# Patient Record
Sex: FEMALE | Race: WHITE | Employment: OTHER | URBAN - METROPOLITAN AREA
[De-identification: names, ages, dates, MRNs, and addresses within clinical notes are randomized per-mention and may not be internally consistent; named-entity substitution may affect disease eponyms.]

---

## 2022-04-22 ENCOUNTER — HOSPITAL ENCOUNTER (INPATIENT)
Age: 77
LOS: 19 days | Discharge: NURSING, REHAB OR LONG-TERM CARE | DRG: 064 | End: 2022-05-11
Attending: FAMILY MEDICINE | Admitting: FAMILY MEDICINE
Payer: MEDICARE

## 2022-04-22 DIAGNOSIS — G45.9 TIA (TRANSIENT ISCHEMIC ATTACK): Primary | ICD-10-CM

## 2022-04-22 PROBLEM — R29.90 STROKE-LIKE SYMPTOMS: Status: ACTIVE | Noted: 2022-04-22

## 2022-04-22 PROCEDURE — 2060000000 HC ICU INTERMEDIATE R&B

## 2022-04-22 RX ORDER — ASPIRIN 300 MG/1
300 SUPPOSITORY RECTAL DAILY
Status: DISCONTINUED | OUTPATIENT
Start: 2022-04-23 | End: 2022-05-04

## 2022-04-22 RX ORDER — RAMIPRIL 5 MG/1
5 CAPSULE ORAL DAILY
Status: ON HOLD | COMMUNITY
End: 2022-05-10 | Stop reason: HOSPADM

## 2022-04-22 RX ORDER — DILTIAZEM HYDROCHLORIDE 120 MG/1
120 CAPSULE, COATED, EXTENDED RELEASE ORAL DAILY
COMMUNITY

## 2022-04-22 RX ORDER — ATORVASTATIN CALCIUM 80 MG/1
80 TABLET, FILM COATED ORAL DAILY
COMMUNITY

## 2022-04-22 RX ORDER — ONDANSETRON 4 MG/1
4 TABLET, ORALLY DISINTEGRATING ORAL EVERY 8 HOURS PRN
Status: DISCONTINUED | OUTPATIENT
Start: 2022-04-22 | End: 2022-05-11 | Stop reason: HOSPADM

## 2022-04-22 RX ORDER — ASPIRIN 81 MG/1
81 TABLET ORAL DAILY
Status: DISCONTINUED | OUTPATIENT
Start: 2022-04-23 | End: 2022-05-11 | Stop reason: HOSPADM

## 2022-04-22 RX ORDER — ENOXAPARIN SODIUM 100 MG/ML
40 INJECTION SUBCUTANEOUS DAILY
Status: DISCONTINUED | OUTPATIENT
Start: 2022-04-23 | End: 2022-04-24

## 2022-04-22 RX ORDER — POLYETHYLENE GLYCOL 3350 17 G/17G
17 POWDER, FOR SOLUTION ORAL DAILY PRN
Status: DISCONTINUED | OUTPATIENT
Start: 2022-04-22 | End: 2022-05-11 | Stop reason: HOSPADM

## 2022-04-22 RX ORDER — ONDANSETRON 2 MG/ML
4 INJECTION INTRAMUSCULAR; INTRAVENOUS EVERY 6 HOURS PRN
Status: DISCONTINUED | OUTPATIENT
Start: 2022-04-22 | End: 2022-05-11 | Stop reason: HOSPADM

## 2022-04-22 RX ORDER — OMEPRAZOLE 20 MG/1
20 CAPSULE, DELAYED RELEASE ORAL DAILY
COMMUNITY
End: 2022-05-26

## 2022-04-22 RX ORDER — ATORVASTATIN CALCIUM 40 MG/1
80 TABLET, FILM COATED ORAL NIGHTLY
Status: DISCONTINUED | OUTPATIENT
Start: 2022-04-22 | End: 2022-05-11 | Stop reason: HOSPADM

## 2022-04-22 NOTE — LETTER
[address] Medicaid  CERTIFICATION OF NECESSITY  FOR TRANSPORTATION   BY WHEELCHAIR VAN     Individual Information   1. Name: Shaji Smith 2. [place] Medicaid Billing Number:    3. Address: [address]  [address] [facility]      Transportation Provider Information   4. Provider Name: MARCIA   5. [place] Medicaid Provider Number:  National Provider Identifier (NPI):      Certification  7. Criteria:  By signing this document, the practitioner certifies that two statements are true:  A. This individual must be accompanied by a mobility-related assistive device from the point of pick-up to the point of drop-off. B. Transport of this individual by standard passenger vehicle or common carrier is precluded or contraindicated. 8. Period Beginning Date: 5/3/2022   9. Length  [x] Not more than 10 day(s)  [] One Year     Additional Information Relevant to Certification   10. Comments or Explanations, If Necessary or Appropriate     Stroke like symptoms, bilateral lower ext dvts HLD & PE     Certifying Practitioner Information   11. Name of Practitioner: Jade Bartholomew   12. [place] Medicaid Provider Number, If Applicable:  Hao 62 Provider Identifier (NPI):      Signature Information   14. Date of Signature: 5/3/2022 15. Name of Person Signing: Electronically signed by Emelina Bryant RN on 5/3/2022 at 11:18 AM     16. Signature and Professional Designation: Electronically signed by Emelina Bryant RN/ & discharge planner on 5/3/2022 at 11:18 AM       ODM 87906  Rev. 7/2015       [address] Encounter Date/Time: 4/22/2022 2138    Hospital Account: [de-identified]    MRN: [id number]    Patient: Patience Apo Serial #: [id number]      ENCOUNTER          Patient Class: I Private Enc?   No Unit RM BD: [address] [facility]  Service: MED   Encounter DX: Stroke-like symptoms [R2*   ADM Provider: Rosalba Agosto MD   Procedure:     ATT Provider: Jennifer Dos Santos MD   REF Provider: Cally Buenrostro Shirley Perkins      Admission DX: Stroke-like symptoms and DX codes: R29.90      PATIENT                 Name: Jeanie Almendarez : 1945 (76 yrs)   Address: [address] [place] [address] Sex: Female   [place]: [address] [place] [address]         Marital Status:    Employer: [employer]         Presybeterian: Mandaeism   Primary Care Provider: Jacob Agosto MD         Primary Phone: [phone]   EMERGENCY CONTACT   Contact Name Legal Guardian? Relationship to Patient Home Phone Work Phone   1. jason mackay  2. *No Contact Specified*      Child    [phone]                 GUARANTOR            GuarantorRbryant Molina     : 1945   Address: [address] Sex: Female     [place] [id number]     Relation to Patient: Self       Home Phone: [phone]   Guarantor ID: [id number]       Work Phone:     Guarantor Employer: [employer]         Status: UNKNOWN      COVERAGE        PRIMARY INSURANCE   Payor: [facility] MEDICARE Plan: Latrice Gal MEDICARE ADVANTAGE*   Payor Address: [facility] [id number] [id number]       Group Number: [id number] Insurance Type: [address] Name: Jcarlos Watts : 1945   Subscriber ID: [id number] Dianna Goldman. Rel. to Sub: Self   SECONDARY INSURANCE   Payor:   Plan:     Payor Address:  ,           Group Number:   Insurance Type:     Subscriber Name:   Subscriber :     Subscriber ID:   Pat.  Rel. to Sub:             CSN: [id number]

## 2022-04-22 NOTE — LETTER
[address] [facility] Medicaid Billing Number: [facility] Medicaid  CERTIFICATION OF NECESSITY  FOR NON-EMERGENCY TRANSPORTATION   BY GROUND AMBULANCE      Individual Information   1. Name: Sheridan Goltz [id number]     3. Address: [address]  [address] [facility]      Transportation Provider Information   4. Provider Name: jose l    5. [place] Provider Number:  National Provider Identifier (NPI):      Certification  7. Criteria:  During transport, this individual requires:  [x] Medical treatment or continuous     supervision by an EMT. [x] The administration or regulation of oxygen by another person. [] Supervised protective restraint. 8. Period Beginning Date: 2022   9. Length  [x] Not more than 10 day(s)  [] One Year     Additional Information Relevant to Certification   10. Comments or Explanations, If Necessary or Appropriate     02 l nc unable to maintain. Sever muscular weakness and deconditioned state that precludes any significant physical activity      Certifying Practitioner Information   11. Name of Practitioner: MADIHA Gray 62 Provider Identifier (NPI):      Signature Information   14. Date of Signature: 2022 15. Name of Person Signing: Lida Robin    Electronically signed by DERECK Sheriff on 2022 at 12:41 PM       OD 41964  Rev. 2015          Samiagallito Bazan : 1945 (76 yrs)    Address: , [facility] Sex: Female   [place] city: [address] [place] [address]         Marital Status:    Employer: [employer]         Confucianist: Cheondoism   Primary Care Provider: Brian Gutierrez MD         Primary Phone: [phone]   EMERGENCY CONTACT   Contact Name Legal Guardian?  Relationship to Patient Home Phone Work Phone   1. jason mackay  2. *No Contact Specified*      Child    [phone]                 GUARANTOR            GuarantorLuceli Ohm     : 1945   Address: [address] Sex: Female     [place] [address]     Relation to Patient: Self       Home Phone: [phone]   Guarantor ID: [id number]       Work Phone:     Guarantor Employer: [employer]         Status: UNKNOWN      COVERAGE        PRIMARY INSURANCE   Payor: [place] MEDICARE Plan: [facility] MEDICARE ADVANTAGE*   Payor Address: [facility] [id number],  [place] [phone]       Group Number: [id number] Insurance Type: [address] Name: Beaverc[place]us Krishnan : 1945   Subscriber ID: [id number] Juan Jose Cullen.  Rel. to Sub: Self       Ss#

## 2022-04-23 ENCOUNTER — APPOINTMENT (OUTPATIENT)
Dept: MRI IMAGING | Age: 77
DRG: 064 | End: 2022-04-23
Attending: FAMILY MEDICINE
Payer: MEDICARE

## 2022-04-23 LAB
ANION GAP SERPL CALCULATED.3IONS-SCNC: 12 MMOL/L (ref 7–16)
BUN BLDV-MCNC: 21 MG/DL (ref 6–23)
CALCIUM SERPL-MCNC: 8.5 MG/DL (ref 8.6–10.2)
CHLORIDE BLD-SCNC: 111 MMOL/L (ref 98–107)
CHOLESTEROL, TOTAL: 140 MG/DL (ref 0–199)
CO2: 18 MMOL/L (ref 22–29)
CREAT SERPL-MCNC: 1.2 MG/DL (ref 0.5–1)
CREATININE URINE: 82 MG/DL (ref 29–226)
CREATININE URINE: 85 MG/DL (ref 29–226)
GFR AFRICAN AMERICAN: 53
GFR NON-AFRICAN AMERICAN: 44 ML/MIN/1.73
GLUCOSE BLD-MCNC: 109 MG/DL (ref 74–99)
HBA1C MFR BLD: 5.6 % (ref 4–5.6)
HCT VFR BLD CALC: 23.4 % (ref 34–48)
HDLC SERPL-MCNC: 48 MG/DL
HEMOGLOBIN: 7.1 G/DL (ref 11.5–15.5)
LDL CHOLESTEROL CALCULATED: 75 MG/DL (ref 0–99)
MCH RBC QN AUTO: 27.8 PG (ref 26–35)
MCHC RBC AUTO-ENTMCNC: 30.3 % (ref 32–34.5)
MCV RBC AUTO: 91.8 FL (ref 80–99.9)
MICROALBUMIN UR-MCNC: 99 MG/L
MICROALBUMIN/CREAT UR-RTO: 116.5 (ref 0–30)
OSMOLALITY URINE: 511 MOSM/KG (ref 300–900)
PDW BLD-RTO: 13.7 FL (ref 11.5–15)
PLATELET # BLD: 280 E9/L (ref 130–450)
PMV BLD AUTO: 10 FL (ref 7–12)
POTASSIUM REFLEX MAGNESIUM: 3.7 MMOL/L (ref 3.5–5)
RBC # BLD: 2.55 E12/L (ref 3.5–5.5)
SODIUM BLD-SCNC: 141 MMOL/L (ref 132–146)
SODIUM URINE: 108 MMOL/L
TRIGL SERPL-MCNC: 87 MG/DL (ref 0–149)
UREA NITROGEN, UR: 498 MG/DL (ref 800–1666)
VITAMIN D 25-HYDROXY: 31 NG/ML (ref 30–100)
VLDLC SERPL CALC-MCNC: 17 MG/DL
WBC # BLD: 9.9 E9/L (ref 4.5–11.5)

## 2022-04-23 PROCEDURE — 97161 PT EVAL LOW COMPLEX 20 MIN: CPT

## 2022-04-23 PROCEDURE — 92523 SPEECH SOUND LANG COMPREHEN: CPT | Performed by: SPEECH-LANGUAGE PATHOLOGIST

## 2022-04-23 PROCEDURE — 80061 LIPID PANEL: CPT

## 2022-04-23 PROCEDURE — 6360000002 HC RX W HCPCS: Performed by: FAMILY MEDICINE

## 2022-04-23 PROCEDURE — 97165 OT EVAL LOW COMPLEX 30 MIN: CPT

## 2022-04-23 PROCEDURE — 82570 ASSAY OF URINE CREATININE: CPT

## 2022-04-23 PROCEDURE — 70551 MRI BRAIN STEM W/O DYE: CPT

## 2022-04-23 PROCEDURE — 92610 EVALUATE SWALLOWING FUNCTION: CPT | Performed by: SPEECH-LANGUAGE PATHOLOGIST

## 2022-04-23 PROCEDURE — 85027 COMPLETE CBC AUTOMATED: CPT

## 2022-04-23 PROCEDURE — 84300 ASSAY OF URINE SODIUM: CPT

## 2022-04-23 PROCEDURE — 2060000000 HC ICU INTERMEDIATE R&B

## 2022-04-23 PROCEDURE — 6370000000 HC RX 637 (ALT 250 FOR IP): Performed by: NURSE PRACTITIONER

## 2022-04-23 PROCEDURE — 84540 ASSAY OF URINE/UREA-N: CPT

## 2022-04-23 PROCEDURE — 83036 HEMOGLOBIN GLYCOSYLATED A1C: CPT

## 2022-04-23 PROCEDURE — 36415 COLL VENOUS BLD VENIPUNCTURE: CPT

## 2022-04-23 PROCEDURE — 83935 ASSAY OF URINE OSMOLALITY: CPT

## 2022-04-23 PROCEDURE — 6370000000 HC RX 637 (ALT 250 FOR IP): Performed by: FAMILY MEDICINE

## 2022-04-23 PROCEDURE — 99222 1ST HOSP IP/OBS MODERATE 55: CPT | Performed by: PSYCHIATRY & NEUROLOGY

## 2022-04-23 PROCEDURE — 97530 THERAPEUTIC ACTIVITIES: CPT

## 2022-04-23 PROCEDURE — 92526 ORAL FUNCTION THERAPY: CPT | Performed by: SPEECH-LANGUAGE PATHOLOGIST

## 2022-04-23 PROCEDURE — 82306 VITAMIN D 25 HYDROXY: CPT

## 2022-04-23 PROCEDURE — 80048 BASIC METABOLIC PNL TOTAL CA: CPT

## 2022-04-23 PROCEDURE — 97535 SELF CARE MNGMENT TRAINING: CPT

## 2022-04-23 PROCEDURE — 82044 UR ALBUMIN SEMIQUANTITATIVE: CPT

## 2022-04-23 RX ORDER — ACETAMINOPHEN 325 MG/1
650 TABLET ORAL EVERY 6 HOURS PRN
Status: DISCONTINUED | OUTPATIENT
Start: 2022-04-23 | End: 2022-04-24

## 2022-04-23 RX ORDER — PANTOPRAZOLE SODIUM 40 MG/1
40 TABLET, DELAYED RELEASE ORAL
Status: DISCONTINUED | OUTPATIENT
Start: 2022-04-24 | End: 2022-04-26

## 2022-04-23 RX ORDER — DILTIAZEM HYDROCHLORIDE 120 MG/1
120 CAPSULE, COATED, EXTENDED RELEASE ORAL DAILY
Status: DISCONTINUED | OUTPATIENT
Start: 2022-04-23 | End: 2022-05-11 | Stop reason: HOSPADM

## 2022-04-23 RX ORDER — ATORVASTATIN CALCIUM 40 MG/1
80 TABLET, FILM COATED ORAL DAILY
Status: DISCONTINUED | OUTPATIENT
Start: 2022-04-23 | End: 2022-04-23

## 2022-04-23 RX ORDER — RAMIPRIL 5 MG/1
5 CAPSULE ORAL DAILY
Status: DISCONTINUED | OUTPATIENT
Start: 2022-04-23 | End: 2022-05-11 | Stop reason: HOSPADM

## 2022-04-23 RX ORDER — CHOLECALCIFEROL (VITAMIN D3) 50 MCG
5000 TABLET ORAL DAILY
Status: DISCONTINUED | OUTPATIENT
Start: 2022-04-23 | End: 2022-05-11 | Stop reason: HOSPADM

## 2022-04-23 RX ADMIN — Medication 5000 UNITS: at 09:41

## 2022-04-23 RX ADMIN — ACETAMINOPHEN 650 MG: 325 TABLET ORAL at 14:10

## 2022-04-23 RX ADMIN — ENOXAPARIN SODIUM 40 MG: 100 INJECTION SUBCUTANEOUS at 09:47

## 2022-04-23 RX ADMIN — ACETAMINOPHEN 650 MG: 325 TABLET ORAL at 03:16

## 2022-04-23 RX ADMIN — ATORVASTATIN CALCIUM 80 MG: 40 TABLET, FILM COATED ORAL at 21:30

## 2022-04-23 RX ADMIN — DILTIAZEM HYDROCHLORIDE 120 MG: 120 CAPSULE, COATED, EXTENDED RELEASE ORAL at 10:33

## 2022-04-23 RX ADMIN — ASPIRIN 81 MG: 81 TABLET, COATED ORAL at 09:41

## 2022-04-23 ASSESSMENT — PAIN DESCRIPTION - LOCATION: LOCATION: HEAD

## 2022-04-23 ASSESSMENT — PAIN SCALES - GENERAL
PAINLEVEL_OUTOF10: 4
PAINLEVEL_OUTOF10: 3

## 2022-04-23 ASSESSMENT — PAIN DESCRIPTION - DESCRIPTORS: DESCRIPTORS: TIGHTNESS;ACHING

## 2022-04-23 ASSESSMENT — PAIN DESCRIPTION - ORIENTATION: ORIENTATION: RIGHT

## 2022-04-23 NOTE — PROGRESS NOTES
Hospitalist Progress Note      Synopsis: Patient admitted as a transfer from [facility] for strokelike symptoms. Patient presented to [facility] with sudden onset of left-sided weakness and aphasia that occurred while in the middle of a conversation with her daughter. In ED she was noted to have severe aphasia, severe dysarthria, and left arm and leg drift, and rightward gaze, and left-sided hemianopia. She is for MRI of the brain and echo. Neurology has been consulted. Hospital day 1     Subjective:  Stable overnight. No issues reported. Patient seen and examined. Daughter at bedside reports her symptoms have improved significantly since arrival to [address] [place] [address] ED. Patient herself reports that her left leg and arm are numb though she does have full movement of the left leg and shoulder movement of the left arm. She also complaints of a right frontal headache that isn't severe but more of a nuisance. Daughter states patient recently established with a kidney doctor who resumed her ramipril   Records reviewed. Temp (24hrs), Av.5 °F (36.9 °C), Min:97.8 °F (36.6 °C), Max:99.1 °F (37.3 °C)    DIET: Diet NPO  CODE: Full Code    Intake/Output Summary (Last 24 hours) at 2022 1353  Last data filed at 2022 0929  Gross per 24 hour   Intake 0 ml   Output 700 ml   Net -700 ml       Review of Systems: All bolded are positive; please see HPI  General:  Fever, chills, diaphoresis, fatigue, malaise, night sweats, weight loss  Psychological:  Anxiety, disorientation, hallucinations. ENT:  Epistaxis, headaches, vertigo, visual changes. Cardiovascular:  Chest pain, irregular heartbeats, palpitations, paroxysmal nocturnal dyspnea. Respiratory:  Shortness of breath, coughing, sputum production, hemoptysis, wheezing, orthopnea.   Gastrointestinal:  Nausea, vomiting, diarrhea, heartburn, constipation, abdominal pain, hematemesis, hematochezia, melena, acholic stools  Genito-Urinary: BILITOT, AMYLASE, LIPASE in the last 72 hours. No results for input(s): INR in the last 72 hours. No results for input(s): Naima Comment in the last 72 hours. Chronic labs:    Lab Results   Component Value Date    CHOL 140 04/23/2022    TRIG 87 04/23/2022    HDL 48 04/23/2022    LDLCALC 75 04/23/2022    LABA1C 5.6 04/23/2022       Radiology: REVIEWED DAILY    Assessment:  Suspected CVA - severe aphasia, severe dysarthria, and left arm and leg drift, and rightward gaze, and left-sided hemianopia  CKD, unknown baseline  CAD s/p PCI in 2017  HTN  HLD  Hx of BLE DVT + PE in 11/2021 anticoagulated on Eliquis    Plan:  Resume home meds  Awaiting neurology consultation  Continue neurochecks  Awaiting MRI brain + echo  ASA + statin   Will defer resumption of DOAC to neurology  PT/OT, SLP  Permissive hypertension unless BP >220/120 or pt symptomatic  Check urine studies  Monitor renal function     DVT Prophylaxis [x] Lovenox  []  Heparin [] DOAC [] PCDs [] Ambulation    GI Prophylaxis [x] PPI  [] H2 Blocker   [] Carafate  [] Diet/Tube Feeds   Level of care [] Med/Surg  [x] Intermediate  []  ICU   Diet Diet NPO    Family contact [x]  N/A - pt A&O   [x] At bedside - DTR  [] Phone call     Discharge Plan: Home when stable    +++++++++++++++++++++++++++++++++++++++++++++++++  LUIS Smith/ Jose [address]  +++++++++++++++++++++++++++++++++++++++++++++++++  NOTE: This report was transcribed using voice recognition software. Every effort was made to ensure accuracy; however, inadvertent computerized transcription errors may be present.

## 2022-04-23 NOTE — PROGRESS NOTES
SPEECH/LANGUAGE PATHOLOGY  SPEECH/LANGUAGE/COGNITIVE EVALUATION   and PLAN OF CARE      PATIENT NAME:  Shaista Stephens  (female)     MRN:  [id number]    :  1945  (68 y.o.)  STATUS:  Inpatient: Room 8516/8516-A    TODAY'S DATE:  2022  REFERRING PROVIDER:     Speech Language Pathology (SLP) eval and treat Start: 22, End: 22, ONE TIME, Standing Count: 1 Occurrences, R    Hamilton Newman DO  REASON FOR REFERRAL:  New CVA  EVALUATING THERAPIST: DAYLIN Gonzales    ADMITTING DIAGNOSIS: Stroke-like symptoms [R29.90]    VISIT DIAGNOSIS:        SPEECH THERAPY  PLAN OF CARE   The speech therapy  POC is established based on physician order, speech pathology diagnosis and results of clinical assessment     SPEECH PATHOLOGY DIAGNOSIS:    Moderate dysarthria, mild-mod  cognitive deficit    Speech Pathology intervention is recommended 3-6 times per week for LOS or when goals are met with emphasis on the following:      Conditions Requiring Skilled Therapeutic Intervention for speech, language and/or cognition    Dysarthria   Decreased safety awareness  Decreased short term memory  Decreased problem solving skills     Specific Speech Therapy Interventions to Include: Therapeutic tasks for Cognition  Therapeutic exercises for dysarthria    Specific instructions for next treatment:      To initiate POC    SHORT/LONG TERM GOALS  Pt will improve immediate, short term, recent memory during structured and unstructured tasks with 80% accuracy   Pt will improve problem solving/thought organization during structured and unstructured tasks with 80% accuracy   Pt will improve speech intelligibility and increased articulatory precision via compensatory strategies and oral motor exercises     Patient goals: Patient/family involved in developing goals and treatment plan:   Treatment goals discussed with Patient    The Patient understand(s) the diagnosis, prognosis and plan of care   The patient/family Agreed with above,     This plan may be re-evaluated and revised as warranted. Rehabilitation Potential/Prognosis: good                CLINICAL ASSESSMENT:  MOTOR SPEECH       Oral Peripheral Examination   Left labiobuccal weakness and Left lingual deviation     Parameters of Speech Production  Respiration:  Adequate for speech production  Articulation:  Distortion  Resonance:  Within functional limits  Quality:   Breathy  Pitch: Within functional limits  Intensity: Quiet  Fluency:  Intact  Prosody Intact    RECEPTIVE LANGUAGE    Comprehension of Yes/No Questions: Within functional limits    Process  Simple Verbal Commands:   Within functional limits  Process Intermediate Verbal Commands:   Within functional limits  Process Complex Verbal Commands:     Inconsistent    Comprehension of Conversation:      Within functional limits      EXPRESSIVE LANGUAGE     Serials: Functional    Imitation:  Words   Functional   Sentences Functional    Naming:  (Modality used:  Verbal)  Confrontation Naming  Functional  Functional Description  Functional  Response Naming: Functional    Conversation:      Conversation was within functional limits    COGNITION     Attention/Orientation  Attention: Sustained attention   Orientation:  Oriented to Person, Place, Date, Reason for hospitalization    Memory   Immediate Recall: Repeated 3/3     Delayed Recall:   Recalled 1/3     Long Term Recall:   Recalled Address, Birthdate and Age    Organization/Problem Solving/Reasoning   Verbal Sequencing:   Functional        Verbal Problem solving:   Impaired          CLINICAL OBSERVATIONS NOTED DURING THE EVALUATION  Cueing was required                  EDUCATION:   The Speech Language Pathologist (SLP) completed education regarding results of evaluation and that intervention is warranted at this time.   Learner: Patient  Education: Reviewed results and recommendations of this evaluation  Evaluation of Education:  Verbalizes understanding    Evaluation Time includes thorough review of current medical information, gathering information on past medical history/social history and prior level of function, completion of standardized testing/informal observation of tasks, assessment of data and education on plan of care and goals. CPT code:    56771  eval speech sound lang comprehension      The admitting diagnosis and active problem list, as listed below have been reviewed prior to initiation of this evaluation. ACTIVE PROBLEM LIST:   Patient Active Problem List   Diagnosis    Stroke-like symptoms       Anup GRANADOS CCC/SLP [id number]  Speech-Language Pathologist

## 2022-04-23 NOTE — PROGRESS NOTES
Occupational Therapy  OCCUPATIONAL THERAPY INITIAL EVALUATION    [address] [address] [address]      Date:2022                                                Patient Name: Marilyn Santiago  MRN: [id number]  : 1945  Room: [address] [place] [address]    Evaluating OT: Fly Ta OTR/L #[id number]     Referring Provider: Joe Adams DO  Specific Provider Orders/Date: OT eval and treat 22    Diagnosis: Stroke-like symptoms [R29.90]   Pt admitted to hospital with L sided weakness, aphasia, dysarthria        Pertinent Medical History:  has no past medical history on file.        Precautions:  Fall Risk, L hemiparesis, R gaze, L inattention, dysarthria, , bed alarm, L lateral lean    Assessment of current deficits    [x] Functional mobility  [x]ADLs  [x] Strength               [x]Cognition    [x] Functional transfers   [x] IADLs         [x] Safety Awareness   [x]Endurance    [x] Fine Coordination              [x] Balance      [x] Vision/perception   [x]Sensation     [x]Gross Motor Coordination  [x] ROM  [] Delirium                   [x] Motor Control     OT PLAN OF CARE   OT POC based on physician orders, patient diagnosis and results of clinical assessment    Frequency/Duration 1-5 days/wk for 2 weeks PRN   Specific OT Treatment Interventions to include:   * Instruction/training on adapted ADL techniques and AE recommendations to increase functional independence within precautions       * Training on energy conservation strategies, correct breathing pattern and techniques to improve independence/tolerance for self-care routine  * Functional transfer/mobility training/DME recommendations for increased independence, safety, and fall prevention  * Patient/Family education to increase follow through with safety techniques and functional independence  * Recommendation of environmental modifications for increased safety with functional transfers/mobility and ADLs  * Cognitive retraining/development of therapeutic activities to improve problem solving, judgement, memory, and attention for increased safety/participation in ADL/IADL tasks  * Sensory re-education to improve body/limb awareness, maintain/improve skin integrity, and improve hand/UE motor function  * Visual-perceptual training to improve environmental scanning, visual attention/focus, and oculomotor skills for increased safety/independence with functional transfers/mobility and ADLs  * Splinting/positioning for increased function, prevention of contractures, and improve skin integrity  * Therapeutic exercise to improve motor endurance, ROM, and functional strength for ADLs/functional transfers  * Therapeutic activities to facilitate/challenge dynamic balance, stand tolerance for increased safety and independence with ADLs  * Therapeutic activities to facilitate gross/fine motor skills for increased independence with ADLs  * Neuro-muscular re-education: facilitation of righting/equilibrium reactions, midline orientation, scapular stability/mobility, normalization of muscle tone, and facilitation of volitional active controled movement  * Positioning to improve skin integrity, interaction with environment and functional independence  * Manual techniques for edema management      Modified [place] Scale (MRS)  Score     Description  0             No symptoms  1             No significant disability despite symptoms  2             Slight disability; able to look after own affairs  3             Moderate disability; able to ambulate without assist/ requires assist with ADLs  4             Moderate/Severe disability;requires assist to ambulate/assist with ADLs  5             Severe disability;bedridden/incontinent   6               Score:  5      Recommended Adaptive Equipment:  TBD     Home Living: Pt lives alone in a 1 story home with 1 ESTRELLA and 1 hand rail.      Bathroom setup: walk-in shower; shower chair    Equipment owned: shower chair, Janina Fang    Prior Level of Function: mod I with ADLs , mod I with IADLs; ambulated with SPC   Driving: no   Occupation: na    Pain Level: Pt denies pain this session    Cognition: A&O: grossly x3 (self, \"hospital\", month and year); Follows 1 step directions   Memory:  P+   Sequencing:  P+   Problem solving:  P+   Judgement/safety:  P   Impaired insight into situation / deficits      Functional Assessment:  AM-PAC Daily Activity Raw Score: 9/24   Initial Eval Status  Date: 4/23/22 Treatment Status  Date: STGs = LTGs  Time frame: 10-14 days   Feeding NPO      Grooming Moderate Assist   Stand by Assist    UB Dressing Maximal Assist   Minimal Assist    LB Dressing Dependent   Moderate Assist    Bathing Maximal Assist  Moderate Assist    Toileting Dependent   Moderate Assist    Bed Mobility  Supine to sit: Maximal Assist x2  Sit to supine: Maximal Assist x2  Supine to sit: Moderate Assist   Sit to supine: Moderate Assist    Functional Transfers Maximal Assist x2    Sit to stands  Moderate Assist    Functional Mobility Maximal Assist x2    1 Side step to [facility] with HHA  Moderate Assist    Balance Sitting: max - min A  (L lateral lean; mod/max cuing; able to correct with min A at times)    Standing: max A x2     Activity Tolerance F-  F+   Visual/  Perceptual R gaze  L inattention    Continue to assess                  Hand Dominance right   Strength ROM Additional Info:    RUE   4-/5 wfl good  and wfl FMC/dexterity noted during ADL tasks     LUE  shoulder 2-/5  Elbow 2/5   Shoulder / elbow 1/2 AROM noted    AAROM / PROM wfl     absent  and absent  FMC/dexterity noted during ADL tasks         Hearing: wfl  Sensation: absent L side (light touch / deep pressure)  Tone: impaired L UE / LE  Edema:none noted     Comments: Upon arrival patient semi-supine in bed and agreeable to OT Session. Therapist educated pt on role of OT.  At end of session, patient seated with [facility] elevated (pillows placed for joint and skin integrity) with call light and phone within reach, all lines intact. Overall patient demonstrated decreased independence and safety during completion of ADL/functional transfer/mobility tasks. Pt would benefit from continued skilled OT to increase safety and independence with completion of ADL/IADL tasks for functional independence and quality of life. Treatment: OT treatment provided this date includes: Facilitation of bed mobility (rolling, supine<>sit), sitting balance at EOB (L lateral lean with max - min A to correct; cuing for midline positioning; addressing posture, weight shifting, dynamic reaching), functional sit to stand transfers (L knee block), standing tolerance tasks (L knee block; addressing posture, balance and activity tolerance; impacting ADLs and functional activity) and side step to [facility] with HHA (L knee block; cuing on sequencing, posture and safety) - skilled cuing on sequencing, task initiation, hand placement, posture, body mechanics, energy conservation techniques and safety. Therapist facilitated self-care retraining: UB/LB self-care tasks (gown, socks), toileting hygiene task and grooming tasks (able to recognize comb; assist with thoroughness and addressing L side) while cuing on sequencing, task initiation, modified techniques, posture, safety and energy conservation techniques. Skilled monitoring of HR, O2 sats and pts response to treatment. Rehab Potential: Good for established goals     Patient / Family Goal: return home      Patient and/or family were instructed on functional diagnosis, prognosis/goals and OT plan of care. Demonstrated fair understanding.      Eval Complexity: Low    Time In: 815  Time Out: 840  Total Treatment Time: 10 minutes    Min Units   OT Eval Low 97165  x  1   OT Eval Medium 34183      OT Eval High 87940      OT Re-Eval [id number]       Therapeutic Ex 56507       Therapeutic Activities 35784  2     ADL/Self Care 36270  8  1   Orthotic Management 31542       Manual 87844     Neuro Re-Ed 56977       Non-Billable Time          Evaluation Time additionally includes thorough review of current medical information, gathering information on past medical history/social history and prior level of function, interpretation of standardized testing/informal observation of tasks, assessment of data and development of plan of care and goals.           Mathew Merritt OTR/L #[id number]

## 2022-04-23 NOTE — PROGRESS NOTES
Physical Therapy  Physical Therapy Initial Assessment     Name: Shaji Smith  : 1945  MRN: [id number]      Date of Service: 2022    Evaluating PT:  Leonard Wills PT, DPT [id number]     Room #:  9500/1715-E  Diagnosis:  Stroke-like symptoms [R29.90]  Reason for admission: L weakness  Precautions:  Falls, L hemiparesis, R gaze/L inattention, sitter  Procedure/Surgery:  None   Equipment Recommendations:  TBD     SUBJECTIVE:  Pt lives alone in a 1 story home with 1 ESTRELLA 1 rail. Pt ambulated with single point cane PTA. OBJECTIVE:   Initial Evaluation  Date:  Treatment   Short Term/ Long Term   Goals   AM-PAC 6 Clicks      Was pt agreeable to Eval/treatment? Yes      Does pt have pain?  Denies      Bed Mobility  Rolling: NT  Supine to sit: MaxA x2  Sit to supine: MaxA x2  Scooting: MaxA x2  ModA   Transfers Sit to stand: MaxA x2  Stand to sit: MaxA x2  Stand pivot: NT  ModA   Ambulation    1-2 side steps with no AD MaxA x2    >10 feet with AAD ModA   Stair negotiation: ascended and descended  NT  TBD      LE ROM: WFL    LE Strength:   knee ext: 4-/5  ankle DF: 4-/5    Balance:   Sitting static: Min<>MaxA  Sitting dynamic: Min<>MaxA  Standing static: MaxA x2  Standing dynamic: NT    -Pt is A & O x 3  -Sensation:  Pt has no light touch or pressure perception to LLE  -Edema:  unremarkable     Therapeutic Exercises:  Functional activity     Patient education  Pt educated on safety, sequencing of transfers, and role of PT    Patient response to education:   Pt verbalized understanding Pt demonstrated skill Pt requires further education in this area   Yes  No  yes     ASSESSMENT:  Conditions Requiring Skilled Therapeutic Intervention:  [x]Decreased strength     []Decreased ROM  [x]Decreased functional mobility  [x]Decreased balance   [x]Decreased endurance   []Decreased posture  [x]Decreased sensation  []Decreased coordination   [x]Decreased vision  [x]Decreased safety awareness   [x]Increased pain Comments:  Pt received supine in bed and agreeable to PT session   Pt presents with L sided weakness (worse in arm than leg), R sided gaze, L sided inattention, and impaired sensation to L side. She requires heavy assist and max verbal cueing for all transfers. She is awake, alert, and oriented cognitively. She has impaired sitting balance with L sided leaning which she does correct with cues but quickly resorts back to leaning. Assisted to stand and able to take a step or two with substantial aid for balance. Pt with all needs met and call light in reach. Pt would benefit from continued PT POC to address functional deficits described above. Treatment:  Patient practiced and was instructed in the following treatment:     Therapeutic activity  o Patient education provided continuously throughout session for sequencing, safety maintenance, and improving any deficits found during the evaluation. o Bed mobility training - pt given verbal and tactile cues to facilitate proper sequencing and safety during rolling and supine>sit as well as provided with physical assistance to complete task    o Sitting EOB for >10 minutes for upright tolerance, postural awareness and BLE ROM   o STS and pivot transfer training - pt educated on proper hand and foot placement, safety and sequencing, and use of proper technique to safely complete sit<>stand and pivot transfers with hands on assistance to complete task safely. Side steps while standing. Up for 1 minute standing. Pt's/ family goals   1. None stated    Patient and or family understand(s) diagnosis, prognosis, and plan of care. yes    Prognosis is fair for reaching above PT goals.     PHYSICAL THERAPY PLAN OF CARE:    PT POC is established based on physician order and patient diagnosis     Referring provider/PT Order:  04/22/22 2215   PT evaluation and treat Start: 04/22/22 2215, End: 04/22/22 2215, ONE TIME, Standing Count: 1 Occurrences, R    Maple Lat, DO    Diagnosis:  Stroke-like symptoms [R29.90]  Specific instructions for next treatment:  Progress transfers as able. Current Treatment Recommendations:   [x] Strengthening to improve independence with functional mobility   [] ROM to improve independence with functional mobility   [x] Balance Training to improve static/dynamic balance and to reduce fall risk  [x] Endurance Training to improve activity tolerance during functional mobility   [x] Transfer Training to improve safety and independence with all functional transfers   [x] Gait Training to improve gait mechanics, endurance and asses need for appropriate assistive device  [] Stair Training in preparation for safe discharge home and/or into the community   [x] Positioning to prevent skin breakdown and contractures  [x] Safety and Education Training   [] Patient/Caregiver Education   [] HEP  [] Other     PT long term treatment goals are located in above grid    Frequency of treatments: 2-5x/week x 1-2 weeks. Time in  0810  Time out  0835    Total Treatment Time  15 minutes     Evaluation Time includes thorough review of current medical information, gathering information on past medical history/social history and prior level of function, completion of standardized testing/informal observation of tasks, assessment of data and education on plan of care and goals.     CPT codes:  [x] Low Complexity PT evaluation 81395  [] Moderate Complexity PT evaluation 68960  [] High Complexity PT evaluation 70182  [] PT Re-evaluation 09235  [] Gait training 70233 - minutes  [] Manual therapy 02733 - minutes  [x] Therapeutic activities 56924 15 minutes  [] Therapeutic exercises 92935 - minutes  [] Neuromuscular reeducation 92719 - minutes     Marilin Coates, PT, DPT  [id number]

## 2022-04-23 NOTE — PROGRESS NOTES
SPEECH/LANGUAGE PATHOLOGY  CLINICAL ASSESSMENT OF SWALLOWING FUNCTION   and PLAN OF CARE    PATIENT NAME:  Nelsy Olivas  (female)     MRN:  [id number]    :  1945  (68 y.o.)  STATUS:  Inpatient: Room 8516/8516-A    TODAY'S DATE:  2215   Speech Language Pathology (SLP) eval and treat Start: 22, End: 22, ONE TIME, Standing Count: 1 Occurrences, R    Radha Arellano, DO  REASON FOR REFERRAL: new CVA  EVALUATING THERAPIST: Symone Rodriguez, SLP                 RESULTS:    DYSPHAGIA DIAGNOSIS:   Clinical indicators of  oropharyngeal phase dysphagia       DIET RECOMMENDATIONS:  Pureed consistency solids (IDDSI level 4) with pudding consistency (extremely thick - IDDSI level 4)  Liquids as tolerated    MEDICATION ADMINISTRATION: Administer medication crushed, as able, with pudding/applesauce     FEEDING RECOMMENDATIONS:     Assistance level:  Full assistance is needed during all oral intake      Compensatory strategies recommended: Double swallow, Small bites/sips, Alternate solids and liquids, Check for oral pocketing and No straw      Discussed recommendations with nursing and/or faxed report to referring provider: Yes    SPEECH THERAPY  PLAN OF CARE   The dysphagia POC is established based on physician order, dysphagia diagnosis and results of clinical assessment     Skilled SLP intervention for dysphagia management on acute care 3-5 x per week until goals met, pt plateaus in function and/or discharged from hospital    Conditions Requiring Skilled Therapeutic Intervention for dysphagia:    Reduced oral control of bolus   Anterior bolus loss  Reduced bolus formation and/or manipulation  Decreased buccal strength and sensation resulting in oral cavity retention  Throat clearing during PO intake   Coughing during PO intake      Specific dysphagia interventions to include:     Compensatory strategy training   Therapeutic exercises  Trials of upgraded diet/liquid     Specific instructions for next treatment:  ongoing PO analysis to upgrade diet and evaluate tolerance of current PO recommendation, initiate instruction of therapeutic exercises  and initiate instruction of compensatory strategies  Patient Treatment Goals:    Short Term Goals:  Pt will implement identified compensatory swallowing strategies on 90% of opportunities or greater to improve airway protection and swallow function. Pt will complete PO trials of upgraded diet textures with SLP only to determine the least restrictive PO diet to maintain adequate nutrition/hydration with no more than 1 overt s/s of pen/asp. Pt will complete oral motor strength/ coordination exercises to improve bolus prep/ control and mastication with  moderate verbal prompts . Pt will complete BOTR strength/ ROM exercises to reduce pharyngeal residuals and improve epiglottic inversion moderate verbal prompts  Pt will complete laryngeal strength/ ROM therapeutic exercises to improve airway protection for the least restrictive PO diet moderate verbal prompts    Long Term Goals:   Pt will maintain adequate nutrition/hydration via PO intake of the least restrictive oral diet with implementation of safe swallow/ compensatory strategies and decrease signs/symptoms of aspiration to less than 1 x/day. Pt will improve oropharyngeal swallow function to ensure airway protection during PO intake to maintain adequate nutrition/hydration and decrease signs/symptoms of aspiration to less than 1 x/day.       Patient/family Goal:    To be able to [facility] discussed with Patient   The Patient understand(s) the diagnosis, prognosis and plan of care     Rehabilitation Potential/Prognosis: good                    ADMITTING DIAGNOSIS: Stroke-like symptoms [R29.90]    VISIT DIAGNOSIS:      PATIENT REPORT/COMPLAINT: patient currently NPO pending results of this evaluation  RN cleared patient for participation in assessment     yes     PRIOR LEVEL OF re-evaluated and revised as warranted. Evaluation Time includes thorough review of current medical information, gathering information on past medical history/social history and prior level of function, completion of standardized testing/informal observation of tasks, assessment of data and education on plan of care and goals. INTERVENTION    Pt/family educated on above results and plan of care. Pt/family trained on compensatory strategies for safe swallow and signs and symptoms of aspiration (throat clearing, coughing/choking, wet vocal quality. , etc.) and encouraged to notify staff if observed. Handouts provided as warranted. Pt/family encouraged to engage in question/answer session. All questions answered and pt/family verbalized understanding of above. [x]The admitting diagnosis and active problem list, have been reviewed prior to initiation of this evaluation. ACTIVE PROBLEM LIST:   Patient Active Problem List   Diagnosis    Stroke-like symptoms         CPT code:  14210  bedside swallow eval, 19452 dysphagia therapy    Alejandro GRANADOS CCC/SLP [id number]  Speech-Language Pathologist

## 2022-04-23 NOTE — CONSULTS
[facility]  Neurology Consult    Date:  4/23/2022  Patient Name:  Gadiel Reyna  YOB: 1945  MRN: [id number]     PCP:  Anabel Perez MD   Referring:  Keenan Orona DO      Chief Complaint: left arm weakness    History obtained from: patient, chart    Estiven Thomason is a 68 y.o. 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 She has a history of afib on Eliquis, which may represent a failure of this agent for her. She also, however, has some borderline intracranial stenosis as well as some plaque in the right ICA. Unfortunately, she has multiple potential etiologies, I would favor the ICA more than cardioembolic given I only note infarcts in the right MCA territory at this time. Plan  · On Eliquis  · If right carotid not symptomatic may need to change to other [address] [place] [address] Road  · Continue ASA 81 daily  · Continue statin therapy, goal LDL<70  · ECHO pending  · Check CUS - consider vascular surgery evaluation depending on results        History of Present Illness:  Gadiel Reyna is a 68 y.o. right handed female presenting for evaluation of stroke. She was reported to have left sided weakness and aphasia with rightward gaze drift, and left visual field deficits. She states she is feeling somewhat better, but still has some left arm weakness. She is on Eliquis for afib, and reports no missed doses recently. Review of Systems:  Constitutional  · Weight loss: No  · Fever: No    Eyes  · Double Vision: No  · Visions loss: No    Ears, Nose, Mouth, and Throat  · Difficulty swallowing: No    Cardiovascular  · Chest Pain: No    Respiratory  · Shortness of Breath: Yes    Gastrointestinal  · Abdominal Pain: No    Genitourinary  · Difficulty with Urination: No    Integumentary  · Rash: No    Musculoskeletal  · Knee Pain: Yes    Neurological  · Weakness:  Yes  · Further symptoms noted in HPI    Psychiatric  · Anxiety: No  · Depression: No    Complete 10-point review of systems is negative except as noted above in my HPI      Medical History:   As per HPI     Surgical History:   No past surgical history reported     Family History:   No history of stroke reported    Social History:  Social History     Tobacco Use    Smoking status: Not on file    Smokeless tobacco: Not on file   Substance Use Topics    Alcohol use: Not on file    Drug use: Not on file        Current Medications:      Current Facility-Administered Medications   Medication Dose Route Frequency Provider Last Rate Last Admin    acetaminophen (TYLENOL) tablet 650 mg  650 mg Oral Q6H PRN Arjun Oka, DO   650 mg at 04/23/22 0316    [START ON 4/24/2022] pantoprazole (PROTONIX) tablet 40 mg  40 mg Oral QAM AC Hiral D [place] Prima, APRN - NP        [Held by provider] apixaban (ELIQUIS) tablet 5 mg  5 mg Oral BID Hiral Jerrald Amel, APRN - NP        vitamin D (CHOLECALCIFEROL) tablet 5,000 Units  5,000 Units Oral Daily Chacon Bolds, APRN - NP   5,000 Units at 04/23/22 0941    dilTIAZem (CARDIZEM CD) extended release capsule 120 mg  120 mg Oral Daily Hiral D Marly Prima, APRN - NP        [Held by provider] ramipril (ALTACE) capsule 5 mg  5 mg Oral Daily Chacon Bolds, APRN - NP        ondansetron (ZOFRAN-ODT) disintegrating tablet 4 mg  4 mg Oral Q8H PRN Arjun Oka, DO        Or    ondansetron [facility]) injection 4 mg  4 mg IntraVENous Q6H PRN Arjun Oka, DO        polyethylene glycol (GLYCOLAX) packet 17 g  17 g Oral Daily PRN Arjun Oka, DO        enoxaparin (LOVENOX) injection 40 mg  40 mg SubCUTAneous Daily Arjun Oka, DO   40 mg at 04/23/22 0947    aspirin EC tablet 81 mg  81 mg Oral Daily Arjun Oka, DO   81 mg at 04/23/22 2704    Or    aspirin suppository 300 mg  300 mg Rectal Daily Arjun Oka, DO        perflutren lipid microspheres (DEFINITY) injection 1.65 mg  1.5 mL IntraVENous ONCE PRN Arjun Oka, DO        atorvastatin (LIPITOR) tablet 80 mg  80 mg Oral Nightly Daniel Bishop DO            Allergies:      Not on File     Physical Examination  Vitals   Vitals:    04/22/22 2230 04/23/22 0200 04/23/22 0929   BP: (!) 152/67 (!) 129/45 (!) 152/74   Pulse: 93 78 87   Resp:  18 20   Temp: 98.8 °F (37.1 °C) 97.8 °F (36.6 °C) 98.1 °F (36.7 °C)   TempSrc: Temporal Axillary Oral   SpO2: 96%  94%        General: Patient appears in no acute distress. Awake and oriented x 3. HEENT: Normocephalic, atraumatic  Chest: Clear to auscultation bilaterally  Heart: No murmurs appreciated  Extremities/Peripheral vascular: Trace BL LE edema noted. No cold limbs noted. Neurologic Examination    Mental Status  Alert, and oriented to person, place and time. Speech is fluent with intact comprehension. No evidence of memory impairment. Attention and concentration appeared normal.     Cranial Nerves  II. Visual fields full to confrontation bilaterally. Fundoscopic exam: discs not well visualized  III, IV, VI: Pupils equally round and reactive to light, 3 to 2 mm bilaterally. EOMs: full, no nystagmus. V. Facial sensation intact to light touch bilaterally  VII: Facial movements symmetric and strong  VIII: Hearing intact to voice  IX,X: Palate elevates symmetrically. No dysarthria  XI: Sternocleidomastoid and trapezius 5/5 bilaterally   XII: Tongue is midline    Motor     Right Left   Right Left   Deltoid 5 4  Hip Flexion 4+ 4+   Biceps      5  4+  Knee Extension 5 5   Triceps 5 4+  Knee Flexion 5 5   Handgrip 5 4  Ankle Dorsiflexion 5 5       Ankle Plantarflexion 5 5       Sensation  · Light Touch: Intact distally in all four limbs    Reflexes     Right Left   Biceps 2 2   Brachioradialis 2 2   Patellar 0 0   Achilles 0 0   ankle clonus none none     Toes down going bilaterally.     Coordination  Rapid alternating movements normal in bilateral upper extremities  Finger to nose testing normal bilaterally    Gait  Deferred for safety/fall consideration      Labs  Recent Labs     04/23/22  0543      K 3.7   *   CO2 18*   BUN 21   CREATININE 1.2*   GLUCOSE 109*   CALCIUM 8.5*   WBC 9.9   RBC 2.55*   HGB 7.1*   HCT 23.4*   MCV 91.8   MCH 27.8   MCHC 30.3*   RDW 13.7      MPV 10.0   HDL 48   LDLCALC 75   LABA1C 5.6       Imaging  MRI brain without contrast    (Results Pending)             Possible right hypodensity in the right posterior frontal region. Diffuse atrophy noted on outside head CT. CTA head/neck        A mild to moderate amount of plaque noted in JOJO.  If R MCA stroke noted on MRI, there is potentially symptomatic R-MCA atherosclerosis noted intracranially as well        Electronically signed by Jodie Justin DO on 4/23/2022 at 10:31 AM

## 2022-04-23 NOTE — PROGRESS NOTES
Paged Dr. Ly Benoit to clarify if a physician requested any extra scans from [facility], due to the nursing supervisor from [facility] calling the unit is stating that the pt's daughter would  the images and reports from [facility] and bring them to us once the HIPPA form is signed and faxed to them.  Talked to Sylwia Anderson, NP she stated that we don't have to worry about getting those images and reports unless something comes about that we need them

## 2022-04-23 NOTE — H&P
Hospitalist History & Physical      PCP: No primary care provider on file. Date of Service: Pt seen/examined on 4/22/2022     Chief Complaint:  had no chief complaint listed for this encounter. History Of Present Illness:    Ms. Elvi Kline, a 68y.o. year old female  who  has no past medical history on file. Patient transferred from [facility] with concern for strokelike symptoms. Patient had sudden onset of left-sided weakness and aphasia. Patient was sitting at a table talking to family on her when the symptom suddenly developed. At the time of examination in the emergency department she had severe aphasia, severe dysarthria. Left arm and left leg exhibited drift. Subsequently these symptoms did not improve. But patient continued to display rightward gaze and left-sided hemianopsia and numbness to left arm and left leg. On arrival to the floor here at [facility] patient appears disheveled and somewhat confused. She is answering questions but she is somewhat disoriented. She states that she has no pain, no concerns or complaints. She has a sitter with her to keep her from getting out of bed. She has some mild facial droop. Vital signs within normal limits and stable. No past medical history on file. No past surgical history on file. Prior to Admission medications    Not on File         Allergies:  Patient has no allergy information on record. Social History:    TOBACCO:   has no history on file for tobacco use. ETOH:   has no history on file for alcohol use. Family History:    Reviewed in detail and negative for DM, CAD, Cancer, CVA. Positive as follows\"  No family history on file. REVIEW OF SYSTEMS:   Pertinent positives as noted in the HPI. All other systems reviewed and negative. PHYSICAL EXAM:  There were no vitals taken for this visit.   General appearance: No apparent distress, mildly confused and restless  HEENT: Normal Order  Surrogate decision maker confirmed with patient:   Extended Emergency Contact Information  Primary Emergency Contact: jason mackay  Home Phone: [phone]  Relation: Child    DVT Prophylaxis: []Lovenox []Heparin []PCD [] [address] []Encouraged ambulation  Disposition: []Med/Surg [] Intermediate [] ICU/CCU  Admit status: [] Observation [] Inpatient     +++++++++++++++++++++++++++++++++++++++++++++++++  Sharona Quivers, DO  +++++++++++++++++++++++++++++++++++++++++++++++++  NOTE: This report was transcribed using voice recognition software. Every effort was made to ensure accuracy; however, inadvertent computerized transcription errors may be present.

## 2022-04-24 ENCOUNTER — APPOINTMENT (OUTPATIENT)
Dept: ULTRASOUND IMAGING | Age: 77
DRG: 064 | End: 2022-04-24
Attending: FAMILY MEDICINE
Payer: MEDICARE

## 2022-04-24 LAB
ANION GAP SERPL CALCULATED.3IONS-SCNC: 10 MMOL/L (ref 7–16)
BUN BLDV-MCNC: 14 MG/DL (ref 6–23)
CALCIUM SERPL-MCNC: 8.8 MG/DL (ref 8.6–10.2)
CHLORIDE BLD-SCNC: 109 MMOL/L (ref 98–107)
CO2: 21 MMOL/L (ref 22–29)
CREAT SERPL-MCNC: 1 MG/DL (ref 0.5–1)
GFR AFRICAN AMERICAN: >60
GFR NON-AFRICAN AMERICAN: 54 ML/MIN/1.73
GLUCOSE BLD-MCNC: 107 MG/DL (ref 74–99)
HCT VFR BLD CALC: 26 % (ref 34–48)
HEMOGLOBIN: 7.9 G/DL (ref 11.5–15.5)
MCH RBC QN AUTO: 28 PG (ref 26–35)
MCHC RBC AUTO-ENTMCNC: 30.4 % (ref 32–34.5)
MCV RBC AUTO: 92.2 FL (ref 80–99.9)
METER GLUCOSE: 111 MG/DL (ref 74–99)
METER GLUCOSE: 114 MG/DL (ref 74–99)
METER GLUCOSE: 121 MG/DL (ref 74–99)
PDW BLD-RTO: 13.7 FL (ref 11.5–15)
PLATELET # BLD: 328 E9/L (ref 130–450)
PMV BLD AUTO: 9.1 FL (ref 7–12)
POTASSIUM SERPL-SCNC: 3.8 MMOL/L (ref 3.5–5)
RBC # BLD: 2.82 E12/L (ref 3.5–5.5)
SODIUM BLD-SCNC: 140 MMOL/L (ref 132–146)
WBC # BLD: 11.5 E9/L (ref 4.5–11.5)

## 2022-04-24 PROCEDURE — 6360000002 HC RX W HCPCS: Performed by: NURSE PRACTITIONER

## 2022-04-24 PROCEDURE — 6370000000 HC RX 637 (ALT 250 FOR IP): Performed by: FAMILY MEDICINE

## 2022-04-24 PROCEDURE — 93880 EXTRACRANIAL BILAT STUDY: CPT

## 2022-04-24 PROCEDURE — 93880 EXTRACRANIAL BILAT STUDY: CPT | Performed by: RADIOLOGY

## 2022-04-24 PROCEDURE — 2060000000 HC ICU INTERMEDIATE R&B

## 2022-04-24 PROCEDURE — 80048 BASIC METABOLIC PNL TOTAL CA: CPT

## 2022-04-24 PROCEDURE — 85027 COMPLETE CBC AUTOMATED: CPT

## 2022-04-24 PROCEDURE — 99233 SBSQ HOSP IP/OBS HIGH 50: CPT | Performed by: NURSE PRACTITIONER

## 2022-04-24 PROCEDURE — 97535 SELF CARE MNGMENT TRAINING: CPT

## 2022-04-24 PROCEDURE — 36415 COLL VENOUS BLD VENIPUNCTURE: CPT

## 2022-04-24 PROCEDURE — 6360000002 HC RX W HCPCS: Performed by: FAMILY MEDICINE

## 2022-04-24 PROCEDURE — 82962 GLUCOSE BLOOD TEST: CPT

## 2022-04-24 RX ORDER — POTASSIUM CHLORIDE 20 MEQ/1
40 TABLET, EXTENDED RELEASE ORAL PRN
Status: DISCONTINUED | OUTPATIENT
Start: 2022-04-24 | End: 2022-04-26

## 2022-04-24 RX ORDER — MORPHINE SULFATE 2 MG/ML
1 INJECTION, SOLUTION INTRAMUSCULAR; INTRAVENOUS EVERY 4 HOURS PRN
Status: DISCONTINUED | OUTPATIENT
Start: 2022-04-24 | End: 2022-04-27

## 2022-04-24 RX ORDER — ALBUTEROL SULFATE 2.5 MG/3ML
2.5 SOLUTION RESPIRATORY (INHALATION) EVERY 6 HOURS PRN
Status: DISCONTINUED | OUTPATIENT
Start: 2022-04-24 | End: 2022-05-11 | Stop reason: HOSPADM

## 2022-04-24 RX ORDER — ENOXAPARIN SODIUM 100 MG/ML
1 INJECTION SUBCUTANEOUS DAILY
Status: DISCONTINUED | OUTPATIENT
Start: 2022-04-25 | End: 2022-04-24 | Stop reason: DRUGHIGH

## 2022-04-24 RX ORDER — LORAZEPAM 0.5 MG/1
0.5 TABLET ORAL ONCE
Status: DISCONTINUED | OUTPATIENT
Start: 2022-04-24 | End: 2022-04-24

## 2022-04-24 RX ORDER — MECOBALAMIN 5000 MCG
5 TABLET,DISINTEGRATING ORAL EVERY EVENING
Status: DISCONTINUED | OUTPATIENT
Start: 2022-04-24 | End: 2022-05-04

## 2022-04-24 RX ORDER — HYDRALAZINE HYDROCHLORIDE 20 MG/ML
10 INJECTION INTRAMUSCULAR; INTRAVENOUS EVERY 6 HOURS PRN
Status: DISCONTINUED | OUTPATIENT
Start: 2022-04-24 | End: 2022-04-28

## 2022-04-24 RX ORDER — LORAZEPAM 2 MG/ML
0.5 INJECTION INTRAMUSCULAR ONCE
Status: COMPLETED | OUTPATIENT
Start: 2022-04-24 | End: 2022-04-25

## 2022-04-24 RX ORDER — POTASSIUM CHLORIDE 7.45 MG/ML
10 INJECTION INTRAVENOUS PRN
Status: DISCONTINUED | OUTPATIENT
Start: 2022-04-24 | End: 2022-04-26

## 2022-04-24 RX ORDER — MECOBALAMIN 5000 MCG
5 TABLET,DISINTEGRATING ORAL NIGHTLY PRN
Status: DISCONTINUED | OUTPATIENT
Start: 2022-04-24 | End: 2022-05-04

## 2022-04-24 RX ORDER — MAGNESIUM SULFATE 1 G/100ML
1000 INJECTION INTRAVENOUS PRN
Status: DISCONTINUED | OUTPATIENT
Start: 2022-04-24 | End: 2022-04-26

## 2022-04-24 RX ORDER — ACETAMINOPHEN 500 MG
1000 TABLET ORAL EVERY 8 HOURS PRN
Status: DISCONTINUED | OUTPATIENT
Start: 2022-04-24 | End: 2022-05-11 | Stop reason: HOSPADM

## 2022-04-24 RX ORDER — ENOXAPARIN SODIUM 100 MG/ML
1 INJECTION SUBCUTANEOUS 2 TIMES DAILY
Status: DISCONTINUED | OUTPATIENT
Start: 2022-04-24 | End: 2022-05-08

## 2022-04-24 RX ORDER — LORAZEPAM 2 MG/ML
1 INJECTION INTRAMUSCULAR ONCE
Status: COMPLETED | OUTPATIENT
Start: 2022-04-24 | End: 2022-04-24

## 2022-04-24 RX ADMIN — ENOXAPARIN SODIUM 80 MG: 100 INJECTION SUBCUTANEOUS at 20:49

## 2022-04-24 RX ADMIN — MORPHINE SULFATE 1 MG: 2 INJECTION, SOLUTION INTRAMUSCULAR; INTRAVENOUS at 17:16

## 2022-04-24 RX ADMIN — ASPIRIN 300 MG: 300 SUPPOSITORY RECTAL at 17:22

## 2022-04-24 RX ADMIN — LORAZEPAM 1 MG: 2 INJECTION INTRAMUSCULAR; INTRAVENOUS at 00:39

## 2022-04-24 RX ADMIN — ENOXAPARIN SODIUM 40 MG: 100 INJECTION SUBCUTANEOUS at 10:29

## 2022-04-24 ASSESSMENT — PAIN DESCRIPTION - ORIENTATION: ORIENTATION: UPPER

## 2022-04-24 ASSESSMENT — PAIN SCALES - GENERAL: PAINLEVEL_OUTOF10: 8

## 2022-04-24 ASSESSMENT — PAIN DESCRIPTION - DESCRIPTORS: DESCRIPTORS: STABBING;ACHING

## 2022-04-24 ASSESSMENT — PAIN DESCRIPTION - LOCATION: LOCATION: LEG

## 2022-04-24 NOTE — PROGRESS NOTES
neurological deficits, weakness, numbness, paresthesia  Derm:  Rashes, ulcers, excoriations, bruising  Extremities:  Decreased ROM - LUE d/t bone spurs, chronic peripheral edema, mottling    Objective:    /63   Pulse 93   Temp 98.6 °F (37 °C) (Temporal)   Resp 20   SpO2 92%     General appearance: Obese elderly female in no apparent distress, appears stated age and cooperative. HEENT: Conjunctivae/corneas clear. Mucous membranes moist.  Neck: Supple. No JVD. Respiratory:  Clear to auscultation bilaterally. Normal respiratory effort. Cardiovascular:  RRR. S1, S2 without MRG. PV: Pulses palpable. + 1 pitting edema of BLE  Abdomen: Soft, non-tender, non-distended. +BS  Musculoskeletal: No obvious deformities. Skin: Normal skin color. No rashes or lesions. Good turgor. Neurologic:  L facial droop, slurred speech, flaccid LUE. Numbness of LUE and LLE. Right sided gaze. Psychiatric: Alert and oriented, thought content appropriate, normal insight and judgement    Medications:  REVIEWED DAILY    Infusion Medications   Scheduled Medications    pantoprazole  40 mg Oral QAM AC    [Held by provider] apixaban  5 mg Oral BID    vitamin D  5,000 Units Oral Daily    dilTIAZem  120 mg Oral Daily    ramipril  5 mg Oral Daily    enoxaparin  40 mg SubCUTAneous Daily    aspirin  81 mg Oral Daily    Or    aspirin  300 mg Rectal Daily    atorvastatin  80 mg Oral Nightly     PRN Meds: acetaminophen, ondansetron **OR** ondansetron, polyethylene glycol, perflutren lipid microspheres    Labs:     Recent Labs     04/23/22  0543   WBC 9.9   HGB 7.1*   HCT 23.4*          Recent Labs     04/23/22  0543      K 3.7   *   CO2 18*   BUN 21   CREATININE 1.2*   CALCIUM 8.5*       No results for input(s): PROT, ALB, ALKPHOS, ALT, AST, BILITOT, AMYLASE, LIPASE in the last 72 hours. No results for input(s): INR in the last 72 hours.     No results for input(s): Terence Seed in the last 72 hours.    Chronic labs:    Lab Results   Component Value Date    CHOL 140 04/23/2022    TRIG 87 04/23/2022    HDL 48 04/23/2022    [address] 75 04/23/2022    LABA1C 5.6 04/23/2022       Radiology: REVIEWED DAILY    Assessment:  Suspected CVA - severe aphasia, severe dysarthria, and left arm and leg drift, and rightward gaze, and left-sided hemianopia  Dysphagia  R ICA borderline stenosis    CKD, unknown baseline  CAD s/p PCI in 2017  HTN  HLD  Hx of BLE DVT + PE in 11/2021 anticoagulated on Eliquis    Plan:  Neurology following  Continue neurochecks  Awaiting echo + carotid US  ASA + statin   Spoke with neurology who is OK with resuming therapeutic anticoagulation  Therapeutic lovenox until able to take PO  Will obtain MBSS given wheezing s/p eating with previously recommended modified diet. PT/OT, SLP  Permissive hypertension unless BP >220/120 or pt symptomatic  Melatonin at HS    DVT Prophylaxis [x] Lovenox  []  Heparin [] DOAC [] PCDs [] Ambulation    GI Prophylaxis [x] PPI  [] H2 Blocker   [] Carafate  [] Diet/Tube Feeds   Level of care [] Med/Surg  [x] Intermediate  []  ICU   Diet ADULT DIET; Dysphagia - Pureed; Extremely Thick (Pudding); No Drinking Straws    Family contact [x]  N/A - pt A&O   [] At bedside   [x] Phone call - daughter     Discharge Plan: Home when stable    +++++++++++++++++++++++++++++++++++++++++++++++++  [place]pe [place]asm[place] [address], [place]  +++++++++++++++++++++++++++++++++++++++++++++++++  NOTE: This report was transcribed using voice recognition software. Every effort was made to ensure accuracy; however, inadvertent computerized transcription errors may be present.

## 2022-04-24 NOTE — PROGRESS NOTES
Occupational Therapy  OCCUPATIONAL THERAPY treatment note   [facility] [address]  [address] [place] [address]      Date:2022                                                Patient Name: Kalia Epperson  MRN: [id number]  : 1945  Room: [address] [place] [address]    Per eval:  Evaluating OT: Wanda Rene OTR/CON #[id number]     Referring Provider: [place] DO  Specific Provider Orders/Date: OT eval and treat 22  Recommended Adaptive Equipment:  24 hr physical assist     Diagnosis: Stroke-like symptoms [R29.90]   Pt admitted to hospital with L sided weakness, aphasia, dysarthria     Pertinent Medical History:  has no past medical history on file.      Precautions:  Fall Risk, L hemiparesis, R gaze, L inattention, dysarthria, , bed alarm, L lateral lean, TSM, impulsive    Assessment of current deficits    [x] Functional mobility  [x]ADLs  [x] Strength               [x]Cognition    [x] Functional transfers   [x] IADLs         [x] Safety Awareness   [x]Endurance    [x] Fine Coordination              [x] Balance      [x] Vision/perception   [x]Sensation     [x]Gross Motor Coordination  [x] ROM  [] Delirium                   [x] Motor Control     OT PLAN OF CARE   OT POC based on physician orders, patient diagnosis and results of clinical assessment    Frequency/Duration 1-5 days/wk for 2 weeks PRN   Specific OT Treatment Interventions to include:   * Instruction/training on adapted ADL techniques and AE recommendations to increase functional independence within precautions       * Training on energy conservation strategies, correct breathing pattern and techniques to improve independence/tolerance for self-care routine  * Functional transfer/mobility training/DME recommendations for increased independence, safety, and fall prevention  * Patient/Family education to increase follow through with safety techniques and functional independence  * Recommendation of environmental modifications for increased safety with functional transfers/mobility and ADLs  * Cognitive retraining/development of therapeutic activities to improve problem solving, judgement, memory, and attention for increased safety/participation in ADL/IADL tasks  * Sensory re-education to improve body/limb awareness, maintain/improve skin integrity, and improve hand/UE motor function  * Visual-perceptual training to improve environmental scanning, visual attention/focus, and oculomotor skills for increased safety/independence with functional transfers/mobility and ADLs  * Splinting/positioning for increased function, prevention of contractures, and improve skin integrity  * Therapeutic exercise to improve motor endurance, ROM, and functional strength for ADLs/functional transfers  * Therapeutic activities to facilitate/challenge dynamic balance, stand tolerance for increased safety and independence with ADLs  * Therapeutic activities to facilitate gross/fine motor skills for increased independence with ADLs  * Neuro-muscular re-education: facilitation of righting/equilibrium reactions, midline orientation, scapular stability/mobility, normalization of muscle tone, and facilitation of volitional active controled movement  * Positioning to improve skin integrity, interaction with environment and functional independence  * Manual techniques for edema management      Modified No Scale (MRS)  Score     Description  0             No symptoms  1             No significant disability despite symptoms  2             Slight disability; able to look after own affairs  3             Moderate disability; able to ambulate without assist/ requires assist with ADLs  4             Moderate/Severe disability;requires assist to ambulate/assist with ADLs  5             Severe disability;bedridden/incontinent   6               Score:  5    Home Living: Pt lives alone in a 1 story home with 1 ESTRELLA and 1 hand rail. Bathroom setup: walk-in shower; shower chair    Equipment owned: shower chair, [facility]    Prior Level of Function: mod I with ADLs , mod I with IADLs; ambulated with SPC   Driving: no   Occupation: na    Pain Level: 0/10    Cognition: A&O: grossly x3 (self, \"hospital\", month and year); Follows 1 step directions   Memory:  P+   Sequencing:  P+   Problem solving:  P+   Judgement/safety:  P   Impaired insight into situation / deficits      Functional Assessment:  AM-PAC Daily Activity Raw Score: 7/24   Initial Eval Status  Date: 4/23/22 Treatment Status  Date: 4/24/22 STGs = LTGs  Time frame: 10-14 days   Feeding NPO  Dep (full feed, decreased alertness and ability to safely  Sequence task)    Grooming Moderate Assist  Max A (hand over hand assist for facial washing) Stand by Assist    UB Dressing Maximal Assist  dep Minimal Assist    LB Dressing Dependent  Dep (assist with socks) Moderate Assist    Bathing Maximal Assist Dep (simulated) Moderate Assist    Toileting Dependent  Dep (incontinent, assist with hygiene) Moderate Assist    Bed Mobility  Supine to sit: Maximal Assist x2  Sit to supine: Maximal Assist x2 Supine-sit: Mod A    Sit-supine: Mod x 2 Supine to sit: Moderate Assist   Sit to supine: Moderate Assist    Functional Transfers Maximal Assist x2    Sit to stands Sit-stand: Max A     Moderate Assist    Functional Mobility Maximal Assist x2    1 Side step to [facility] with HHA NT Moderate Assist    Balance Sitting: max - min A  (L lateral lean; mod/max cuing; able to correct with min A at times)    Standing: max A x2 Sitting: Mod A (progressed to CGA, Lateral lean)    Standing:  Max A    Activity Tolerance F- Poor+ F+   Visual/  Perceptual R gaze  L inattention    Continue to assess                  Hand Dominance right   Strength ROM Additional Info:    RUE   4-/5 wfl good  and wfl FMC/dexterity noted during ADL tasks     LUE  shoulder 2-/5  Elbow 2/5   Shoulder / elbow 1/2 AROM noted    AAROM / PROM wfl     absent  and absent  FMC/dexterity noted during ADL tasks         Hearing: wfl  Sensation: absent L side (light touch / deep pressure)  Tone: impaired L UE / LE  Edema:none noted     Comments: Upon arrival patient semi-supine in bed and agreeable to OT Session. At end of session, patient upright in bed (pillows placed for joint and skin integrity) with call light and phone within reach, all lines intact. Treatment: Cleared by RN to see pt. Pt required vc's and physical assist for proper technique/safety with hand placement/body mechanics/posture for bed mobility/ADLs/functional tranfers. Pt required vc's for sequencing/initiation of ADLs/functional transfers. Pt appears very impulsive with transfers. Pt able to  sit EOB ~5 mins to increase core strength/balance/activity tolerance for ease with ADLs. Pt required increased time to complete ADLs/functional transfers due to re-directing to task and vc's to stay alert. Pt alert with eyes open ~50% of session.  Continue to educate.     -pt has made slow progress toward goals  -continue current POC    Time In: 0955  Time Out: 1005  Total Treatment Time: 10 minutes    Min Units   OT Eval Low [id number]     OT Eval Medium [id number]     OT Eval High [id number]     OT Re-Eval [id number]     Therapeutic Ex [facility] [id number]     ADL/Self Care [address] [facility] 10 1   Orthotic Management [facility]       Manual [id number]     Neuro Re-Ed [address] [place]       Non-Billable Time          Paulo Maza OTR/L [id number]

## 2022-04-24 NOTE — PROGRESS NOTES
room   V: mastication Normal   V: facial light touch sensation     V,VII: corneal reflex  Present   VII: facial muscle function - upper  Normal   VII: facial muscle function - lower  left facial droop   VIII: hearing Normal   IX: soft palate elevation  Normal   IX,X: gag reflex    XI: trapezius strength  5/5   XI: sternocleidomastoid strength 5/5   XI: neck extension strength  5/5   XII: tongue strength  Normal     Motor:  LUE 1/5, RUE +4/5  BUE atleast 3/5  Normal bulk and tone  No abnormal movements    Sensory:  Appreciates LT throughout     Coordination:   FN intact, attempts to FFM on R but none to L    Gait:  Not tested     DTR:   +1 throughout     No Babinskis  No Monte's    No pathological reflexes    Laboratory/Radiology:     CBC:   Lab Results   Component Value Date    WBC 11.5 04/24/2022    RBC 2.82 04/24/2022    HGB 7.9 04/24/2022    HCT 26.0 04/24/2022    MCV 92.2 04/24/2022    MCH 28.0 04/24/2022    MCHC 30.4 04/24/2022    RDW 13.7 04/24/2022     04/24/2022    MPV 9.1 04/24/2022     CMP:    Lab Results   Component Value Date     04/23/2022    K 3.7 04/23/2022     04/23/2022    CO2 18 04/23/2022    BUN 21 04/23/2022    CREATININE 1.2 04/23/2022    GFRAA 53 04/23/2022    LABGLOM 44 04/23/2022    GLUCOSE 109 04/23/2022    CALCIUM 8.5 04/23/2022     MRI brain without contrast   Final Result   Multiple small recent infarcts throughout the right MCA territory. There is   evidence of slow vascular flow in the right MCA territory. Consider CTA of   the head/neck for further evaluation. Advanced chronic microvascular ischemic changes. No mass effect. No hemorrhage. No hydrocephalus.          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 fib on Eliquis--may be Eliquis failure    Patient also has borderline intracranial stenosis as well as some plaque in the right ICA   She has multiple potential etiologies but would favor ICA more than cardioembolic given infarcts only in the right MCA territory at this time   Patient is awake, alert and oriented x3, confused to situation and impulsive.   Patient continues to have left-sided deficits on exam.      Plan:     Continue supportive care  Await complete echo  Await carotid ultrasound   Vascular surgery may be needed pending results  Continue Eliquis   If right carotid is not symptomatic may need to change [address]  Continue aspirin  Continue high intensity statin  Stroke education  Continue telemetry  Continue PT/OT/PATRICIA Carrasquillo   8:33 AM  4/24/2022

## 2022-04-24 NOTE — PROGRESS NOTES
Patient's daughter at the bedside requesting ultrasound of the patient's legs.  Hiral Kirkpatrick notified via perfect serve

## 2022-04-25 ENCOUNTER — APPOINTMENT (OUTPATIENT)
Dept: GENERAL RADIOLOGY | Age: 77
DRG: 064 | End: 2022-04-25
Attending: FAMILY MEDICINE
Payer: MEDICARE

## 2022-04-25 ENCOUNTER — APPOINTMENT (OUTPATIENT)
Dept: ULTRASOUND IMAGING | Age: 77
DRG: 064 | End: 2022-04-25
Attending: FAMILY MEDICINE
Payer: MEDICARE

## 2022-04-25 LAB
ANION GAP SERPL CALCULATED.3IONS-SCNC: 13 MMOL/L (ref 7–16)
BUN BLDV-MCNC: 14 MG/DL (ref 6–23)
CALCIUM SERPL-MCNC: 8.8 MG/DL (ref 8.6–10.2)
CHLORIDE BLD-SCNC: 109 MMOL/L (ref 98–107)
CO2: 20 MMOL/L (ref 22–29)
CREAT SERPL-MCNC: 1 MG/DL (ref 0.5–1)
EKG ATRIAL RATE: 88 BPM
EKG P AXIS: 13 DEGREES
EKG P-R INTERVAL: 160 MS
EKG Q-T INTERVAL: 394 MS
EKG QRS DURATION: 76 MS
EKG QTC CALCULATION (BAZETT): 476 MS
EKG R AXIS: -6 DEGREES
EKG T AXIS: 18 DEGREES
EKG VENTRICULAR RATE: 88 BPM
GFR AFRICAN AMERICAN: >60
GFR NON-AFRICAN AMERICAN: 54 ML/MIN/1.73
GLUCOSE BLD-MCNC: 105 MG/DL (ref 74–99)
HCT VFR BLD CALC: 27.3 % (ref 34–48)
HEMOGLOBIN: 8.4 G/DL (ref 11.5–15.5)
MCH RBC QN AUTO: 28.5 PG (ref 26–35)
MCHC RBC AUTO-ENTMCNC: 30.8 % (ref 32–34.5)
MCV RBC AUTO: 92.5 FL (ref 80–99.9)
METER GLUCOSE: 101 MG/DL (ref 74–99)
PDW BLD-RTO: 13.9 FL (ref 11.5–15)
PLATELET # BLD: 350 E9/L (ref 130–450)
PMV BLD AUTO: 9.6 FL (ref 7–12)
POTASSIUM SERPL-SCNC: 3.7 MMOL/L (ref 3.5–5)
RBC # BLD: 2.95 E12/L (ref 3.5–5.5)
SODIUM BLD-SCNC: 142 MMOL/L (ref 132–146)
WBC # BLD: 12 E9/L (ref 4.5–11.5)

## 2022-04-25 PROCEDURE — 6370000000 HC RX 637 (ALT 250 FOR IP): Performed by: NURSE PRACTITIONER

## 2022-04-25 PROCEDURE — 6360000002 HC RX W HCPCS: Performed by: FAMILY MEDICINE

## 2022-04-25 PROCEDURE — 93005 ELECTROCARDIOGRAM TRACING: CPT | Performed by: NURSE PRACTITIONER

## 2022-04-25 PROCEDURE — 71045 X-RAY EXAM CHEST 1 VIEW: CPT

## 2022-04-25 PROCEDURE — 36415 COLL VENOUS BLD VENIPUNCTURE: CPT

## 2022-04-25 PROCEDURE — 82962 GLUCOSE BLOOD TEST: CPT

## 2022-04-25 PROCEDURE — 93970 EXTREMITY STUDY: CPT

## 2022-04-25 PROCEDURE — 93971 EXTREMITY STUDY: CPT | Performed by: RADIOLOGY

## 2022-04-25 PROCEDURE — 6370000000 HC RX 637 (ALT 250 FOR IP): Performed by: FAMILY MEDICINE

## 2022-04-25 PROCEDURE — 85027 COMPLETE CBC AUTOMATED: CPT

## 2022-04-25 PROCEDURE — 99232 SBSQ HOSP IP/OBS MODERATE 35: CPT | Performed by: PHYSICIAN ASSISTANT

## 2022-04-25 PROCEDURE — 2060000000 HC ICU INTERMEDIATE R&B

## 2022-04-25 PROCEDURE — 80048 BASIC METABOLIC PNL TOTAL CA: CPT

## 2022-04-25 PROCEDURE — 2580000003 HC RX 258: Performed by: NURSE PRACTITIONER

## 2022-04-25 PROCEDURE — 6360000002 HC RX W HCPCS: Performed by: NURSE PRACTITIONER

## 2022-04-25 RX ORDER — QUETIAPINE FUMARATE 25 MG/1
12.5 TABLET, FILM COATED ORAL EVERY 12 HOURS SCHEDULED
Status: DISCONTINUED | OUTPATIENT
Start: 2022-04-25 | End: 2022-05-09

## 2022-04-25 RX ORDER — ACETAMINOPHEN 650 MG/1
650 SUPPOSITORY RECTAL EVERY 4 HOURS PRN
Status: DISCONTINUED | OUTPATIENT
Start: 2022-04-25 | End: 2022-05-11 | Stop reason: HOSPADM

## 2022-04-25 RX ORDER — SODIUM CHLORIDE 9 MG/ML
INJECTION, SOLUTION INTRAVENOUS CONTINUOUS
Status: DISCONTINUED | OUTPATIENT
Start: 2022-04-25 | End: 2022-04-26

## 2022-04-25 RX ORDER — QUETIAPINE FUMARATE 25 MG/1
12.5 TABLET, FILM COATED ORAL EVERY 6 HOURS PRN
Status: DISCONTINUED | OUTPATIENT
Start: 2022-04-25 | End: 2022-04-27

## 2022-04-25 RX ADMIN — ASPIRIN 300 MG: 300 SUPPOSITORY RECTAL at 16:21

## 2022-04-25 RX ADMIN — LORAZEPAM 0.5 MG: 2 INJECTION INTRAMUSCULAR; INTRAVENOUS at 00:31

## 2022-04-25 RX ADMIN — ENOXAPARIN SODIUM 80 MG: 100 INJECTION SUBCUTANEOUS at 12:07

## 2022-04-25 RX ADMIN — ACETAMINOPHEN 650 MG: 650 SUPPOSITORY RECTAL at 16:21

## 2022-04-25 RX ADMIN — SODIUM CHLORIDE: 9 INJECTION, SOLUTION INTRAVENOUS at 18:09

## 2022-04-25 RX ADMIN — ENOXAPARIN SODIUM 80 MG: 100 INJECTION SUBCUTANEOUS at 23:08

## 2022-04-25 ASSESSMENT — PAIN SCALES - GENERAL
PAINLEVEL_OUTOF10: 1
PAINLEVEL_OUTOF10: 1
PAINLEVEL_OUTOF10: 3

## 2022-04-25 NOTE — CARE COORDINATION
Therapy notes reviewed and patient will need rehab prior returning home. Met with the patient and her daughter Bruce Quinn at the bed and the patient's niece Nate Morales [phone] on the speaker phone. Reviewed input from therapy and gave patient's daughter a list of [place] rehab facilities. Patient has an [employer] Patient is currently in 4 pt restraints, released at the daughter's request because she is at the bedside. Patient is agitated and moving about in the bed. Reviewed that the patient is ordered an US BLE, Echocardiogram, and video swallow. Mirna Lay stated that they would review the list and determine some choices for where they may like the patient to transition to. Contact information for this CM provided to the patient's daughter. Patient will need to be restraint free and sitter free for at least 24 hours prior to submitting for authorization with the patient's insurance. Will continue to follow.      Sandeep Canseco RN.  Sandra Holman  [phone]

## 2022-04-25 NOTE — PLAN OF CARE
Problem: Discharge Planning  Goal: Discharge to home or other facility with appropriate resources  Outcome: Progressing     Problem: Skin/Tissue Integrity  Goal: Absence of new skin breakdown  Description: 1. Monitor for areas of redness and/or skin breakdown  2. Assess vascular access sites hourly  3. Every 4-6 hours minimum:  Change oxygen saturation probe site  4. Every 4-6 hours:  If on nasal continuous positive airway pressure, respiratory therapy assess nares and determine need for appliance change or resting period.   Outcome: Progressing     Problem: Safety - Adult  Goal: Free from fall injury  Outcome: Progressing     Problem: Pain  Goal: Verbalizes/displays adequate comfort level or baseline comfort level  Outcome: Progressing

## 2022-04-25 NOTE — PROGRESS NOTES
Patient continues to be confused and unsuccessfully redirected by the tele sitter.  Restraints continued for patient safety

## 2022-04-25 NOTE — PROGRESS NOTES
SPEECH LANGUAGE PATHOLOGY  DAILY PROGRESS NOTE        PATIENT NAME:  Sunil Ernst      :  1945          TODAY'S DATE:  2022 ROOM:  [address] [place], [place] [address]-    Order received. Chart reviewed. Pt unavailable at this time due to:  [] HOLD per RN  [] Off unit for testing/ procedure    [] With medical staff   [] Declined intervention  [x] Sleeping/ Lethargic   [x] Other: Pt not appropriate for MBSS at this time. Pt unable to attend/stay awake or follow simple commands to participate in MBSS. Will follow as per previously established POC. Please cancel MBSS. SLP will request when appropriate. Violet Andrade.  Caio CAI, [place]. [phone]

## 2022-04-25 NOTE — PROGRESS NOTES
Hospitalist Progress Note      Synopsis: Patient admitted as a transfer from [facility] for strokelike symptoms. Patient presented to [facility] with sudden onset of left-sided weakness and aphasia that occurred while in the middle of a conversation with her daughter. In ED she was noted to have severe aphasia, severe dysarthria, and left arm and leg drift, and rightward gaze, and left-sided hemianopia. MRI of the brain right MCA stroke likely embolic in nature. Neurology is following. Carotid ultrasound with no significant stenosis. Awaiting echo. Hospital day 3     Subjective:  Stable overnight. No issues reported. Patient seen and examined. Confused. 4pt restraints. Records reviewed. Temp (24hrs), Av.9 °F (36.6 °C), Min:97 °F (36.1 °C), Max:98.9 °F (37.2 °C)    DIET: Diet NPO  CODE: Full Code    Intake/Output Summary (Last 24 hours) at 2022 0730  Last data filed at 2022 1446  Gross per 24 hour   Intake --   Output 700 ml   Net -700 ml       Review of Systems:    KURTIS given mental status    Objective:    /85   Pulse 97   Temp 97 °F (36.1 °C) (Temporal)   Resp 18   SpO2 95%     General appearance: Obese elderly female restless and agitated. HEENT: Conjunctivae/corneas clear. Mucous membranes dry. Neck: Supple. No JVD. Respiratory:  Clear to auscultation bilaterally. Normal respiratory effort. Cardiovascular:  RRR. S1, S2 without MRG. PV: Pulses palpable. + 1 pitting edema of BLE  Abdomen: Soft, non-tender, non-distended. +BS  Musculoskeletal: No obvious deformities. Skin: Normal skin color. No rashes or lesions. Good turgor. Neurologic:  L facial droop, slurred speech, flaccid LUE.  Nonsensical speech    Medications:  REVIEWED DAILY    Infusion Medications   Scheduled Medications    melatonin  5 mg Oral QPM    enoxaparin  1 mg/kg SubCUTAneous BID    pantoprazole  40 mg Oral QAM AC    [Held by provider] apixaban  5 mg Oral BID    vitamin D  5,000 Units Oral Daily    dilTIAZem  120 mg Oral Daily    ramipril  5 mg Oral Daily    aspirin  81 mg Oral Daily    Or    aspirin  300 mg Rectal Daily    atorvastatin  80 mg Oral Nightly     PRN Meds: melatonin, hydrALAZINE, potassium chloride **OR** potassium alternative oral replacement **OR** potassium chloride, magnesium sulfate, albuterol, acetaminophen, morphine, ondansetron **OR** ondansetron, polyethylene glycol, perflutren lipid microspheres    Labs:     Recent Labs     04/23/22  0543 04/24/22  1003 04/25/22  0621   WBC 9.9 11.5 12.0*   HGB 7.1* 7.9* 8.4*   HCT 23.4* 26.0* 27.3*    328 350       Recent Labs     04/23/22  0543 04/24/22  1003 04/25/22  0621    140 142   K 3.7 3.8 3.7   * 109* 109*   CO2 18* 21* 20*   BUN 21 14 14   CREATININE 1.2* 1.0 1.0   CALCIUM 8.5* 8.8 8.8       No results for input(s): PROT, ALB, ALKPHOS, ALT, AST, BILITOT, AMYLASE, LIPASE in the last 72 hours. No results for input(s): INR in the last 72 hours. No results for input(s): Les Anna in the last 72 hours. Chronic labs:    Lab Results   Component Value Date    CHOL 140 04/23/2022    TRIG 87 04/23/2022    HDL 48 04/23/2022    LDLCALC 75 04/23/2022    LABA1C 5.6 04/23/2022       Radiology: REVIEWED DAILY    Assessment:  Suspected CVA - severe aphasia, severe dysarthria, and left arm and leg drift, and rightward gaze, and left-sided hemianopia  Dysphagia  Acute delirium  R ICA borderline stenosis     Leukocytosis   CKD, unknown baseline  CAD s/p PCI in 2017  HTN  HLD  Hx of BLE DVT + PE in 11/2021 anticoagulated on Eliquis    Plan:  Neurology following  Continue neurochecks  Awaiting echo   Carotid ultrasound unremarkable, therefore suspect Eliquis failed. [address] only changed  Cont therapeutic lovenox for now  ASA + statin   Will obtain MBSS given wheezing s/p eating with previously recommended modified diet.     PT/OT, SLP  Melatonin at HS  Check CXR given suspicion of aspiration + new onset leukocytosis   Measures to reduce delirium   Seroquel added + melatonin at HS    DVT Prophylaxis [x] Lovenox  []  Heparin [] DOAC [] PCDs [] Ambulation    GI Prophylaxis [x] PPI  [] H2 Blocker   [] Carafate  [] Diet/Tube Feeds   Level of care [] Med/Surg  [x] Intermediate  []  ICU   Diet Diet NPO    Family contact [x]  N/A - pt A&O   [] At bedside   [] Phone call      Discharge Plan: Home when stable    +++++++++++++++++++++++++++++++++++++++++++++++++  LUIS Washington/ Jose [address]  +++++++++++++++++++++++++++++++++++++++++++++++++  NOTE: This report was transcribed using voice recognition software. Every effort was made to ensure accuracy; however, inadvertent computerized transcription errors may be present.

## 2022-04-25 NOTE — PROGRESS NOTES
[facility]  Neurology follow up     Date:  4/25/2022  Patient Name:  Jeny Moran  YOB: 1945  MRN: [id number]     Chief Complaint: stroke     Assessment  RMCA stroke    Evidence of some intracranial stenosis on vessel imaging, however no significant stenosis in the JOJO on CUS    Concerning for cardio-embolic source. Per daughter, the patient does not have a known history of Afib and was on Eliquis for a DVT/PE. No missed doses of anticoagulation. Echo is pending. No afib on telemetry thus far     Plan  · On Eliquis  · Consider changing to different [address] [place] [address] Road- defer choice of agent to primary   · Continue ASA 81 daily  · Continue statin therapy, goal LDL<70  · ECHO pending- if unrevealing, will need extended cardiac monitoring on discharge   · PT/OT/speech , video swallow pending   · Patient currently in 4 point restraints due to confusion, recommend avoiding narcotics/sedatives as able. Recommend frequent reorientation. Can consider use of Depakote 125 mg if needed. History of Present Illness:  Jeny Moran is a 68 y.o. right handed female presenting for evaluation of stroke. Patient initially presented to an outside hospital for eval ration of stroke. She was transferred here for further evaluation. MRI of the brain revealed right MCA stroke. She does have some evidence of borderline intracranial atherosclerosis. Carotid ultrasound did not show any significant right ICA stenosis. An echo is pending. Discussed with patient's daughter today and reviewed MRI images. Per her daughter the patient does not have a known history of A. fib and was on Eliquis for DVT/PE. Denies any compliance issues. Patient was very active leading up to this and independent. She continues to have left-sided weakness and dysarthria. She is lethargic but did receive morphine yesterday and Ativan today.     ROS limited due to lethargy      Allergies:      Not on File     Physical Examination  Vitals   Vitals:    04/24/22 1830 04/24/22 2030 04/25/22 0357 04/25/22 0815   BP: 138/72 (!) 160/79 134/85 (!) 164/75   Pulse: 96 61 97 98   Resp:  18 18 28   Temp:  97.2 °F (36.2 °C) 97 °F (36.1 °C) 96.4 °F (35.8 °C)   TempSrc:  Temporal Temporal Temporal   SpO2:  92% 95% 96%        General: Patient appears in no acute distress. Lethargic. Oriented to self and knows she is in the hospital (says Lu Evans). HEENT: Normocephalic, atraumatic  Chest: effort normal on room air   Heart: NSR on tele  Extremities/Peripheral vascular: Trace BL LE edema noted. No cold limbs noted. Neurologic Examination    Mental Status  Lethargic, and oriented to person, hospital (says Lu Evans)  Attention and concentration appeared impaired. Moderate to severe dysarthria. No aphasia. Repetition of single words intact, trouble with attention for phrases. Cranial Nerves  II. Visual fields - no clear blink to threat on L.   III, IV, VI: Pupils equally round and reactive to light, 3 to 2 mm bilaterally. EOMs: eyes midline with forced eye opening    ? Eyelid apraxia   V. Facial sensation intact to light touch bilaterally  VII: Facial movements - L facial droop   VIII: Hearing intact to voice  IX,X: Palate elevates symmetrically.  Moderate to severe dysarthria   XI: Sternocleidomastoid and trapezius 5/5 bilaterally   XII: Tongue is midline    Motor    L hemiparesis arm > leg  BLE 3/5  Normal tone and bulk   No abnormal movements     Sensation  · Light Touch: decreased on L  · Responds to pain in all limbs     Reflexes    L Babinski     Coordination  Not alert enough to follow commands for testing     Gait  Deferred for safety/fall consideration      Labs  Recent Labs     04/23/22  0543 04/24/22  1003 04/25/22  0621      < > 142   K 3.7   < > 3.7   *   < > 109*   CO2 18*   < > 20*   BUN 21   < > 14   CREATININE 1.2*   < > 1.0   GLUCOSE 109*   < > 105*   CALCIUM 8.5*   < > 8.8   WBC 9.9   < > 12.0*   RBC 2.55*   < > 2.95*   HGB 7.1*   < > 8.4*   HCT 23.4*   < > 27.3*   MCV 91.8   < > 92.5   MCH 27.8   < > 28.5   MCHC 30.3*   < > 30.8*   RDW 13.7   < > 13.9      < > 350   MPV 10.0   < > 9.6   HDL 48  --   --    LDLCALC 75  --   --    LABA1C 5.6  --   --     < > = values in this interval not displayed. Imaging  XR CHEST PORTABLE   Final Result   Findings suggest interstitial scarring and chronic bronchitis. No   significant acute infiltrates are noted         VL DUP CAROTID BILATERAL   Final Result   Atherosclerotic disease. No hemodynamically significant stenosis is   identified   Estimated stenosis by NASCET criteria in the proximal right carotid   artery is between 0% and 49%. Estimated stenosis by NASCET criteria in the proximal left carotid   artery is between 0% and 49%. MRI brain without contrast   Final Result   Multiple small recent infarcts throughout the right MCA territory. There is   evidence of slow vascular flow in the right MCA territory. Consider CTA of   the head/neck for further evaluation. Advanced chronic microvascular ischemic changes. No mass effect. No hemorrhage. No hydrocephalus. FL MODIFIED BARIUM SWALLOW W VIDEO    (Results Pending)   US DUP LOWER EXTREMITIES BILATERAL VENOUS    (Results Pending)         CTA head/neck        A mild to moderate amount of plaque noted in JOJO. If R MCA stroke noted on MRI, there is potentially symptomatic R-MCA atherosclerosis noted intracranially as well    CUS  Atherosclerotic disease. No hemodynamically significant stenosis is  identified  Estimated stenosis by NASCET criteria in the proximal right carotid  artery is between 0% and 49%. Estimated stenosis by NASCET criteria in the proximal left carotid  artery is between 0% and 49%.     Electronically signed by TEMI Beckham on 4/25/2022 at 11:01 AM

## 2022-04-26 ENCOUNTER — APPOINTMENT (OUTPATIENT)
Dept: GENERAL RADIOLOGY | Age: 77
DRG: 064 | End: 2022-04-26
Attending: FAMILY MEDICINE
Payer: MEDICARE

## 2022-04-26 ENCOUNTER — APPOINTMENT (OUTPATIENT)
Dept: CT IMAGING | Age: 77
DRG: 064 | End: 2022-04-26
Attending: FAMILY MEDICINE
Payer: MEDICARE

## 2022-04-26 LAB
AADO2: 102.8 MMHG
AADO2: 233 MMHG
ANION GAP SERPL CALCULATED.3IONS-SCNC: 15 MMOL/L (ref 7–16)
B.E.: -3.7 MMOL/L (ref -3–3)
B.E.: -5.7 MMOL/L (ref -3–3)
BACTERIA: ABNORMAL /HPF
BILIRUBIN URINE: NEGATIVE
BLOOD, URINE: ABNORMAL
BUN BLDV-MCNC: 17 MG/DL (ref 6–23)
CALCIUM SERPL-MCNC: 8.7 MG/DL (ref 8.6–10.2)
CHLORIDE BLD-SCNC: 108 MMOL/L (ref 98–107)
CLARITY: ABNORMAL
CO2: 20 MMOL/L (ref 22–29)
COHB: 0.4 % (ref 0–1.5)
COHB: 0.7 % (ref 0–1.5)
COLOR: YELLOW
CREAT SERPL-MCNC: 1 MG/DL (ref 0.5–1)
CRITICAL: ABNORMAL
CRITICAL: ABNORMAL
DATE ANALYZED: ABNORMAL
DATE ANALYZED: ABNORMAL
DATE OF COLLECTION: ABNORMAL
DATE OF COLLECTION: ABNORMAL
FIO2: 40 %
FIO2: 60 %
GFR AFRICAN AMERICAN: >60
GFR NON-AFRICAN AMERICAN: 54 ML/MIN/1.73
GLUCOSE BLD-MCNC: 90 MG/DL (ref 74–99)
GLUCOSE URINE: NEGATIVE MG/DL
HCO3: 18.7 MMOL/L (ref 22–26)
HCO3: 21 MMOL/L (ref 22–26)
HCT VFR BLD CALC: 28.5 % (ref 34–48)
HEMOGLOBIN: 8.6 G/DL (ref 11.5–15.5)
HHB: 1.2 % (ref 0–5)
HHB: 1.4 % (ref 0–5)
KETONES, URINE: 40 MG/DL
LAB: ABNORMAL
LAB: ABNORMAL
LEUKOCYTE ESTERASE, URINE: ABNORMAL
Lab: ABNORMAL
Lab: ABNORMAL
MCH RBC QN AUTO: 27.8 PG (ref 26–35)
MCHC RBC AUTO-ENTMCNC: 30.2 % (ref 32–34.5)
MCV RBC AUTO: 92.2 FL (ref 80–99.9)
METHB: 0.3 % (ref 0–1.5)
METHB: 0.5 % (ref 0–1.5)
MODE: AC
MODE: AC
NITRITE, URINE: POSITIVE
O2 CONTENT: 11.3 ML/DL
O2 CONTENT: 11.5 ML/DL
O2 SATURATION: 98.6 % (ref 92–98.5)
O2 SATURATION: 98.8 % (ref 92–98.5)
O2HB: 97.7 % (ref 94–97)
O2HB: 97.8 % (ref 94–97)
OPERATOR ID: 1210
OPERATOR ID: ABNORMAL
PATIENT TEMP: 37 C
PATIENT TEMP: 37 C
PCO2: 32.2 MMHG (ref 35–45)
PCO2: 36 MMHG (ref 35–45)
PDW BLD-RTO: 14 FL (ref 11.5–15)
PEEP/CPAP: 5 CMH2O
PEEP/CPAP: 8 CMH2O
PFO2: 2.34 MMHG/%
PFO2: 3.38 MMHG/%
PH BLOOD GAS: 7.38 (ref 7.35–7.45)
PH BLOOD GAS: 7.38 (ref 7.35–7.45)
PH UA: 5.5 (ref 5–9)
PLATELET # BLD: 420 E9/L (ref 130–450)
PMV BLD AUTO: 9.7 FL (ref 7–12)
PO2: 135.4 MMHG (ref 75–100)
PO2: 140.2 MMHG (ref 75–100)
POTASSIUM SERPL-SCNC: 3.6 MMOL/L (ref 3.5–5)
PROCALCITONIN: 0.21 NG/ML (ref 0–0.08)
PROTEIN UA: 100 MG/DL
RBC # BLD: 3.09 E12/L (ref 3.5–5.5)
RBC UA: ABNORMAL /HPF (ref 0–2)
RI(T): 0.76
RI(T): 1.66
RR MECHANICAL: 12 B/MIN
RR MECHANICAL: 16 B/MIN
SODIUM BLD-SCNC: 143 MMOL/L (ref 132–146)
SOURCE, BLOOD GAS: ABNORMAL
SOURCE, BLOOD GAS: ABNORMAL
SPECIFIC GRAVITY UA: >=1.03 (ref 1–1.03)
THB: 8 G/DL (ref 11.5–16.5)
THB: 8.1 G/DL (ref 11.5–16.5)
TIME ANALYZED: 1634
TIME ANALYZED: 1951
UROBILINOGEN, URINE: 0.2 E.U./DL
VT MECHANICAL: 350 ML
VT MECHANICAL: 350 ML
WBC # BLD: 13.2 E9/L (ref 4.5–11.5)
WBC UA: ABNORMAL /HPF (ref 0–5)

## 2022-04-26 PROCEDURE — 94640 AIRWAY INHALATION TREATMENT: CPT

## 2022-04-26 PROCEDURE — 87186 SC STD MICRODIL/AGAR DIL: CPT

## 2022-04-26 PROCEDURE — 6360000002 HC RX W HCPCS: Performed by: NURSE PRACTITIONER

## 2022-04-26 PROCEDURE — 2580000003 HC RX 258: Performed by: INTERNAL MEDICINE

## 2022-04-26 PROCEDURE — 6370000000 HC RX 637 (ALT 250 FOR IP): Performed by: INTERNAL MEDICINE

## 2022-04-26 PROCEDURE — 6370000000 HC RX 637 (ALT 250 FOR IP): Performed by: NURSE PRACTITIONER

## 2022-04-26 PROCEDURE — 0BH17EZ INSERTION OF ENDOTRACHEAL AIRWAY INTO TRACHEA, VIA NATURAL OR ARTIFICIAL OPENING: ICD-10-PCS | Performed by: INTERNAL MEDICINE

## 2022-04-26 PROCEDURE — 99232 SBSQ HOSP IP/OBS MODERATE 35: CPT | Performed by: PHYSICIAN ASSISTANT

## 2022-04-26 PROCEDURE — 6360000002 HC RX W HCPCS: Performed by: STUDENT IN AN ORGANIZED HEALTH CARE EDUCATION/TRAINING PROGRAM

## 2022-04-26 PROCEDURE — 6360000002 HC RX W HCPCS: Performed by: INTERNAL MEDICINE

## 2022-04-26 PROCEDURE — 82805 BLOOD GASES W/O2 SATURATION: CPT

## 2022-04-26 PROCEDURE — 71045 X-RAY EXAM CHEST 1 VIEW: CPT

## 2022-04-26 PROCEDURE — C9113 INJ PANTOPRAZOLE SODIUM, VIA: HCPCS | Performed by: STUDENT IN AN ORGANIZED HEALTH CARE EDUCATION/TRAINING PROGRAM

## 2022-04-26 PROCEDURE — 6360000002 HC RX W HCPCS

## 2022-04-26 PROCEDURE — 31500 INSERT EMERGENCY AIRWAY: CPT | Performed by: INTERNAL MEDICINE

## 2022-04-26 PROCEDURE — 74018 RADEX ABDOMEN 1 VIEW: CPT

## 2022-04-26 PROCEDURE — 84145 PROCALCITONIN (PCT): CPT

## 2022-04-26 PROCEDURE — 81001 URINALYSIS AUTO W/SCOPE: CPT

## 2022-04-26 PROCEDURE — 85027 COMPLETE CBC AUTOMATED: CPT

## 2022-04-26 PROCEDURE — 2580000003 HC RX 258: Performed by: NURSE PRACTITIONER

## 2022-04-26 PROCEDURE — 2000000000 HC ICU R&B

## 2022-04-26 PROCEDURE — 87088 URINE BACTERIA CULTURE: CPT

## 2022-04-26 PROCEDURE — 36415 COLL VENOUS BLD VENIPUNCTURE: CPT

## 2022-04-26 PROCEDURE — A4216 STERILE WATER/SALINE, 10 ML: HCPCS | Performed by: INTERNAL MEDICINE

## 2022-04-26 PROCEDURE — 80048 BASIC METABOLIC PNL TOTAL CA: CPT

## 2022-04-26 PROCEDURE — 94664 DEMO&/EVAL PT USE INHALER: CPT

## 2022-04-26 PROCEDURE — 70450 CT HEAD/BRAIN W/O DYE: CPT

## 2022-04-26 PROCEDURE — 2580000003 HC RX 258: Performed by: STUDENT IN AN ORGANIZED HEALTH CARE EDUCATION/TRAINING PROGRAM

## 2022-04-26 PROCEDURE — 94002 VENT MGMT INPAT INIT DAY: CPT

## 2022-04-26 PROCEDURE — 87040 BLOOD CULTURE FOR BACTERIA: CPT

## 2022-04-26 PROCEDURE — 2500000003 HC RX 250 WO HCPCS: Performed by: INTERNAL MEDICINE

## 2022-04-26 PROCEDURE — 31500 INSERT EMERGENCY AIRWAY: CPT

## 2022-04-26 PROCEDURE — 5A1955Z RESPIRATORY VENTILATION, GREATER THAN 96 CONSECUTIVE HOURS: ICD-10-PCS | Performed by: INTERNAL MEDICINE

## 2022-04-26 PROCEDURE — 2500000003 HC RX 250 WO HCPCS

## 2022-04-26 RX ORDER — ETOMIDATE 2 MG/ML
INJECTION INTRAVENOUS
Status: COMPLETED
Start: 2022-04-26 | End: 2022-04-26

## 2022-04-26 RX ORDER — ETOMIDATE 2 MG/ML
0.3 INJECTION INTRAVENOUS ONCE
Status: COMPLETED | OUTPATIENT
Start: 2022-04-26 | End: 2022-04-26

## 2022-04-26 RX ORDER — SUCCINYLCHOLINE CHLORIDE 20 MG/ML
INJECTION INTRAMUSCULAR; INTRAVENOUS
Status: COMPLETED
Start: 2022-04-26 | End: 2022-04-26

## 2022-04-26 RX ORDER — PANTOPRAZOLE SODIUM 40 MG/10ML
40 INJECTION, POWDER, LYOPHILIZED, FOR SOLUTION INTRAVENOUS DAILY
Status: DISCONTINUED | OUTPATIENT
Start: 2022-04-26 | End: 2022-04-28

## 2022-04-26 RX ORDER — DIPHENHYDRAMINE HYDROCHLORIDE 50 MG/ML
INJECTION INTRAMUSCULAR; INTRAVENOUS
Status: DISPENSED
Start: 2022-04-26 | End: 2022-04-27

## 2022-04-26 RX ORDER — FENTANYL CITRATE 50 UG/ML
50 INJECTION, SOLUTION INTRAMUSCULAR; INTRAVENOUS ONCE
Status: COMPLETED | OUTPATIENT
Start: 2022-04-26 | End: 2022-04-26

## 2022-04-26 RX ORDER — FENTANYL CITRATE 50 UG/ML
INJECTION, SOLUTION INTRAMUSCULAR; INTRAVENOUS
Status: COMPLETED
Start: 2022-04-26 | End: 2022-04-26

## 2022-04-26 RX ORDER — METHYLPREDNISOLONE SODIUM SUCCINATE 125 MG/2ML
INJECTION, POWDER, LYOPHILIZED, FOR SOLUTION INTRAMUSCULAR; INTRAVENOUS
Status: DISPENSED
Start: 2022-04-26 | End: 2022-04-27

## 2022-04-26 RX ORDER — CHLORHEXIDINE GLUCONATE 0.12 MG/ML
15 RINSE ORAL 2 TIMES DAILY
Status: DISCONTINUED | OUTPATIENT
Start: 2022-04-26 | End: 2022-05-02

## 2022-04-26 RX ORDER — METHYLPREDNISOLONE SODIUM SUCCINATE 125 MG/2ML
125 INJECTION, POWDER, LYOPHILIZED, FOR SOLUTION INTRAMUSCULAR; INTRAVENOUS ONCE
Status: DISCONTINUED | OUTPATIENT
Start: 2022-04-26 | End: 2022-04-26

## 2022-04-26 RX ORDER — DIPHENHYDRAMINE HYDROCHLORIDE 50 MG/ML
12.5 INJECTION INTRAMUSCULAR; INTRAVENOUS ONCE
Status: DISCONTINUED | OUTPATIENT
Start: 2022-04-26 | End: 2022-04-26

## 2022-04-26 RX ORDER — SUCCINYLCHOLINE CHLORIDE 20 MG/ML
0.6 INJECTION INTRAMUSCULAR; INTRAVENOUS ONCE
Status: COMPLETED | OUTPATIENT
Start: 2022-04-26 | End: 2022-04-26

## 2022-04-26 RX ORDER — SODIUM CHLORIDE FOR INHALATION 0.9 %
3 VIAL, NEBULIZER (ML) INHALATION ONCE
Status: COMPLETED | OUTPATIENT
Start: 2022-04-26 | End: 2022-04-26

## 2022-04-26 RX ORDER — PROPOFOL 10 MG/ML
5-50 INJECTION, EMULSION INTRAVENOUS CONTINUOUS
Status: DISCONTINUED | OUTPATIENT
Start: 2022-04-26 | End: 2022-04-27

## 2022-04-26 RX ADMIN — ISODIUM CHLORIDE 3 ML: 0.03 SOLUTION RESPIRATORY (INHALATION) at 12:22

## 2022-04-26 RX ADMIN — PROPOFOL 5 MCG/KG/MIN: 10 INJECTION, EMULSION INTRAVENOUS at 13:04

## 2022-04-26 RX ADMIN — ENOXAPARIN SODIUM 80 MG: 100 INJECTION SUBCUTANEOUS at 19:58

## 2022-04-26 RX ADMIN — FENTANYL CITRATE 100 MCG: 50 INJECTION, SOLUTION INTRAMUSCULAR; INTRAVENOUS at 12:55

## 2022-04-26 RX ADMIN — RACEPINEPHRINE HYDROCHLORIDE 11.25 MG: 11.25 SOLUTION RESPIRATORY (INHALATION) at 12:16

## 2022-04-26 RX ADMIN — PANTOPRAZOLE SODIUM 40 MG: 40 INJECTION, POWDER, FOR SOLUTION INTRAVENOUS at 17:51

## 2022-04-26 RX ADMIN — ENOXAPARIN SODIUM 80 MG: 100 INJECTION SUBCUTANEOUS at 09:36

## 2022-04-26 RX ADMIN — ETOMIDATE 20 MG: 20 INJECTION, SOLUTION INTRAVENOUS at 12:56

## 2022-04-26 RX ADMIN — SODIUM CHLORIDE: 9 INJECTION, SOLUTION INTRAVENOUS at 13:16

## 2022-04-26 RX ADMIN — 0.12% CHLORHEXIDINE GLUCONATE 15 ML: 1.2 RINSE ORAL at 13:43

## 2022-04-26 RX ADMIN — FAMOTIDINE 20 MG: 10 INJECTION INTRAVENOUS at 13:43

## 2022-04-26 RX ADMIN — 0.12% CHLORHEXIDINE GLUCONATE 15 ML: 1.2 RINSE ORAL at 19:53

## 2022-04-26 RX ADMIN — WATER 1000 MG: 1 INJECTION INTRAMUSCULAR; INTRAVENOUS; SUBCUTANEOUS at 19:58

## 2022-04-26 RX ADMIN — PROPOFOL 30 MCG/KG/MIN: 10 INJECTION, EMULSION INTRAVENOUS at 17:53

## 2022-04-26 RX ADMIN — ETOMIDATE 20 MG: 2 INJECTION INTRAVENOUS at 12:56

## 2022-04-26 RX ADMIN — SUCCINYLCHOLINE CHLORIDE 48 MG: 20 INJECTION, SOLUTION INTRAMUSCULAR; INTRAVENOUS at 12:59

## 2022-04-26 RX ADMIN — SUCCINYLCHOLINE CHLORIDE 48 MG: 20 INJECTION INTRAMUSCULAR; INTRAVENOUS at 12:59

## 2022-04-26 ASSESSMENT — PAIN SCALES - GENERAL
PAINLEVEL_OUTOF10: 0
PAINLEVEL_OUTOF10: 2
PAINLEVEL_OUTOF10: 0

## 2022-04-26 ASSESSMENT — PULMONARY FUNCTION TESTS
PIF_VALUE: 22
PIF_VALUE: 22
PIF_VALUE: 16
PIF_VALUE: 22
PIF_VALUE: 18
PIF_VALUE: 32
PIF_VALUE: 25
PIF_VALUE: 25
PIF_VALUE: 23
PIF_VALUE: 21
PIF_VALUE: 24
PIF_VALUE: 26
PIF_VALUE: 18
PIF_VALUE: 24
PIF_VALUE: 18
PIF_VALUE: 17
PIF_VALUE: 22
PIF_VALUE: 17
PIF_VALUE: 23
PIF_VALUE: 19
PIF_VALUE: 21
PIF_VALUE: 25
PIF_VALUE: 23
PIF_VALUE: 16
PIF_VALUE: 23
PIF_VALUE: 21
PIF_VALUE: 20
PIF_VALUE: 25

## 2022-04-26 NOTE — PROGRESS NOTES
[address] [facility]  Neurology follow up     Date:  4/26/2022  Patient Name:  Latrice Rdz  YOB: 1945  MRN: [id number]     Chief Complaint: stroke     *Addendum: Repeat CT head reviewed- stable. No evidence of new infarcts or hemorrhagic conversion. Okay to resume anticoagulation from neurology POV    Assessment  RMCA stroke    Evidence of some intracranial stenosis on vessel imaging, however no significant stenosis in the JOJO on CUS    Concerning for cardio-embolic source. Per daughter, the patient does not have a known history of Afib and was on Eliquis for a DVT/PE. Need to assess for PFO- r/o paradoxical embolus. Echo is pending- if unrevealing, extended cardiac monitoring will be needed to eval for Afib. No afib on telemetry thus far. Will repeat CT head today given worsening exam- if stable and no signs of hemorrhage, okay to resume anticoagulation from neurology POV. Would recommend switching from eliquis to different [address] given presence of DVT still as well as embolic appearing stroke. Plan  · Repeat CT head due to worsening exam- if stable and no sign of bleed, okay to resume anticoagulation from neuro POV- defer choice of agent to primary  · Eliquis has been on hold  · Consider changing to different [address]- defer choice of agent to primary   · Continue ASA 81 daily  · Continue statin therapy, goal LDL<70  · ECHO pending- if unrevealing, will need extended cardiac monitoring on discharge   · PT/OT/speech , video swallow pending   · Patient currently in 4 point restraints due to confusion, recommend avoiding narcotics/sedatives as able. Recommend frequent reorientation. Can consider use of Depakote 125 mg if needed. History of Present Illness:  Latrice Rdz is a 68 y.o. right handed female presenting for evaluation of stroke. Patient initially presented to an outside hospital for eval ration of stroke. She was transferred here for further evaluation.   MRI of the brain revealed right MCA stroke. She does have some evidence of borderline intracranial atherosclerosis. Carotid ultrasound did not show any significant right ICA stenosis. An echo is pending. Discussed with patient's daughter today and reviewed MRI images. Per her daughter the patient does not have a known history of A. fib and was on Eliquis for DVT/PE. Denies any compliance issues. Patient was very active leading up to this and independent. She continues to have left-sided weakness and dysarthria. She remains lethargic. She has not received any Ativan today. Severely dysarthric and worse than yesterday. She was not able to participate in MBSS yesterday. She was off floor when echo was attempted to be done. US LE did show evidence of DVT. No family at bedside. ROS limited due to lethargy      Allergies:      Not on File     Physical Examination  Vitals   Vitals:    04/25/22 1615 04/25/22 2015 04/26/22 0400 04/26/22 0800   BP: (!) 141/85 (!) 155/95 137/82 (!) 143/93   Pulse: 101 104 94 101   Resp: 24 18 20 24   Temp: 97.9 °F (36.6 °C) 97.4 °F (36.3 °C) 96.7 °F (35.9 °C) 97.7 °F (36.5 °C)   TempSrc: Temporal Temporal Temporal Axillary   SpO2: 96% 94% 95% 96%        General: Patient appears in no acute distress. Lethargic. Oriented to self and knows she is in the hospital (says Lakeshia Osullivan). HEENT: Normocephalic, atraumatic  Chest: Appears short of breath with increased breathing effort on room air   Heart: NSR on tele  Extremities/Peripheral vascular: Trace BL LE edema noted. No cold limbs noted. Neurologic Examination    Mental Status  Lethargic, and oriented to person, hospital (does not know which). Attention and concentration appeared impaired. Severe dysarthria. - trouble making out any words on exam today. Cranial Nerves  II. Visual fields - no clear blink to threat on L.   III, IV, VI: Pupils equally round and reactive to light, 3 to 2 mm bilaterally.    EOMs: eyes midline with forced eye opening    ? Eyelid apraxia   V. Facial sensation intact to light touch bilaterally  VII: Facial movements - L facial droop   VIII: Hearing intact to voice  IX,X: Palate elevates symmetrically. Severe dysarthria   XI: Sternocleidomastoid and trapezius 5/5 bilaterally   XII: Tongue is midline    Motor    L hemiparesis arm > leg  BLE 3/5  Normal tone and bulk   No abnormal movements     Sensation  · Light Touch: decreased on L  · Responds to pain in all limbs     Reflexes    L Babinski     Coordination  Not alert enough to follow commands for testing     Gait  Deferred for safety/fall consideration      Labs  Recent Labs     04/26/22  0657      K 3.6   *   CO2 20*   BUN 17   CREATININE 1.0   GLUCOSE 90   CALCIUM 8.7   WBC 13.2*   RBC 3.09*   HGB 8.6*   HCT 28.5*   MCV 92.2   MCH 27.8   MCHC 30.2*   RDW 14.0      MPV 9.7       Imaging  US DUP LOWER EXTREMITIES BILATERAL VENOUS   Final Result   There is evidence for deep venous thrombosis      ALERT:  THIS IS AN ABNORMAL REPORT               XR CHEST PORTABLE   Final Result   Findings suggest interstitial scarring and chronic bronchitis. No   significant acute infiltrates are noted         VL DUP CAROTID BILATERAL   Final Result   Atherosclerotic disease. No hemodynamically significant stenosis is   identified   Estimated stenosis by NASCET criteria in the proximal right carotid   artery is between 0% and 49%. Estimated stenosis by NASCET criteria in the proximal left carotid   artery is between 0% and 49%. MRI brain without contrast   Final Result   Multiple small recent infarcts throughout the right MCA territory. There is   evidence of slow vascular flow in the right MCA territory. Consider CTA of   the head/neck for further evaluation. Advanced chronic microvascular ischemic changes. No mass effect. No hemorrhage. No hydrocephalus.          CT HEAD WO CONTRAST    (Results Pending)         CTA head/neck        A mild to moderate amount of plaque noted in JOJO. If R MCA stroke noted on MRI, there is potentially symptomatic R-MCA atherosclerosis noted intracranially as well    CUS  Atherosclerotic disease. No hemodynamically significant stenosis is  identified  Estimated stenosis by NASCET criteria in the proximal right carotid  artery is between 0% and 49%. Estimated stenosis by NASCET criteria in the proximal left carotid  artery is between 0% and 49%.     Electronically signed by TEMI Garcia on 4/26/2022 at 10:01 AM

## 2022-04-26 NOTE — PROGRESS NOTES
Speech Language Pathology      NAME:  Cynthia Gonzalez  :  1945  DATE: 2022  ROOM:  9053/[id number]    Chart review indicating that Pt is now intubated. Will DC from SLP caseload. Please re-order Speech Pathology as warranted/ appropriate. Thank you.        Stroke-like symptoms [R29.90]

## 2022-04-26 NOTE — PROGRESS NOTES
Occupational Therapy     Date:2022  Patient Name: Shaji Smith  MRN: [id number]  : 1945  Room: [address] [place] [address]-A     Completed chart review & attempted to see pt with pt off the unit for testing with nsg reporting decline & may be transferred off unit. Will attempt to see pt at another time. Catia Palomares.  [address] [place] [address], [address] [place] [address]

## 2022-04-26 NOTE — H&P
n  Medical Intensive Care Unit     [facility]  MICU Consult Note    Date and time: 4/26/2022 1:07 PM  Patient's name:  Susana Gonzalez  Medical Record Number: [id number]  Patient's account/billing number: [de-identified]  Patient's YOB: 1945  Age: 68 y.o. Date of Admission: 4/22/2022  9:38 PM  Length of stay during current admission: 4    Primary Care Physician: Monty Solorzano MD  ICU Attending Physician: Dr. Unice Galeazzi Status: Full Code    Reason for ICU admission: worsening mentation, respiratory status     History of Present Illness:   Susana Gonzalez is a 68 y.o. female with PMH of hypertension, hyperlipidemia, CAD s/p PCI in 2017, BLE DVT/PE 11/2021 on Eliquis, GERD who initially presented 4/22 to [address] [place] [address] from [facility] for strokelike sx. She was having sudden onset left-sided weakness and aphasia. In [facility] ED, she had severe aphasia, dysarthria, left arm/leg drift and rightward gaze with left-sided hemianopsia. She was transferred to [facility]. Upon arrival, she was confused and had mild facial droop. MRI showed small recent infarcts in the right MCA territory. Neurology was consulted. Carotid ultrasound showed no significant stenosis. Eliquis was held therapeutic Lovenox was started until patient was able to take p.o. Repeat CT head ordered due to worsening mental status, which showed no new infarcts, progression or midline shift. LE US positive for DVT and popliteal vein and tibioperoneal trunk on the right. Echo ordered to evaluate for PFO to rule out paradoxical embolus. Today, patient became more obtunded and was in respiratory distress. Transferred to the ICU for closer management. Upon arrival to the ICU, she was intubated. Large mucous plug was removed.       CURRENT VENTILATION STATUS:   [x] Ventilator  [] BIPAP  [] Nasal Cannula [] Room Air      IF INTUBATED, ET TUBE MARKING AT LOWER LIP:   24    cms    SECRETIONS   Amount:  [] Small [] Moderate  [] Large [x] None    Color:     [] White [] Colored  [] Bloody    SEDATION:  RAAS Score:  [x] Propofol gtt  [] Versed gtt  [] Ativan gtt   [] No Sedation    PARALYZED:  [x] No    [] Yes    VASOPRESSORS:  [x] No    [] Yes    If yes -   [] Levophed       [] Dopamine     [] Vasopressin       [] Dobutamine  [] Phenylephrine         [] Epinephrine    CENTRAL LINES:     [x] No   [] Yes   (Date of Insertion:   )           If yes -     [] Right IJ     [] Left IJ [] Right Femoral [] Left Femoral                   [] Right Subclavian [] Left Subclavian    REVIEW OF SYSTEMS:  Review of Systems   Unable to perform ROS: Intubated       OBJECTIVE:     VITAL SIGNS:  BP (!) 167/92   Pulse 104   Temp 97.7 °F (36.5 °C) (Axillary)   Resp 20   SpO2 (!) 83%   Tmax over 24 hours:  Temp (24hrs), Av.4 °F (36.3 °C), Min:96.7 °F (35.9 °C), Max:97.9 °F (36.6 °C)      Patient Vitals for the past 6 hrs:   BP Temp Temp src Pulse Resp SpO2   22 1245 (!) 167/92 -- -- 104 20 (!) 83 %   22 1240 (!) 179/100 -- -- 103 20 100 %   22 1230 -- -- -- -- -- 99 %   22 1216 -- -- -- -- -- 99 %   22 0800 (!) 143/93 97.7 °F (36.5 °C) Axillary 101 24 96 %       No intake or output data in the 24 hours ending 22 1307  Wt Readings from Last 2 Encounters:   22 176 lb (79.8 kg)     There is no height or weight on file to calculate BMI. PHYSICAL EXAMINATION:  Physical Exam:  · Vitals: BP (!) 167/92   Pulse 104   Temp 97.7 °F (36.5 °C) (Axillary)   Resp 20   SpO2 (!) 83%     · I & O - 24hr: No intake/output data recorded. Physical Exam  Constitutional:       Appearance: She is ill-appearing. Comments: Intubated, sedated   HENT:      Head: Normocephalic and atraumatic. Eyes:      General: No scleral icterus. Conjunctiva/sclera: Conjunctivae normal.      Pupils: Pupils are equal, round, and reactive to light. Cardiovascular:      Rate and Rhythm: Normal rate and regular rhythm. Pulses: Normal pulses. Heart sounds: No murmur heard. Pulmonary:      Breath sounds: Normal breath sounds. No wheezing, rhonchi or rales. Abdominal:      General: Bowel sounds are normal.      Palpations: Abdomen is soft. Tenderness: There is no guarding or rebound. Musculoskeletal:         General: No swelling. Cervical back: Neck supple. Skin:     General: Skin is warm and dry. Findings: Bruising (ecchymosis present on BUE, ecchymosis and petichiae on BLE ) present. Neurological:      Comments:  Withdraws from noxious stimuli, unresponsive to voice, sedated          MEDICATIONS:  Scheduled Meds:   methylPREDNISolone  125 mg IntraVENous Once    diphenhydrAMINE  12.5 mg IntraVENous Once    diphenhydrAMINE        methylPREDNISolone sodium        QUEtiapine  12.5 mg Oral 2 times per day    melatonin  5 mg Oral QPM    enoxaparin  1 mg/kg SubCUTAneous BID    pantoprazole  40 mg Oral QAM AC    [Held by provider] apixaban  5 mg Oral BID    vitamin D  5,000 Units Oral Daily    dilTIAZem  120 mg Oral Daily    ramipril  5 mg Oral Daily    aspirin  81 mg Oral Daily    Or    aspirin  300 mg Rectal Daily    atorvastatin  80 mg Oral Nightly     Continuous Infusions:   propofol 5 mcg/kg/min (04/26/22 1304)    sodium chloride 50 mL/hr at 04/25/22 1809     PRN Meds:   racepinephrine HCl, 11.25 mg, Q4H PRN  QUEtiapine, 12.5 mg, Q6H PRN  acetaminophen, 650 mg, Q4H PRN  melatonin, 5 mg, Nightly PRN  hydrALAZINE, 10 mg, Q6H PRN  potassium chloride, 40 mEq, PRN   Or  potassium alternative oral replacement, 40 mEq, PRN   Or  potassium chloride, 10 mEq, PRN  magnesium sulfate, 1,000 mg, PRN  albuterol, 2.5 mg, Q6H PRN  acetaminophen, 1,000 mg, Q8H PRN  morphine, 1 mg, Q4H PRN  ondansetron, 4 mg, Q8H PRN   Or  ondansetron, 4 mg, Q6H PRN  polyethylene glycol, 17 g, Daily PRN  perflutren lipid microspheres, 1.5 mL, ONCE PRN        VENT SETTINGS (Comprehensive) (if applicable): Additional Respiratory Assessments  Pulse: 104  Resp: 20  SpO2: (!) 83 %  Swallow: Chokes on liquids    ABGs:   No results for input(s): PH, PCO2, PO2, HCO3, BE, O2SAT in the last 72 hours. Laboratory findings:  Complete Blood Count:   Recent Labs     04/24/22  1003 04/25/22  0621 04/26/22  0657   WBC 11.5 12.0* 13.2*   HGB 7.9* 8.4* 8.6*   HCT 26.0* 27.3* 28.5*    350 420        Last 3 Blood Glucose:   Recent Labs     04/24/22  1003 04/25/22  0621 04/26/22  0657   GLUCOSE 107* 105* 90        PT/INR:  No results found for: PROTIME, INR  PTT:  No results found for: APTT, PTT    Comprehensive Metabolic Profile:   Recent Labs     04/24/22  1003 04/25/22  0621 04/26/22  0657    142 143   K 3.8 3.7 3.6   * 109* 108*   CO2 21* 20* 20*   BUN 14 14 17   CREATININE 1.0 1.0 1.0   GLUCOSE 107* 105* 90   CALCIUM 8.8 8.8 8.7      Magnesium: No results found for: MG  Phosphorus: No results found for: PHOS  Ionized Calcium: No results found for: CAION   Troponin: No results for input(s): TROPONINI in the last 72 hours. Urinalysis: Urine dipstick shows not done. Micro exam: not done. Microbiology:  Notable Cultures:      Blood cultures No results found for: BC  Respiratory cultures No results found for: RESPCULTURE No results found for: LABGRAM  Urine No results found for: LABURIN  Legionella No results found for: LABLEGI  C Diff PCR No results found for: CDIFPCR  Wound culture/abscess: No results for input(s): WNDABS in the last 72 hours. Tip culture:No results for input(s): CXCATHTIP in the last 72 hours. Radiology/Imaging:   Chest Xray (4/26/2022):    ASSESSMENT  And PLAN:     Assessment:    1. Acute respiratory failure 2/2 mucus plugging  2. Acute RMCA Stroke   3. Acute RLE DVT, US showed nonocclusive in popliteal vein and tibioperneal trunk  4. Chronic Anemia, Hb 7.1 on admission, improving   5. History of BLE DVT/PE, on Eliquis  6. History of hypertension  7.  History of hyperlipidemia        Plan:  · Intubated and sedated on propofol  · ABG, CXR   · Neuro on board, appreciate recommendations   · Echo pending   · Eliquis held, currently on therapeutic lovenox   · Leukocytosis and elevated Pro-Des, panculture ordered  · CXR showed more likely atelectasis on the left, cannot rule out infiltrate. For now will defer antibiotics  · Monitor CBC, transfuse Hb <7  · Monitor BMP, creatinine at baseline 1-1.2  · Thrombophilia workup started at [facility], lupus anticoag, APA, factor V, factor VII, factor VIII negative. May need heme onc f/u OP on DC   · Continue aspirin and atorvastatin  · Continue home BP med, Cardizem  · Continue Seroquel for agitation, monitor QTC  · Continue vitamin D          ICU PROPHYLAXIS:  Stress ulcer:  [x] PPI Agent  [] [id number] [] Sucralfate  [] Other:  VTE:   [x] Enoxaparin  [] Unfract. Heparin Subcut  [] EPC Cuffs    NUTRITION:  [x] NPO [] Tube Feeding (Specify: ) [] TPN  [] PO (Diet: Diet NPO)    Chico Qureshi MD, MD PGY-1  Attending Physician: Dr. Olena Grigsby  4/26/2022, 1:07 PM       [facility]  Department of Pulmonary, Critical Care and Sleep Medicine  [address] [facility]  Department of Internal Medicine      During multidisciplinary team rounds Jaky Gallagher is a 68 y.o. female was seen, examined and discussed. This is confirmation that I have personally seen and examined the patient and that the key elements of the encounter were performed by me (> 85 % time). The medications & laboratory data was discussed and adjusted where necessary. The radiographic images were reviewed or with radiologist or consultant if felt dis-concordant with the exam or history. The above findings were corroborated, plans confirmed and changes made if needed. Family is updated at the bedside as available. Key issues of the case were discussed among consultants. Critical Care time is documented if appropriate.       Critical Care time: 44 minutes  Kami Kincaid DO

## 2022-04-26 NOTE — PROGRESS NOTES
Pt noted to be stridorous and increasingly obtunded. Transferred emergently to ICU for RSI.  Meds given as follows:    1243 50 mg fentanyl  1243 20 mg etomidate  1244 48 mg succinylcholine      SpO2 100% /98 R 28    1245 HR 70 SpO2 40    1246  SpO2 23    1247  SpO2 100 167/92    7.5 ETT 24 @ lip BL breath sounds

## 2022-04-26 NOTE — PROGRESS NOTES
Hospitalist Progress Note      Synopsis: Patient admitted as a transfer from [facility] for strokelike symptoms. Patient presented to [facility] with sudden onset of left-sided weakness and aphasia that occurred while in the middle of a conversation with her daughter. In ED she was noted to have severe aphasia, severe dysarthria, and left arm and leg drift, and rightward gaze, and left-sided hemianopia. MRI of the brain right MCA stroke likely embolic in nature. Neurology is following. Carotid ultrasound with no significant stenosis. Awaiting echo to assess for paradoxical embolus. If unrevealing plan is for Zio patch at discharge. She is currently being treated with therapeutic Lovenox with plans to switch to Coumadin when she is able to take p.o. Hospital day 4     Subjective:  Stable overnight. Nursing concerned with breathing this morning  Patient seen and examined. Obtunded, stridorous. Daughter at bedside who reports she believes her mother would agree to intubation. Records reviewed. Temp (24hrs), Av.3 °F (36.3 °C), Min:96.7 °F (35.9 °C), Max:97.9 °F (36.6 °C)    DIET: Diet NPO  CODE: Full Code  No intake or output data in the 24 hours ending 22 1045    Review of Systems:    KURTIS given mental status    Objective:    BP (!) 143/93   Pulse 101   Temp 97.7 °F (36.5 °C) (Axillary)   Resp 24   SpO2 96%     General appearance: Obese elderly female, obtunded though does respond to voice. HEENT: Conjunctivae/corneas clear. Mucous membranes dry. Poor airway protection. Neck: Supple. No JVD. Respiratory:  Stridorous. Prolonged expiration. Mild accessory muscle use. Cardiovascular:  RRR. S1, S2 without MRG. PV: Pulses palpable. + 1 pitting edema of BLE  Abdomen: Soft, non-tender, non-distended. +BS  Musculoskeletal: No obvious deformities. Skin: Normal skin color. No rashes or lesions. Good turgor. Neurologic:  L facial droop, slurred speech, flaccid LUE. Nonsensical speech. Medications:  REVIEWED DAILY    Infusion Medications    sodium chloride 50 mL/hr at 04/25/22 1809     Scheduled Medications    QUEtiapine  12.5 mg Oral 2 times per day    melatonin  5 mg Oral QPM    enoxaparin  1 mg/kg SubCUTAneous BID    pantoprazole  40 mg Oral QAM AC    [Held by provider] apixaban  5 mg Oral BID    vitamin D  5,000 Units Oral Daily    dilTIAZem  120 mg Oral Daily    ramipril  5 mg Oral Daily    aspirin  81 mg Oral Daily    Or    aspirin  300 mg Rectal Daily    atorvastatin  80 mg Oral Nightly     PRN Meds: QUEtiapine, acetaminophen, melatonin, hydrALAZINE, potassium chloride **OR** potassium alternative oral replacement **OR** potassium chloride, magnesium sulfate, albuterol, acetaminophen, morphine, ondansetron **OR** ondansetron, polyethylene glycol, perflutren lipid microspheres    Labs:     Recent Labs     04/24/22  1003 04/25/22  0621 04/26/22  0657   WBC 11.5 12.0* 13.2*   HGB 7.9* 8.4* 8.6*   HCT 26.0* 27.3* 28.5*    350 420       Recent Labs     04/24/22  1003 04/25/22  0621 04/26/22  0657    142 143   K 3.8 3.7 3.6   * 109* 108*   CO2 21* 20* 20*   BUN 14 14 17   CREATININE 1.0 1.0 1.0   CALCIUM 8.8 8.8 8.7       No results for input(s): PROT, ALB, ALKPHOS, ALT, AST, BILITOT, AMYLASE, LIPASE in the last 72 hours. No results for input(s): INR in the last 72 hours. No results for input(s): Oscar Candace in the last 72 hours.     Chronic labs:    Lab Results   Component Value Date    CHOL 140 04/23/2022    TRIG 87 04/23/2022    HDL 48 04/23/2022    [address] 75 04/23/2022    LABA1C 5.6 04/23/2022       Radiology: REVIEWED DAILY    Assessment:  Suspected CVA - severe aphasia, severe dysarthria, and left arm and leg drift, and rightward gaze, and left-sided hemianopia  Dysphagia  Poor airway protection  Acute delirium  R ICA borderline stenosis     Leukocytosis   CKD, unknown baseline  CAD s/p PCI in 2017  HTN  HLD  Hx of BLE DVT + PE in 11/2021 anticoagulated on Eliquis    Plan:  Received Mg epinephrine, Solu-Medrol, and Benadryl ordered  ICU team consulted given concern for airway protection, she was subsequently transferred to the ICU for intubation  Neurology following  Continue neurochecks  Awaiting echo to r/o paradoxical embolus, if negative will need Zio patch at discharge  Cont therapeutic lovenox for now, plans to bridge to Coumadin once able to take p.o. ASA + statin   PT/OT, SLP  Measures to reduce delirium   Check Pro-Des, UA  Seroquel added + melatonin at HS  Will need MBSS when appropriate   Will make outpatient referral to hematology/oncology for hypercoagulable work-up given history of DVT/PE and failure of Eliquis     DVT Prophylaxis [x] Lovenox  []  Heparin [] DOAC [] PCDs [] Ambulation    GI Prophylaxis [x] PPI  [] H2 Blocker   [] Carafate  [] Diet/Tube Feeds   Level of care [] Med/Surg  [] Intermediate  [x]  ICU   Diet Diet NPO    Family contact []  N/A  [x] At bedside - daughter, Madina Howard   [] Phone call      Discharge Plan: TBD pending further clinical course    +++++++++++++++++++++++++++++++++++++++++++++++++  Karan Griffin [address]  +++++++++++++++++++++++++++++++++++++++++++++++++  NOTE: This report was transcribed using voice recognition software. Every effort was made to ensure accuracy; however, inadvertent computerized transcription errors may be present.

## 2022-04-26 NOTE — PLAN OF CARE
Problem: Discharge Planning  Goal: Discharge to home or other facility with appropriate resources  Outcome: Progressing     Problem: Skin/Tissue Integrity  Goal: Absence of new skin breakdown  Description: 1. Monitor for areas of redness and/or skin breakdown  2. Assess vascular access sites hourly  3. Every 4-6 hours minimum:  Change oxygen saturation probe site  4. Every 4-6 hours:  If on nasal continuous positive airway pressure, respiratory therapy assess nares and determine need for appliance change or resting period. Outcome: Progressing     Problem: Safety - Adult  Goal: Free from fall injury  Outcome: Progressing     Problem: Pain  Goal: Verbalizes/displays adequate comfort level or baseline comfort level  Outcome: Progressing     Problem: Safety - Medical Restraint  Goal: Remains free of injury from restraints (Restraint for Interference with Medical Device)  Description: INTERVENTIONS:  1. Determine that other, less restrictive measures have been tried or would not be effective before applying the restraint  2. Evaluate the patient's condition at the time of restraint application  3. Inform patient/family regarding the reason for restraint  4.  Q2H: Monitor safety, psychosocial status, comfort, nutrition and hydration  Outcome: Progressing

## 2022-04-27 ENCOUNTER — APPOINTMENT (OUTPATIENT)
Dept: GENERAL RADIOLOGY | Age: 77
DRG: 064 | End: 2022-04-27
Attending: FAMILY MEDICINE
Payer: MEDICARE

## 2022-04-27 LAB
AADO2: 121.2 MMHG
ANION GAP SERPL CALCULATED.3IONS-SCNC: 12 MMOL/L (ref 7–16)
ANION GAP SERPL CALCULATED.3IONS-SCNC: 14 MMOL/L (ref 7–16)
ANION GAP SERPL CALCULATED.3IONS-SCNC: 15 MMOL/L (ref 7–16)
ANION GAP SERPL CALCULATED.3IONS-SCNC: 17 MMOL/L (ref 7–16)
APTT: 48 SEC (ref 24.5–35.1)
B.E.: -4.9 MMOL/L (ref -3–3)
BUN BLDV-MCNC: 25 MG/DL (ref 6–23)
BUN BLDV-MCNC: 25 MG/DL (ref 6–23)
BUN BLDV-MCNC: 26 MG/DL (ref 6–23)
BUN BLDV-MCNC: 26 MG/DL (ref 6–23)
CALCIUM IONIZED: 1.19 MMOL/L (ref 1.15–1.33)
CALCIUM SERPL-MCNC: 7.7 MG/DL (ref 8.6–10.2)
CALCIUM SERPL-MCNC: 7.9 MG/DL (ref 8.6–10.2)
CALCIUM SERPL-MCNC: 7.9 MG/DL (ref 8.6–10.2)
CALCIUM SERPL-MCNC: 8.2 MG/DL (ref 8.6–10.2)
CHLORIDE BLD-SCNC: 112 MMOL/L (ref 98–107)
CHLORIDE BLD-SCNC: 112 MMOL/L (ref 98–107)
CHLORIDE BLD-SCNC: 114 MMOL/L (ref 98–107)
CHLORIDE BLD-SCNC: 115 MMOL/L (ref 98–107)
CHLORIDE URINE RANDOM: 29 MMOL/L
CO2: 15 MMOL/L (ref 22–29)
CO2: 18 MMOL/L (ref 22–29)
CO2: 18 MMOL/L (ref 22–29)
CO2: 19 MMOL/L (ref 22–29)
COHB: 1 % (ref 0–1.5)
CREAT SERPL-MCNC: 1.3 MG/DL (ref 0.5–1)
CREATININE URINE: 205 MG/DL (ref 29–226)
CRITICAL: ABNORMAL
DATE ANALYZED: ABNORMAL
DATE OF COLLECTION: ABNORMAL
FIO2: 40 %
GFR AFRICAN AMERICAN: 48
GFR NON-AFRICAN AMERICAN: 40 ML/MIN/1.73
GLUCOSE BLD-MCNC: 107 MG/DL (ref 74–99)
GLUCOSE BLD-MCNC: 139 MG/DL (ref 74–99)
GLUCOSE BLD-MCNC: 86 MG/DL (ref 74–99)
GLUCOSE BLD-MCNC: 86 MG/DL (ref 74–99)
HCO3: 19.5 MMOL/L (ref 22–26)
HCT VFR BLD CALC: 22.9 % (ref 34–48)
HCT VFR BLD CALC: 23.2 % (ref 34–48)
HEMOGLOBIN: 7 G/DL (ref 11.5–15.5)
HEMOGLOBIN: 7.1 G/DL (ref 11.5–15.5)
HHB: 1.6 % (ref 0–5)
INR BLD: 1.3
LAB: ABNORMAL
LV EF: 60 %
LVEF MODALITY: NORMAL
Lab: ABNORMAL
MAGNESIUM: 1.8 MG/DL (ref 1.6–2.6)
MCH RBC QN AUTO: 28.6 PG (ref 26–35)
MCHC RBC AUTO-ENTMCNC: 30.6 % (ref 32–34.5)
MCV RBC AUTO: 93.5 FL (ref 80–99.9)
METHB: 0.4 % (ref 0–1.5)
MODE: AC
O2 CONTENT: 10.5 ML/DL
O2 SATURATION: 98.4 % (ref 92–98.5)
O2HB: 97 % (ref 94–97)
OPERATOR ID: 2962
PATIENT TEMP: 37 C
PCO2: 32.5 MMHG (ref 35–45)
PDW BLD-RTO: 14.4 FL (ref 11.5–15)
PEEP/CPAP: 8 CMH2O
PFO2: 2.91 MMHG/%
PH BLOOD GAS: 7.39 (ref 7.35–7.45)
PLATELET # BLD: 287 E9/L (ref 130–450)
PMV BLD AUTO: 9.8 FL (ref 7–12)
PO2: 116.6 MMHG (ref 75–100)
POTASSIUM REFLEX MAGNESIUM: 3.2 MMOL/L (ref 3.5–5)
POTASSIUM REFLEX MAGNESIUM: 3.6 MMOL/L (ref 3.5–5)
POTASSIUM SERPL-SCNC: 3.2 MMOL/L (ref 3.5–5)
POTASSIUM SERPL-SCNC: 5.3 MMOL/L (ref 3.5–5)
POTASSIUM, UR: >100 MMOL/L
PROTHROMBIN TIME: 14.9 SEC (ref 9.3–12.4)
RBC # BLD: 2.48 E12/L (ref 3.5–5.5)
RI(T): 1.04
RR MECHANICAL: 12 B/MIN
SODIUM BLD-SCNC: 144 MMOL/L (ref 132–146)
SODIUM BLD-SCNC: 145 MMOL/L (ref 132–146)
SODIUM BLD-SCNC: 146 MMOL/L (ref 132–146)
SODIUM BLD-SCNC: 146 MMOL/L (ref 132–146)
SODIUM URINE: 37 MMOL/L
SOURCE, BLOOD GAS: ABNORMAL
THB: 7.5 G/DL (ref 11.5–16.5)
TIME ANALYZED: 535
UREA NITROGEN, UR: 601 MG/DL (ref 800–1666)
VT MECHANICAL: 350 ML
WBC # BLD: 11.9 E9/L (ref 4.5–11.5)

## 2022-04-27 PROCEDURE — 2500000003 HC RX 250 WO HCPCS

## 2022-04-27 PROCEDURE — 6370000000 HC RX 637 (ALT 250 FOR IP): Performed by: INTERNAL MEDICINE

## 2022-04-27 PROCEDURE — 99232 SBSQ HOSP IP/OBS MODERATE 35: CPT | Performed by: PHYSICIAN ASSISTANT

## 2022-04-27 PROCEDURE — 84540 ASSAY OF URINE/UREA-N: CPT

## 2022-04-27 PROCEDURE — 6360000002 HC RX W HCPCS: Performed by: STUDENT IN AN ORGANIZED HEALTH CARE EDUCATION/TRAINING PROGRAM

## 2022-04-27 PROCEDURE — 85018 HEMOGLOBIN: CPT

## 2022-04-27 PROCEDURE — 71045 X-RAY EXAM CHEST 1 VIEW: CPT

## 2022-04-27 PROCEDURE — 94003 VENT MGMT INPAT SUBQ DAY: CPT

## 2022-04-27 PROCEDURE — 80048 BASIC METABOLIC PNL TOTAL CA: CPT

## 2022-04-27 PROCEDURE — 6370000000 HC RX 637 (ALT 250 FOR IP)

## 2022-04-27 PROCEDURE — 85730 THROMBOPLASTIN TIME PARTIAL: CPT

## 2022-04-27 PROCEDURE — 2580000003 HC RX 258: Performed by: STUDENT IN AN ORGANIZED HEALTH CARE EDUCATION/TRAINING PROGRAM

## 2022-04-27 PROCEDURE — 83735 ASSAY OF MAGNESIUM: CPT

## 2022-04-27 PROCEDURE — 85014 HEMATOCRIT: CPT

## 2022-04-27 PROCEDURE — 6370000000 HC RX 637 (ALT 250 FOR IP): Performed by: FAMILY MEDICINE

## 2022-04-27 PROCEDURE — 6360000002 HC RX W HCPCS: Performed by: INTERNAL MEDICINE

## 2022-04-27 PROCEDURE — 6360000002 HC RX W HCPCS

## 2022-04-27 PROCEDURE — 82436 ASSAY OF URINE CHLORIDE: CPT

## 2022-04-27 PROCEDURE — 82805 BLOOD GASES W/O2 SATURATION: CPT

## 2022-04-27 PROCEDURE — C9113 INJ PANTOPRAZOLE SODIUM, VIA: HCPCS | Performed by: STUDENT IN AN ORGANIZED HEALTH CARE EDUCATION/TRAINING PROGRAM

## 2022-04-27 PROCEDURE — 93306 TTE W/DOPPLER COMPLETE: CPT

## 2022-04-27 PROCEDURE — 2580000003 HC RX 258: Performed by: INTERNAL MEDICINE

## 2022-04-27 PROCEDURE — 6370000000 HC RX 637 (ALT 250 FOR IP): Performed by: NURSE PRACTITIONER

## 2022-04-27 PROCEDURE — 82330 ASSAY OF CALCIUM: CPT

## 2022-04-27 PROCEDURE — 2000000000 HC ICU R&B

## 2022-04-27 PROCEDURE — 84300 ASSAY OF URINE SODIUM: CPT

## 2022-04-27 PROCEDURE — 84133 ASSAY OF URINE POTASSIUM: CPT

## 2022-04-27 PROCEDURE — 6360000002 HC RX W HCPCS: Performed by: NURSE PRACTITIONER

## 2022-04-27 PROCEDURE — 99221 1ST HOSP IP/OBS SF/LOW 40: CPT | Performed by: INTERNAL MEDICINE

## 2022-04-27 PROCEDURE — 82570 ASSAY OF URINE CREATININE: CPT

## 2022-04-27 PROCEDURE — 85610 PROTHROMBIN TIME: CPT

## 2022-04-27 PROCEDURE — 85027 COMPLETE CBC AUTOMATED: CPT

## 2022-04-27 PROCEDURE — 99291 CRITICAL CARE FIRST HOUR: CPT | Performed by: INTERNAL MEDICINE

## 2022-04-27 PROCEDURE — 36415 COLL VENOUS BLD VENIPUNCTURE: CPT

## 2022-04-27 PROCEDURE — 2580000003 HC RX 258: Performed by: CHIROPRACTOR

## 2022-04-27 PROCEDURE — 2580000003 HC RX 258

## 2022-04-27 RX ORDER — DEXAMETHASONE SODIUM PHOSPHATE 10 MG/ML
10 INJECTION INTRAMUSCULAR; INTRAVENOUS ONCE
Status: DISCONTINUED | OUTPATIENT
Start: 2022-04-27 | End: 2022-04-27

## 2022-04-27 RX ORDER — POLYETHYLENE GLYCOL 3350 17 G/17G
17 POWDER, FOR SOLUTION ORAL DAILY
Status: DISCONTINUED | OUTPATIENT
Start: 2022-04-27 | End: 2022-05-02

## 2022-04-27 RX ORDER — SENNA PLUS 8.6 MG/1
1 TABLET ORAL NIGHTLY
Status: DISCONTINUED | OUTPATIENT
Start: 2022-04-27 | End: 2022-05-02

## 2022-04-27 RX ORDER — DEXAMETHASONE SODIUM PHOSPHATE 10 MG/ML
10 INJECTION INTRAMUSCULAR; INTRAVENOUS EVERY 6 HOURS
Status: DISCONTINUED | OUTPATIENT
Start: 2022-04-28 | End: 2022-04-27

## 2022-04-27 RX ORDER — DEXAMETHASONE SODIUM PHOSPHATE 4 MG/ML
4 INJECTION, SOLUTION INTRA-ARTICULAR; INTRALESIONAL; INTRAMUSCULAR; INTRAVENOUS; SOFT TISSUE EVERY 6 HOURS
Status: DISCONTINUED | OUTPATIENT
Start: 2022-04-27 | End: 2022-04-28

## 2022-04-27 RX ORDER — DEXTROSE MONOHYDRATE 50 MG/ML
INJECTION, SOLUTION INTRAVENOUS CONTINUOUS
Status: DISCONTINUED | OUTPATIENT
Start: 2022-04-27 | End: 2022-04-28

## 2022-04-27 RX ORDER — SODIUM CHLORIDE, SODIUM LACTATE, POTASSIUM CHLORIDE, AND CALCIUM CHLORIDE .6; .31; .03; .02 G/100ML; G/100ML; G/100ML; G/100ML
500 INJECTION, SOLUTION INTRAVENOUS ONCE
Status: COMPLETED | OUTPATIENT
Start: 2022-04-27 | End: 2022-04-27

## 2022-04-27 RX ORDER — DEXTROSE MONOHYDRATE 50 MG/ML
100 INJECTION, SOLUTION INTRAVENOUS PRN
Status: DISCONTINUED | OUTPATIENT
Start: 2022-04-27 | End: 2022-05-11 | Stop reason: HOSPADM

## 2022-04-27 RX ORDER — NICOTINE POLACRILEX 4 MG
15 LOZENGE BUCCAL PRN
Status: DISCONTINUED | OUTPATIENT
Start: 2022-04-27 | End: 2022-04-27 | Stop reason: CLARIF

## 2022-04-27 RX ORDER — POTASSIUM CHLORIDE 7.45 MG/ML
10 INJECTION INTRAVENOUS
Status: DISCONTINUED | OUTPATIENT
Start: 2022-04-27 | End: 2022-04-27

## 2022-04-27 RX ORDER — OXYMETAZOLINE HYDROCHLORIDE 0.05 G/100ML
2 SPRAY NASAL 2 TIMES DAILY
Status: DISPENSED | OUTPATIENT
Start: 2022-04-27 | End: 2022-04-29

## 2022-04-27 RX ORDER — POTASSIUM CHLORIDE 7.45 MG/ML
10 INJECTION INTRAVENOUS
Status: COMPLETED | OUTPATIENT
Start: 2022-04-27 | End: 2022-04-28

## 2022-04-27 RX ORDER — DEXTROSE MONOHYDRATE 25 G/50ML
12.5 INJECTION, SOLUTION INTRAVENOUS PRN
Status: DISCONTINUED | OUTPATIENT
Start: 2022-04-27 | End: 2022-05-11 | Stop reason: HOSPADM

## 2022-04-27 RX ADMIN — PROPOFOL 30 MCG/KG/MIN: 10 INJECTION, EMULSION INTRAVENOUS at 01:30

## 2022-04-27 RX ADMIN — PANTOPRAZOLE SODIUM 40 MG: 40 INJECTION, POWDER, FOR SOLUTION INTRAVENOUS at 08:02

## 2022-04-27 RX ADMIN — ENOXAPARIN SODIUM 80 MG: 100 INJECTION SUBCUTANEOUS at 08:30

## 2022-04-27 RX ADMIN — 0.12% CHLORHEXIDINE GLUCONATE 15 ML: 1.2 RINSE ORAL at 08:06

## 2022-04-27 RX ADMIN — DEXTROSE MONOHYDRATE: 50 INJECTION, SOLUTION INTRAVENOUS at 08:57

## 2022-04-27 RX ADMIN — SODIUM CHLORIDE, POTASSIUM CHLORIDE, SODIUM LACTATE AND CALCIUM CHLORIDE 500 ML: 600; 310; 30; 20 INJECTION, SOLUTION INTRAVENOUS at 23:00

## 2022-04-27 RX ADMIN — QUETIAPINE FUMARATE 12.5 MG: 25 TABLET ORAL at 08:02

## 2022-04-27 RX ADMIN — ACETAMINOPHEN 1000 MG: 500 TABLET ORAL at 21:12

## 2022-04-27 RX ADMIN — DEXAMETHASONE SODIUM PHOSPHATE 4 MG: 4 INJECTION, SOLUTION INTRAMUSCULAR; INTRAVENOUS at 15:46

## 2022-04-27 RX ADMIN — 0.12% CHLORHEXIDINE GLUCONATE 15 ML: 1.2 RINSE ORAL at 21:12

## 2022-04-27 RX ADMIN — POLYETHYLENE GLYCOL 3350 17 G: 17 POWDER, FOR SOLUTION ORAL at 09:38

## 2022-04-27 RX ADMIN — Medication 5000 UNITS: at 08:03

## 2022-04-27 RX ADMIN — SODIUM CHLORIDE, POTASSIUM CHLORIDE, SODIUM LACTATE AND CALCIUM CHLORIDE 500 ML: 600; 310; 30; 20 INJECTION, SOLUTION INTRAVENOUS at 07:52

## 2022-04-27 RX ADMIN — SODIUM CHLORIDE, POTASSIUM CHLORIDE, SODIUM LACTATE AND CALCIUM CHLORIDE 500 ML: 600; 310; 30; 20 INJECTION, SOLUTION INTRAVENOUS at 08:43

## 2022-04-27 RX ADMIN — DEXAMETHASONE SODIUM PHOSPHATE 4 MG: 4 INJECTION, SOLUTION INTRAMUSCULAR; INTRAVENOUS at 20:07

## 2022-04-27 RX ADMIN — SENNOSIDES 8.6 MG: 8.6 TABLET, COATED ORAL at 21:12

## 2022-04-27 RX ADMIN — POTASSIUM BICARBONATE 50 MEQ: 782 TABLET, EFFERVESCENT ORAL at 14:07

## 2022-04-27 RX ADMIN — WATER 1000 MG: 1 INJECTION INTRAMUSCULAR; INTRAVENOUS; SUBCUTANEOUS at 20:07

## 2022-04-27 RX ADMIN — ASPIRIN 81 MG: 81 TABLET, COATED ORAL at 08:06

## 2022-04-27 RX ADMIN — Medication 10 MEQ: at 23:54

## 2022-04-27 RX ADMIN — Medication 10 MEQ: at 22:52

## 2022-04-27 RX ADMIN — QUETIAPINE FUMARATE 12.5 MG: 25 TABLET ORAL at 21:12

## 2022-04-27 RX ADMIN — ATORVASTATIN CALCIUM 80 MG: 40 TABLET, FILM COATED ORAL at 21:12

## 2022-04-27 RX ADMIN — SODIUM CHLORIDE 0.2 MCG/KG/HR: 9 INJECTION, SOLUTION INTRAVENOUS at 14:44

## 2022-04-27 ASSESSMENT — PULMONARY FUNCTION TESTS
PIF_VALUE: 19
PIF_VALUE: 18
PIF_VALUE: 18
PIF_VALUE: 20
PIF_VALUE: 17
PIF_VALUE: 19
PIF_VALUE: 19
PIF_VALUE: 21
PIF_VALUE: 17
PIF_VALUE: 5
PIF_VALUE: 19
PIF_VALUE: 18
PIF_VALUE: 26
PIF_VALUE: 18
PIF_VALUE: 17
PIF_VALUE: 20
PIF_VALUE: 16
PIF_VALUE: 20
PIF_VALUE: 19
PIF_VALUE: 19
PIF_VALUE: 18
PIF_VALUE: 19
PIF_VALUE: 16
PIF_VALUE: 19
PIF_VALUE: 23

## 2022-04-27 ASSESSMENT — PAIN SCALES - GENERAL: PAINLEVEL_OUTOF10: 3

## 2022-04-27 NOTE — PLAN OF CARE
Problem: Respiratory - Adult  Goal: Respiratory status will improve  Description: Respiratory status will improve  4/27/2022 1104 by Darylene Chroman, RCP  Outcome: Progressing  4/27/2022 0123 by Jesus Ivy RCP  Outcome: Progressing

## 2022-04-27 NOTE — CONSULTS
DEPARTMENT OF OTOLARYNGOLOGY -- HEAD & NECK SURGERY   CONSULT NOTE    PATIENT: Heri Jimenez : 1945 (68 y.o.)   DATE: 2022  ROOM/BED: [id number]      REQUESTING PHYSICIAN: Gonzales Chaudhary DO    REASON FOR CONSULT: nasal/oral bleeding     HISTORY OBTAINED FROM:  chart, nurse    HISTORY OF PRESENT ILLNESS:                                                                                        HPI  68 y.o. female presents as a consult for nasal and oral bleeding started earlier today. Patient has history of PE and DVT. Patient was admitted on 2022 due to having a stroke. Was transferred from [facility] ED. Patient was obtunded with breathing difficulty. She was subsequently intubated and found a large mucous plug. OGT placed and has had intermittent bleeding from mouth and nose. Daughter at bedside and states patient does have history of epistaxis. Past Medical History:   Diagnosis Date    CAD (coronary artery disease)     s/p PCI in 2017    DVT, bilateral lower limbs ([facility] [address] 2021    on eliquis    HTN (hypertension)     Hyperlipidemia     Pulmonary embolism ([facility] [address] 2021    on eliquis     Past Surgical History:   Procedure Laterality Date    CHOLECYSTECTOMY      HYSTERECTOMY  2011     Prior to Admission medications    Medication Sig Start Date End Date Taking?  Authorizing Provider   ramipril (ALTACE) 5 MG capsule Take 5 mg by mouth daily   Yes Historical Provider, MD   dilTIAZem (CARDIZEM CD) 120 MG extended release capsule Take 120 mg by mouth daily   Yes Historical Provider, MD   atorvastatin (LIPITOR) 80 MG tablet Take 80 mg by mouth daily Please send 2 40mg tablets easier for patient to swallow   Yes Historical Provider, MD   apixaban (ELIQUIS) 5 MG TABS tablet Take 5 mg by mouth 2 times daily   Yes Historical Provider, MD   Cholecalciferol (DIALYVITE VITAMIN D 5000 PO) Take 5,000 Units/day by mouth   Yes Historical Provider, MD   omeprazole (PRILOSEC) 20 MG delayed release capsule Take 20 mg by mouth daily   Yes Historical Provider, MD     Scheduled Medications:   senna  1 tablet Oral Nightly    polyethylene glycol  17 g Oral Daily    dexamethasone  4 mg IntraVENous Q6H    oxymetazoline  2 spray Each Nostril BID    chlorhexidine  15 mL Mouth/Throat BID    pantoprazole  40 mg IntraVENous Daily    cefTRIAXone (ROCEPHIN) IV  1,000 mg IntraVENous Q24H    QUEtiapine  12.5 mg Oral 2 times per day    melatonin  5 mg Oral QPM    [Held by provider] enoxaparin  1 mg/kg SubCUTAneous BID    [Held by provider] apixaban  5 mg Oral BID    vitamin D  5,000 Units Oral Daily    [Held by provider] dilTIAZem  120 mg Oral Daily    [Held by provider] ramipril  5 mg Oral Daily    aspirin  81 mg Oral Daily    Or    aspirin  300 mg Rectal Daily    atorvastatin  80 mg Oral Nightly     Continuous Infusion Medications:   dextrose      dexmedetomidine (PRECEDEX) IV infusion 0.4 mcg/kg/hr (04/27/22 1514)    dextrose 50 mL/hr at 04/27/22 0857     AsNeeded Medications:  dextrose, glucagon (rDNA), dextrose, glucose, racepinephrine HCl, acetaminophen, melatonin, hydrALAZINE, albuterol, acetaminophen, ondansetron **OR** ondansetron, polyethylene glycol, perflutren lipid microspheres  Not on File  Social History     Socioeconomic History    Marital status:      Spouse name: Not on file    Number of children: Not on file    Years of education: Not on file    Highest education level: Not on file   Occupational History    Not on file   Tobacco Use    Smoking status: Not on file    Smokeless tobacco: Not on file   Substance and Sexual Activity    Alcohol use: Not on file    Drug use: Not on file    Sexual activity: Not on file   Other Topics Concern    Not on file   Social History Narrative    Not on file     Social Determinants of Health     Financial Resource Strain:     Difficulty of Paying Living Expenses: Not on file   Food Insecurity:     Worried About Running Out of Food in the Last Year: Not on file    Ran Out of Food in the Last Year: Not on file   Transportation Needs:     Lack of Transportation (Medical): Not on file    Lack of Transportation (Non-Medical): Not on file   Physical Activity:     Days of Exercise per Week: Not on file    Minutes of Exercise per Session: Not on file   Stress:     Feeling of Stress : Not on file   Social Connections:     Frequency of Communication with Friends and Family: Not on file    Frequency of Social Gatherings with Friends and Family: Not on file    Attends Anabaptism Services: Not on file    Active Member of [address] [place] [address] [employer] or Organizations: Not on file    Attends Club or Organization Meetings: Not on file    Marital Status: Not on file   Intimate Partner Violence:     Fear of Current or Ex-Partner: Not on file    Emotionally Abused: Not on file    Physically Abused: Not on file    Sexually Abused: Not on file   Housing Stability:     Unable to Pay for Housing in the Last Year: Not on file    Number of [place] in the Last Year: Not on file    Unstable Housing in the Last Year: Not on file     No family history on file. REVIEW OF SYSTEMS:  Review of Systems  Unable to obtain, sedated and intubated     PHYSICAL EXAM:                                                                                                                Vitals:    04/27/22 1600 04/27/22 1607 04/27/22 1700 04/27/22 1705   BP: (!) 108/52  126/60    Pulse: 82 83 86 92   Resp: 17 25 23 16   Temp: 100.2 °F (37.9 °C)      TempSrc: Bladder      SpO2: 100% 99%  99%   Weight:       Height:         Physical Exam  Constitutional: Appears well-developed and well-nourished.    Eyes: Lids and lashes normal   ENT: Sinuses nontender on palpation, external ears and ear canals without lesions, lips normal, poor dentition, Kerlex in place with minimal bleeding, bilateral nares with blood clots   Neck:  Supple, symmetrical, trachea midline   Respiratory: Intubated Cardiovascular: Regular rate   Skin: Warm and dry   Neurologic:  Sedated     DATA:                                                                                                                                      XR CHEST PORTABLE   Final Result   Improved aeration at the left base associated with a greater degree of   inspiration. XR ABDOMEN FOR NG/OG/NE TUBE PLACEMENT   Final Result   Catheter is in the stomach. XR CHEST PORTABLE   Final Result   ET tube tip 2.4 cm from the pascual. XR CHEST PORTABLE   Final Result   No acute process. ET tube tip within the right mainstem bronchus and should be retracted at   least 4.0 cm. Findings given to the core team to be relayed to the license caregiver on   04/26/2022 at 2:09 p.m. Nick Gomes CT HEAD WO CONTRAST   Final Result   1. Late acute/early subacute multiple scattered infarcts in the vascular   distribution of the right middle cerebral artery as above commented. 2.  No indication for hemorrhagic component. 3.  No indication for new sizable area of acute recent insult in progression   to the brain parenchyma. 4.  There is no midline shift. RECOMMENDATIONS:   Unavailable         US DUP LOWER EXTREMITIES BILATERAL VENOUS   Final Result   There is evidence for deep venous thrombosis      ALERT:  THIS IS AN ABNORMAL REPORT               XR CHEST PORTABLE   Final Result   Findings suggest interstitial scarring and chronic bronchitis. No   significant acute infiltrates are noted         VL DUP CAROTID BILATERAL   Final Result   Atherosclerotic disease. No hemodynamically significant stenosis is   identified   Estimated stenosis by NASCET criteria in the proximal right carotid   artery is between 0% and 49%. Estimated stenosis by NASCET criteria in the proximal left carotid   artery is between 0% and 49%.          MRI brain without contrast   Final Result   Multiple small recent infarcts throughout the right MCA territory. There is   evidence of slow vascular flow in the right MCA territory. Consider CTA of   the head/neck for further evaluation. Advanced chronic microvascular ischemic changes. No mass effect. No hemorrhage. No hydrocephalus.          XR CHEST PORTABLE    (Results Pending)     CBC with Differential:    Lab Results   Component Value Date    WBC 11.9 04/27/2022    RBC 2.48 04/27/2022    HGB 7.0 04/27/2022    HCT 22.9 04/27/2022     04/27/2022    MCV 93.5 04/27/2022    MCH 28.6 04/27/2022    MCHC 30.6 04/27/2022    RDW 14.4 04/27/2022     Platelets:    Lab Results   Component Value Date     04/27/2022     Hemoglobin/Hematocrit:    Lab Results   Component Value Date    HGB 7.0 04/27/2022    HCT 22.9 04/27/2022     CMP:    Lab Results   Component Value Date     04/27/2022    K 3.2 04/27/2022     04/27/2022    CO2 18 04/27/2022    BUN 26 04/27/2022    CREATININE 1.3 04/27/2022    GFRAA 48 04/27/2022    LABGLOM 40 04/27/2022    GLUCOSE 107 04/27/2022    CALCIUM 8.2 04/27/2022     BUN/Creatinine:    Lab Results   Component Value Date    BUN 26 04/27/2022    CREATININE 1.3 04/27/2022     Albumin:  No results found for: LABALBU  Calcium:    Lab Results   Component Value Date    CALCIUM 8.2 04/27/2022     Ionized Calcium:  No results found for: IONCA  PT/INR:    Lab Results   Component Value Date    PROTIME 14.9 04/27/2022    INR 1.3 04/27/2022     Warfarin PT/INR:  No components found for: PTPATWAR, PTINRWAR  PTT:    Lab Results   Component Value Date    APTT 48.0 04/27/2022   [APTT}    ASSESSMENTAND PLAN:                                                                                                   Patient Active Problem List   Diagnosis    Stroke-like symptoms     68 y.o. female with history of PE, DVT, and stroke presents with oral and nasal bleeding after OGT placement     · Bilateral nares packed with Rapid Rhino, to stay 3-5 days   · Oral cavity packed with wet Kerlex - change PRN if saturated   · Call ENT if further bleeding    Case, assessment and plans discussed with attending physician.     Yoel Fowler DO  Resident Physician  Otolaryngology -- [facility]ky Gipson 1943 4/27/2022  5:49 PM

## 2022-04-27 NOTE — PLAN OF CARE
Problem: Safety - Medical Restraint  Goal: Remains free of injury from restraints (Restraint for Interference with Medical Device)  Description: INTERVENTIONS:  1. Determine that other, less restrictive measures have been tried or would not be effective before applying the restraint  2. Evaluate the patient's condition at the time of restraint application  3. Inform patient/family regarding the reason for restraint  4. Q2H: Monitor safety, psychosocial status, comfort, nutrition and hydration  Outcome: Progressing     Problem: Safety - Medical Restraint  Goal: Remains free of injury from restraints (Restraint for Interference with Medical Device)  Description: INTERVENTIONS:  1. Determine that other, less restrictive measures have been tried or would not be effective before applying the restraint  2. Evaluate the patient's condition at the time of restraint application  3. Inform patient/family regarding the reason for restraint  4.  Q2H: Monitor safety, psychosocial status, comfort, nutrition and hydration  Outcome: Progressing

## 2022-04-27 NOTE — PROGRESS NOTES
Spontaneous Parameters performed    VT = 567 ml  f = 12  B/M  Ve = 9.29 L/M  NIF = -22  cmH2O  VC = unable to do   RSBI = 26    No cuff leak present at this time       Performed by Michelle Edward RCP

## 2022-04-27 NOTE — PROGRESS NOTES
Comprehensive Nutrition Assessment    Type and Reason for Visit:  Initial,Consult    Nutrition Recommendations/Plan:   Continue NPO, Start Tube Feeding    Rec Standard with fiber (Jevity 1.5) @ 40 ml/hr + 1 protein modular daily  Will provide: 960 ml tv, 1440 kcals, 61 gm pro (1540 kcals & 87 gm pro w/ mod), 730 ml free water    Will defer water flushes to critical care team     Malnutrition Assessment:  Malnutrition Status: At risk for malnutrition (04/27/22)   Context:  Acute Illness     Findings of the 6 clinical characteristics of malnutrition:  Energy Intake:  50% or less of estimated energy requirements for 5 or more days  Weight Loss:  No significant weight loss (10.5% x 11 mon, does not met sig criteria)     Body Fat Loss:  No significant body fat loss   Muscle Mass Loss:  No significant muscle mass loss  Fluid Accumulation:  No significant fluid accumulation   Strength:  Not Performed    Nutrition Assessment:    Pt admit transferred from [facility] 2/2 R MCA stroke now intubated d/t mucous plugging. PMHx CAD. Noted severe aphasia & previous dysphagia. Plans to start EN support, will provide TF recs & monitor.     Nutrition Related Findings:    Pt intubated/sedated, MAP WNL, -I/O's, trace edema, active BS, OGT Wound Type: None       Current Nutrition Intake & Therapies:    Average Meal Intake: NPO (Minimal nutrition x 5 days)     Current Tube Feeding (TF) Orders:  · Feeding Route: Orogastric  · Formula: Standard with Fiber  · Schedule: Continuous  · Feeding Regimen: 55 ml/hr, not started yet  · Water Flushes: 100 ml q 4 hr = 600 ml water  · Goal TF & Flush Orders Provides: 1320 ml tv, 1980 kcals, 84 gm pro, 1003 ml free water, 1603 ml total water w/ flushes      Anthropometric Measures:  Height: 5' (152.4 cm)  Ideal Body Weight (IBW): 100 lbs (45 kg)    Admission Body Weight: 162 lb (73.5 kg) (4/26 first measured)  Current Body Weight: 162 lb (73.5 kg) (4/26 adm wt as CBW elevated)   Current BMI (kg/m2): 31.6  Usual Body Weight: 181 lb (82.1 kg) (5/10/21 EMR measured wt from [facility] encounter)  % Weight Change (Calculated): -10.5 x 11 mon per EMR                     BMI Categories: Obese Class 1 (BMI 30.0-34. 9)    Estimated Daily Nutrient Needs:  Energy Requirements Based On: Formula  Weight Used for Energy Requirements: Admission  Energy (kcal/day): ; 6638-9044  Weight Used for Protein Requirements: Ideal  Protein (g/day): 1.3-1.5 g/kg IBW; 70-80     Fluid (ml/day): per critical care    Nutrition Diagnosis:   · Inadequate oral intake related to impaired respiratory function as evidenced by NPO or clear liquid status due to medical condition,intubation,nutrition support - enteral nutrition      Nutrition Interventions:   Nutrition Education/Counseling: Education not appropriate  Coordination of Nutrition Care: Continue to monitor while inpatient       Goals:     Goals: Initiate nutrition support,Tolerate nutrition support at goal rate       Nutrition Monitoring and Evaluation:      Food/Nutrient Intake Outcomes: Diet Advancement/Tolerance,Enteral Nutrition Intake/Tolerance  Physical Signs/Symptoms Outcomes: Biochemical Data,Nutrition Focused Physical Findings,Skin,Weight,GI Status,Fluid Status or Edema,Hemodynamic Status    Discharge Planning:     Too soon to determine     Tito Bull RD, LD  Contact: Ext [phone]

## 2022-04-27 NOTE — PROGRESS NOTES
[address] [facility]   Department of Internal Medicine   Internal Medicine Residency  MICU Progress Note    Patient:  Xavier Hook 68 y.o. female   MRN: [id number]       Date of Service: 2022    Allergy: Patient has no allergy information on record. Subjective     Patient was seen and examined this morning at bedside in no acute distress. Overnight, no acute events noted. Seen this morning patient was coming off of the propofol sedation. We had planned to try to extubate patient today however upon weaning parameters she did not have a cuff leak. As such we feel there is most likely some edema present and will start Decadron. There is concern for UTI based on her urinalysis as such cultures were sent and she was started on Rocephin. CURT developing most likely due to poor oral hydration, will start fluids and monitor for recovery. This afternoon patient started to develop bleeding from mouth and nose, possibly due to oral trauma, ENT was consulted and placed packing. At this time we will continue to monitor in ICU and try for possible extubation tomorrow if patient tolerates and bleeding has resolved. Continue ICU level care at this time    Objective     TEMPERATURE:  Current - Temp: 99.5 °F (37.5 °C); Max - Temp  Av.2 °F (37.3 °C)  Min: 98.4 °F (36.9 °C)  Max: 99.7 °F (37.6 °C)  RESPIRATIONS RANGE: Resp  Av.7  Min: 11  Max: 29  PULSE RANGE: Pulse  Av.4  Min: 66  Max: 110  BLOOD PRESSURE RANGE:  Systolic (73XZI), ELS:401 , Min:89 , MRA:769   ; Diastolic (68KEP), EIR:64, Min:41, Max:87    PULSE OXIMETRY RANGE: SpO2  Av.1 %  Min: 87 %  Max: 100 %    I & O - 24hr:    Intake/Output Summary (Last 24 hours) at 2022 1445  Last data filed at 2022 0800  Gross per 24 hour   Intake 639.2 ml   Output 210 ml   Net 429.2 ml     I/O last 3 completed shifts: In: 639.2 [I.V.:639.2]  Out: 210 [Urine:210] No intake/output data recorded.    Weight change:     Physical Exam  Constitutional:       Appearance: Normal appearance. Interventions: She is sedated and intubated. Eyes:      Pupils: Pupils are equal, round, and reactive to light. Cardiovascular:      Rate and Rhythm: Normal rate and regular rhythm. Pulses: Normal pulses. Heart sounds: Normal heart sounds. No murmur heard. Pulmonary:      Effort: Pulmonary effort is normal. She is intubated. Breath sounds: Normal breath sounds. No wheezing or rhonchi. Abdominal:      General: Abdomen is flat. There is no distension. Palpations: Abdomen is soft. Tenderness: There is no abdominal tenderness. Musculoskeletal:         General: Normal range of motion. Cervical back: Normal range of motion. Skin:     General: Skin is warm and dry. Capillary Refill: Capillary refill takes less than 2 seconds. Neurological:      General: No focal deficit present.       Comments: Weaning sedation this morning, intermittently following commands         Medications     Continuous Infusions:   dextrose      dexmedetomidine (PRECEDEX) IV infusion 0.2 mcg/kg/hr (04/27/22 1444)    dextrose 50 mL/hr at 04/27/22 0857     Scheduled Meds:   senna  1 tablet Oral Nightly    polyethylene glycol  17 g Oral Daily    dexamethasone  4 mg IntraVENous Q6H    chlorhexidine  15 mL Mouth/Throat BID    pantoprazole  40 mg IntraVENous Daily    cefTRIAXone (ROCEPHIN) IV  1,000 mg IntraVENous Q24H    QUEtiapine  12.5 mg Oral 2 times per day    melatonin  5 mg Oral QPM    [Held by provider] enoxaparin  1 mg/kg SubCUTAneous BID    [Held by provider] apixaban  5 mg Oral BID    vitamin D  5,000 Units Oral Daily    [Held by provider] dilTIAZem  120 mg Oral Daily    [Held by provider] ramipril  5 mg Oral Daily    aspirin  81 mg Oral Daily    Or    aspirin  300 mg Rectal Daily    atorvastatin  80 mg Oral Nightly     PRN Meds: dextrose, glucagon (rDNA), dextrose, glucose, racepinephrine HCl, acetaminophen, melatonin, hydrALAZINE, albuterol, acetaminophen, ondansetron **OR** ondansetron, polyethylene glycol, perflutren lipid microspheres  Nutrition:   NG/OG tube TF type: Pulmocare/Nephro/Glucerna/Jevity        At rate: ml/h    Labs and Imaging Studies     CBC:   Recent Labs     04/25/22  0621 04/25/22  0621 04/26/22  0657 04/27/22  0533 04/27/22  1400   WBC 12.0*  --  13.2* 11.9*  --    HGB 8.4*   < > 8.6* 7.1* 7.0*   HCT 27.3*   < > 28.5* 23.2* 22.9*   MCV 92.5  --  92.2 93.5  --      --  420 287  --     < > = values in this interval not displayed. BMP:    Recent Labs     04/26/22  0657 04/27/22  0533 04/27/22  0900    146 145   K 3.6 5.3* 3.2*   * 114* 112*   CO2 20* 15* 18*   BUN 17 25* 25*   CREATININE 1.0 1.3* 1.3*   GLUCOSE 90 86 86       LIVER PROFILE:   No results for input(s): AST, ALT, LIPASE, BILIDIR, BILITOT, ALKPHOS in the last 72 hours. Invalid input(s): AMYLASE,  ALB    PT/INR:   No results for input(s): PROTIME, INR in the last 72 hours. APTT:   No results for input(s): APTT in the last 72 hours. Fasting Lipid Panel:    Lab Results   Component Value Date    CHOL 140 04/23/2022    TRIG 87 04/23/2022    HDL 48 04/23/2022       Cardiac Enzymes:    No results found for: CKTOTAL, CKMB, CKMBINDEX, TROPONINI    Notable Cultures:      Blood cultures No results found for: BC  Respiratory cultures No results found for: RESPCULTURE No results found for: LABGRAM  Urine   Urine Culture, Routine   Date Value Ref Range Status   04/26/2022   Preliminary    Growth present, evaluating for:  Escherichia coli       Legionella No results found for: LABLEGI  C Diff PCR No results found for: CDIFPCR  Wound culture/abscess: No results for input(s): WNDABS in the last 72 hours. Tip culture:No results for input(s): CXCATHTIP in the last 72 hours.       Oxygen:     Vent Information  Ventilator ID: [id number]  Vent Mode: AC/VC  Ventilator Initiate: Yes  Additional Respiratory Assessments  Pulse: 110  Resp: 19  SpO2: 100 %  Position: Semi-Tang's  Humidification Source: Heated wire  Humidification Temp: 36.9  Circuit Condensation: Drained  Subglottic Suction Done: Yes  Swallow: Chokes on liquids       Urinary Catheter-Output (mL): 20 mL  [REMOVED] Urinary Catheter Pink-Output (mL): 700 mL    Imaging Studies:  XR CHEST PORTABLE   Final Result   Improved aeration at the left base associated with a greater degree of   inspiration. XR ABDOMEN FOR NG/OG/NE TUBE PLACEMENT   Final Result   Catheter is in the stomach. XR CHEST PORTABLE   Final Result   ET tube tip 2.4 cm from the pascual. XR CHEST PORTABLE   Final Result   No acute process. ET tube tip within the right mainstem bronchus and should be retracted at   least 4.0 cm. Findings given to the core team to be relayed to the license caregiver on   04/26/2022 at 2:09 p.m. Ashlee Salas CT HEAD WO CONTRAST   Final Result   1. Late acute/early subacute multiple scattered infarcts in the vascular   distribution of the right middle cerebral artery as above commented. 2.  No indication for hemorrhagic component. 3.  No indication for new sizable area of acute recent insult in progression   to the brain parenchyma. 4.  There is no midline shift. RECOMMENDATIONS:   Unavailable         US DUP LOWER EXTREMITIES BILATERAL VENOUS   Final Result   There is evidence for deep venous thrombosis      ALERT:  THIS IS AN ABNORMAL REPORT               XR CHEST PORTABLE   Final Result   Findings suggest interstitial scarring and chronic bronchitis. No   significant acute infiltrates are noted         VL DUP CAROTID BILATERAL   Final Result   Atherosclerotic disease. No hemodynamically significant stenosis is   identified   Estimated stenosis by NASCET criteria in the proximal right carotid   artery is between 0% and 49%. Estimated stenosis by NASCET criteria in the proximal left carotid   artery is between 0% and 49%. MRI brain without contrast   Final Result   Multiple small recent infarcts throughout the right MCA territory. There is   evidence of slow vascular flow in the right MCA territory. Consider CTA of   the head/neck for further evaluation. Advanced chronic microvascular ischemic changes. No mass effect. No hemorrhage. No hydrocephalus. XR CHEST PORTABLE    (Results Pending)        Resident's Assessment and Plan     Assessment:    1. Acute hypoxic respiratory failure 2/2 large mucus plug  2. Acute RMCA Stroke no hemorraghic convergence on CT  3. Acute RLE DVT: seen on US in the popliteal vein and Tibioperoneal Trunk  4. Oral and nasal bleeding 2/2 OG tube insertion, ENT consulted placed rhino in nose for 3-5 days and Kerlex packing for mouth  5. Concern for UTI due UA showing bacteria: Cultures sent will follow  6. Chronic Anemia: Has had workup done at [facility]  7. CURT 2/2 poor oral intake/nutrion since admission: Pre-renal, FeNa= 0.2% indicating pre-renal  8. 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 Hx Hypertension on Cardizem and ramipril at home  10.  Hx Hyperlipidemia    Plan:  · Hold Anticoagulation at this time due to oral bleed, may need to consider DVT filter if unable to resume  · Per neuro continue Aspirin and Statin therapy at this time  · Following ENT recs regarding oral and nasal bleeding  · Started Decadron due to no cuff leak  · Will try for extubation tomorrow if cuff leak improving  · Hold Cardizem in setting of normo/hypotension  · Hold Ramipril in setting of CURT and normotensive pressures   · Started on D5 for nutrition as tube feeds can not be started   · Continue following fluid status and Cr   · Started precedex for agitation  · Continue Rocephin for UTI concern  · Try for Extubation possibly tomorrow      # Peptic ulcer prophylaxis: Protonix  # DVT Prophylaxis: Held due to bleed  # Disposition: Cont current care     Rachel Riley MD, PGY-1    Attending Physician:  [address] [facility]  Department of Pulmonary, Critical Care and Sleep Medicine  [address] [facility]  Department of Internal Medicine      During multidisciplinary team rounds Cynthia Gonzalez is a 68 y.o. female was seen, examined and discussed. This is confirmation that I have personally seen and examined the patient and that the key elements of the encounter were performed by me (> 85 % time). The medications & laboratory data was discussed and adjusted where necessary. The radiographic images were reviewed or with radiologist or consultant if felt dis-concordant with the exam or history. The above findings were corroborated, plans confirmed and changes made if needed. Family is updated at the bedside as available. Key issues of the case were discussed among consultants. Critical Care time is documented if appropriate. Hopeful to get patient extubated today however she then developed significant epistaxis and also bleeding from the oral cavity. Currently with packing in place bilaterally and also to the oral cavity. We will plan for possible extubation tomorrow.   Patient's daughter updated at bedside    Critical Care time: 36 minutes    Kami Kincaid DO

## 2022-04-27 NOTE — CARE COORDINATION
4/27:  Update CM Note: Pt was a transfer from [facility] for concerns of stroke like symptoms. MRI showed small recent infarcts. Neurology was consulted. Pt became obtunded, in respiratory distress & was transferred to MICU. PT was intubated & a large mucous plug removed. Pt remains intubated in bilateral wrist restraints. Pt is on IV decadron, Iv Rocephin & Iv Protonix. 2 D echo needed & Swallow Eval. FMLA papers faxed to PCP Dr. Niki Castrejon for daughter & left paperwork in room. Needs are unclear. Pt will need PT/OT/Speech evals. Pt will need to be restraint free/sitter for 24hrs prior to submitting auth. Per Previous CM notes:  Daughter was given a list of [place] rehab facilities. CM spoke with daughter Kelvin Sanchez. She has the list & so far she is interested in [address] [place] [address] in [address] explained that [facility] may not be an option due to being able to work out 3 hrs. She will look over the list & get back to me. Sw/CHELLE will continue to follow for dc planning.  Electronically signed by Kriss Gutierrez RN on 4/27/2022 at 2:07 PM

## 2022-04-27 NOTE — PROGRESS NOTES
[facility]  Neurology follow up     Date:  4/27/2022  Patient Name:  Melvin Stewart  YOB: 1945  MRN: [id number]     Chief Complaint: stroke     Assessment  RMCA stroke    Evidence of some intracranial stenosis on vessel imaging, however no significant stenosis in the JOJO on CUS    Concerning for cardio-embolic source. Per daughter, the patient does not have a known history of Afib and was on Eliquis for a DVT/PE. Need to assess for PFO- r/o paradoxical embolus. Echo is pending- if unrevealing, extended cardiac monitoring will be needed to eval for Afib. No afib on telemetry thus far. UTI- on Rocephin     Plan  · Continue OAC   · Continue ASA 81 daily  · Continue statin therapy, goal LDL<70  · ECHO pending- if unrevealing, will need extended cardiac monitoring on discharge   · PT/OT/speech , video swallow pending       History of Present Illness:  Melvin Stewart is a 68 y.o. right handed female presenting for evaluation of stroke. Patient initially presented to an outside hospital for eval ration of stroke. She was transferred here for further evaluation. MRI of the brain revealed right MCA stroke. She does have some evidence of borderline intracranial atherosclerosis. Carotid ultrasound did not show any significant right ICA stenosis. An echo is pending. Discussed with patient's daughter today and reviewed MRI images. Per her daughter the patient does not have a known history of A. fib and was on Eliquis for DVT/PE. Denies any compliance issues. Patient was very active leading up to this and independent. US LE did show evidence of DVT. STAT CT head yesterday due to worsening dysarthria and lethargy- stable. Had increasing breathing difficulties and was transferred to the ICU and intubated. Found to have large mucous plug. Also found to have UTI and now on Abx     Spoke with RN. Sedation stopped this morning, planning to hopefully extubate soon.      No family at bedside. ROS unable due to intubation. Allergies:      Not on File     Physical Examination  Vitals   Vitals:    04/27/22 0800 04/27/22 0900 04/27/22 1000 04/27/22 1044   BP: (!) 115/56 128/61 (!) 131/56    Pulse: 72 81 80 83   Resp: 13 16 14 18   Temp: 99.3 °F (37.4 °C)      TempSrc: Bladder      SpO2: 100%   100%   Weight:       Height:            General: Patient appears in no acute distress. Intubated. HEENT: Normocephalic, atraumatic  Lungs: On vent   Heart: NSR on tele  Extremities/Peripheral vascular: Trace BL LE edema noted. No cold limbs noted. Neurologic Examination    Mental Status  Intubated. Not sedated. Withdraws to pain in all limbs. Not following commands. Cranial Nerves  II. Visual fields - no clear blink to threat on L.   III, IV, VI: Pupils equally round and reactive to light, 3 to 2 mm bilaterally. EOMs: eyes midline with forced eye opening   V. Facial sensation intact to light touch bilaterally  VII: Facial movements - L facial droop   VIII: Hearing intact to voice  IX,X: Palate elevates symmetrically.   XI: Sternocleidomastoid and trapezius 5/5 bilaterally   XII: Tongue is midline    Motor    Spontaneously moving all limbs R > L  Normal tone and bulk   No abnormal movements     Sensation  · Responds to pain in all limbs     Reflexes    L Babinski     Coordination  Not alert enough to follow commands for testing     Gait  Deferred for safety/fall consideration      Labs  Recent Labs     04/26/22  1951 04/27/22  0533 04/27/22  0533 04/27/22  0535 04/27/22  0900   NA  --  146   < >  --  145   K  --  5.3*   < >  --  3.2*   CL  --  114*   < >  --  112*   CO2  --  15*   < >  --  18*   BUN  --  25*   < >  --  25*   CREATININE  --  1.3*   < >  --  1.3*   GLUCOSE  --  86   < >  --  86   CALCIUM  --  7.9*   < >  --  7.9*   WBC  --  11.9*  --   --   --    RBC  --  2.48*  --   --   --    HGB  --  7.1*  --   --   --    HCT  --  23.2*  --   --   --    MCV  --  93.5  --   --   -- MCH  --  28.6  --   --   --    MCHC  --  30.6*  --   --   --    RDW  --  14.4  --   --   --    PLT  --  287  --   --   --    MPV  --  9.8  --   --   --    PH   < >  --   --  7.395  --    PO2   < >  --   --  116.6*  --    PCO2   < >  --   --  32.5*  --    HCO3   < >  --   --  19.5*  --    BE   < >  --   --  -4.9*  --    O2SAT   < >  --   --  98.4  --     < > = values in this interval not displayed. Imaging  XR CHEST PORTABLE   Final Result   Improved aeration at the left base associated with a greater degree of   inspiration. XR ABDOMEN FOR NG/OG/NE TUBE PLACEMENT   Final Result   Catheter is in the stomach. XR CHEST PORTABLE   Final Result   ET tube tip 2.4 cm from the pascual. XR CHEST PORTABLE   Final Result   No acute process. ET tube tip within the right mainstem bronchus and should be retracted at   least 4.0 cm. Findings given to the core team to be relayed to the license caregiver on   04/26/2022 at 2:09 p.m. [place] CT HEAD WO CONTRAST   Final Result   1. Late acute/early subacute multiple scattered infarcts in the vascular   distribution of the right middle cerebral artery as above commented. 2.  No indication for hemorrhagic component. 3.  No indication for new sizable area of acute recent insult in progression   to the brain parenchyma. 4.  There is no midline shift. RECOMMENDATIONS:   Unavailable         US DUP LOWER EXTREMITIES BILATERAL VENOUS   Final Result   There is evidence for deep venous thrombosis      ALERT:  THIS IS AN ABNORMAL REPORT               XR CHEST PORTABLE   Final Result   Findings suggest interstitial scarring and chronic bronchitis. No   significant acute infiltrates are noted         VL DUP CAROTID BILATERAL   Final Result   Atherosclerotic disease. No hemodynamically significant stenosis is   identified   Estimated stenosis by NASCET criteria in the proximal right carotid   artery is between 0% and 49%.    Estimated stenosis by NASCET criteria in the proximal left carotid   artery is between 0% and 49%. MRI brain without contrast   Final Result   Multiple small recent infarcts throughout the right MCA territory. There is   evidence of slow vascular flow in the right MCA territory. Consider CTA of   the head/neck for further evaluation. Advanced chronic microvascular ischemic changes. No mass effect. No hemorrhage. No hydrocephalus. XR CHEST PORTABLE    (Results Pending)         CTA head/neck        A mild to moderate amount of plaque noted in JOJO. If R MCA stroke noted on MRI, there is potentially symptomatic R-MCA atherosclerosis noted intracranially as well    CUS  Atherosclerotic disease. No hemodynamically significant stenosis is  identified  Estimated stenosis by NASCET criteria in the proximal right carotid  artery is between 0% and 49%. Estimated stenosis by NASCET criteria in the proximal left carotid  artery is between 0% and 49%.     Electronically signed by TEMI Queen on 4/27/2022 at 10:55 AM

## 2022-04-27 NOTE — PROGRESS NOTES
56 - Spoke with Resident Henry; wean propofol and get weaning parameters from RT.  1242 - ICU team rounding. Aware of bleeding from nose and mouth  1352 - Resident Henry notified of very large clots suctioned from nose and throat. Pt continuously bleeding from nose and mouth. See new orders  1435 -  Resident Henry notified of continuous blood from nose, ETT and mouth  1444 - Ok to start precedex and no plans to extubate today. Also hold TF for now d/t bleeding  1505 - Resident Henry informed by Dr Riri Zuniga to call ENT  [phone] - Pt mouth packed with gauze by resident Sunday Hughes per ENT request.  7832 - Daughter at bedside.  Updated on patient status  1645 - ENT at bedside

## 2022-04-27 NOTE — PROGRESS NOTES
Hospitalist Progress Note      Synopsis: Patient admitted as a transfer from [facility] for strokelike symptoms. Patient presented to [facility] with sudden onset of left-sided weakness and aphasia that occurred while in the middle of a conversation with her daughter. In ED she was noted to have severe aphasia, severe dysarthria, and left arm and leg drift, and rightward gaze, and left-sided hemianopia. MRI of the brain right MCA stroke likely embolic in nature. Neurology is following. Carotid ultrasound with no significant stenosis. Awaiting echo to assess for paradoxical embolus. If unrevealing plan is for Zio patch at discharge. She is currently being treated with therapeutic Lovenox with plans to switch to Coumadin when she is able to take p.o. she began obtunded and stridorous requiring transfer to ICU and intubation. A large mucous plug was extracted from her airway. Hospital day 5     Subjective:  Stable overnight. No issues reported  Patient seen and examined. Daughter at bedside reports they just stopped sedation and attempt to extubate today. Patient starting to follow some commands. Records reviewed. Temp (24hrs), Av.9 °F (37.2 °C), Min:97.7 °F (36.5 °C), Max:99.7 °F (37.6 °C)    DIET: Diet NPO  CODE: Full Code    Intake/Output Summary (Last 24 hours) at 2022 0646  Last data filed at 2022 0415  Gross per 24 hour   Intake 606.3 ml   Output 165 ml   Net 441.3 ml       Review of Systems:    KURTIS given mental status    Objective:    BP (!) 89/41   Pulse 75   Temp 99.7 °F (37.6 °C) (Bladder)   Resp 12   Ht 5' (1.524 m)   Wt 162 lb 0.6 oz (73.5 kg)   SpO2 99%   BMI 31.65 kg/m²     General appearance: Obese elderly female, sedated, in no apparent distress. Appears stated age. HEENT: Conjunctivae/corneas clear. Mucous membranes dry. ETT secured. Neck: Supple. No JVD. Respiratory: CTA throughout though diminished at bilateral bases.   Synchronous with ventilator. Cardiovascular:  RRR. S1, S2 without MRG. PV: Pulses palpable. + 1 pitting edema of BLE  Abdomen: Soft, non-tender, non-distended. +BS  Musculoskeletal: No obvious deformities. Skin: Normal skin color. No rashes or lesions. Good turgor. Neurologic: Squeezes right hand to command. Otherwise, unable to follow command. Medications:  REVIEWED DAILY    Infusion Medications    propofol 25 mcg/kg/min (04/27/22 0411)     Scheduled Medications    chlorhexidine  15 mL Mouth/Throat BID    pantoprazole  40 mg IntraVENous Daily    cefTRIAXone (ROCEPHIN) IV  1,000 mg IntraVENous Q24H    QUEtiapine  12.5 mg Oral 2 times per day    melatonin  5 mg Oral QPM    enoxaparin  1 mg/kg SubCUTAneous BID    [Held by provider] apixaban  5 mg Oral BID    vitamin D  5,000 Units Oral Daily    [Held by provider] dilTIAZem  120 mg Oral Daily    [Held by provider] ramipril  5 mg Oral Daily    aspirin  81 mg Oral Daily    Or    aspirin  300 mg Rectal Daily    atorvastatin  80 mg Oral Nightly     PRN Meds: racepinephrine HCl, QUEtiapine, acetaminophen, melatonin, hydrALAZINE, albuterol, acetaminophen, [Held by provider] morphine, ondansetron **OR** ondansetron, polyethylene glycol, perflutren lipid microspheres    Labs:     Recent Labs     04/25/22  0621 04/26/22  0657 04/27/22  0533   WBC 12.0* 13.2* 11.9*   HGB 8.4* 8.6* 7.1*   HCT 27.3* 28.5* 23.2*    420 287       Recent Labs     04/24/22  1003 04/25/22  0621 04/26/22  0657    142 143   K 3.8 3.7 3.6   * 109* 108*   CO2 21* 20* 20*   BUN 14 14 17   CREATININE 1.0 1.0 1.0   CALCIUM 8.8 8.8 8.7       No results for input(s): PROT, ALB, ALKPHOS, ALT, AST, BILITOT, AMYLASE, LIPASE in the last 72 hours. No results for input(s): INR in the last 72 hours. No results for input(s): Lorenso Favre in the last 72 hours.     Chronic labs:    Lab Results   Component Value Date    CHOL 140 04/23/2022    TRIG 87 04/23/2022    HDL 48 04/23/2022    [address] 75 04/23/2022    LABA1C 5.6 04/23/2022       Radiology: REVIEWED DAILY    Assessment:  Acute respiratory failure secondary to mucous plugging  Right MCA stroke- severe aphasia, severe dysarthria, and left arm and leg drift, and rightward gaze, and left-sided hemianopia  Dysphagia  Acute delirium   UTI  Chronic anemia    CKD, unknown baseline  CAD s/p PCI in 2017  HTN  HLD  Hx of BLE DVT + PE in 11/2021 anticoagulated on Eliquis    Plan:  ICU team and neurology following  Remains intubated -MV management per ICU team, attempting to wean   Continue neurochecks  Awaiting echo to r/o paradoxical embolus, if negative will need Zio patch at discharge  Cont therapeutic lovenox for now, plans to bridge to Coumadin once able to take p.o. ASA + statin   PT/OT, SLP  Measures to reduce delirium   Seroquel added + melatonin at HS  Will need MBSS when appropriate   Initiated on ceftriaxone  Will make outpatient referral to hematology/oncology for hypercoagulable work-up given history of DVT/PE and failure of Eliquis     DVT Prophylaxis [x] Lovenox  []  Heparin [] DOAC [] PCDs [] Ambulation    GI Prophylaxis [x] PPI  [] H2 Blocker   [] Carafate  [] Diet/Tube Feeds   Level of care [] Med/Surg  [] Intermediate  [x]  ICU   Diet Diet NPO    Family contact []  N/A  [x] At bedside - daughterJanell   [] Phone call      Discharge Plan: TBD pending further clinical course    +++++++++++++++++++++++++++++++++++++++++++++++++  LUIS Rodrigues/ Jose Hermosillo[address]  +++++++++++++++++++++++++++++++++++++++++++++++++  NOTE: This report was transcribed using voice recognition software. Every effort was made to ensure accuracy; however, inadvertent computerized transcription errors may be present.

## 2022-04-27 NOTE — CARE COORDINATION
4/27:  Update CM Note:  CHELLE faxed LA papers to Dr. Ephriam Ahumada. Returned papers to Kiki/Melva.  Electronically signed by Sisi Wallace RN on 4/27/2022 at 10:14 AM

## 2022-04-27 NOTE — PLAN OF CARE
Spoke with ENT resident Dr. Cally Swenson regarding patients bleeding from mouth and nose. He recommended that we pack the back of patients mouth with Crilex Gauze and that they would be up to see patient momentarily. Patients mouth was packed with gauze.

## 2022-04-28 ENCOUNTER — APPOINTMENT (OUTPATIENT)
Dept: GENERAL RADIOLOGY | Age: 77
DRG: 064 | End: 2022-04-28
Attending: FAMILY MEDICINE
Payer: MEDICARE

## 2022-04-28 LAB
ABO/RH: NORMAL
ANION GAP SERPL CALCULATED.3IONS-SCNC: 10 MMOL/L (ref 7–16)
ANION GAP SERPL CALCULATED.3IONS-SCNC: 12 MMOL/L (ref 7–16)
ANION GAP SERPL CALCULATED.3IONS-SCNC: 9 MMOL/L (ref 7–16)
ANTIBODY SCREEN: NORMAL
BUN BLDV-MCNC: 29 MG/DL (ref 6–23)
BUN BLDV-MCNC: 30 MG/DL (ref 6–23)
BUN BLDV-MCNC: 31 MG/DL (ref 6–23)
CALCIUM SERPL-MCNC: 7.5 MG/DL (ref 8.6–10.2)
CALCIUM SERPL-MCNC: 7.7 MG/DL (ref 8.6–10.2)
CALCIUM SERPL-MCNC: 8.2 MG/DL (ref 8.6–10.2)
CHLORIDE BLD-SCNC: 108 MMOL/L (ref 98–107)
CHLORIDE BLD-SCNC: 109 MMOL/L (ref 98–107)
CHLORIDE BLD-SCNC: 109 MMOL/L (ref 98–107)
CHLORIDE URINE RANDOM: <20 MMOL/L
CO2: 20 MMOL/L (ref 22–29)
CO2: 20 MMOL/L (ref 22–29)
CO2: 21 MMOL/L (ref 22–29)
CREAT SERPL-MCNC: 1.3 MG/DL (ref 0.5–1)
CREAT SERPL-MCNC: 1.3 MG/DL (ref 0.5–1)
CREAT SERPL-MCNC: 1.4 MG/DL (ref 0.5–1)
CREATININE URINE: 70 MG/DL (ref 29–226)
GFR AFRICAN AMERICAN: 44
GFR AFRICAN AMERICAN: 48
GFR AFRICAN AMERICAN: 48
GFR NON-AFRICAN AMERICAN: 36 ML/MIN/1.73
GFR NON-AFRICAN AMERICAN: 40 ML/MIN/1.73
GFR NON-AFRICAN AMERICAN: 40 ML/MIN/1.73
GLUCOSE BLD-MCNC: 138 MG/DL (ref 74–99)
GLUCOSE BLD-MCNC: 164 MG/DL (ref 74–99)
GLUCOSE BLD-MCNC: 178 MG/DL (ref 74–99)
HCT VFR BLD CALC: 18.9 % (ref 34–48)
HCT VFR BLD CALC: 25.4 % (ref 34–48)
HCT VFR BLD CALC: 26 % (ref 34–48)
HEMOGLOBIN: 5.7 G/DL (ref 11.5–15.5)
HEMOGLOBIN: 8.2 G/DL (ref 11.5–15.5)
HEMOGLOBIN: 8.4 G/DL (ref 11.5–15.5)
MCH RBC QN AUTO: 28.5 PG (ref 26–35)
MCHC RBC AUTO-ENTMCNC: 32.3 % (ref 32–34.5)
MCV RBC AUTO: 88.2 FL (ref 80–99.9)
PDW BLD-RTO: 14.1 FL (ref 11.5–15)
PLATELET # BLD: 280 E9/L (ref 130–450)
PMV BLD AUTO: 10 FL (ref 7–12)
POTASSIUM SERPL-SCNC: 4.3 MMOL/L (ref 3.5–5)
POTASSIUM SERPL-SCNC: 4.4 MMOL/L (ref 3.5–5)
POTASSIUM SERPL-SCNC: 4.9 MMOL/L (ref 3.5–5)
POTASSIUM, UR: 26.6 MMOL/L
RBC # BLD: 2.88 E12/L (ref 3.5–5.5)
SODIUM BLD-SCNC: 138 MMOL/L (ref 132–146)
SODIUM BLD-SCNC: 139 MMOL/L (ref 132–146)
SODIUM BLD-SCNC: 141 MMOL/L (ref 132–146)
SODIUM URINE: <20 MMOL/L
UREA NITROGEN, UR: 444 MG/DL (ref 800–1666)
WBC # BLD: 10 E9/L (ref 4.5–11.5)

## 2022-04-28 PROCEDURE — 6370000000 HC RX 637 (ALT 250 FOR IP): Performed by: NURSE PRACTITIONER

## 2022-04-28 PROCEDURE — 6370000000 HC RX 637 (ALT 250 FOR IP)

## 2022-04-28 PROCEDURE — 2580000003 HC RX 258: Performed by: INTERNAL MEDICINE

## 2022-04-28 PROCEDURE — 99291 CRITICAL CARE FIRST HOUR: CPT | Performed by: INTERNAL MEDICINE

## 2022-04-28 PROCEDURE — 99222 1ST HOSP IP/OBS MODERATE 55: CPT | Performed by: OTOLARYNGOLOGY

## 2022-04-28 PROCEDURE — 6370000000 HC RX 637 (ALT 250 FOR IP): Performed by: INTERNAL MEDICINE

## 2022-04-28 PROCEDURE — 99232 SBSQ HOSP IP/OBS MODERATE 35: CPT | Performed by: PHYSICIAN ASSISTANT

## 2022-04-28 PROCEDURE — 2580000003 HC RX 258: Performed by: CHIROPRACTOR

## 2022-04-28 PROCEDURE — 84540 ASSAY OF URINE/UREA-N: CPT

## 2022-04-28 PROCEDURE — 6360000002 HC RX W HCPCS: Performed by: INTERNAL MEDICINE

## 2022-04-28 PROCEDURE — 2000000000 HC ICU R&B

## 2022-04-28 PROCEDURE — 6360000002 HC RX W HCPCS: Performed by: STUDENT IN AN ORGANIZED HEALTH CARE EDUCATION/TRAINING PROGRAM

## 2022-04-28 PROCEDURE — 6370000000 HC RX 637 (ALT 250 FOR IP): Performed by: FAMILY MEDICINE

## 2022-04-28 PROCEDURE — 86900 BLOOD TYPING SEROLOGIC ABO: CPT

## 2022-04-28 PROCEDURE — 86850 RBC ANTIBODY SCREEN: CPT

## 2022-04-28 PROCEDURE — 36430 TRANSFUSION BLD/BLD COMPNT: CPT

## 2022-04-28 PROCEDURE — 6360000002 HC RX W HCPCS

## 2022-04-28 PROCEDURE — P9016 RBC LEUKOCYTES REDUCED: HCPCS

## 2022-04-28 PROCEDURE — 86923 COMPATIBILITY TEST ELECTRIC: CPT

## 2022-04-28 PROCEDURE — 2500000003 HC RX 250 WO HCPCS: Performed by: CHIROPRACTOR

## 2022-04-28 PROCEDURE — 80048 BASIC METABOLIC PNL TOTAL CA: CPT

## 2022-04-28 PROCEDURE — 82570 ASSAY OF URINE CREATININE: CPT

## 2022-04-28 PROCEDURE — 2580000003 HC RX 258: Performed by: STUDENT IN AN ORGANIZED HEALTH CARE EDUCATION/TRAINING PROGRAM

## 2022-04-28 PROCEDURE — 84300 ASSAY OF URINE SODIUM: CPT

## 2022-04-28 PROCEDURE — 82436 ASSAY OF URINE CHLORIDE: CPT

## 2022-04-28 PROCEDURE — 71045 X-RAY EXAM CHEST 1 VIEW: CPT

## 2022-04-28 PROCEDURE — 85018 HEMOGLOBIN: CPT

## 2022-04-28 PROCEDURE — 36415 COLL VENOUS BLD VENIPUNCTURE: CPT

## 2022-04-28 PROCEDURE — 85027 COMPLETE CBC AUTOMATED: CPT

## 2022-04-28 PROCEDURE — 86901 BLOOD TYPING SEROLOGIC RH(D): CPT

## 2022-04-28 PROCEDURE — C9113 INJ PANTOPRAZOLE SODIUM, VIA: HCPCS | Performed by: INTERNAL MEDICINE

## 2022-04-28 PROCEDURE — 94003 VENT MGMT INPAT SUBQ DAY: CPT

## 2022-04-28 PROCEDURE — 85014 HEMATOCRIT: CPT

## 2022-04-28 PROCEDURE — 84133 ASSAY OF URINE POTASSIUM: CPT

## 2022-04-28 RX ORDER — PANTOPRAZOLE SODIUM 40 MG/10ML
40 INJECTION, POWDER, LYOPHILIZED, FOR SOLUTION INTRAVENOUS 2 TIMES DAILY
Status: DISCONTINUED | OUTPATIENT
Start: 2022-04-28 | End: 2022-05-04

## 2022-04-28 RX ORDER — SODIUM CHLORIDE 9 MG/ML
INJECTION, SOLUTION INTRAVENOUS PRN
Status: DISCONTINUED | OUTPATIENT
Start: 2022-04-28 | End: 2022-05-02

## 2022-04-28 RX ORDER — DEXAMETHASONE SODIUM PHOSPHATE 4 MG/ML
4 INJECTION, SOLUTION INTRA-ARTICULAR; INTRALESIONAL; INTRAMUSCULAR; INTRAVENOUS; SOFT TISSUE EVERY 6 HOURS
Status: COMPLETED | OUTPATIENT
Start: 2022-04-28 | End: 2022-04-29

## 2022-04-28 RX ORDER — FENTANYL CITRATE 50 UG/ML
25 INJECTION, SOLUTION INTRAMUSCULAR; INTRAVENOUS
Status: DISCONTINUED | OUTPATIENT
Start: 2022-04-28 | End: 2022-04-29

## 2022-04-28 RX ORDER — SODIUM CHLORIDE, SODIUM LACTATE, POTASSIUM CHLORIDE, AND CALCIUM CHLORIDE .6; .31; .03; .02 G/100ML; G/100ML; G/100ML; G/100ML
500 INJECTION, SOLUTION INTRAVENOUS ONCE
Status: COMPLETED | OUTPATIENT
Start: 2022-04-28 | End: 2022-04-28

## 2022-04-28 RX ORDER — SODIUM CHLORIDE, SODIUM LACTATE, POTASSIUM CHLORIDE, CALCIUM CHLORIDE 600; 310; 30; 20 MG/100ML; MG/100ML; MG/100ML; MG/100ML
INJECTION, SOLUTION INTRAVENOUS CONTINUOUS
Status: ACTIVE | OUTPATIENT
Start: 2022-04-28 | End: 2022-04-28

## 2022-04-28 RX ORDER — HYDRALAZINE HYDROCHLORIDE 20 MG/ML
10 INJECTION INTRAMUSCULAR; INTRAVENOUS EVERY 4 HOURS PRN
Status: DISCONTINUED | OUTPATIENT
Start: 2022-04-28 | End: 2022-05-11 | Stop reason: HOSPADM

## 2022-04-28 RX ADMIN — 0.12% CHLORHEXIDINE GLUCONATE 15 ML: 1.2 RINSE ORAL at 21:08

## 2022-04-28 RX ADMIN — POLYETHYLENE GLYCOL 3350 17 G: 17 POWDER, FOR SOLUTION ORAL at 10:45

## 2022-04-28 RX ADMIN — PANTOPRAZOLE SODIUM 40 MG: 40 INJECTION, POWDER, FOR SOLUTION INTRAVENOUS at 21:08

## 2022-04-28 RX ADMIN — DEXAMETHASONE SODIUM PHOSPHATE 4 MG: 4 INJECTION, SOLUTION INTRAMUSCULAR; INTRAVENOUS at 10:40

## 2022-04-28 RX ADMIN — DEXAMETHASONE SODIUM PHOSPHATE 4 MG: 4 INJECTION, SOLUTION INTRAMUSCULAR; INTRAVENOUS at 04:58

## 2022-04-28 RX ADMIN — DEXTROSE MONOHYDRATE: 50 INJECTION, SOLUTION INTRAVENOUS at 05:45

## 2022-04-28 RX ADMIN — Medication 50 MCG/HR: at 23:45

## 2022-04-28 RX ADMIN — SODIUM CHLORIDE, POTASSIUM CHLORIDE, SODIUM LACTATE AND CALCIUM CHLORIDE 500 ML: 600; 310; 30; 20 INJECTION, SOLUTION INTRAVENOUS at 05:03

## 2022-04-28 RX ADMIN — QUETIAPINE FUMARATE 12.5 MG: 25 TABLET ORAL at 21:09

## 2022-04-28 RX ADMIN — FENTANYL CITRATE 25 MCG: 50 INJECTION, SOLUTION INTRAMUSCULAR; INTRAVENOUS at 21:08

## 2022-04-28 RX ADMIN — WATER 1000 MG: 1 INJECTION INTRAMUSCULAR; INTRAVENOUS; SUBCUTANEOUS at 21:10

## 2022-04-28 RX ADMIN — SODIUM CHLORIDE, POTASSIUM CHLORIDE, SODIUM LACTATE AND CALCIUM CHLORIDE: 600; 310; 30; 20 INJECTION, SOLUTION INTRAVENOUS at 10:49

## 2022-04-28 RX ADMIN — QUETIAPINE FUMARATE 12.5 MG: 25 TABLET ORAL at 10:41

## 2022-04-28 RX ADMIN — SENNOSIDES 8.6 MG: 8.6 TABLET, COATED ORAL at 21:10

## 2022-04-28 RX ADMIN — DEXAMETHASONE SODIUM PHOSPHATE 4 MG: 4 INJECTION INTRA-ARTICULAR; INTRALESIONAL; INTRAMUSCULAR; INTRAVENOUS; SOFT TISSUE at 21:09

## 2022-04-28 RX ADMIN — 0.12% CHLORHEXIDINE GLUCONATE 15 ML: 1.2 RINSE ORAL at 10:41

## 2022-04-28 RX ADMIN — ATORVASTATIN CALCIUM 80 MG: 40 TABLET, FILM COATED ORAL at 21:10

## 2022-04-28 RX ADMIN — DEXAMETHASONE SODIUM PHOSPHATE 4 MG: 4 INJECTION INTRA-ARTICULAR; INTRALESIONAL; INTRAMUSCULAR; INTRAVENOUS; SOFT TISSUE at 14:24

## 2022-04-28 RX ADMIN — Medication 5 MG: at 21:09

## 2022-04-28 RX ADMIN — Medication 10 MEQ: at 01:04

## 2022-04-28 RX ADMIN — SODIUM CHLORIDE, POTASSIUM CHLORIDE, SODIUM LACTATE AND CALCIUM CHLORIDE: 600; 310; 30; 20 INJECTION, SOLUTION INTRAVENOUS at 21:06

## 2022-04-28 RX ADMIN — PANTOPRAZOLE SODIUM 40 MG: 40 INJECTION, POWDER, FOR SOLUTION INTRAVENOUS at 09:35

## 2022-04-28 RX ADMIN — Medication 5000 UNITS: at 10:41

## 2022-04-28 RX ADMIN — FENTANYL CITRATE 25 MCG: 50 INJECTION, SOLUTION INTRAMUSCULAR; INTRAVENOUS at 14:20

## 2022-04-28 ASSESSMENT — PAIN SCALES - GENERAL
PAINLEVEL_OUTOF10: 7
PAINLEVEL_OUTOF10: 0
PAINLEVEL_OUTOF10: 7

## 2022-04-28 ASSESSMENT — PULMONARY FUNCTION TESTS
PIF_VALUE: 20
PIF_VALUE: 21
PIF_VALUE: 19
PIF_VALUE: 17
PIF_VALUE: 20
PIF_VALUE: 20
PIF_VALUE: 18
PIF_VALUE: 24
PIF_VALUE: 21
PIF_VALUE: 17
PIF_VALUE: 24
PIF_VALUE: 11
PIF_VALUE: 18
PIF_VALUE: 21
PIF_VALUE: 18
PIF_VALUE: 17
PIF_VALUE: 17
PIF_VALUE: 22
PIF_VALUE: 18
PIF_VALUE: 18
PIF_VALUE: 16
PIF_VALUE: 20
PIF_VALUE: 20
PIF_VALUE: 17
PIF_VALUE: 18
PIF_VALUE: 20
PIF_VALUE: 18
PIF_VALUE: 18
PIF_VALUE: 22
PIF_VALUE: 28

## 2022-04-28 NOTE — CONSULTS
Department of Internal Medicine  Nephrology Nurse Practitioner Consult Note      Reason for Consult: CURT  Requesting Physician:  Dr. Prerna Huynh:  Stroke like symptoms    History Obtained From:  patient, electronic medical record    HISTORY OF PRESENT ILLNESS:  Briefly, Ms. Shaista Stephens is a 68year old female with a PMH of HTN, CAD with PCI 2017, BLE DVT 11/2021, HLD, GERD who was admitted on April 22, 2022 after she was transferred from [facility] with stroke like symptoms. Patient presented to [facility] with complaint of strokelike symptoms and sudden onset left-sided weakness with aphasia, with MRI indicating right MCA without hemorrhagic conversion. Patient was originally admitted to the medical floor at [facility] and became progressively obtunded with stridorous respiratory failure, was intubated and transferred to MICU 4/26/2022. Patient developed severe epistaxis requiring packing per ENT. Labs on admission were significant for sodium of 141, potassium 3.7, chloride 111, bicarbonate 18, BUN 21, creatinine 1.2, and hemoglobin of 7.1. We are consulted for worsening CURT and decreased urine output. Notably patient's daughter states that patient has been recently evaluated by nephrology in [place] and underwent blood work and renal ultrasound however did not receive the results yet. Ramipril, diltiazem, atorvastatin, apixaban, and omeprazole are medications patient was taking prior to admission.       Past Medical History:        Diagnosis Date    CAD (coronary artery disease)     s/p PCI in 2017    DVT, bilateral lower limbs ([address] 11/2021    on eliquis    HTN (hypertension)     Hyperlipidemia     [address]monary embolism [address] [address] 11/2021    on eliquis     Past Surgical History:        Procedure Laterality Date    CHOLECYSTECTOMY  2011    HYSTERECTOMY  02/09/2011     Current Medications:    Current Facility-Administered Medications: 0.9 % sodium chloride infusion, , IntraVENous, PRN  0.9 % sodium chloride infusion, , IntraVENous, PRN  pantoprazole (PROTONIX) injection 40 mg, 40 mg, IntraVENous, BID  lactated ringers infusion, , IntraVENous, Continuous  dexamethasone (DECADRON) injection 4 mg, 4 mg, IntraVENous, Q6H **FOLLOWED BY** [DISCONTINUED] dexamethasone (DECADRON) injection 10 mg, 10 mg, IntraVENous, Q6H  dextrose 50 % IV solution, 12.5 g, IntraVENous, PRN  glucagon (rDNA) injection 1 mg, 1 mg, IntraMUSCular, PRN  dextrose 5 % solution, 100 mL/hr, IntraVENous, PRN  glucose chewable tablet 16 g, 16 g, Oral, PRN  senna (SENOKOT) tablet 8.6 mg, 1 tablet, Oral, Nightly  polyethylene glycol (GLYCOLAX) packet 17 g, 17 g, Oral, Daily  dexmedetomidine (PRECEDEX) 1,000 mcg in sodium chloride 0.9 % 250 mL infusion, 0.1-1.5 mcg/kg/hr, IntraVENous, Continuous  oxymetazoline (AFRIN) 0.05 % nasal spray 2 spray, 2 spray, Each Nostril, BID  racepinephrine HCl (VAPONEFPRIN) 2.25 % nebulizer solution NEBU 11.25 mg, 11.25 mg, Nebulization, Q4H PRN  chlorhexidine (PERIDEX) 0.12 % solution 15 mL, 15 mL, Mouth/Throat, BID  cefTRIAXone (ROCEPHIN) 1,000 mg in sterile water 10 mL IV syringe, 1,000 mg, IntraVENous, Q24H  QUEtiapine (SEROQUEL) tablet 12.5 mg, 12.5 mg, Oral, 2 times per day  acetaminophen (TYLENOL) suppository 650 mg, 650 mg, Rectal, Q4H PRN  melatonin disintegrating tablet 5 mg, 5 mg, Oral, QPM  melatonin disintegrating tablet 5 mg, 5 mg, Oral, Nightly PRN  hydrALAZINE (APRESOLINE) injection 10 mg, 10 mg, IntraVENous, Q6H PRN  albuterol (PROVENTIL) nebulizer solution 2.5 mg, 2.5 mg, Nebulization, Q6H PRN  [Held by provider] enoxaparin (LOVENOX) injection 80 mg, 1 mg/kg, SubCUTAneous, BID  acetaminophen (TYLENOL) tablet 1,000 mg, 1,000 mg, Oral, Q8H PRN  [Held by provider] apixaban (ELIQUIS) tablet 5 mg, 5 mg, Oral, BID  vitamin D (CHOLECALCIFEROL) tablet 5,000 Units, 5,000 Units, Oral, Daily  [Held by provider] dilTIAZem (CARDIZEM CD) extended release capsule 120 mg, 120 mg, Oral, Daily  [Held by provider] ramipril (ALTACE) capsule 5 mg, 5 mg, Oral, Daily  ondansetron (ZOFRAN-ODT) disintegrating tablet 4 mg, 4 mg, Oral, Q8H PRN **OR** ondansetron (ZOFRAN) injection 4 mg, 4 mg, IntraVENous, Q6H PRN  polyethylene glycol (GLYCOLAX) packet 17 g, 17 g, Oral, Daily PRN  [Held by provider] aspirin EC tablet 81 mg, 81 mg, Oral, Daily **OR** [Held by provider] aspirin suppository 300 mg, 300 mg, Rectal, Daily  perflutren lipid microspheres (DEFINITY) injection 1.65 mg, 1.5 mL, IntraVENous, ONCE PRN  atorvastatin (LIPITOR) tablet 80 mg, 80 mg, Oral, Nightly  Allergies:  Patient has no allergy information on record. Social History:    TOBACCO:   has no history on file for tobacco use. ETOH:   has no history on file for alcohol use. Family History:   No family history on file. REVIEW OF SYSTEMS:    Review of systems not obtained due to patient factors - intubation  PHYSICAL EXAM:      Vitals:    VITALS:  BP (!) 167/91   Pulse 68   Temp 98.1 °F (36.7 °C) (Axillary)   Resp 16   Ht 5' (1.524 m)   Wt 174 lb 2.6 oz (79 kg)   SpO2 100%   BMI 34.01 kg/m²   24HR INTAKE/OUTPUT:    Intake/Output Summary (Last 24 hours) at 4/28/2022 1148  Last data filed at 4/28/2022 1000  Gross per 24 hour   Intake 3169.86 ml   Output 375 ml   Net 2794.86 ml       Constitutional: Intubated on mechanical ventilator  HEENT:  ETT present, nasal packing present  Respiratory:   On mechanical ventilator  Cardiovascular/Edema:  RRR, S1/S2  Gastrointestinal:  abd rounded, non tender, BS hypoactive  Neurologic:  Sedated on mechanical ventilator  Skin:  Warm,dry, ecchymosis to BUE  Other:  Pink catheter draining minimal yellow urine    DATA:    CBC:   Lab Results   Component Value Date    WBC 10.0 04/28/2022    RBC 2.88 04/28/2022    HGB 8.2 04/28/2022    HCT 25.4 04/28/2022    MCV 88.2 04/28/2022    MCH 28.5 04/28/2022    MCHC 32.3 04/28/2022    RDW 14.1 04/28/2022     04/28/2022    MPV 10.0 04/28/2022     CMP:    Lab Results   Component Value Date     04/28/2022    K 4.4 04/28/2022    K 3.6 04/27/2022     04/28/2022    CO2 20 04/28/2022    BUN 29 04/28/2022    CREATININE 1.3 04/28/2022    GFRAA 48 04/28/2022    LABGLOM 40 04/28/2022    GLUCOSE 178 04/28/2022    CALCIUM 7.7 04/28/2022     Magnesium:    Lab Results   Component Value Date    MG 1.8 04/27/2022     Phosphorus:  No results found for: PHOS  Radiology Review:            BRIEF SUMMARY OF INITIAL CONSULT:  Briefly, Ms. Latrice Rdz is a 68year old female with a PMH of HTN, CAD with PCI 2017, BLE DVT 11/2021, HLD, GERD who was admitted on April 22, 2022 after she was transferred from [facility] with stroke like symptoms. [facility] of strokelike symptoms and sudden onset left-sided weakness with aphasia, with MRI indicating right MCA without hemorrhagic conversion. Patient was originally admitted to the medical floor at [facility] and became progressively obtunded with stridorous respiratory failure, was intubated and transferred to MICU 4/26/2022. Patient developed severe epistaxis requiring packing per ENT. Labs on admission were significant for sodium of 141, potassium 3.7, chloride 111, bicarbonate 18, BUN 21, creatinine 1.2, and hemoglobin of 7.1. We are consulted for worsening CURT and decreased urine output. Notably patient's daughter states that patient has been recently evaluated by nephrology in [place] and underwent blood work and renal ultrasound however did not receive the results yet. Ramipril, diltiazem, atorvastatin, apixaban, and omeprazole are medications patient was taking prior to admission. IMPRESSION/RECOMMENDATIONS:      1. CURT stage III based on urine output, likely volume responsive pre-renal CURT from poor oral intake and volume loss with severe anemia in the setting of ACEi administration vs ischemic ATN from acute hypotension. Oliguric.   FENa 0.3% c/w pre-renal injury. Creatinine on admission 1.2 mg/dL, today creatinine 1.3 mg/dL    2. CKD? Patient's daughter states patient was recently seen by nephrologist in [place]. Blood work and renal ultrasound had been conducted however they had not received the results yet. We will try to obtain results from Dr. Zenobia Dillard in [facility]. 3. Acidemia, pH 7.39, PCO2 32.5, HCO3 19.5   4. HTN, on cardizem and ramipril (on hold)  5. Hypocalcemia, 2/2 vitamin D deficiency. On cholecalciferol, obtain ionized calcium in am  6. Vitamin D deficiency, on cholecalciferol  ------------------------------------------------------------------------  7. Acute hypoxic respiratory failure 2/2 mucus plug, intubated on 4/26, remains on mechanical ventilation. S/p bronchoscopy  8. CVA, MRI demonstrates infarcts in right MCA without hemorrhagic conversion. Neurology following, concern for already embolic source. Echo ordered for PFO evaluation to r/o paradoxical embolus. Borderline intracranial atherosclerosis. 9. Historical DVT and current right LE+, patiently chronically anticoagulated on Eliquis at home, was being treated with Lovenox for transition to [address] [place] [address], now on hold secondary to epistaxis  10. Acute epistaxis, nasal packing in place  11. Anemia, acute drop in hemoglobin 5.6, with previous work-up at  [address]. HLD, on statin   13. GERD, on omeprazole at home  14. UTI, cultures pending, started on Rocephin  15. N.p.o., start tube feed? Plan:    · Continue LR at 100 cc/hr  · Monitor renal function, repeat BMP 4 PM  · Obtain ionized calcium in am  · Monitor H&H, transfuse to keep <7. S/p transfusion this morning       Thank you Dr. Alvaro Sahu for allowing us to participate in the care of Ms. Samia Maria  Electronically signed by PATRICIA Finley CNP on 4/28/2022 at 11:49 AM

## 2022-04-28 NOTE — PROGRESS NOTES
[facility]  Neurology follow up     Date:  4/28/2022  Patient Name:  Johnny Mcclellan  YOB: 1945  MRN: [id number]     Chief Complaint: stroke     Assessment  RMCA stroke    Evidence of some intracranial stenosis on vessel imaging, however no significant stenosis in the JOJO on CUS    Concerning for cardio-embolic source. Echo with dilated LA. No shunt. No AF on telemetry. Will need extended cardiac monitoring to eval for, especially given her bleeding issues currently. If AF would be detected and she is unable to remain on anticoagulation, could consider watchman device at that time. Hgb drop to 5.7 and bleeding from the mouth and nose-- ASA and AC on hold. Would recommend resuming when/if able    UTI- on Rocephin     Plan  · Resume OAC/AP when able from bleeding perspective   · Given her blood clotting hx, would recommend [address] over   AP therapy for secondary prevention. She does have intracranial athero and was previously on ASA as well for CAD, however it does not appear that she has been or currently able to tolerate both  · Continue statin therapy, goal LDL<70  · Zio patch on discharge- order placed. · PT/OT/speech   · Neuro will be available as needed, please call with any questions or concerns in the interim       History of Present Illness:  Johnny Mcclellan is a 68 y.o. right handed female presenting for evaluation of stroke. Patient initially presented to an outside hospital for eval ration of stroke. She was transferred here for further evaluation. MRI of the brain revealed right MCA stroke. She does have some evidence of borderline intracranial atherosclerosis. Carotid ultrasound did not show any significant right ICA stenosis. An echo is pending. Discussed with patient's daughter today and reviewed MRI images. Per her daughter the patient does not have a known history of A. fib and was on Eliquis for DVT/PE. Denies any compliance issues.   Patient was very active leading up to this and independent. US LE did show evidence of DVT. STAT CT head 4/26 due to worsening dysarthria and lethargy- stable. Had increasing breathing difficulties and was transferred to the ICU and intubated. Found to have large mucous plug. Also found to have UTI and now on Abx     Developed bleeding from mouth and nose. Hgb 5.7 this am. ASA and Lovenox on hold. Echo completed- dilated LA. No shunt. Followed at [facility] and has had hypercoag workup. Last note from 3/4/22 reports elevated factor 8 and the plan was to keep her on anticoagulation indefinitely. Daughter at bedside. Reports family history of blood clots. Does not know of any specific diagnosis      ROS unable due to intubation. Allergies:      Not on File     Physical Examination  Vitals   Vitals:    04/28/22 0906 04/28/22 0909 04/28/22 0920 04/28/22 1000   BP:   138/62 (!) 167/91   Pulse: (!) 49 (!) 48  68   Resp:    16   Temp:   97.9 °F (36.6 °C) 98.1 °F (36.7 °C)   TempSrc:    Axillary   SpO2:    100%   Weight:       Height:            General: Patient appears in no acute distress. Intubated. HEENT: Normocephalic, atraumatic  Lungs: On vent   Heart: NSR on tele  Extremities/Peripheral vascular: Trace BL LE edema noted. No cold limbs noted. Neurologic Examination    Mental Status  Intubated. Not sedated. Follows simple one step commands. Cranial Nerves  II. Visual fields - no clear blink to threat on L.   III, IV, VI: Pupils equally round and reactive to light, 3 to 2 mm bilaterally. EOMs: eyes midline with forced eye opening   V. Facial sensation intact to light touch bilaterally  VII: Facial movements - L facial droop   VIII: Hearing intact to voice  IX,X: Palate elevates symmetrically.   XI: Sternocleidomastoid and trapezius 5/5 bilaterally   XII: Tongue is midline    Motor    Spontaneously moving all limbs R > L  Normal tone and bulk   No abnormal movements     Sensation  · Responds to pain in all limbs more briskly on R     Reflexes    L Babinski     Coordination  Not alert enough to follow commands for testing     Gait  Deferred for safety/fall consideration      Labs  Recent Labs     04/26/22  1951 04/27/22  0533 04/27/22  0535 04/27/22  0900 04/27/22  1400 04/27/22  1850 04/28/22  0200   NA  --  146  --    < >  --  146  --    K  --  5.3*  --    < >  --  3.6  --    CL  --  114*  --    < >  --  115*  --    CO2  --  15*  --    < >  --  19*  --    BUN  --  25*  --    < >  --  26*  --    CREATININE  --  1.3*  --    < >  --  1.3*  --    GLUCOSE  --  86  --    < >  --  139*  --    CALCIUM  --  7.9*  --    < >  --  7.7*  --    WBC  --  11.9*  --   --   --   --   --    RBC  --  2.48*  --   --   --   --   --    HGB  --  7.1*  --    < >   < >  --  5.7*   HCT  --  23.2*  --    < >   < >  --  18.9*   MCV  --  93.5  --   --   --   --   --    MCH  --  28.6  --   --   --   --   --    MCHC  --  30.6*  --   --   --   --   --    RDW  --  14.4  --   --   --   --   --    PLT  --  287  --   --   --   --   --    MPV  --  9.8  --   --   --   --   --    PH   < >  --  7.395  --   --   --   --    PO2   < >  --  116.6*  --   --   --   --    PCO2   < >  --  32.5*  --   --   --   --    HCO3   < >  --  19.5*  --   --   --   --    BE   < >  --  -4.9*  --   --   --   --    O2SAT   < >  --  98.4  --   --   --   --     < > = values in this interval not displayed. Imaging  XR CHEST PORTABLE         XR CHEST PORTABLE   Final Result   Improved aeration at the left base associated with a greater degree of   inspiration. XR ABDOMEN FOR NG/OG/NE TUBE PLACEMENT   Final Result   Catheter is in the stomach. XR CHEST PORTABLE   Final Result   ET tube tip 2.4 cm from the pascual. XR CHEST PORTABLE   Final Result   No acute process. ET tube tip within the right mainstem bronchus and should be retracted at   least 4.0 cm.       Findings given to the core team to be relayed to the license caregiver on   04/26/2022 at 2:09 p.m. Ian Trevor CT HEAD WO CONTRAST   Final Result   1. Late acute/early subacute multiple scattered infarcts in the vascular   distribution of the right middle cerebral artery as above commented. 2.  No indication for hemorrhagic component. 3.  No indication for new sizable area of acute recent insult in progression   to the brain parenchyma. 4.  There is no midline shift. RECOMMENDATIONS:   Unavailable         US DUP LOWER EXTREMITIES BILATERAL VENOUS   Final Result   There is evidence for deep venous thrombosis      ALERT:  THIS IS AN ABNORMAL REPORT               XR CHEST PORTABLE   Final Result   Findings suggest interstitial scarring and chronic bronchitis. No   significant acute infiltrates are noted         VL DUP CAROTID BILATERAL   Final Result   Atherosclerotic disease. No hemodynamically significant stenosis is   identified   Estimated stenosis by NASCET criteria in the proximal right carotid   artery is between 0% and 49%. Estimated stenosis by NASCET criteria in the proximal left carotid   artery is between 0% and 49%. MRI brain without contrast   Final Result   Multiple small recent infarcts throughout the right MCA territory. There is   evidence of slow vascular flow in the right MCA territory. Consider CTA of   the head/neck for further evaluation. Advanced chronic microvascular ischemic changes. No mass effect. No hemorrhage. No hydrocephalus. XR CHEST PORTABLE    (Results Pending)         CTA head/neck        A mild to moderate amount of plaque noted in JOJO. If R MCA stroke noted on MRI, there is potentially symptomatic R-MCA atherosclerosis noted intracranially as well    CUS  Atherosclerotic disease. No hemodynamically significant stenosis is  identified  Estimated stenosis by NASCET criteria in the proximal right carotid  artery is between 0% and 49%.   Estimated stenosis by NASCET criteria in the proximal left carotid  artery is between 0% and 49%. Echo    Normal left ventricular systolic function. Ejection fraction is visually estimated at > 60%. Normal right ventricular size and function (TAPSE 2.8 cm). There is doppler evidence of stage I diastolic dysfunction. No evidence of interatrial shunting on bubble study. Mild aortic stenosis.     Electronically signed by TEMI Rosales on 4/28/2022 at 10:47 AM

## 2022-04-28 NOTE — PROGRESS NOTES
[address] [facility]   Department of Internal Medicine   Internal Medicine Residency  MICU Progress Note    Patient:  Jeanie Almendarez 68 y.o. female   MRN: [id number]       Date of Service: 2022    Allergy: Patient has no allergy information on record. Subjective     Patient was seen and examined this morning at bedside in no acute distress. Overnight, patient was noted to have drop in hemoglobin to 5.6 and as such was transfused 1 unit of packed red blood cells. Patient still having bloody discharge from mouth, ENT recommends continued packing with Kerlix gauze. Understands at this time due to the oral packing with gauze patient is not a candidate for extubation today, we will continue to monitor and assess for extubation in the coming days. Patient is becoming oliguric and her urine with decreased output and elevated creatinine, patient is most likely volume depleted due to blood loss and poor oral intake, nephrology was consulted and patient was started on LR fluids. We will continue to follow patient's kidney function. At this time we will continue ICU level care monitoring for mentation. Objective     TEMPERATURE:  Current - Temp: 98.1 °F (36.7 °C); Max - Temp  Av.9 °F (36.6 °C)  Min: 96.8 °F (36 °C)  Max: 100.2 °F (37.9 °C)  RESPIRATIONS RANGE: Resp  Av.5  Min: 16  Max: 33  PULSE RANGE: Pulse  Av.7  Min: 47  Max: 115  BLOOD PRESSURE RANGE:  Systolic (85KOX), FJQ:294 , Min:98 , QHO:973   ; Diastolic (11WSB), QEL:11, Min:52, Max:96    PULSE OXIMETRY RANGE: SpO2  Av.8 %  Min: 96 %  Max: 100 %    I & O - 24hr:    Intake/Output Summary (Last 24 hours) at 2022 1435  Last data filed at 2022 1000  Gross per 24 hour   Intake 3169.86 ml   Output 375 ml   Net 2794.86 ml     I/O last 3 completed shifts:   In: 3809.1 [I.V.:1736; NG/GT:100; IV Piggyback:.1]  Out: 410 [Urine:410] I/O this shift:  In: -   Out: 125 [Urine:125]   Weight change:     Physical Exam  Constitutional:       Appearance: Normal appearance. Interventions: She is sedated and intubated. HENT:      Mouth/Throat:      Comments: Gauze in place for oral bleeding  Eyes:      Pupils: Pupils are equal, round, and reactive to light. Cardiovascular:      Rate and Rhythm: Normal rate and regular rhythm. Pulses: Normal pulses. Heart sounds: Normal heart sounds. No murmur heard. Pulmonary:      Effort: Pulmonary effort is normal. She is intubated. Breath sounds: Normal breath sounds. No wheezing or rhonchi. Abdominal:      General: Abdomen is flat. There is no distension. Palpations: Abdomen is soft. Tenderness: There is no abdominal tenderness. Musculoskeletal:         General: Normal range of motion. Cervical back: Normal range of motion. Skin:     General: Skin is warm and dry. Capillary Refill: Capillary refill takes less than 2 seconds. Neurological:      General: No focal deficit present.       Comments: Coming off sedation, able to follow some commands and nod appropriately          Medications     Continuous Infusions:   sodium chloride      sodium chloride      lactated ringers 100 mL/hr at 04/28/22 1049    dextrose      dexmedetomidine (PRECEDEX) IV infusion Stopped (04/28/22 0920)     Scheduled Meds:   pantoprazole  40 mg IntraVENous BID    dexamethasone  4 mg IntraVENous Q6H    senna  1 tablet Oral Nightly    polyethylene glycol  17 g Oral Daily    oxymetazoline  2 spray Each Nostril BID    chlorhexidine  15 mL Mouth/Throat BID    cefTRIAXone (ROCEPHIN) IV  1,000 mg IntraVENous Q24H    QUEtiapine  12.5 mg Oral 2 times per day    melatonin  5 mg Oral QPM    [Held by provider] enoxaparin  1 mg/kg SubCUTAneous BID    [Held by provider] apixaban  5 mg Oral BID    vitamin D  5,000 Units Oral Daily    [Held by provider] dilTIAZem  120 mg Oral Daily    [Held by provider] ramipril  5 mg Oral Daily    [Held by provider] aspirin 81 mg Oral Daily    Or    [Held by provider] aspirin  300 mg Rectal Daily    atorvastatin  80 mg Oral Nightly     PRN Meds: sodium chloride, sodium chloride, fentanNYL, dextrose, glucagon (rDNA), dextrose, glucose, racepinephrine HCl, acetaminophen, melatonin, hydrALAZINE, albuterol, acetaminophen, ondansetron **OR** ondansetron, polyethylene glycol, perflutren lipid microspheres  Nutrition:   NG/OG tube TF type: Pulmocare/Nephro/Glucerna/Jevity        At rate: ml/h    Labs and Imaging Studies     CBC:   Recent Labs     04/26/22  0657 04/26/22  0657 04/27/22  0533 04/27/22  0533 04/27/22  1400 04/28/22  0200 04/28/22  1030   WBC 13.2*  --  11.9*  --   --   --  10.0   HGB 8.6*   < > 7.1*   < > 7.0* 5.7* 8.2*   HCT 28.5*   < > 23.2*   < > 22.9* 18.9* 25.4*   MCV 92.2  --  93.5  --   --   --  88.2     --  287  --   --   --  280    < > = values in this interval not displayed. BMP:    Recent Labs     04/27/22  1850 04/28/22  0545 04/28/22  1030    138 141   K 3.6 4.3 4.4   * 109* 109*   CO2 19* 20* 20*   BUN 26* 30* 29*   CREATININE 1.3* 1.3* 1.3*   GLUCOSE 139* 164* 178*       LIVER PROFILE:   No results for input(s): AST, ALT, LIPASE, BILIDIR, BILITOT, ALKPHOS in the last 72 hours. Invalid input(s):   AMYLASE,  ALB    PT/INR:   Recent Labs     04/27/22  1512   PROTIME 14.9*   INR 1.3       APTT:   Recent Labs     04/27/22  1512   APTT 48.0*       Fasting Lipid Panel:    Lab Results   Component Value Date    CHOL 140 04/23/2022    TRIG 87 04/23/2022    HDL 48 04/23/2022       Cardiac Enzymes:    No results found for: CKTOTAL, CKMB, CKMBINDEX, TROPONINI    Notable Cultures:      Blood cultures   Blood Culture, Routine   Date Value Ref Range Status   04/26/2022 24 Hours no growth  Preliminary     Respiratory cultures No results found for: RESPCULTURE No results found for: LABGRAM  Urine   Urine Culture, Routine   Date Value Ref Range Status   04/26/2022 (A)  Preliminary    Growth present, evaluating for:  Escherichia coli     04/26/2022 >100,000 CFU/ml  Sensitivity to follow    Preliminary     Legionella No results found for: LABLEGI  C Diff PCR No results found for: CDIFPCR  Wound culture/abscess: No results for input(s): WNDABS in the last 72 hours. Tip culture:No results for input(s): CXCATHTIP in the last 72 hours. Oxygen:     Vent Information  Ventilator ID: [id number]  Vent Mode: AC/VC  Ventilator Initiate: Yes  Additional Respiratory Assessments  Pulse: 65  Resp: 27  SpO2: 97 %  Position: Semi-Tang's  Humidification Source: Heated wire  Humidification Temp: 37  Circuit Condensation: Drained  Subglottic Suction Done: Yes  Airway Type: ET  Airway Size: 7.5  Cuff Pressure (cm H2O): 29 cm H2O  Swallow: Chokes on liquids       Urinary Catheter-Output (mL): 125 mL  [REMOVED] Urinary Catheter Pink-Output (mL): 700 mL    Imaging Studies:  XR CHEST PORTABLE         XR CHEST PORTABLE   Final Result   Improved aeration at the left base associated with a greater degree of   inspiration. XR ABDOMEN FOR NG/OG/NE TUBE PLACEMENT   Final Result   Catheter is in the stomach. XR CHEST PORTABLE   Final Result   ET tube tip 2.4 cm from the pascual. XR CHEST PORTABLE   Final Result   No acute process. ET tube tip within the right mainstem bronchus and should be retracted at   least 4.0 cm. Findings given to the core team to be relayed to the license caregiver on   04/26/2022 at 2:09 p.m. Shubham Castillo CT HEAD WO CONTRAST   Final Result   1. Late acute/early subacute multiple scattered infarcts in the vascular   distribution of the right middle cerebral artery as above commented. 2.  No indication for hemorrhagic component. 3.  No indication for new sizable area of acute recent insult in progression   to the brain parenchyma. 4.  There is no midline shift.       RECOMMENDATIONS:   Unavailable         US DUP LOWER EXTREMITIES BILATERAL VENOUS   Final Result   There is evidence for deep venous thrombosis      ALERT:  THIS IS AN ABNORMAL REPORT               XR CHEST PORTABLE   Final Result   Findings suggest interstitial scarring and chronic bronchitis. No   significant acute infiltrates are noted         VL DUP CAROTID BILATERAL   Final Result   Atherosclerotic disease. No hemodynamically significant stenosis is   identified   Estimated stenosis by NASCET criteria in the proximal right carotid   artery is between 0% and 49%. Estimated stenosis by NASCET criteria in the proximal left carotid   artery is between 0% and 49%. MRI brain without contrast   Final Result   Multiple small recent infarcts throughout the right MCA territory. There is   evidence of slow vascular flow in the right MCA territory. Consider CTA of   the head/neck for further evaluation. Advanced chronic microvascular ischemic changes. No mass effect. No hemorrhage. No hydrocephalus. XR CHEST PORTABLE    (Results Pending)        Resident's Assessment and Plan     Assessment:    1. Acute hypoxic respiratory failure 2/2 large mucus plug  2. Acute RMCA Stroke no hemorraghic convergence on CT  3. Acute RLE DVT: seen on US in the popliteal vein and Tibioperoneal Trunk  4. Oral and nasal bleeding 2/2 OG tube insertion ?, ENT consulted placed rhino in nose for 3-5 days and Kerlex packing for mouth  5. Concern for UTI due UA showing bacteria: Cultures concerning for E. Coli  6. Acute on Chronic Anemia 2/2 blood lose: Has had workup done at [facility]  7. CURT 2/2 poor oral intake/nutrion and blood loss: Pre-renal, FeNa= 0.2% indicating pre-renal  8. 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 Hx Hypertension on Cardizem and ramipril at home  10.  Hx Hyperlipidemia    Plan:  · Continue hold Anticoagulation at this time due to oral bleed, may need to consider DVT filter if unable to resume  · Per neuro continue Aspirin and Statin therapy at this time  · Per ENT continue rhino rockets and oral packing with gauze  · Complete Decadron course  · Resume home Cardizem   · Hold Ramipril in setting of CURT   · Started on LR maintenance fluids  · Start Tube feeds today  · Fentynal PRN q2h for pain and agitation  · Continue Rocephin for E. Coli UTI  · Follow HgB transfuse in below 7, has required 1 unit PRBC      # Peptic ulcer prophylaxis: Protonix  # DVT Prophylaxis: Held due to bleed  # Disposition: Cont current care     Corinne Arabia, MD, PGY-1    Attending Physician: Dr. Hellen Henson  Department of Pulmonary, Critical Care and Sleep Medicine  [address] [facility]  Department of Internal Medicine      During multidisciplinary team rounds Keagan Hardy is a 68 y.o. female was seen, examined and discussed. This is confirmation that I have personally seen and examined the patient and that the key elements of the encounter were performed by me (> 85 % time). The medications & laboratory data was discussed and adjusted where necessary. The radiographic images were reviewed or with radiologist or consultant if felt dis-concordant with the exam or history. The above findings were corroborated, plans confirmed and changes made if needed. Family is updated at the bedside as available. Key issues of the case were discussed among consultants. Critical Care time is documented if appropriate.       Critical Care time: 35 minutes     Kami Kincaid DO

## 2022-04-28 NOTE — PLAN OF CARE
Problem: Safety - Adult  Goal: Free from fall injury  4/28/2022 0611 by Krissy Davis RN  Outcome: Progressing     Problem: Skin/Tissue Integrity  Goal: Absence of new skin breakdown  Description: 1. Monitor for areas of redness and/or skin breakdown  2. Assess vascular access sites hourly  3. Every 4-6 hours minimum:  Change oxygen saturation probe site  4. Every 4-6 hours:  If on nasal continuous positive airway pressure, respiratory therapy assess nares and determine need for appliance change or resting period. 4/28/2022 5519 by Krissy Davis RN  Outcome: Progressing     Problem: Pain  Goal: Verbalizes/displays adequate comfort level or baseline comfort level  Outcome: Progressing     Problem: Safety - Medical Restraint  Goal: Remains free of injury from restraints (Restraint for Interference with Medical Device)  Description: INTERVENTIONS:  1. Determine that other, less restrictive measures have been tried or would not be effective before applying the restraint  2. Evaluate the patient's condition at the time of restraint application  3. Inform patient/family regarding the reason for restraint  4.  Q2H: Monitor safety, psychosocial status, comfort, nutrition and hydration  4/28/2022 0611 by Krissy Davis RN  Outcome: Progressing     Problem: Nutrition Deficit:  Goal: Optimize nutritional status  4/27/2022 2217 by Krissy Davis RN  Outcome: Not Progressing

## 2022-04-28 NOTE — PROGRESS NOTES
Hospitalist Progress Note      Synopsis: Patient admitted as a transfer from [facility] for strokelike symptoms. Patient presented to [facility] with sudden onset of left-sided weakness and aphasia that occurred while in the middle of a conversation with her daughter. In ED she was noted to have severe aphasia, severe dysarthria, and left arm and leg drift, and rightward gaze, and left-sided hemianopia. MRI of the brain right MCA stroke likely embolic in nature. Neurology is following. Carotid ultrasound with no significant stenosis. Awaiting echo to assess for paradoxical embolus. If unrevealing plan is for Zio patch at discharge. She is currently being treated with therapeutic Lovenox with plans to switch to Coumadin when she is able to take p.o. she began obtunded and stridorous requiring transfer to ICU and intubation. A large mucous plug was extracted from her airway. Patient began having bleeding from the nose and mouth. ENT was consulted. Nasal and oral packing remain in place. Anticoagulation on hold. Hospital day 6     Subjective:  Stable overnight. No issues reported  Patient seen and examined. Remains intubated. Able to follow some simple commands. Records reviewed. Temp (24hrs), Av.1 °F (36.7 °C), Min:96.8 °F (36 °C), Max:100.2 °F (37.9 °C)    DIET: Diet NPO  ADULT TUBE FEEDING; Orogastric; Standard with Fiber; Continuous; 10; Yes; 15; Q 4 hours; 40; 100; Q 4 hours; Protein; 1 Proteinex Daily  CODE: Full Code    Intake/Output Summary (Last 24 hours) at 2022 0910  Last data filed at 2022 3518  Gross per 24 hour   Intake 3169.86 ml   Output 250 ml   Net 2919.86 ml       Review of Systems:    KURTIS given mental status    Objective:    /67   Pulse 50   Temp 97.5 °F (36.4 °C)   Resp 24   Ht 5' (1.524 m)   Wt 174 lb 2.6 oz (79 kg)   SpO2 99%   BMI 34.01 kg/m²     General appearance: Obese elderly female, intubated and in no apparent distress. Appears stated age. HEENT: Conjunctivae/corneas clear. Bilateral nasal packing, oral packing. Still with some sanguineous oozing. ETT secured. Neck: Supple. No JVD. Respiratory: CTA throughout though diminished at bilateral bases. Synchronous with ventilator. Cardiovascular:  RRR. S1, S2 without MRG. PV: Pulses palpable. + 1 pitting edema of BLE  Abdomen: Soft, non-tender, non-distended. +BS  Musculoskeletal: No obvious deformities. Skin: Normal skin color. No rashes or lesions. Good turgor. Neurologic: Squeezes right hand to command. Otherwise, unable to follow command.     Medications:  REVIEWED DAILY    Infusion Medications    sodium chloride      sodium chloride      dextrose      dexmedetomidine (PRECEDEX) IV infusion 0.2 mcg/kg/hr (04/28/22 6022)    dextrose 50 mL/hr at 04/28/22 4391     Scheduled Medications    senna  1 tablet Oral Nightly    polyethylene glycol  17 g Oral Daily    dexamethasone  4 mg IntraVENous Q6H    oxymetazoline  2 spray Each Nostril BID    chlorhexidine  15 mL Mouth/Throat BID    pantoprazole  40 mg IntraVENous Daily    cefTRIAXone (ROCEPHIN) IV  1,000 mg IntraVENous Q24H    QUEtiapine  12.5 mg Oral 2 times per day    melatonin  5 mg Oral QPM    [Held by provider] enoxaparin  1 mg/kg SubCUTAneous BID    [Held by provider] apixaban  5 mg Oral BID    vitamin D  5,000 Units Oral Daily    [Held by provider] dilTIAZem  120 mg Oral Daily    [Held by provider] ramipril  5 mg Oral Daily    [Held by provider] aspirin  81 mg Oral Daily    Or    [Held by provider] aspirin  300 mg Rectal Daily    atorvastatin  80 mg Oral Nightly     PRN Meds: sodium chloride, sodium chloride, dextrose, glucagon (rDNA), dextrose, glucose, racepinephrine HCl, acetaminophen, melatonin, hydrALAZINE, albuterol, acetaminophen, ondansetron **OR** ondansetron, polyethylene glycol, perflutren lipid microspheres    Labs:     Recent Labs     04/26/22  0657 04/26/22  0657 04/27/22  3669 04/27/22  1400 04/28/22  0200   WBC 13.2*  --  11.9*  --   --    HGB 8.6*   < > 7.1* 7.0* 5.7*   HCT 28.5*   < > 23.2* 22.9* 18.9*     --  287  --   --     < > = values in this interval not displayed. Recent Labs     04/27/22  0900 04/27/22  1405 04/27/22  1850    144 146   K 3.2* 3.2* 3.6   * 112* 115*   CO2 18* 18* 19*   BUN 25* 26* 26*   CREATININE 1.3* 1.3* 1.3*   CALCIUM 7.9* 8.2* 7.7*       No results for input(s): PROT, ALB, ALKPHOS, ALT, AST, BILITOT, AMYLASE, LIPASE in the last 72 hours. Recent Labs     04/27/22  1512   INR 1.3       No results for input(s): Les Anna in the last 72 hours.     Chronic labs:    Lab Results   Component Value Date    CHOL 140 04/23/2022    TRIG 87 04/23/2022    HDL 48 04/23/2022    LDLCALC 75 04/23/2022    INR 1.3 04/27/2022    LABA1C 5.6 04/23/2022       Radiology: REVIEWED DAILY    Assessment:  Acute respiratory failure secondary to mucous plugging  Right MCA stroke- severe aphasia, severe dysarthria, and left arm and leg drift, and rightward gaze, and left-sided hemianopia  CURT  Nasal/oral bleeding  Acute on chronic anemia   Dysphagia  Acute delirium   UTI  CKD, unknown baseline  CAD s/p PCI in 2017  HTN  HLD  Hx of BLE DVT + PE in 11/2021 anticoagulated on Eliquis    Plan:  ICU team,  Neurology, and ENT following  Remains intubated -MV management per ICU team, attempting to wean   Decadron was initiated given lack of cuff leak   ACE inhibitor on hold given CURT  Monitor renal function, INR  Continue neurochecks  Awaiting echo to r/o paradoxical embolus, if negative will need Zio patch at discharge  Anticoagulation on hold  Monitor H&H, transfuse for Hb < 7, for one unit this AM  ASA + statin   PT/OT  Ceftriaxone for UTI  Will make outpatient referral to hematology/oncology for hypercoagulable work-up given history of DVT/PE and failure of Eliquis     DVT Prophylaxis [x] Lovenox  []  Heparin [] DOAC [] PCDs [] Ambulation    GI Prophylaxis [x] PPI  [] H2 Blocker   [] Carafate  [] Diet/Tube Feeds   Level of care [] Med/Surg  [] Intermediate  [x]  ICU   Diet Diet NPO  ADULT TUBE FEEDING; Orogastric; Standard with Fiber; Continuous; 10; Yes; 15; Q 4 hours; 40; 100; Q 4 hours; Protein; 1 Proteinex Daily    Family contact [x]  N/A - per ICU team  [] At bedside  [] Phone call      Discharge Plan: TBD pending further clinical course    +++++++++++++++++++++++++++++++++++++++++++++++++  Hari Greene, [address], [place]  +++++++++++++++++++++++++++++++++++++++++++++++++  NOTE: This report was transcribed using voice recognition software. Every effort was made to ensure accuracy; however, inadvertent computerized transcription errors may be present.

## 2022-04-28 NOTE — PLAN OF CARE
Problem: Skin/Tissue Integrity  Goal: Absence of new skin breakdown  Description: 1. Monitor for areas of redness and/or skin breakdown  2. Assess vascular access sites hourly  3. Every 4-6 hours minimum:  Change oxygen saturation probe site  4. Every 4-6 hours:  If on nasal continuous positive airway pressure, respiratory therapy assess nares and determine need for appliance change or resting period. Outcome: Progressing     Problem: Safety - Adult  Goal: Free from fall injury  Outcome: Progressing     Problem: Safety - Medical Restraint  Goal: Remains free of injury from restraints (Restraint for Interference with Medical Device)  Description: INTERVENTIONS:  1. Determine that other, less restrictive measures have been tried or would not be effective before applying the restraint  2. Evaluate the patient's condition at the time of restraint application  3. Inform patient/family regarding the reason for restraint  4.  Q2H: Monitor safety, psychosocial status, comfort, nutrition and hydration  Outcome: Progressing     Problem: Nutrition Deficit:  Goal: Optimize nutritional status  Outcome: Not Progressing

## 2022-04-28 NOTE — PLAN OF CARE
Problem: Respiratory - Adult  Goal: Achieves optimal ventilation and oxygenation  Flowsheets (Taken 4/27/2022 2153)  Achieves optimal ventilation and oxygenation:   Assess for changes in respiratory status   Position to facilitate oxygenation and minimize respiratory effort   Assess the need for suctioning and aspirate as needed   Respiratory therapy support as indicated   Oxygen supplementation based on oxygen saturation or arterial blood gases

## 2022-04-28 NOTE — PROGRESS NOTES
OTOLARYNGOLOGY  DAILY PROGRESS NOTE  2022    Subjective:     Remains intubated and sedated. No active bleeding noted. Objective:     BP (!) 167/91   Pulse 68   Temp 98.1 °F (36.7 °C) (Axillary)   Resp 16   Ht 5' (1.524 m)   Wt 174 lb 2.6 oz (79 kg)   SpO2 100%   BMI 34.01 kg/m²   PULSE OXIMETRY RANGE: SpO2  Av.9 %  Min: 96 %  Max: 100 %  I/O last 3 completed shifts: In: 3809.1 [I.V.:1736; NG/GT:100; IV Piggyback:1973.1]  Out: 410 [Urine:410]    Physical Exam  Constitutional: Appears well-developed and well-nourished. Eyes: Lids and lashes normal   ENT: [place] [facility] in place with minimal bleeding, bilateral nares with nasal balloon packing in place   Neck:  Supple, symmetrical, trachea midline   Respiratory: Intubated   Cardiovascular: Regular rate   Skin: Warm and dry   Neurologic:  Sedated         Assessment/Plan:     68 y.o. female with history of PE, DVT, and stroke presents with oral and nasal bleeding after OGT placement     - Continue Kerlix packing in mouth as needed for hemostasis. - Packing to be removed 3-5 days after placement.      Patient seen by resident and attending surgeon Dr. Bridget Fallon     Electronically signed by Danae Markham DO on 2022 at 12:01 PM

## 2022-04-29 ENCOUNTER — APPOINTMENT (OUTPATIENT)
Dept: GENERAL RADIOLOGY | Age: 77
DRG: 064 | End: 2022-04-29
Attending: FAMILY MEDICINE
Payer: MEDICARE

## 2022-04-29 LAB
AADO2: 148.2 MMHG
ANION GAP SERPL CALCULATED.3IONS-SCNC: 11 MMOL/L (ref 7–16)
B.E.: -3.7 MMOL/L (ref -3–3)
BUN BLDV-MCNC: 34 MG/DL (ref 6–23)
CALCIUM SERPL-MCNC: 8 MG/DL (ref 8.6–10.2)
CHLORIDE BLD-SCNC: 107 MMOL/L (ref 98–107)
CO2: 20 MMOL/L (ref 22–29)
COHB: 0.4 % (ref 0–1.5)
CREAT SERPL-MCNC: 1.4 MG/DL (ref 0.5–1)
CREATININE URINE: 116 MG/DL (ref 29–226)
CRITICAL: ABNORMAL
DATE ANALYZED: ABNORMAL
DATE OF COLLECTION: ABNORMAL
FIO2: 40 %
GFR AFRICAN AMERICAN: 44
GFR NON-AFRICAN AMERICAN: 36 ML/MIN/1.73
GLUCOSE BLD-MCNC: 168 MG/DL (ref 74–99)
HCO3: 19.9 MMOL/L (ref 22–26)
HCT VFR BLD CALC: 23.3 % (ref 34–48)
HEMOGLOBIN: 7.4 G/DL (ref 11.5–15.5)
HHB: 3.4 % (ref 0–5)
LAB: ABNORMAL
Lab: ABNORMAL
MCH RBC QN AUTO: 28.5 PG (ref 26–35)
MCHC RBC AUTO-ENTMCNC: 31.8 % (ref 32–34.5)
MCV RBC AUTO: 89.6 FL (ref 80–99.9)
METHB: 0.3 % (ref 0–1.5)
MODE: AC
O2 CONTENT: 11.1 ML/DL
O2 SATURATION: 96.6 % (ref 92–98.5)
O2HB: 95.9 % (ref 94–97)
OPERATOR ID: 2863
ORGANISM: ABNORMAL
PATIENT TEMP: 37 C
PCO2: 30 MMHG (ref 35–45)
PDW BLD-RTO: 14.6 FL (ref 11.5–15)
PEEP/CPAP: 8 CMH2O
PFO2: 2.31 MMHG/%
PH BLOOD GAS: 7.44 (ref 7.35–7.45)
PLATELET # BLD: 298 E9/L (ref 130–450)
PMV BLD AUTO: 10.1 FL (ref 7–12)
PO2: 92.5 MMHG (ref 75–100)
POTASSIUM SERPL-SCNC: 4.5 MMOL/L (ref 3.5–5)
PROTEIN PROTEIN: 12 MG/DL (ref 0–12)
PROTEIN/CREAT RATIO: 0.1
PROTEIN/CREAT RATIO: 0.1 (ref 0–0.2)
RBC # BLD: 2.6 E12/L (ref 3.5–5.5)
RI(T): 1.6
RR MECHANICAL: 12 B/MIN
SODIUM BLD-SCNC: 138 MMOL/L (ref 132–146)
SOURCE, BLOOD GAS: ABNORMAL
THB: 8.1 G/DL (ref 11.5–16.5)
TIME ANALYZED: 448
URINE CULTURE, ROUTINE: ABNORMAL
VT MECHANICAL: 350 ML
WBC # BLD: 12.3 E9/L (ref 4.5–11.5)

## 2022-04-29 PROCEDURE — 6370000000 HC RX 637 (ALT 250 FOR IP): Performed by: INTERNAL MEDICINE

## 2022-04-29 PROCEDURE — 2500000003 HC RX 250 WO HCPCS: Performed by: CHIROPRACTOR

## 2022-04-29 PROCEDURE — 82570 ASSAY OF URINE CREATININE: CPT

## 2022-04-29 PROCEDURE — 6360000002 HC RX W HCPCS: Performed by: STUDENT IN AN ORGANIZED HEALTH CARE EDUCATION/TRAINING PROGRAM

## 2022-04-29 PROCEDURE — 82805 BLOOD GASES W/O2 SATURATION: CPT

## 2022-04-29 PROCEDURE — 94003 VENT MGMT INPAT SUBQ DAY: CPT

## 2022-04-29 PROCEDURE — 99291 CRITICAL CARE FIRST HOUR: CPT | Performed by: INTERNAL MEDICINE

## 2022-04-29 PROCEDURE — 2580000003 HC RX 258: Performed by: STUDENT IN AN ORGANIZED HEALTH CARE EDUCATION/TRAINING PROGRAM

## 2022-04-29 PROCEDURE — 6370000000 HC RX 637 (ALT 250 FOR IP): Performed by: NURSE PRACTITIONER

## 2022-04-29 PROCEDURE — 6370000000 HC RX 637 (ALT 250 FOR IP): Performed by: FAMILY MEDICINE

## 2022-04-29 PROCEDURE — C9113 INJ PANTOPRAZOLE SODIUM, VIA: HCPCS | Performed by: INTERNAL MEDICINE

## 2022-04-29 PROCEDURE — 2000000000 HC ICU R&B

## 2022-04-29 PROCEDURE — 71045 X-RAY EXAM CHEST 1 VIEW: CPT

## 2022-04-29 PROCEDURE — 6370000000 HC RX 637 (ALT 250 FOR IP)

## 2022-04-29 PROCEDURE — 80048 BASIC METABOLIC PNL TOTAL CA: CPT

## 2022-04-29 PROCEDURE — 2580000003 HC RX 258: Performed by: NURSE PRACTITIONER

## 2022-04-29 PROCEDURE — 85027 COMPLETE CBC AUTOMATED: CPT

## 2022-04-29 PROCEDURE — 84156 ASSAY OF PROTEIN URINE: CPT

## 2022-04-29 PROCEDURE — 6360000002 HC RX W HCPCS: Performed by: INTERNAL MEDICINE

## 2022-04-29 RX ORDER — SODIUM CHLORIDE, SODIUM LACTATE, POTASSIUM CHLORIDE, CALCIUM CHLORIDE 600; 310; 30; 20 MG/100ML; MG/100ML; MG/100ML; MG/100ML
INJECTION, SOLUTION INTRAVENOUS CONTINUOUS
Status: DISCONTINUED | OUTPATIENT
Start: 2022-04-29 | End: 2022-04-30

## 2022-04-29 RX ORDER — PROPOFOL 10 MG/ML
5-50 INJECTION, EMULSION INTRAVENOUS CONTINUOUS
Status: DISCONTINUED | OUTPATIENT
Start: 2022-04-29 | End: 2022-05-02

## 2022-04-29 RX ADMIN — SENNOSIDES 8.6 MG: 8.6 TABLET, COATED ORAL at 20:28

## 2022-04-29 RX ADMIN — 0.12% CHLORHEXIDINE GLUCONATE 15 ML: 1.2 RINSE ORAL at 20:29

## 2022-04-29 RX ADMIN — PANTOPRAZOLE SODIUM 40 MG: 40 INJECTION, POWDER, FOR SOLUTION INTRAVENOUS at 07:49

## 2022-04-29 RX ADMIN — WATER 1000 MG: 1 INJECTION INTRAMUSCULAR; INTRAVENOUS; SUBCUTANEOUS at 20:28

## 2022-04-29 RX ADMIN — POLYETHYLENE GLYCOL 3350 17 G: 17 POWDER, FOR SOLUTION ORAL at 07:51

## 2022-04-29 RX ADMIN — SODIUM CHLORIDE, POTASSIUM CHLORIDE, SODIUM LACTATE AND CALCIUM CHLORIDE: 600; 310; 30; 20 INJECTION, SOLUTION INTRAVENOUS at 14:54

## 2022-04-29 RX ADMIN — QUETIAPINE FUMARATE 12.5 MG: 25 TABLET ORAL at 20:28

## 2022-04-29 RX ADMIN — DEXAMETHASONE SODIUM PHOSPHATE 4 MG: 4 INJECTION INTRA-ARTICULAR; INTRALESIONAL; INTRAMUSCULAR; INTRAVENOUS; SOFT TISSUE at 20:29

## 2022-04-29 RX ADMIN — Medication 5 MG: at 17:58

## 2022-04-29 RX ADMIN — DEXAMETHASONE SODIUM PHOSPHATE 4 MG: 4 INJECTION INTRA-ARTICULAR; INTRALESIONAL; INTRAMUSCULAR; INTRAVENOUS; SOFT TISSUE at 02:31

## 2022-04-29 RX ADMIN — QUETIAPINE FUMARATE 12.5 MG: 25 TABLET ORAL at 07:49

## 2022-04-29 RX ADMIN — 0.12% CHLORHEXIDINE GLUCONATE 15 ML: 1.2 RINSE ORAL at 07:48

## 2022-04-29 RX ADMIN — Medication 5000 UNITS: at 07:49

## 2022-04-29 RX ADMIN — Medication 75 MCG/HR: at 14:10

## 2022-04-29 RX ADMIN — DILTIAZEM HYDROCHLORIDE 120 MG: 120 CAPSULE, COATED, EXTENDED RELEASE ORAL at 07:49

## 2022-04-29 RX ADMIN — PANTOPRAZOLE SODIUM 40 MG: 40 INJECTION, POWDER, FOR SOLUTION INTRAVENOUS at 20:28

## 2022-04-29 RX ADMIN — PROPOFOL 10 MCG/KG/MIN: 10 INJECTION, EMULSION INTRAVENOUS at 15:05

## 2022-04-29 RX ADMIN — ATORVASTATIN CALCIUM 80 MG: 40 TABLET, FILM COATED ORAL at 20:29

## 2022-04-29 RX ADMIN — DEXAMETHASONE SODIUM PHOSPHATE 4 MG: 4 INJECTION INTRA-ARTICULAR; INTRALESIONAL; INTRAMUSCULAR; INTRAVENOUS; SOFT TISSUE at 14:11

## 2022-04-29 RX ADMIN — DEXAMETHASONE SODIUM PHOSPHATE 4 MG: 4 INJECTION INTRA-ARTICULAR; INTRALESIONAL; INTRAMUSCULAR; INTRAVENOUS; SOFT TISSUE at 07:49

## 2022-04-29 ASSESSMENT — PULMONARY FUNCTION TESTS
PIF_VALUE: 24
PIF_VALUE: 25
PIF_VALUE: 31
PIF_VALUE: 25
PIF_VALUE: 17
PIF_VALUE: 21
PIF_VALUE: 17
PIF_VALUE: 15
PIF_VALUE: 21
PIF_VALUE: 18
PIF_VALUE: 15
PIF_VALUE: 29
PIF_VALUE: 21
PIF_VALUE: 26
PIF_VALUE: 23
PIF_VALUE: 20
PIF_VALUE: 20
PIF_VALUE: 23
PIF_VALUE: 18
PIF_VALUE: 27
PIF_VALUE: 19
PIF_VALUE: 27
PIF_VALUE: 16
PIF_VALUE: 18
PIF_VALUE: 13
PIF_VALUE: 27
PIF_VALUE: 34

## 2022-04-29 ASSESSMENT — PAIN SCALES - GENERAL: PAINLEVEL_OUTOF10: 0

## 2022-04-29 NOTE — PROGRESS NOTES
Hospitalist Progress Note      Synopsis: Patient admitted as a transfer from [facility] for strokelike symptoms. Patient presented to [facility] with sudden onset of left-sided weakness and aphasia that occurred while in the middle of a conversation with her daughter. In ED she was noted to have severe aphasia, severe dysarthria, and left arm and leg drift, and rightward gaze, and left-sided hemianopia. MRI of the brain right MCA stroke likely embolic in nature. Neurology is following. Carotid ultrasound with no significant stenosis. Awaiting echo to assess for paradoxical embolus. If unrevealing plan is for Zio patch at discharge. She is currently being treated with therapeutic Lovenox with plans to switch to Coumadin when she is able to take p.o. she began obtunded and stridorous requiring transfer to ICU and intubation. A large mucous plug was extracted from her airway. Patient began having bleeding from the nose and mouth. ENT was consulted. Nasal and oral packing remain in place. Anticoagulation on hold. Nephrology following for CURT with oliguria. Hospital day 7     Subjective:  Stable overnight. No issues reported  Patient seen and examined. Remains intubated. Now sedated with fentanyl. Opens eyes, otherwise, unable to follow commands. Daughter at bedside. Records reviewed.        Temp (24hrs), Av.9 °F (36.6 °C), Min:97.5 °F (36.4 °C), Max:98.6 °F (37 °C)    DIET: Diet NPO  ADULT TUBE FEEDING; Orogastric; Standard with Fiber; Continuous; 10; Yes; 15; Q 4 hours; 40; 100; Q 4 hours; Protein; 1 Proteinex Daily  CODE: Full Code    Intake/Output Summary (Last 24 hours) at 2022 0715  Last data filed at 2022 0600  Gross per 24 hour   Intake 2350.52 ml   Output 940 ml   Net 1410.52 ml       Review of Systems:    KURTIS given mental status    Objective:    /73   Pulse 89   Temp 98.1 °F (36.7 °C) (Bladder)   Resp 14   Ht 5' (1.524 m)   Wt 174 lb 2.6 oz (79 kg) SpO2 100%   BMI 34.01 kg/m²     General appearance: Obese elderly female, intubated and sedated. In no apparent distress. Appears stated age. HEENT: Conjunctivae/corneas clear. Bilateral nasal packing, oral packing. Sanguinous drainage from nose appears old and dried. ETT secured. Neck: Supple. No JVD. Respiratory: CTA throughout though diminished at bilateral bases. Synchronous with ventilator. Cardiovascular:  RRR. S1, S2 without MRG. PV: Pulses palpable. No edema. Abdomen: Soft, non-tender, non-distended. +BS  Musculoskeletal: No obvious deformities. Skin: Normal skin color. No rashes or lesions. Good turgor. Neurologic: Sedated, opens eyes, unable to follow command.     Medications:  REVIEWED DAILY    Infusion Medications    sodium chloride      sodium chloride      fentaNYL 100 mcg/hr (04/29/22 0600)    dextrose      dexmedetomidine (PRECEDEX) IV infusion Stopped (04/28/22 0909)     Scheduled Medications    pantoprazole  40 mg IntraVENous BID    dexamethasone  4 mg IntraVENous Q6H    senna  1 tablet Oral Nightly    polyethylene glycol  17 g Oral Daily    oxymetazoline  2 spray Each Nostril BID    chlorhexidine  15 mL Mouth/Throat BID    cefTRIAXone (ROCEPHIN) IV  1,000 mg IntraVENous Q24H    QUEtiapine  12.5 mg Oral 2 times per day    melatonin  5 mg Oral QPM    [Held by provider] enoxaparin  1 mg/kg SubCUTAneous BID    [Held by provider] apixaban  5 mg Oral BID    vitamin D  5,000 Units Oral Daily    dilTIAZem  120 mg Oral Daily    [Held by provider] ramipril  5 mg Oral Daily    [Held by provider] aspirin  81 mg Oral Daily    Or    [Held by provider] aspirin  300 mg Rectal Daily    atorvastatin  80 mg Oral Nightly     PRN Meds: sodium chloride, sodium chloride, fentanNYL, hydrALAZINE, dextrose, glucagon (rDNA), dextrose, glucose, racepinephrine HCl, acetaminophen, melatonin, albuterol, acetaminophen, ondansetron **OR** ondansetron, polyethylene glycol, perflutren lipid microspheres    Labs:     Recent Labs     04/27/22  0533 04/27/22  1400 04/28/22  1030 04/28/22  1655 04/29/22  0446   WBC 11.9*  --  10.0  --  12.3*   HGB 7.1*   < > 8.2* 8.4* 7.4*   HCT 23.2*   < > 25.4* 26.0* 23.3*     --  280  --  298    < > = values in this interval not displayed. Recent Labs     04/28/22  1030 04/28/22  1655 04/29/22  0446    139 138   K 4.4 4.9 4.5   * 108* 107   CO2 20* 21* 20*   BUN 29* 31* 34*   CREATININE 1.3* 1.4* 1.4*   CALCIUM 7.7* 8.2* 8.0*       No results for input(s): PROT, ALB, ALKPHOS, ALT, AST, BILITOT, AMYLASE, LIPASE in the last 72 hours. Recent Labs     04/27/22  1512   INR 1.3       No results for input(s): Oscar Candace in the last 72 hours.     Chronic labs:    Lab Results   Component Value Date    CHOL 140 04/23/2022    TRIG 87 04/23/2022    HDL 48 04/23/2022    LDLCALC 75 04/23/2022    INR 1.3 04/27/2022    LABA1C 5.6 04/23/2022       Radiology: REVIEWED DAILY    Assessment:  Acute respiratory failure secondary to mucous plugging  Right MCA stroke- severe aphasia, severe dysarthria, and left arm and leg drift, and rightward gaze, and left-sided hemianopia  CURT  Nasal/oral bleeding  Acute on chronic anemia   Dysphagia  Acute delirium   UTI  CKD, unknown baseline  CAD s/p PCI in 2017  HTN  HLD  Hx of BLE DVT + PE in 11/2021 anticoagulated on Eliquis    Plan:  ICU team, nephrology, and ENT following  Remains intubated -MV management per ICU team, attempting to wean   Decadron was initiated given lack of cuff leak   ACE inhibitor on hold   LR @ 100 per nephro  Monitor renal function, I&O  Neurology signed off with recs to resume anticoagulation if and when possible as well as Zio patch at discharge  Anticoagulation on hold  Monitor H&H, transfuse for Hb < 7, for one unit this AM  ENT with recs to remove and oral packing in 3 to 5 days, will remain intubated while packing in place  Ceftriaxone for UTI  Will make outpatient referral to hematology/oncology for hypercoagulable work-up given history of DVT/PE and failure of Eliquis     DVT Prophylaxis [x] Lovenox  []  Heparin [] DOAC [] PCDs [] Ambulation    GI Prophylaxis [x] PPI  [] H2 Blocker   [] Carafate  [] Diet/Tube Feeds   Level of care [] Med/Surg  [] Intermediate  [x]  ICU   Diet Diet NPO  ADULT TUBE FEEDING; Orogastric; Standard with Fiber; Continuous; 10; Yes; 15; Q 4 hours; 40; 100; Q 4 hours; Protein; 1 Proteinex Daily    Family contact [x]  N/A - per ICU team  [x] At bedside  [] Phone call      Discharge Plan: TBD pending further clinical course    +++++++++++++++++++++++++++++++++++++++++++++++++  Nina Driver, [address]  +++++++++++++++++++++++++++++++++++++++++++++++++  NOTE: This report was transcribed using voice recognition software. Every effort was made to ensure accuracy; however, inadvertent computerized transcription errors may be present.

## 2022-04-29 NOTE — PROGRESS NOTES
Department of Internal Medicine  Nephrology Progress Note    Events reviewed. SUBJECTIVE:  We are following MsNeo Fong for CURT. She remains intubated and sedated. Daughter at the bedside. PHYSICAL EXAM:      Vitals:    VITALS:  /66   Pulse 66   Temp 98.1 °F (36.7 °C) (Bladder)   Resp 15   Ht 5' (1.524 m)   Wt 174 lb 2.6 oz (79 kg)   SpO2 98%   BMI 34.01 kg/m²   24HR INTAKE/OUTPUT:      Intake/Output Summary (Last 24 hours) at 4/29/2022 1120  Last data filed at 4/29/2022 0800  Gross per 24 hour   Intake 2430.52 ml   Output 845 ml   Net 1585.52 ml       Constitutional: Intubated on mechanical ventilator  HEENT:  ETT present, nasal packing present  Respiratory: On mechanical ventilator  Cardiovascular/Edema:  RRR, S1/S2  Gastrointestinal:  abd rounded, non tender, BS hypoactive  Neurologic:  Sedated on mechanical ventilator  Skin:  Warm,dry, ecchymosis to BUE  Other:  Pink catheter draining minimal yellow urine    DATA:    CBC:   Lab Results   Component Value Date    WBC 12.3 04/29/2022    RBC 2.60 04/29/2022    HGB 7.4 04/29/2022    HCT 23.3 04/29/2022    MCV 89.6 04/29/2022    MCH 28.5 04/29/2022    MCHC 31.8 04/29/2022    RDW 14.6 04/29/2022     04/29/2022    MPV 10.1 04/29/2022     CMP:    Lab Results   Component Value Date     04/29/2022    K 4.5 04/29/2022    K 3.6 04/27/2022     04/29/2022    CO2 20 04/29/2022    BUN 34 04/29/2022    CREATININE 1.4 04/29/2022    GFRAA 44 04/29/2022    LABGLOM 36 04/29/2022    GLUCOSE 168 04/29/2022    CALCIUM 8.0 04/29/2022     Magnesium:    Lab Results   Component Value Date    MG 1.8 04/27/2022     Phosphorus:  No results found for: PHOS  Radiology Review:      Chest X-ray April 29, 2022   1. Patchy bilateral infrahilar and lower lobe airspace disease   2. The lung volumes are diminished. 3. There is no pneumothorax.               BRIEF SUMMARY OF INITIAL CONSULT:  Briefly, Ms. Madelyn Fong is a 68year old female with a PMH of HTN, CAD with PCI 2017, BLE DVT 11/2021, HLD, GERD who was admitted on April 22, 2022 after she was transferred from [facility] with stroke like symptoms. [facility] of strokelike symptoms and sudden onset left-sided weakness with aphasia, with MRI indicating right MCA without hemorrhagic conversion. Patient was originally admitted to the medical floor at [facility] and became progressively obtunded with stridorous respiratory failure, was intubated and transferred to MICU 4/26/2022. Patient developed severe epistaxis requiring packing per ENT. Labs on admission were significant for sodium of 141, potassium 3.7, chloride 111, bicarbonate 18, BUN 21, creatinine 1.2, and hemoglobin of 7.1. We are consulted for worsening CURT and decreased urine output. Notably patient's daughter states that patient has been recently evaluated by nephrology in [place] and underwent blood work and renal ultrasound however did not receive the results yet. Ramipril, diltiazem, atorvastatin, apixaban, and omeprazole are medications patient was taking prior to admission. IMPRESSION/RECOMMENDATIONS:      1. CURT stage III based on urine output, likely volume responsive pre-renal CURT from poor oral intake and volume loss with severe anemia in the setting of ACEi administration vs ischemic ATN from acute hypotension. Oliguric. FENa 0.3% c/w pre-renal injury. Creatinine on admission 1.2 mg/dL, today creatinine 1.4 mg/dL. Will restart LR at 100 cc/hr for anther 24 hrs. 2. CKD Stage 3, Renal US done at Dr. Lara Shah office in [place], [facility] shows increased cortical echogenicity consistent with medical renal disease. 3. Alkalemia, pH 7.439, PCO2 30.0, HCO3 19.9, respiratory alkalosis  4. HTN, on cardizem and ramipril (on hold)  5. Hypocalcemia, 2/2 vitamin D deficiency. On cholecalciferol, obtain ionized calcium in am  6.  Vitamin D deficiency, on cholecalciferol  ------------------------------------------------------------------------  7. Acute hypoxic respiratory failure 2/2 mucus plug, intubated on 4/26, remains on mechanical ventilation. S/p bronchoscopy  8. CVA, MRI demonstrates infarcts in right MCA without hemorrhagic conversion. Neurology following, concern for already embolic source. Echo ordered for PFO evaluation to r/o paradoxical embolus. Borderline intracranial atherosclerosis. 9. Historical DVT and current right LE+, patiently chronically anticoagulated on Eliquis at home, was being treated with Lovenox for [facility] to [facility], now on hold secondary to epistaxis  10. Acute epistaxis, nasal packing in place  11. Anemia, acute drop in hemoglobin 5.6, with previous work-up at [address]. HLD, on statin   13. GERD, on omeprazole at home  14. UTI, cultures pending, started on Rocephin  15. N.p.o., TF at 40 cc/hr with free water flush 100 cc Q4hrs      Plan:    · Continue LR at 100 cc/hr for another 24 hrs. · Continue to monitor renal function  · Continue to monitor H&H, transfuse to keep <7. S/p transfusion this morning   · Obtain UPCR      Thank you Dr. Willy Hernandez for allowing us to participate in the care of Ms. Samia Maria  Electronically signed by PATRICIA Dale CNP on 4/29/2022 at 11:20 AM

## 2022-04-29 NOTE — PROGRESS NOTES
[facility]   Department of Internal Medicine   Internal Medicine Residency  MICU Progress Note    Patient:  Macho Whyte 68 y.o. female   MRN: [id number]       Date of Service: 2022    Allergy: Patient has no allergy information on record. Subjective     Patient was seen and examined this morning at bedside. Overnight, she was sedated with fentanyl due to agitation. Continues to be intubated. Responding to commands and withdrawing from noxious stimuli. No active bleeding noted from nares. Objective     TEMPERATURE:  Current - Temp: 98.1 °F (36.7 °C); Max - Temp  Av °F (36.7 °C)  Min: 97.5 °F (36.4 °C)  Max: 98.6 °F (37 °C)  RESPIRATIONS RANGE: Resp  Av.2  Min: 12  Max: 52  PULSE RANGE: Pulse  Av.2  Min: 48  Max: 101  BLOOD PRESSURE RANGE:  Systolic (62IRL), YCI:622 , Min:125 , BTI:695   ; Diastolic (76OGJ), FII:87, Min:62, Max:127    PULSE OXIMETRY RANGE: SpO2  Av.2 %  Min: 95 %  Max: 100 %    I & O - 24hr:    Intake/Output Summary (Last 24 hours) at 2022 0903  Last data filed at 2022 0800  Gross per 24 hour   Intake 2430.52 ml   Output 970 ml   Net 1460.52 ml     I/O last 3 completed shifts: In: 5520.4 [I.V.:3037.3; NG/GT:510; IV Piggyback:1973.1]  Out: 5008 [ORNTT:1787] I/O this shift: In: 80 [NG/GT:80]  Out: 30 [Urine:30]   Weight change:     Physical Exam  Constitutional:       Appearance: Normal appearance. Interventions: She is sedated and intubated. HENT:      Nose:      Comments: Rhino rocket in place with surrounding dried blood in nares      Mouth/Throat:      Comments: Gauze in place with dried blood, no active bleeding noted   Eyes:      Pupils: Pupils are equal, round, and reactive to light. Cardiovascular:      Rate and Rhythm: Normal rate and regular rhythm. Pulses: Normal pulses. Heart sounds: Normal heart sounds. No murmur heard. Pulmonary:      Effort: Pulmonary effort is normal. She is intubated. Breath sounds: Normal breath sounds. No wheezing or rhonchi. Abdominal:      General: Abdomen is flat. Bowel sounds are normal. There is no distension. Palpations: Abdomen is soft. Tenderness: There is no abdominal tenderness. Musculoskeletal:      Cervical back: Normal range of motion. Skin:     General: Skin is warm and dry.    Neurological:      Comments: Sedated, able to follow some commands, withdraws from pain          Medications     Continuous Infusions:   sodium chloride      sodium chloride      fentaNYL 100 mcg/hr (04/29/22 0600)    dextrose      dexmedetomidine (PRECEDEX) IV infusion Stopped (04/28/22 0909)     Scheduled Meds:   pantoprazole  40 mg IntraVENous BID    dexamethasone  4 mg IntraVENous Q6H    senna  1 tablet Oral Nightly    polyethylene glycol  17 g Oral Daily    oxymetazoline  2 spray Each Nostril BID    chlorhexidine  15 mL Mouth/Throat BID    cefTRIAXone (ROCEPHIN) IV  1,000 mg IntraVENous Q24H    QUEtiapine  12.5 mg Oral 2 times per day    melatonin  5 mg Oral QPM    [Held by provider] enoxaparin  1 mg/kg SubCUTAneous BID    [Held by provider] apixaban  5 mg Oral BID    vitamin D  5,000 Units Oral Daily    dilTIAZem  120 mg Oral Daily    [Held by provider] ramipril  5 mg Oral Daily    [Held by provider] aspirin  81 mg Oral Daily    Or    [Held by provider] aspirin  300 mg Rectal Daily    atorvastatin  80 mg Oral Nightly     PRN Meds: sodium chloride, sodium chloride, fentanNYL, hydrALAZINE, dextrose, glucagon (rDNA), dextrose, glucose, racepinephrine HCl, acetaminophen, melatonin, albuterol, acetaminophen, ondansetron **OR** ondansetron, polyethylene glycol, perflutren lipid microspheres  Nutrition:   NG/OG tube TF type: Pulmocare/Nephro/Glucerna/Jevity        At rate: ml/h    Labs and Imaging Studies     CBC:   Recent Labs     04/27/22  0533 04/27/22  1400 04/28/22  1030 04/28/22  1655 04/29/22  0446   WBC 11.9*  --  10.0  --  12.3*   HGB 7.1*   < > 8.2* 8.4* 7.4*   HCT 23.2*   < > 25.4* 26.0* 23.3*   MCV 93.5  --  88.2  --  89.6     --  280  --  298    < > = values in this interval not displayed. BMP:    Recent Labs     04/28/22  1030 04/28/22  1655 04/29/22  0446    139 138   K 4.4 4.9 4.5   * 108* 107   CO2 20* 21* 20*   BUN 29* 31* 34*   CREATININE 1.3* 1.4* 1.4*   GLUCOSE 178* 138* 168*       LIVER PROFILE:   No results for input(s): AST, ALT, LIPASE, BILIDIR, BILITOT, ALKPHOS in the last 72 hours. Invalid input(s): AMYLASE,  ALB    PT/INR:   Recent Labs     04/27/22  1512   PROTIME 14.9*   INR 1.3       APTT:   Recent Labs     04/27/22 1512   APTT 48.0*       Fasting Lipid Panel:    Lab Results   Component Value Date    CHOL 140 04/23/2022    TRIG 87 04/23/2022    HDL 48 04/23/2022       Cardiac Enzymes:    No results found for: CKTOTAL, CKMB, CKMBINDEX, TROPONINI    Notable Cultures:      Blood cultures   Blood Culture, Routine   Date Value Ref Range Status   04/26/2022 24 Hours no growth  Preliminary     Respiratory cultures No results found for: RESPCULTURE No results found for: LABGRAM  Urine   Urine Culture, Routine   Date Value Ref Range Status   04/26/2022 >100,000 CFU/ml  Final     Legionella No results found for: LABLEGI  C Diff PCR No results found for: CDIFPCR  Wound culture/abscess: No results for input(s): WNDABS in the last 72 hours. Tip culture:No results for input(s): CXCATHTIP in the last 72 hours.       Oxygen:     Vent Information  Ventilator ID: [id number]  Vent Mode: AC/VC  Ventilator Initiate: Yes  Additional Respiratory Assessments  Pulse: 74  Resp: 18  SpO2: 99 %  Position: Semi-Tang's  Humidification Source: Heated wire  Humidification Temp: 37  Circuit Condensation: Drained  Subglottic Suction Done: Yes  Airway Type: ET  Airway Size: 7.5  Cuff Pressure (cm H2O): 29 cm H2O  Swallow: Chokes on liquids       Urinary Catheter-Output (mL): 30 mL  [REMOVED] Urinary Catheter Pink-Output (mL): 700 mL    Imaging Studies:  XR CHEST PORTABLE   Final Result   1. Patchy bilateral infrahilar and lower lobe airspace disease   2. The lung volumes are diminished. 3. There is no pneumothorax. XR CHEST PORTABLE   Final Result   *Right perihilar airspace disease. Please note the chest x-ray is rotated and   the hilar vessels are superimposed. The airspace disease within the right   hilum may be exaggerated. *Trace right pleural effusion. *Left pleural effusion. RECOMMENDATION:   1.      XR CHEST PORTABLE   Final Result   Improved aeration at the left base associated with a greater degree of   inspiration. XR ABDOMEN FOR NG/OG/NE TUBE PLACEMENT   Final Result   Catheter is in the stomach. XR CHEST PORTABLE   Final Result   ET tube tip 2.4 cm from the pascual. XR CHEST PORTABLE   Final Result   No acute process. ET tube tip within the right mainstem bronchus and should be retracted at   least 4.0 cm. Findings given to the core team to be relayed to the license caregiver on   04/26/2022 at 2:09 p.m. Silas Garcia CT HEAD WO CONTRAST   Final Result   1. Late acute/early subacute multiple scattered infarcts in the vascular   distribution of the right middle cerebral artery as above commented. 2.  No indication for hemorrhagic component. 3.  No indication for new sizable area of acute recent insult in progression   to the brain parenchyma. 4.  There is no midline shift. RECOMMENDATIONS:   Unavailable         US DUP LOWER EXTREMITIES BILATERAL VENOUS   Final Result   There is evidence for deep venous thrombosis      ALERT:  THIS IS AN ABNORMAL REPORT               XR CHEST PORTABLE   Final Result   Findings suggest interstitial scarring and chronic bronchitis. No   significant acute infiltrates are noted         VL DUP CAROTID BILATERAL   Final Result   Atherosclerotic disease.  No hemodynamically significant stenosis is   identified   Estimated stenosis by NASCET criteria due to bleed  # Disposition: Cont current care     Jeff Sutherland MD, PGY-1    Attending Physician: Dr. Aleks Valente  Department of Pulmonary, Critical Care and [facility] [place]  Department of Internal Medicine      During multidisciplinary team rounds Nelsy Olivas is a 68 y.o. female was seen, examined and discussed. This is confirmation that I have personally seen and examined the patient and that the key elements of the encounter were performed by me (> 85 % time). The medications & laboratory data was discussed and adjusted where necessary. The radiographic images were reviewed or with radiologist or consultant if felt dis-concordant with the exam or history. The above findings were corroborated, plans confirmed and changes made if needed. Family is updated at the bedside as available. Key issues of the case were discussed among consultants. Critical Care time is documented if appropriate.       Critical Care time: 35 minutes    Kami Kincaid DO

## 2022-04-29 NOTE — PLAN OF CARE
Problem: Respiratory - Adult  Goal: Achieves optimal ventilation and oxygenation  4/29/2022 1130 by Kvng Cheung RCP  Outcome:Progressing  4/28/2022 2135 by Sebastian Pfeiffer RCP  Flowsheets (Taken 4/28/2022 2135)  Achieves optimal ventilation and oxygenation:   Assess for changes in respiratory status   Position to facilitate oxygenation and minimize respiratory effort   Assess the need for suctioning and aspirate as needed   Respiratory therapy support as indicated

## 2022-04-29 NOTE — CARE COORDINATION
4/29:  Update CM Note:  Pt was a transfer from [facility] for concerns of stroke like symptoms. MRI showed small recent infarcts. Pt became obtunded, in respiratory distress & was transferred to MICU. PT was intubated & a large mucous plug removed. Pt remains intubated in bilateral wrist restraints. Pt is on IV Fluids, Iv Fentanyl, IV decadron, Iv Rocephin, PO Eliquis   & Iv Protonix. 2 D echo done. FMLA papers faxed to PCP Dr. Petra Garibay for daughter & left paperwork in room. Needs are unclear. Pt will need PT/OT/Speech evals. Pt will need to be restraint free/sitter for 24hrs prior to submitting auth. Per Previous CM notes:  Daughter was given a list of [place] rehab facilities. CM spoke with daughter Janell Rush. She has the list & so far she is interested in [facility] in [address] explained that [facility] may not be an option due to being able to work out 3 hrs. She will look over the list & get back to me. Neurology has signed off. Per Neurology not resume anticoagulation if & when possible as well as Zio patch at dc. Sw/CM will continue to follow for dc planning.  Electronically signed by Luda Frazier RN on 4/29/2022 at 2:53 PM

## 2022-04-30 ENCOUNTER — APPOINTMENT (OUTPATIENT)
Dept: GENERAL RADIOLOGY | Age: 77
DRG: 064 | End: 2022-04-30
Attending: FAMILY MEDICINE
Payer: MEDICARE

## 2022-04-30 LAB
AADO2: 169.9 MMHG
ANION GAP SERPL CALCULATED.3IONS-SCNC: 14 MMOL/L (ref 7–16)
B.E.: -2.3 MMOL/L (ref -3–3)
BUN BLDV-MCNC: 45 MG/DL (ref 6–23)
CALCIUM IONIZED: 1.16 MMOL/L (ref 1.15–1.33)
CALCIUM SERPL-MCNC: 8 MG/DL (ref 8.6–10.2)
CHLORIDE BLD-SCNC: 108 MMOL/L (ref 98–107)
CO2: 18 MMOL/L (ref 22–29)
COHB: 0.5 % (ref 0–1.5)
CREAT SERPL-MCNC: 1.3 MG/DL (ref 0.5–1)
CRITICAL: ABNORMAL
DATE ANALYZED: ABNORMAL
DATE OF COLLECTION: ABNORMAL
FIO2: 40 %
GFR AFRICAN AMERICAN: 48
GFR NON-AFRICAN AMERICAN: 40 ML/MIN/1.73
GLUCOSE BLD-MCNC: 217 MG/DL (ref 74–99)
HCO3: 20.7 MMOL/L (ref 22–26)
HCT VFR BLD CALC: 23.6 % (ref 34–48)
HEMOGLOBIN: 7.3 G/DL (ref 11.5–15.5)
HHB: 4.8 % (ref 0–5)
LAB: ABNORMAL
Lab: ABNORMAL
MCH RBC QN AUTO: 28.2 PG (ref 26–35)
MCHC RBC AUTO-ENTMCNC: 30.9 % (ref 32–34.5)
MCV RBC AUTO: 91.1 FL (ref 80–99.9)
METER GLUCOSE: 138 MG/DL (ref 74–99)
METER GLUCOSE: 145 MG/DL (ref 74–99)
METER GLUCOSE: 149 MG/DL (ref 74–99)
METER GLUCOSE: 231 MG/DL (ref 74–99)
METHB: 0.1 % (ref 0–1.5)
MODE: AC
O2 CONTENT: 9.8 ML/DL
O2 SATURATION: 95.2 % (ref 92–98.5)
O2HB: 94.6 % (ref 94–97)
OPERATOR ID: 2962
PATIENT TEMP: 37 C
PCO2: 28.3 MMHG (ref 35–45)
PDW BLD-RTO: 14.2 FL (ref 11.5–15)
PEEP/CPAP: 6 CMH2O
PFO2: 1.82 MMHG/%
PH BLOOD GAS: 7.48 (ref 7.35–7.45)
PLATELET # BLD: 335 E9/L (ref 130–450)
PMV BLD AUTO: 10 FL (ref 7–12)
PO2: 72.8 MMHG (ref 75–100)
POTASSIUM SERPL-SCNC: 4.7 MMOL/L (ref 3.5–5)
RBC # BLD: 2.59 E12/L (ref 3.5–5.5)
RI(T): 2.33
RR MECHANICAL: 12 B/MIN
SODIUM BLD-SCNC: 140 MMOL/L (ref 132–146)
SOURCE, BLOOD GAS: ABNORMAL
THB: 7.3 G/DL (ref 11.5–16.5)
TIME ANALYZED: 513
VT MECHANICAL: 350 ML
WBC # BLD: 13.5 E9/L (ref 4.5–11.5)

## 2022-04-30 PROCEDURE — 2580000003 HC RX 258: Performed by: NURSE PRACTITIONER

## 2022-04-30 PROCEDURE — 85027 COMPLETE CBC AUTOMATED: CPT

## 2022-04-30 PROCEDURE — 94003 VENT MGMT INPAT SUBQ DAY: CPT

## 2022-04-30 PROCEDURE — 6370000000 HC RX 637 (ALT 250 FOR IP): Performed by: INTERNAL MEDICINE

## 2022-04-30 PROCEDURE — 6370000000 HC RX 637 (ALT 250 FOR IP): Performed by: FAMILY MEDICINE

## 2022-04-30 PROCEDURE — 80048 BASIC METABOLIC PNL TOTAL CA: CPT

## 2022-04-30 PROCEDURE — 71045 X-RAY EXAM CHEST 1 VIEW: CPT

## 2022-04-30 PROCEDURE — 2580000003 HC RX 258

## 2022-04-30 PROCEDURE — 82330 ASSAY OF CALCIUM: CPT

## 2022-04-30 PROCEDURE — 6360000002 HC RX W HCPCS: Performed by: INTERNAL MEDICINE

## 2022-04-30 PROCEDURE — 6370000000 HC RX 637 (ALT 250 FOR IP)

## 2022-04-30 PROCEDURE — 82805 BLOOD GASES W/O2 SATURATION: CPT

## 2022-04-30 PROCEDURE — C9113 INJ PANTOPRAZOLE SODIUM, VIA: HCPCS | Performed by: INTERNAL MEDICINE

## 2022-04-30 PROCEDURE — 2000000000 HC ICU R&B

## 2022-04-30 PROCEDURE — 82962 GLUCOSE BLOOD TEST: CPT

## 2022-04-30 PROCEDURE — 6370000000 HC RX 637 (ALT 250 FOR IP): Performed by: NURSE PRACTITIONER

## 2022-04-30 PROCEDURE — 6360000002 HC RX W HCPCS

## 2022-04-30 PROCEDURE — 99291 CRITICAL CARE FIRST HOUR: CPT | Performed by: INTERNAL MEDICINE

## 2022-04-30 RX ORDER — INSULIN LISPRO 100 [IU]/ML
0-6 INJECTION, SOLUTION INTRAVENOUS; SUBCUTANEOUS
Status: DISCONTINUED | OUTPATIENT
Start: 2022-04-30 | End: 2022-05-02

## 2022-04-30 RX ORDER — INSULIN LISPRO 100 [IU]/ML
0-3 INJECTION, SOLUTION INTRAVENOUS; SUBCUTANEOUS NIGHTLY
Status: DISCONTINUED | OUTPATIENT
Start: 2022-04-30 | End: 2022-05-02

## 2022-04-30 RX ADMIN — Medication 5 MG: at 17:15

## 2022-04-30 RX ADMIN — ATORVASTATIN CALCIUM 80 MG: 40 TABLET, FILM COATED ORAL at 20:40

## 2022-04-30 RX ADMIN — QUETIAPINE FUMARATE 12.5 MG: 25 TABLET ORAL at 20:39

## 2022-04-30 RX ADMIN — SODIUM CHLORIDE, POTASSIUM CHLORIDE, SODIUM LACTATE AND CALCIUM CHLORIDE: 600; 310; 30; 20 INJECTION, SOLUTION INTRAVENOUS at 03:24

## 2022-04-30 RX ADMIN — 0.12% CHLORHEXIDINE GLUCONATE 15 ML: 1.2 RINSE ORAL at 07:43

## 2022-04-30 RX ADMIN — PANTOPRAZOLE SODIUM 40 MG: 40 INJECTION, POWDER, FOR SOLUTION INTRAVENOUS at 20:39

## 2022-04-30 RX ADMIN — INSULIN LISPRO 2 UNITS: 100 INJECTION, SOLUTION INTRAVENOUS; SUBCUTANEOUS at 08:50

## 2022-04-30 RX ADMIN — Medication 5000 UNITS: at 07:42

## 2022-04-30 RX ADMIN — PANTOPRAZOLE SODIUM 40 MG: 40 INJECTION, POWDER, FOR SOLUTION INTRAVENOUS at 07:43

## 2022-04-30 RX ADMIN — POLYETHYLENE GLYCOL 3350 17 G: 17 POWDER, FOR SOLUTION ORAL at 07:42

## 2022-04-30 RX ADMIN — SODIUM CHLORIDE, POTASSIUM CHLORIDE, SODIUM LACTATE AND CALCIUM CHLORIDE: 600; 310; 30; 20 INJECTION, SOLUTION INTRAVENOUS at 13:44

## 2022-04-30 RX ADMIN — 0.12% CHLORHEXIDINE GLUCONATE 15 ML: 1.2 RINSE ORAL at 20:40

## 2022-04-30 RX ADMIN — PROPOFOL 20.04 MCG/KG/MIN: 10 INJECTION, EMULSION INTRAVENOUS at 02:21

## 2022-04-30 RX ADMIN — INSULIN LISPRO 1 UNITS: 100 INJECTION, SOLUTION INTRAVENOUS; SUBCUTANEOUS at 17:14

## 2022-04-30 RX ADMIN — QUETIAPINE FUMARATE 12.5 MG: 25 TABLET ORAL at 07:42

## 2022-04-30 RX ADMIN — Medication 5 MG: at 20:39

## 2022-04-30 RX ADMIN — WATER 1000 MG: 1 INJECTION INTRAMUSCULAR; INTRAVENOUS; SUBCUTANEOUS at 20:38

## 2022-04-30 RX ADMIN — SENNOSIDES 8.6 MG: 8.6 TABLET, COATED ORAL at 20:39

## 2022-04-30 ASSESSMENT — PULMONARY FUNCTION TESTS
PIF_VALUE: 12
PIF_VALUE: 12
PIF_VALUE: 13
PIF_VALUE: 12
PIF_VALUE: 10
PIF_VALUE: 12
PIF_VALUE: 11
PIF_VALUE: 12
PIF_VALUE: 35
PIF_VALUE: 11
PIF_VALUE: 12
PIF_VALUE: 12
PIF_VALUE: 13
PIF_VALUE: 12
PIF_VALUE: 18
PIF_VALUE: 12
PIF_VALUE: 20

## 2022-04-30 ASSESSMENT — PAIN SCALES - GENERAL
PAINLEVEL_OUTOF10: 0
PAINLEVEL_OUTOF10: 2

## 2022-04-30 NOTE — PLAN OF CARE
Problem: Skin/Tissue Integrity  Goal: Absence of new skin breakdown  Description: 1. Monitor for areas of redness and/or skin breakdown  2. Assess vascular access sites hourly  3. Every 4-6 hours minimum:  Change oxygen saturation probe site  4. Every 4-6 hours:  If on nasal continuous positive airway pressure, respiratory therapy assess nares and determine need for appliance change or resting period. 4/30/2022 0944 by Cristal Infante RN  Outcome: Progressing  4/30/2022 0124 by Julius Jose RN  Outcome: Progressing     Problem: Safety - Adult  Goal: Free from fall injury  4/30/2022 0944 by Cristal Infante RN  Outcome: Progressing  4/30/2022 0124 by Julius Jose RN  Outcome: Progressing     Problem: Safety - Medical Restraint  Goal: Remains free of injury from restraints (Restraint for Interference with Medical Device)  Description: INTERVENTIONS:  1. Determine that other, less restrictive measures have been tried or would not be effective before applying the restraint  2. Evaluate the patient's condition at the time of restraint application  3. Inform patient/family regarding the reason for restraint  4.  Q2H: Monitor safety, psychosocial status, comfort, nutrition and hydration  4/30/2022 0944 by Cristal Infante RN  Outcome: Progressing  4/30/2022 0125 by Julius Jose RN  Outcome: Progressing  4/29/2022 2114 by Beny Vasquez RN  Outcome: Progressing     Problem: Respiratory - Adult  Goal: Respiratory status will improve  Description: Respiratory status will improve  4/30/2022 0508 by Orquidea Bryant RCP  Outcome: Progressing  Goal: Achieves optimal ventilation and oxygenation  4/29/2022 2114 by Beny Vasquez RN  Outcome: Progressing  4/29/2022 2054 by Xavier Orozco RCP  Outcome: Progressing     Problem: ABCDS Injury Assessment  Goal: Absence of physical injury  Outcome: Progressing

## 2022-04-30 NOTE — PROGRESS NOTES
Hospitalist Progress Note      Synopsis: Patient admitted as a transfer from [facility] for strokelike symptoms. Patient presented to [facility] with sudden onset of left-sided weakness and aphasia that occurred while in the middle of a conversation with her daughter. In ED she was noted to have severe aphasia, severe dysarthria, and left arm and leg drift, and rightward gaze, and left-sided hemianopia. MRI of the brain right MCA stroke likely embolic in nature. Neurology is following. Carotid ultrasound with no significant stenosis. Awaiting echo to assess for paradoxical embolus. If unrevealing plan is for Zio patch at discharge. She is currently being treated with therapeutic Lovenox with plans to switch to Coumadin when she is able to take p.o. she began obtunded and stridorous requiring transfer to ICU and intubation. A large mucous plug was extracted from her airway. Patient began having bleeding from the nose and mouth. ENT was consulted. Nasal and oral packing remain in place. Anticoagulation on hold. Nephrology following for CURT with oliguria. Hospital day 8     Subjective:  Stable overnight. No issues reported  Patient seen and examined. Sedation weaned off this morning per nursing. Remains intubated. Coughing. Daughter at bedside. Records reviewed.        Temp (24hrs), Av.5 °F (36.9 °C), Min:98.1 °F (36.7 °C), Max:98.8 °F (37.1 °C)    DIET: Diet NPO  ADULT TUBE FEEDING; Orogastric; Standard with Fiber; Continuous; 10; Yes; 15; Q 4 hours; 50; 100; Q 4 hours; Protein; 1 Proteinex Daily  CODE: Full Code    Intake/Output Summary (Last 24 hours) at 2022 0700  Last data filed at 2022 0650  Gross per 24 hour   Intake 3399.71 ml   Output 645 ml   Net 2754.71 ml       Review of Systems:    KURTIS given mental status    Objective:    BP (!) 141/73   Pulse 95   Temp 98.1 °F (36.7 °C) (Bladder)   Resp 18   Ht 5' (1.524 m)   Wt 186 lb (84.4 kg)   SpO2 93%   BMI 36.33 kg/m²     General appearance: Obese elderly female, intubated and in no apparent distress. Appears stated age. HEENT: Conjunctivae/corneas clear. Bilateral nasal packing deflated with no new bleeding. ETT secured. Neck: Supple. No JVD. Respiratory: CTA throughout though diminished at bilateral bases. Synchronous with ventilator. Cardiovascular:  RRR. S1, S2 without MRG. PV: Pulses palpable. No edema. Abdomen: Soft, non-tender, non-distended. +BS  Musculoskeletal: No obvious deformities. Skin: Normal skin color. No rashes or lesions. Good turgor. Neurologic: Opens eyes, unable to follow commands. Spontaneous movement of RUE.     Medications:  REVIEWED DAILY    Infusion Medications    lactated ringers 100 mL/hr at 04/30/22 0650    propofol 10 mcg/kg/min (04/30/22 0650)    sodium chloride      sodium chloride      fentaNYL Stopped (04/29/22 1505)    dextrose      dexmedetomidine (PRECEDEX) IV infusion Stopped (04/28/22 0909)     Scheduled Medications    pantoprazole  40 mg IntraVENous BID    senna  1 tablet Oral Nightly    polyethylene glycol  17 g Oral Daily    chlorhexidine  15 mL Mouth/Throat BID    cefTRIAXone (ROCEPHIN) IV  1,000 mg IntraVENous Q24H    QUEtiapine  12.5 mg Oral 2 times per day    melatonin  5 mg Oral QPM    [Held by provider] enoxaparin  1 mg/kg SubCUTAneous BID    [Held by provider] apixaban  5 mg Oral BID    vitamin D  5,000 Units Oral Daily    dilTIAZem  120 mg Oral Daily    [Held by provider] ramipril  5 mg Oral Daily    [Held by provider] aspirin  81 mg Oral Daily    Or    [Held by provider] aspirin  300 mg Rectal Daily    atorvastatin  80 mg Oral Nightly     PRN Meds: sodium chloride, sodium chloride, hydrALAZINE, dextrose, glucagon (rDNA), dextrose, glucose, racepinephrine HCl, acetaminophen, melatonin, albuterol, acetaminophen, ondansetron **OR** ondansetron, polyethylene glycol, perflutren lipid microspheres    Labs:     Recent Labs 04/28/22  1030 04/28/22  1655 04/29/22  0446   WBC 10.0  --  12.3*   HGB 8.2* 8.4* 7.4*   HCT 25.4* 26.0* 23.3*     --  298       Recent Labs     04/28/22  1030 04/28/22  1655 04/29/22  0446    139 138   K 4.4 4.9 4.5   * 108* 107   CO2 20* 21* 20*   BUN 29* 31* 34*   CREATININE 1.3* 1.4* 1.4*   CALCIUM 7.7* 8.2* 8.0*       No results for input(s): PROT, ALB, ALKPHOS, ALT, AST, BILITOT, AMYLASE, LIPASE in the last 72 hours. Recent Labs     04/27/22  1512   INR 1.3       No results for input(s): Oscar Candace in the last 72 hours. Chronic labs:    Lab Results   Component Value Date    CHOL 140 04/23/2022    TRIG 87 04/23/2022    HDL 48 04/23/2022    LDLCALC 75 04/23/2022    INR 1.3 04/27/2022    LABA1C 5.6 04/23/2022       Radiology: REVIEWED DAILY    Assessment:  Acute respiratory failure secondary to mucous plugging  Right MCA stroke- severe aphasia, severe dysarthria, and left arm and leg drift, and rightward gaze, and left-sided hemianopia  CURT on CKD III  Nasal/oral bleeding  Acute on chronic anemia   Dysphagia  Acute delirium   UTI  Leukocytosis  CKD, unknown baseline  CAD s/p PCI in 2017  HTN  HLD  Hx of BLE DVT + PE in 11/2021 anticoagulated on Eliquis    Plan:  ICU team, nephrology, and ENT following  Remains intubated -MV management per ICU team, attempting to wean   Discussed with Dr. Carlo Perez - nasal packing deflated this morning and oral packing removed. If no bleeding will attempt extubation this afternoon.    ACE inhibitor on hold   LR @ 100 per nephro  Monitor renal function, I&O  Neurology signed off with recs to resume anticoagulation if and when possible as well as Zio patch at discharge  Anticoagulation on hold  Monitor H&H, transfuse for Hb < 7  ENT with recs to remove and oral and nasal packing in 3 to 5 days, will remain intubated while packing in place  Ceftriaxone for UTI  Will make outpatient referral to hematology/oncology for hypercoagulable work-up given history of DVT/PE and failure of Eliquis     DVT Prophylaxis [] Lovenox  []  Heparin [] DOAC [] PCDs [] Ambulation    GI Prophylaxis [x] PPI  [] H2 Blocker   [] Carafate  [] Diet/Tube Feeds   Level of care [] Med/Surg  [] Intermediate  [x]  ICU   Diet Diet NPO  ADULT TUBE FEEDING; Orogastric; Standard with Fiber; Continuous; 10; Yes; 15; Q 4 hours; 50; 100; Q 4 hours; Protein; 1 Proteinex Daily    Family contact []  N/A   [x] At bedside - daughter  [] Phone call      Discharge Plan: TBD pending further clinical course    +++++++++++++++++++++++++++++++++++++++++++++++++  Dennis Zee, [address], [address]  +++++++++++++++++++++++++++++++++++++++++++++++++  NOTE: This report was transcribed using voice recognition software. Every effort was made to ensure accuracy; however, inadvertent computerized transcription errors may be present.

## 2022-04-30 NOTE — PROGRESS NOTES
Department of Internal Medicine  Nephrology Progress Note    Events reviewed. SUBJECTIVE:  We are following Ms. Susana Gonzalez for CURT. She remains intubated and sedated. PHYSICAL EXAM:      Vitals:    VITALS:  BP (!) 142/75   Pulse 92   Temp 99.1 °F (37.3 °C) (Bladder)   Resp 18   Ht 5' (1.524 m)   Wt 186 lb (84.4 kg)   SpO2 95%   BMI 36.33 kg/m²   24HR INTAKE/OUTPUT:      Intake/Output Summary (Last 24 hours) at 4/30/2022 0936  Last data filed at 4/30/2022 0650  Gross per 24 hour   Intake 3319.71 ml   Output 575 ml   Net 2744.71 ml       Constitutional: Intubated on mechanical ventilator  HEENT:  ETT present, nasal packing present  Respiratory:   On mechanical ventilator  Cardiovascular/Edema:  RRR, S1/S2  Gastrointestinal:  abd rounded, non tender, BS hypoactive  Neurologic:  Sedated on mechanical ventilator  Skin:  Warm,dry, ecchymosis to BUE  Other:  Pink catheter draining minimal yellow urine    Scheduled Meds:   insulin lispro  0-6 Units SubCUTAneous TID WC    insulin lispro  0-3 Units SubCUTAneous Nightly    pantoprazole  40 mg IntraVENous BID    senna  1 tablet Oral Nightly    polyethylene glycol  17 g Oral Daily    chlorhexidine  15 mL Mouth/Throat BID    cefTRIAXone (ROCEPHIN) IV  1,000 mg IntraVENous Q24H    QUEtiapine  12.5 mg Oral 2 times per day    melatonin  5 mg Oral QPM    [Held by provider] enoxaparin  1 mg/kg SubCUTAneous BID    [Held by provider] apixaban  5 mg Oral BID    vitamin D  5,000 Units Oral Daily    dilTIAZem  120 mg Oral Daily    [Held by provider] ramipril  5 mg Oral Daily    [Held by provider] aspirin  81 mg Oral Daily    Or    [Held by provider] aspirin  300 mg Rectal Daily    atorvastatin  80 mg Oral Nightly     Continuous Infusions:   lactated ringers 100 mL/hr at 04/30/22 0650    propofol 5 mcg/kg/min (04/30/22 0723)    sodium chloride      sodium chloride      fentaNYL Stopped (04/29/22 1505)    dextrose      dexmedetomidine (PRECEDEX) IV sodium of 141, potassium 3.7, chloride 111, bicarbonate 18, BUN 21, creatinine 1.2, and hemoglobin of 7.1. We are consulted for worsening CURT and decreased urine output. Notably patient's daughter states that patient has been recently evaluated by nephrology in [place] and underwent blood work and renal ultrasound however did not receive the results yet. Ramipril, diltiazem, atorvastatin, apixaban, and omeprazole are medications patient was taking prior to admission. Problems resolved:  · Hypocalcemia, 2/2 vitamin D deficiency. On cholecalciferol, ionized calcium 1.16  · Vitamin D deficiency, on cholecalciferol      IMPRESSION/RECOMMENDATIONS:      1. CURT stage III based on urine output, likely volume responsive pre-renal CURT from poor oral intake and volume loss with severe anemia in the setting of ACEi administration vs ischemic ATN from acute hypotension. Oliguric. FENa 0.3%. Renal function slowly improving, creatinine 1.3 which seems to be her baseline. 2. CKD Stage 3, [facility] [place], [place] shows increased cortical echogenicity consistent with medical renal disease. Baseline creatinine 1.3 mg deciliter. 3. Alkalemia, pH 7.482, PCO2 28.3, HCO3 20.7, respiratory alkalosis  4. HTN, on cardizem, ramipril (on hold)    ------------------------------------------------------------------------  5. Acute hypoxic respiratory failure 2/2 mucus plug, intubated on 4/26, remains on mechanical ventilation. S/p bronchoscopy  6. CVA, MRI demonstrates infarcts in right MCA without hemorrhagic conversion. Neurology following, concern for already embolic source. Echo ordered for PFO evaluation to r/o paradoxical embolus. Borderline intracranial atherosclerosis. 7. Historical DVT and current right LE+, patiently chronically anticoagulated on Eliquis at home, was being treated with Lovenox for transition to [address] [place] [address], now on hold secondary to epistaxis  8.  Acute epistaxis, nasal packing in place  9. Anemia, acute drop in hemoglobin 5.6, [facility]  10. HLD, on statin   11. GERD, on omeprazole at home  12. UTI, cultures pending, started on Rocephin  13. N.p.o., TF at 40 cc/hr with free water flush 100 cc Q4hrs      Plan:    · Discontinue IV fluids  · Continue to monitor renal function  · Continue to monitor H&H, transfuse to keep <7.  S/p transfusion this morning     Electronically signed by Dejuan Rodriguez MD on 4/30/2022 at 9:36 AM

## 2022-04-30 NOTE — PLAN OF CARE
Problem: Respiratory - Adult  Goal: Respiratory status will improve  Description: Respiratory status will improve  Outcome: Progressing     Problem: Respiratory - Adult  Goal: Achieves optimal ventilation and oxygenation  4/29/2022 2114 by Aristides Nuno RN  Outcome: Progressing  4/29/2022 2054 by Evelyn Johnson RCP  Outcome: Progressing

## 2022-04-30 NOTE — PROGRESS NOTES
[address] [facility]   Department of Internal Medicine   Internal Medicine Residency  MICU Progress Note    Patient:  Susana Gonzalez 68 y.o. female   MRN: [id number]       Date of Service: 2022    Allergy: Patient has no allergy information on record. Subjective     Patient was seen and examined this morning at bedside in no acute distress. Overnight, patient was weaned off of sedation. When seen this morning patient was able to wake up and follow commands appropriately. We tried for weaning parameters today however patient does not produce a cuff leak, as such we will await extubation at this time may possibly try tomorrow. Patient continues to have multiple clots in mouth however it does not appear as if she has active bleeding these appear to be old we will continue mouth care and remove clot as appropriate. At this time no other acute issues, will continue to maintain ICU level care and try for extubation in the coming days. Objective     TEMPERATURE:  Current - Temp: 99.1 °F (37.3 °C); Max - Temp  Av.7 °F (37.1 °C)  Min: 98.1 °F (36.7 °C)  Max: 99.1 °F (37.3 °C)  RESPIRATIONS RANGE: Resp  Av.8  Min: 8  Max: 40  PULSE RANGE: Pulse  Av.4  Min: 62  Max: 105  BLOOD PRESSURE RANGE:  Systolic (80FIY), HMT:134 , Min:103 , CRISTEL:897   ; Diastolic (22TTN), PFC:52, Min:52, Max:83    PULSE OXIMETRY RANGE: SpO2  Av.4 %  Min: 93 %  Max: 99 %    I & O - 24hr:    Intake/Output Summary (Last 24 hours) at 2022 1336  Last data filed at 2022 0650  Gross per 24 hour   Intake 3319.71 ml   Output 530 ml   Net 2789.71 ml     I/O last 3 completed shifts: In: 5750.2 [I.V.:3798.2; JZ/TO:2930]  Out: 5706 [Urine:1260] No intake/output data recorded. Weight change:     Physical Exam  Constitutional:       Appearance: Normal appearance. Interventions: She is sedated and intubated.    HENT:      Mouth/Throat:      Comments: Dried blood on ET tube and clots in mouth appear to be old  Eyes: Pupils: Pupils are equal, round, and reactive to light. Cardiovascular:      Rate and Rhythm: Normal rate and regular rhythm. Pulses: Normal pulses. Heart sounds: Normal heart sounds. No murmur heard. Pulmonary:      Effort: Pulmonary effort is normal. She is intubated. Breath sounds: Normal breath sounds. No wheezing or rhonchi. Abdominal:      General: Abdomen is flat. There is no distension. Palpations: Abdomen is soft. Tenderness: There is no abdominal tenderness. Musculoskeletal:         General: Normal range of motion. Cervical back: Normal range of motion. Skin:     General: Skin is warm and dry. Capillary Refill: Capillary refill takes less than 2 seconds. Neurological:      General: No focal deficit present.       Comments: Coming off sedation, able to follow some commands and nod appropriately          Medications     Continuous Infusions:   lactated ringers 100 mL/hr at 04/30/22 0650    propofol 5 mcg/kg/min (04/30/22 0723)    sodium chloride      sodium chloride      fentaNYL Stopped (04/29/22 1505)    dextrose      dexmedetomidine (PRECEDEX) IV infusion Stopped (04/28/22 0909)     Scheduled Meds:   insulin lispro  0-6 Units SubCUTAneous TID WC    insulin lispro  0-3 Units SubCUTAneous Nightly    pantoprazole  40 mg IntraVENous BID    senna  1 tablet Oral Nightly    polyethylene glycol  17 g Oral Daily    chlorhexidine  15 mL Mouth/Throat BID    cefTRIAXone (ROCEPHIN) IV  1,000 mg IntraVENous Q24H    QUEtiapine  12.5 mg Oral 2 times per day    melatonin  5 mg Oral QPM    [Held by provider] enoxaparin  1 mg/kg SubCUTAneous BID    [Held by provider] apixaban  5 mg Oral BID    vitamin D  5,000 Units Oral Daily    dilTIAZem  120 mg Oral Daily    [Held by provider] ramipril  5 mg Oral Daily    [Held by provider] aspirin  81 mg Oral Daily    Or    [Held by provider] aspirin  300 mg Rectal Daily    atorvastatin  80 mg Oral Nightly PRN Meds: sodium chloride, sodium chloride, hydrALAZINE, dextrose, glucagon (rDNA), dextrose, glucose, racepinephrine HCl, acetaminophen, melatonin, albuterol, acetaminophen, ondansetron **OR** ondansetron, polyethylene glycol, perflutren lipid microspheres  Nutrition:   NG/OG tube TF type: Pulmocare/Nephro/Glucerna/Jevity        At rate: ml/h    Labs and Imaging Studies     CBC:   Recent Labs     04/28/22  1030 04/28/22  1030 04/28/22  1655 04/29/22  0446 04/30/22  0549   WBC 10.0  --   --  12.3* 13.5*   HGB 8.2*   < > 8.4* 7.4* 7.3*   HCT 25.4*   < > 26.0* 23.3* 23.6*   MCV 88.2  --   --  89.6 91.1     --   --  298 335    < > = values in this interval not displayed. BMP:    Recent Labs     04/28/22  1655 04/29/22  0446 04/30/22  0549    138 140   K 4.9 4.5 4.7   * 107 108*   CO2 21* 20* 18*   BUN 31* 34* 45*   CREATININE 1.4* 1.4* 1.3*   GLUCOSE 138* 168* 217*       LIVER PROFILE:   No results for input(s): AST, ALT, LIPASE, BILIDIR, BILITOT, ALKPHOS in the last 72 hours. Invalid input(s): AMYLASE,  ALB    PT/INR:   Recent Labs     04/27/22 1512   PROTIME 14.9*   INR 1.3       APTT:   Recent Labs     04/27/22 1512   APTT 48.0*       Fasting Lipid Panel:    Lab Results   Component Value Date    CHOL 140 04/23/2022    TRIG 87 04/23/2022    HDL 48 04/23/2022       Cardiac Enzymes:    No results found for: CKTOTAL, CKMB, CKMBINDEX, TROPONINI    Notable Cultures:      Blood cultures   Blood Culture, Routine   Date Value Ref Range Status   04/26/2022 24 Hours no growth  Preliminary     Respiratory cultures No results found for: RESPCULTURE No results found for: LABGRAM  Urine   Urine Culture, Routine   Date Value Ref Range Status   04/26/2022 >100,000 CFU/ml  Final     Legionella No results found for: LABLEGI  C Diff PCR No results found for: CDIFPCR  Wound culture/abscess: No results for input(s): WNDABS in the last 72 hours.   Tip culture:No results for input(s): CXCATHTIP in the last 72 hours. Oxygen:     Vent Information  Ventilator ID: [id number]  Vent Mode: AC/VC  Ventilator Initiate: Yes  Additional Respiratory Assessments  Pulse: 105  Resp: 29  SpO2: 96 %  Position: Semi-Tang's  Humidification Source: Heated wire  Humidification Temp: 37  Circuit Condensation: Drained  Subglottic Suction Done: Yes  Airway Type: ET  Airway Size: 7.5  Cuff Pressure (cm H2O): 29 cm H2O  Swallow: Chokes on liquids       Urinary Catheter-Output (mL): 125 mL  [REMOVED] Urinary Catheter Pink-Output (mL): 700 mL    Imaging Studies:  XR CHEST PORTABLE   Final Result   1. Partial interval clearing of the bibasilar airspace disease   2. There is mild residual bibasilar airspace disease. XR CHEST PORTABLE   Final Result   1. Patchy bilateral infrahilar and lower lobe airspace disease   2. The lung volumes are diminished. 3. There is no pneumothorax. XR CHEST PORTABLE   Final Result   *Right perihilar airspace disease. Please note the chest x-ray is rotated and   the hilar vessels are superimposed. The airspace disease within the right   hilum may be exaggerated. *Trace right pleural effusion. *Left pleural effusion. RECOMMENDATION:   1.      XR CHEST PORTABLE   Final Result   Improved aeration at the left base associated with a greater degree of   inspiration. XR ABDOMEN FOR NG/OG/NE TUBE PLACEMENT   Final Result   Catheter is in the stomach. XR CHEST PORTABLE   Final Result   ET tube tip 2.4 cm from the pascual. XR CHEST PORTABLE   Final Result   No acute process. ET tube tip within the right mainstem bronchus and should be retracted at   least 4.0 cm. Findings given to the core team to be relayed to the license caregiver on   04/26/2022 at 2:09 p.m. Zunilda Tinajero CT HEAD WO CONTRAST   Final Result   1. Late acute/early subacute multiple scattered infarcts in the vascular   distribution of the right middle cerebral artery as above commented.       2.  No indication for hemorrhagic component. 3.  No indication for new sizable area of acute recent insult in progression   to the brain parenchyma. 4.  There is no midline shift. RECOMMENDATIONS:   Unavailable         US DUP LOWER EXTREMITIES BILATERAL VENOUS   Final Result   There is evidence for deep venous thrombosis      ALERT:  THIS IS AN ABNORMAL REPORT               XR CHEST PORTABLE   Final Result   Findings suggest interstitial scarring and chronic bronchitis. No   significant acute infiltrates are noted         VL DUP CAROTID BILATERAL   Final Result   Atherosclerotic disease. No hemodynamically significant stenosis is   identified   Estimated stenosis by NASCET criteria in the proximal right carotid   artery is between 0% and 49%. Estimated stenosis by NASCET criteria in the proximal left carotid   artery is between 0% and 49%. MRI brain without contrast   Final Result   Multiple small recent infarcts throughout the right MCA territory. There is   evidence of slow vascular flow in the right MCA territory. Consider CTA of   the head/neck for further evaluation. Advanced chronic microvascular ischemic changes. No mass effect. No hemorrhage. No hydrocephalus. XR CHEST PORTABLE    (Results Pending)        Resident's Assessment and Plan     Assessment:    1. Acute hypoxic respiratory failure 2/2 large mucus plug  2. Acute RMCA Stroke no hemorraghic convergence on CT  3. Acute RLE DVT: seen on US in the popliteal vein and Tibioperoneal Trunk  4. Oral and nasal bleeding 2/2 OG tube insertion ?, ENT consulted placed rhino in nose for 3-5 days and Kerlex packing for mouth  5. Concern for UTI due UA showing bacteria: Cultures concerning for E. Coli  6. Acute on Chronic Anemia 2/2 blood lose: Has had workup done at [facility]  7.  CURT 2/2 poor oral intake/nutrion and blood loss: Pre-renal, FeNa= 0.2% indicating pre-renal, may have component of CKD, based upon Care Everywhere chart records patient has creatinine of 1-1.3 on baseline, recently started seeing a nephrologist in [place]  8. 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 Hx Hypertension on Cardizem and ramipril at home  10. Hx Hyperlipidemia    Plan:  · Continue holding anticoagulation, bleeding appears to have resolved mainly clots and dried blood at this point  · Per neuro continue Aspirin and Statin therapy at this time  · ENT has seen patient, no longer requiring oral packing, Rhino Rocket's were deflated May possibly removed tomorrow  · Tried for SBT trials however patient does not have a cuff leak, will remain intubated today and will try for extubation possibly tomorrow  · Continue home Cardizem  · Hold Ramipril in setting of CURT   · Continue LR maintenance fluids per nephrology  · Continue tube feeds  · Wean sedation may use as needed's for pain and agitation  · Antibiotics stopped after 5 days  · Follow HgB transfuse in below 7  · We will try for extubation tomorrow      # Peptic ulcer prophylaxis: Protonix  # DVT Prophylaxis: Held due to bleed  # Disposition: Cont current care     Rachel Riley MD, PGY-1    Attending Physician: Dr. Nikolay Ellis  Department of Pulmonary, Critical Care and Sleep Medicine  [address] [facility]  Department of Internal Medicine      During multidisciplinary team rounds Sheridan Goltz is a 68 y.o. female was seen, examined and discussed. This is confirmation that I have personally seen and examined the patient and that the key elements of the encounter were performed by me (> 85 % time). The medications & laboratory data was discussed and adjusted where necessary. The radiographic images were reviewed or with radiologist or consultant if felt dis-concordant with the exam or history. The above findings were corroborated, plans confirmed and changes made if needed. Family is updated at the bedside as available.  Key issues of the case were discussed among consultants. Critical Care time is documented if appropriate.       Critical Care time: 35 minutes    Kami Kincaid DO

## 2022-04-30 NOTE — PROGRESS NOTES
Wean parameter done    VT= 539 mls  F= 16 B/M  V= 8.62 l/m  NIF=-31 cmH2O  VC= na L      Rsbi=31    No leak detected with a vt of 500mls.

## 2022-04-30 NOTE — PLAN OF CARE
Problem: Skin/Tissue Integrity  Goal: Absence of new skin breakdown  Description: 1. Monitor for areas of redness and/or skin breakdown  2. Assess vascular access sites hourly  3. Every 4-6 hours minimum:  Change oxygen saturation probe site  4. Every 4-6 hours:  If on nasal continuous positive airway pressure, respiratory therapy assess nares and determine need for appliance change or resting period.   Outcome: Progressing     Problem: Safety - Adult  Goal: Free from fall injury  Outcome: Progressing

## 2022-04-30 NOTE — PLAN OF CARE
Problem: Safety - Medical Restraint  Goal: Remains free of injury from restraints (Restraint for Interference with Medical Device)  Description: INTERVENTIONS:  1. Determine that other, less restrictive measures have been tried or would not be effective before applying the restraint  2. Evaluate the patient's condition at the time of restraint application  3. Inform patient/family regarding the reason for restraint  4.  Q2H: Monitor safety, psychosocial status, comfort, nutrition and hydration  4/30/2022 0125 by Hema Mack RN  Outcome: Progressing

## 2022-04-30 NOTE — PLAN OF CARE
Problem: Respiratory - Adult  Goal: Respiratory status will improve  Description: Respiratory status will improve  4/30/2022 0944 by Brigitte Gonzalez RCP  Outcome: Progressing     Problem: Respiratory - Adult  Goal: Achieves optimal ventilation and oxygenation  4/30/2022 0944 by Brigitte Gonzalez RCP  Outcome: Progressing

## 2022-04-30 NOTE — PLAN OF CARE
Problem: Respiratory - Adult  Goal: Respiratory status will improve  Description: Respiratory status will improve  4/30/2022 1816 by Melissa Conteh RCP  Outcome: Progressing

## 2022-04-30 NOTE — PROGRESS NOTES
OTOLARYNGOLOGY  DAILY PROGRESS NOTE  2022    Subjective:     Remains intubated and sedated. Kerlix packing removed from mouth by nursing staff as no bleeding noted. Old clots suctioned from back of mouth. Objective:     /83   Pulse 86   Temp 98.1 °F (36.7 °C) (Bladder)   Resp 19   Ht 5' (1.524 m)   Wt 186 lb (84.4 kg)   SpO2 94%   BMI 36.33 kg/m²   PULSE OXIMETRY RANGE: SpO2  Av %  Min: 93 %  Max: 99 %  I/O last 3 completed shifts: In: 5.4 [I.V.:3798.2; NG/GT:]  Out: 1260 [Urine:1260]    Physical Exam  Constitutional: Appears well-developed and well-nourished. Eyes: Lids and lashes normal   ENT: bilateral nares with nasal balloon packing in place   Neck:  Supple, symmetrical, trachea midline   Respiratory: Intubated   Cardiovascular: Regular rate   Skin: Warm and dry   Neurologic:  Sedated         Assessment/Plan:     68 y.o. female with history of PE, DVT, and stroke presents with oral and nasal bleeding after OGT placement     - Continue Kerlix packing in mouth as needed for hemostasis. - Nasal packing to be removed early next week if no bleeding returns. - balloon deflated. Will pull nasal packing  if remains without bleeding.     Patient seen by resident and attending surgeon Dr. Israel Durán    Electronically signed by Danae Markham DO on 2022 at 8:47 AM

## 2022-04-30 NOTE — PLAN OF CARE
Problem: Respiratory - Adult  Goal: Respiratory status will improve  Description: Respiratory status will improve  4/30/2022 1534 by Lee Kern RCP  Outcome: Progressing     Problem: Respiratory - Adult  Goal: Achieves optimal ventilation and oxygenation  4/30/2022 1534 by Lee Kern RCP  Outcome: Progressing

## 2022-05-01 ENCOUNTER — APPOINTMENT (OUTPATIENT)
Dept: GENERAL RADIOLOGY | Age: 77
DRG: 064 | End: 2022-05-01
Attending: FAMILY MEDICINE
Payer: MEDICARE

## 2022-05-01 LAB
AADO2: 140.3 MMHG
ANION GAP SERPL CALCULATED.3IONS-SCNC: 11 MMOL/L (ref 7–16)
B.E.: -0.8 MMOL/L (ref -3–3)
BLOOD CULTURE, ROUTINE: NORMAL
BUN BLDV-MCNC: 41 MG/DL (ref 6–23)
CALCIUM SERPL-MCNC: 7.9 MG/DL (ref 8.6–10.2)
CHLORIDE BLD-SCNC: 109 MMOL/L (ref 98–107)
CO2: 23 MMOL/L (ref 22–29)
COHB: 0.2 % (ref 0–1.5)
CREAT SERPL-MCNC: 1.1 MG/DL (ref 0.5–1)
CRITICAL: ABNORMAL
CULTURE, BLOOD 2: NORMAL
DATE ANALYZED: ABNORMAL
DATE OF COLLECTION: ABNORMAL
FIO2: 40 %
GFR AFRICAN AMERICAN: 58
GFR NON-AFRICAN AMERICAN: 48 ML/MIN/1.73
GLUCOSE BLD-MCNC: 150 MG/DL (ref 74–99)
HCO3: 21.8 MMOL/L (ref 22–26)
HCT VFR BLD CALC: 22.3 % (ref 34–48)
HEMOGLOBIN: 7.1 G/DL (ref 11.5–15.5)
HHB: 2.1 % (ref 0–5)
LAB: ABNORMAL
Lab: ABNORMAL
MCH RBC QN AUTO: 28 PG (ref 26–35)
MCHC RBC AUTO-ENTMCNC: 31.8 % (ref 32–34.5)
MCV RBC AUTO: 87.8 FL (ref 80–99.9)
METER GLUCOSE: 150 MG/DL (ref 74–99)
METER GLUCOSE: 166 MG/DL (ref 74–99)
METER GLUCOSE: 184 MG/DL (ref 74–99)
METER GLUCOSE: 202 MG/DL (ref 74–99)
METHB: 0.2 % (ref 0–1.5)
MODE: ABNORMAL
O2 CONTENT: 11.2 ML/DL
O2 SATURATION: 97.9 % (ref 92–98.5)
O2HB: 97.5 % (ref 94–97)
OPERATOR ID: ABNORMAL
PATIENT TEMP: 37 C
PCO2: 28 MMHG (ref 35–45)
PDW BLD-RTO: 14.4 FL (ref 11.5–15)
PEEP/CPAP: 6 CMH2O
PFO2: 2.57 MMHG/%
PH BLOOD GAS: 7.51 (ref 7.35–7.45)
PLATELET # BLD: 341 E9/L (ref 130–450)
PMV BLD AUTO: 9.8 FL (ref 7–12)
PO2: 102.7 MMHG (ref 75–100)
POTASSIUM SERPL-SCNC: 4.8 MMOL/L (ref 3.5–5)
PS: 5 CMH20
RBC # BLD: 2.54 E12/L (ref 3.5–5.5)
RI(T): 1.37
SODIUM BLD-SCNC: 143 MMOL/L (ref 132–146)
SOURCE, BLOOD GAS: ABNORMAL
THB: 8 G/DL (ref 11.5–16.5)
TIME ANALYZED: 508
WBC # BLD: 14.7 E9/L (ref 4.5–11.5)

## 2022-05-01 PROCEDURE — 6360000002 HC RX W HCPCS: Performed by: INTERNAL MEDICINE

## 2022-05-01 PROCEDURE — 94003 VENT MGMT INPAT SUBQ DAY: CPT

## 2022-05-01 PROCEDURE — 6370000000 HC RX 637 (ALT 250 FOR IP)

## 2022-05-01 PROCEDURE — 6370000000 HC RX 637 (ALT 250 FOR IP): Performed by: NURSE PRACTITIONER

## 2022-05-01 PROCEDURE — 99232 SBSQ HOSP IP/OBS MODERATE 35: CPT | Performed by: OTOLARYNGOLOGY

## 2022-05-01 PROCEDURE — 80048 BASIC METABOLIC PNL TOTAL CA: CPT

## 2022-05-01 PROCEDURE — 82962 GLUCOSE BLOOD TEST: CPT

## 2022-05-01 PROCEDURE — 94640 AIRWAY INHALATION TREATMENT: CPT

## 2022-05-01 PROCEDURE — 6360000002 HC RX W HCPCS: Performed by: NURSE PRACTITIONER

## 2022-05-01 PROCEDURE — 71045 X-RAY EXAM CHEST 1 VIEW: CPT

## 2022-05-01 PROCEDURE — 82805 BLOOD GASES W/O2 SATURATION: CPT

## 2022-05-01 PROCEDURE — C9113 INJ PANTOPRAZOLE SODIUM, VIA: HCPCS | Performed by: INTERNAL MEDICINE

## 2022-05-01 PROCEDURE — 6370000000 HC RX 637 (ALT 250 FOR IP): Performed by: INTERNAL MEDICINE

## 2022-05-01 PROCEDURE — 2580000003 HC RX 258

## 2022-05-01 PROCEDURE — 2000000000 HC ICU R&B

## 2022-05-01 PROCEDURE — 94660 CPAP INITIATION&MGMT: CPT

## 2022-05-01 PROCEDURE — 85027 COMPLETE CBC AUTOMATED: CPT

## 2022-05-01 RX ORDER — DEXAMETHASONE SODIUM PHOSPHATE 4 MG/ML
4 INJECTION, SOLUTION INTRA-ARTICULAR; INTRALESIONAL; INTRAMUSCULAR; INTRAVENOUS; SOFT TISSUE EVERY 4 HOURS
Status: COMPLETED | OUTPATIENT
Start: 2022-05-01 | End: 2022-05-02

## 2022-05-01 RX ADMIN — DILTIAZEM HYDROCHLORIDE 120 MG: 120 CAPSULE, COATED, EXTENDED RELEASE ORAL at 08:00

## 2022-05-01 RX ADMIN — Medication 5000 UNITS: at 07:59

## 2022-05-01 RX ADMIN — 0.12% CHLORHEXIDINE GLUCONATE 15 ML: 1.2 RINSE ORAL at 08:00

## 2022-05-01 RX ADMIN — DEXAMETHASONE SODIUM PHOSPHATE 4 MG: 4 INJECTION, SOLUTION INTRA-ARTICULAR; INTRALESIONAL; INTRAMUSCULAR; INTRAVENOUS; SOFT TISSUE at 12:22

## 2022-05-01 RX ADMIN — WATER: 1 INJECTION, SOLUTION INTRAMUSCULAR; INTRAVENOUS; SUBCUTANEOUS at 09:48

## 2022-05-01 RX ADMIN — PANTOPRAZOLE SODIUM 40 MG: 40 INJECTION, POWDER, FOR SOLUTION INTRAVENOUS at 08:00

## 2022-05-01 RX ADMIN — INSULIN LISPRO 1 UNITS: 100 INJECTION, SOLUTION INTRAVENOUS; SUBCUTANEOUS at 12:22

## 2022-05-01 RX ADMIN — DEXAMETHASONE SODIUM PHOSPHATE 4 MG: 4 INJECTION, SOLUTION INTRA-ARTICULAR; INTRALESIONAL; INTRAMUSCULAR; INTRAVENOUS; SOFT TISSUE at 16:33

## 2022-05-01 RX ADMIN — INSULIN LISPRO 1 UNITS: 100 INJECTION, SOLUTION INTRAVENOUS; SUBCUTANEOUS at 08:00

## 2022-05-01 RX ADMIN — 0.12% CHLORHEXIDINE GLUCONATE 15 ML: 1.2 RINSE ORAL at 20:40

## 2022-05-01 RX ADMIN — RACEPINEPHRINE HYDROCHLORIDE 11.25 MG: 11.25 SOLUTION RESPIRATORY (INHALATION) at 20:24

## 2022-05-01 RX ADMIN — QUETIAPINE FUMARATE 12.5 MG: 25 TABLET ORAL at 08:00

## 2022-05-01 RX ADMIN — SALINE NASAL SPRAY 2 SPRAY: 1.5 SOLUTION NASAL at 20:40

## 2022-05-01 RX ADMIN — ALBUTEROL SULFATE 2.5 MG: 2.5 SOLUTION RESPIRATORY (INHALATION) at 20:24

## 2022-05-01 RX ADMIN — RACEPINEPHRINE HYDROCHLORIDE 11.25 MG: 11.25 SOLUTION RESPIRATORY (INHALATION) at 23:55

## 2022-05-01 RX ADMIN — ALBUTEROL SULFATE 2.5 MG: 2.5 SOLUTION RESPIRATORY (INHALATION) at 16:07

## 2022-05-01 RX ADMIN — SALINE NASAL SPRAY 2 SPRAY: 1.5 SOLUTION NASAL at 14:25

## 2022-05-01 RX ADMIN — INSULIN LISPRO 2 UNITS: 100 INJECTION, SOLUTION INTRAVENOUS; SUBCUTANEOUS at 18:12

## 2022-05-01 RX ADMIN — INSULIN LISPRO 1 UNITS: 100 INJECTION, SOLUTION INTRAVENOUS; SUBCUTANEOUS at 20:59

## 2022-05-01 RX ADMIN — PANTOPRAZOLE SODIUM 40 MG: 40 INJECTION, POWDER, FOR SOLUTION INTRAVENOUS at 20:40

## 2022-05-01 RX ADMIN — DEXAMETHASONE SODIUM PHOSPHATE 4 MG: 4 INJECTION, SOLUTION INTRA-ARTICULAR; INTRALESIONAL; INTRAMUSCULAR; INTRAVENOUS; SOFT TISSUE at 08:02

## 2022-05-01 RX ADMIN — RACEPINEPHRINE HYDROCHLORIDE 11.25 MG: 11.25 SOLUTION RESPIRATORY (INHALATION) at 16:07

## 2022-05-01 RX ADMIN — DEXAMETHASONE SODIUM PHOSPHATE 4 MG: 4 INJECTION, SOLUTION INTRA-ARTICULAR; INTRALESIONAL; INTRAMUSCULAR; INTRAVENOUS; SOFT TISSUE at 20:40

## 2022-05-01 RX ADMIN — POLYETHYLENE GLYCOL 3350 17 G: 17 POWDER, FOR SOLUTION ORAL at 07:59

## 2022-05-01 ASSESSMENT — PULMONARY FUNCTION TESTS
PIF_VALUE: 13
PIF_VALUE: 13
PIF_VALUE: 12
PIF_VALUE: 12
PIF_VALUE: 11
PIF_VALUE: 11
PIF_VALUE: 12
PIF_VALUE: 13

## 2022-05-01 ASSESSMENT — PAIN SCALES - GENERAL
PAINLEVEL_OUTOF10: 0

## 2022-05-01 NOTE — PLAN OF CARE
Problem: Respiratory - Adult  Goal: Achieves optimal ventilation and oxygenation  5/1/2022 1511 by Corrinne Shu, RN  Outcome: Progressing     Problem: Safety - Medical Restraint  Goal: Remains free of injury from restraints (Restraint for Interference with Medical Device)  Description: INTERVENTIONS:  1. Determine that other, less restrictive measures have been tried or would not be effective before applying the restraint  2. Evaluate the patient's condition at the time of restraint application  3. Inform patient/family regarding the reason for restraint  4.  Q2H: Monitor safety, psychosocial status, comfort, nutrition and hydration  5/1/2022 1511 by Corrinne Shu, RN  Outcome: Completed

## 2022-05-01 NOTE — PROGRESS NOTES
Attempted leak test, no leak detected. Has been on ps of 5 since yesterday. Alert and following commands.      05/01/22 0815   NICU Vent Information   Vent Type 980   Vent Mode PS   Vt (Set, mL) 500 mL   Vt Exhaled 408 mL   Resp Rate (Set) 12 bmp   Rate Measured 19 br/min   Minute Volume 8.64 Liters   Peak Flow 40 L/min   Pressure Support 5 cmH20   FiO2  40 %   PaO2/FiO2 ratio 257   Peak Inspiratory Pressure 12 cmH2O   I:E Ratio 1:1.80   Sensitivity 3   PEEP/CPAP (cmH2O) 6   Mean Airway Pressure 8 cmH20   Plateau Pressure 19 FBS81   Static Compliance 23 mL/cmH2O   Additional Respiratory Assessments   Pulse 102   Resp 22   SpO2 98 %   Humidification Source Heated wire   Humidification Temp 37   Cuff Pressure (cm H2O) 29 cm H2O   Vent Alarm Settings   High Pressure  40 cmH2O

## 2022-05-01 NOTE — PROGRESS NOTES
Hospitalist Progress Note      Synopsis: Patient admitted as a transfer from [facility] for strokelike symptoms. Patient presented to [facility] with sudden onset of left-sided weakness and aphasia that occurred while in the middle of a conversation with her daughter. In ED she was noted to have severe aphasia, severe dysarthria, and left arm and leg drift, and rightward gaze, and left-sided hemianopia. MRI of the brain right MCA stroke likely embolic in nature. Neurology is following. Carotid ultrasound with no significant stenosis. Awaiting echo to assess for paradoxical embolus. If unrevealing plan is for Zio patch at discharge. She is currently being treated with therapeutic Lovenox with plans to switch to Coumadin when she is able to take p.o. she began obtunded and stridorous requiring transfer to ICU and intubation. A large mucous plug was extracted from her airway. Patient began having bleeding from the nose and mouth. ENT was consulted. Nasal and oral packing remain in place. Anticoagulation on hold. Nephrology following for CURT with oliguria. Nasal packing was removed and replaced with absorbable sinofoam. She was successfully extubated. Hospital day 9     Subjective:  Stable overnight. No issues reported  Patient seen and examined. On Venturi mask. Nonverbal on my exam.   Records reviewed.        Temp (24hrs), Av.7 °F (37.6 °C), Min:99.3 °F (37.4 °C), Max:99.9 °F (37.7 °C)    DIET: Diet NPO  ADULT TUBE FEEDING; Orogastric; Standard with Fiber; Continuous; 10; Yes; 15; Q 4 hours; 50; 100; Q 4 hours; Protein; 1 Proteinex Daily  CODE: Full Code    Intake/Output Summary (Last 24 hours) at 2022 0843  Last data filed at 2022 0641  Gross per 24 hour   Intake 2598.75 ml   Output 2475 ml   Net 123.75 ml       Review of Systems:    KURTIS given mental status    Objective:    BP (!) 149/83   Pulse 102   Temp 99.9 °F (37.7 °C) (Bladder)   Resp 22   Ht 5' (1.524 m) Wt 193 lb 6.4 oz (87.7 kg)   SpO2 98%   BMI 37.77 kg/m²     General appearance: Obese elderly female in no apparent distress though is nonverbal on exam.   HEENT: Conjunctivae/corneas clear. Mucous membranes dry. Neck: Supple. No JVD. Respiratory: CTA throughout though diminished at bilateral bases. Cardiovascular:  RRR. S1, S2 without MRG. PV: Pulses palpable. No edema. Abdomen: Soft, non-tender, non-distended. +BS  Musculoskeletal: No obvious deformities. Skin: Normal skin color. No rashes or lesions. Good turgor. Neurologic: Withdraws from painful stimuli. Does not follow commands. Spontaneous movement of RUE.      Medications:  REVIEWED DAILY    Infusion Medications    propofol Stopped (04/30/22 1560)    sodium chloride      sodium chloride      fentaNYL Stopped (04/29/22 3705)    dextrose      dexmedetomidine (PRECEDEX) IV infusion Stopped (04/28/22 0905)     Scheduled Medications    dexamethasone  4 mg IntraVENous Q4H    insulin lispro  0-6 Units SubCUTAneous TID WC    insulin lispro  0-3 Units SubCUTAneous Nightly    pantoprazole  40 mg IntraVENous BID    senna  1 tablet Oral Nightly    polyethylene glycol  17 g Oral Daily    chlorhexidine  15 mL Mouth/Throat BID    QUEtiapine  12.5 mg Oral 2 times per day    melatonin  5 mg Oral QPM    [Held by provider] enoxaparin  1 mg/kg SubCUTAneous BID    [Held by provider] apixaban  5 mg Oral BID    vitamin D  5,000 Units Oral Daily    dilTIAZem  120 mg Oral Daily    [Held by provider] ramipril  5 mg Oral Daily    [Held by provider] aspirin  81 mg Oral Daily    Or    [Held by provider] aspirin  300 mg Rectal Daily    atorvastatin  80 mg Oral Nightly     PRN Meds: sodium chloride, sodium chloride, hydrALAZINE, dextrose, glucagon (rDNA), dextrose, glucose, racepinephrine HCl, acetaminophen, melatonin, albuterol, acetaminophen, ondansetron **OR** ondansetron, polyethylene glycol, perflutren lipid microspheres    Labs:     Recent Labs 04/29/22  0446 04/30/22  0549 05/01/22  0430   WBC 12.3* 13.5* 14.7*   HGB 7.4* 7.3* 7.1*   HCT 23.3* 23.6* 22.3*    335 341       Recent Labs     04/29/22  0446 04/30/22  0549 05/01/22  0430    140 143   K 4.5 4.7 4.8    108* 109*   CO2 20* 18* 23   BUN 34* 45* 41*   CREATININE 1.4* 1.3* 1.1*   CALCIUM 8.0* 8.0* 7.9*       No results for input(s): PROT, ALB, ALKPHOS, ALT, AST, BILITOT, AMYLASE, LIPASE in the last 72 hours. No results for input(s): INR in the last 72 hours. No results for input(s): Vaishnavi Belts in the last 72 hours.     Chronic labs:    Lab Results   Component Value Date    CHOL 140 04/23/2022    TRIG 87 04/23/2022    HDL 48 04/23/2022    LDLCALC 75 04/23/2022    INR 1.3 04/27/2022    LABA1C 5.6 04/23/2022       Radiology: REVIEWED DAILY    Assessment:  Acute respiratory failure secondary to mucous plugging  Right MCA stroke- severe aphasia, severe dysarthria, and left arm and leg drift, and rightward gaze, and left-sided hemianopia  Encephalopathy  CKD III  Nasal/oral bleeding  Acute on chronic anemia   Dysphagia  Acute delirium   UTI s/p 5 days of Ceftri  Leukocytosis  CKD, unknown baseline  CAD s/p PCI in 2017  HTN  HLD  Hx of BLE DVT + PE in 11/2021 anticoagulated on Eliquis    Plan:  ICU team and nephrology following  S/p extubation this morning  ACE inhibitor on hold   IVF stopped  Monitor renal function, I&O  Neurology signed off with recs to resume anticoagulation if and when possible as well as Zio patch at discharge  Anticoagulation on hold  Monitor H&H, transfuse for Hb < 7  Will make outpatient referral to hematology/oncology for hypercoagulable work-up given history of DVT/PE and failure of Eliquis     DVT Prophylaxis [] Lovenox  []  Heparin [] DOAC [] PCDs [] Ambulation    GI Prophylaxis [x] PPI  [] H2 Blocker   [] Carafate  [] Diet/Tube Feeds   Level of care [] Med/Surg  [] Intermediate  [x]  ICU   Diet Diet NPO  ADULT TUBE FEEDING; Orogastric; Standard with Fiber; Continuous; 10; Yes; 15; Q 4 hours; 50; 100; Q 4 hours; Protein; 1 Proteinex Daily    Family contact [x]  N/A - per ICU team  [] At bedside   [] Phone call      Discharge Plan: TBD pending further clinical course    +++++++++++++++++++++++++++++++++++++++++++++++++  [address]  +++++++++++++++++++++++++++++++++++++++++++++++++  NOTE: This report was transcribed using voice recognition software. Every effort was made to ensure accuracy; however, inadvertent computerized transcription errors may be present.

## 2022-05-01 NOTE — PLAN OF CARE
Problem: Respiratory - Adult  Goal: Respiratory status will improve  Description: Respiratory status will improve  5/1/2022 0054 by Giulia Saxena RCP  Outcome: Progressing

## 2022-05-01 NOTE — PLAN OF CARE
Problem: Safety - Medical Restraint  Goal: Remains free of injury from restraints (Restraint for Interference with Medical Device)  Description: INTERVENTIONS:  1. Determine that other, less restrictive measures have been tried or would not be effective before applying the restraint  2. Evaluate the patient's condition at the time of restraint application  3. Inform patient/family regarding the reason for restraint  4.  Q2H: Monitor safety, psychosocial status, comfort, nutrition and hydration  Outcome: Progressing

## 2022-05-01 NOTE — PROGRESS NOTES
Patient extubated by Dr. Chey Grover, no complicatons noted at this time.      05/01/22 1041   Oxygen Therapy/Pulse Ox   O2 Therapy Oxygen   O2 Device Nasal cannula   O2 Flow Rate (L/min) 3 L/min   Resp 19   SpO2 94 %

## 2022-05-01 NOTE — PLAN OF CARE
Problem: Respiratory - Adult  Goal: Achieves optimal ventilation and oxygenation  Outcome: Progressing

## 2022-05-01 NOTE — PROGRESS NOTES
OTOLARYNGOLOGY  DAILY PROGRESS NOTE  2022    Subjective:     Remains intubated but alert. No new bleeding. Objective:     BP (!) 144/93   Pulse 97   Temp 99.9 °F (37.7 °C) (Bladder)   Resp 19   Ht 5' (1.524 m)   Wt 193 lb 6.4 oz (87.7 kg)   SpO2 94%   BMI 37.77 kg/m²   PULSE OXIMETRY RANGE: SpO2  Av.8 %  Min: 90 %  Max: 98 %  I/O last 3 completed shifts: In: 4968.7 [I.V.:2821.7; NG/GT:2147]  Out: 3465 [Urine:3140]    Physical Exam  Constitutional: Appears well-developed and well-nourished. Eyes: Lids and lashes normal   ENT: bilateral nares with nasal balloon packing in place   Neck:  Supple, symmetrical, trachea midline   Respiratory: Intubated   Cardiovascular: Regular rate   Skin: Warm and dry   Neurologic:  Sedated         Assessment/Plan:     68 y.o. female with history of PE, DVT, and stroke presents with oral and nasal bleeding after OGT placement     - packing removed from bilateral nasal passages. - Bilateral nose packed with absorbable fibrillar and sinu-foam which will dissolve on its own. Nasal saline spray at least TID- ordered. - avoid nose blowing, keep head of bed elevated. - ENT will sign off.     Patient seen by resident and attending surgeon Dr. Milton Murphy    Electronically signed by Rosalind Benton DO on 2022 at 11:09 AM

## 2022-05-01 NOTE — PROGRESS NOTES
Patient reaches for ETT/OGT. Patient is not reliably verbally redirectable. Bilateral soft wrist restraints continued for patient safety.

## 2022-05-01 NOTE — PROGRESS NOTES
Patient continues to reach for ETT/OGT. Patient is not reliably verbally redirectable. Bilateral soft wrist restraints continued for patient safety.

## 2022-05-01 NOTE — PROGRESS NOTES
[address] [facility]   Department of Internal Medicine   Internal Medicine Residency  MICU Progress Note    Patient:  Jeny Moran 68 y.o. female   MRN: [id number]       Date of Service: 2022    Allergy: Patient has no allergy information on record. Subjective     Patient was seen and examined this morning at bedside in no acute distress. She was awake, alert, follows commands. Overnight, patient has been off sedation and on pressure support, tolerated well. No acute events noted. Nasal packing has been removed by ENT today. Blood clots were suctioned. Patient did not have cuff leak, given decadron. She was extubated today with no complications. Objective     TEMPERATURE:  Current - Temp: 99.7 °F (37.6 °C); Max - Temp  Av.7 °F (37.6 °C)  Min: 99.3 °F (37.4 °C)  Max: 99.9 °F (37.7 °C)  RESPIRATIONS RANGE: Resp  Av  Min: 16  Max: 30  PULSE RANGE: Pulse  Av.2  Min: 88  Max: 114  BLOOD PRESSURE RANGE:  Systolic (82TWM), UUC:162 , Min:101 , GEU:574   ; Diastolic (82SGM), PII:07, Min:58, Max:124    PULSE OXIMETRY RANGE: SpO2  Av.6 %  Min: 90 %  Max: 98 %    I & O - 24hr:    Intake/Output Summary (Last 24 hours) at 2022 1403  Last data filed at 2022 1200  Gross per 24 hour   Intake 1732.29 ml   Output 2760 ml   Net -1027.71 ml     I/O last 3 completed shifts: In: 4968.7 [I.V.:2821.7; NG/GT:2147]  Out: 8090 [RCELO:6580] I/O this shift: In: 80 [NG/GT:80]  Out: 750 [Urine:750]   Weight change: 7 lb 1.8 oz (3.226 kg)    Physical Exam  Constitutional:       Appearance: Normal appearance. Interventions: She is sedated and intubated. HENT:      Mouth/Throat:      Comments: Dried blood on ET tube and clots in mouth appear to be old  Eyes:      Pupils: Pupils are equal, round, and reactive to light. Cardiovascular:      Rate and Rhythm: Normal rate and regular rhythm. Pulses: Normal pulses. Heart sounds: Normal heart sounds. No murmur heard.       Pulmonary: Effort: Pulmonary effort is normal. She is intubated. Breath sounds: Normal breath sounds. No wheezing or rhonchi. Abdominal:      General: Abdomen is flat. There is no distension. Palpations: Abdomen is soft. Tenderness: There is no abdominal tenderness. Musculoskeletal:         General: Normal range of motion. Cervical back: Normal range of motion. Skin:     General: Skin is warm and dry. Capillary Refill: Capillary refill takes less than 2 seconds. Neurological:      General: No focal deficit present.       Comments: Off sedation, able to follow some commands and nod appropriately          Medications     Continuous Infusions:   propofol Stopped (04/30/22 0723)    sodium chloride      sodium chloride      fentaNYL Stopped (04/29/22 1505)    dextrose      dexmedetomidine (PRECEDEX) IV infusion Stopped (04/28/22 0945)     Scheduled Meds:   dexamethasone  4 mg IntraVENous Q4H    sodium chloride  2 spray Each Nostril TID    insulin lispro  0-6 Units SubCUTAneous TID WC    insulin lispro  0-3 Units SubCUTAneous Nightly    pantoprazole  40 mg IntraVENous BID    senna  1 tablet Oral Nightly    polyethylene glycol  17 g Oral Daily    chlorhexidine  15 mL Mouth/Throat BID    QUEtiapine  12.5 mg Oral 2 times per day    melatonin  5 mg Oral QPM    [Held by provider] enoxaparin  1 mg/kg SubCUTAneous BID    [Held by provider] apixaban  5 mg Oral BID    vitamin D  5,000 Units Oral Daily    dilTIAZem  120 mg Oral Daily    [Held by provider] ramipril  5 mg Oral Daily    [Held by provider] aspirin  81 mg Oral Daily    Or    [Held by provider] aspirin  300 mg Rectal Daily    atorvastatin  80 mg Oral Nightly     PRN Meds: sodium chloride, sodium chloride, sodium chloride, hydrALAZINE, dextrose, glucagon (rDNA), dextrose, glucose, racepinephrine HCl, acetaminophen, melatonin, albuterol, acetaminophen, ondansetron **OR** ondansetron, polyethylene glycol, perflutren lipid microspheres  Nutrition:   NG/OG tube TF type: Pulmocare/Nephro/Glucerna/Jevity        At rate: ml/h    Labs and Imaging Studies     CBC:   Recent Labs     04/29/22  0446 04/30/22  0549 05/01/22  0430   WBC 12.3* 13.5* 14.7*   HGB 7.4* 7.3* 7.1*   HCT 23.3* 23.6* 22.3*   MCV 89.6 91.1 87.8    335 341       BMP:    Recent Labs     04/29/22  0446 04/30/22  0549 05/01/22  0430    140 143   K 4.5 4.7 4.8    108* 109*   CO2 20* 18* 23   BUN 34* 45* 41*   CREATININE 1.4* 1.3* 1.1*   GLUCOSE 168* 217* 150*       LIVER PROFILE:   No results for input(s): AST, ALT, LIPASE, BILIDIR, BILITOT, ALKPHOS in the last 72 hours. Invalid input(s): AMYLASE,  ALB    PT/INR:   No results for input(s): PROTIME, INR in the last 72 hours. APTT:   No results for input(s): APTT in the last 72 hours. Fasting Lipid Panel:    Lab Results   Component Value Date    CHOL 140 04/23/2022    TRIG 87 04/23/2022    HDL 48 04/23/2022       Cardiac Enzymes:    No results found for: CKTOTAL, CKMB, CKMBINDEX, TROPONINI    Notable Cultures:      Blood cultures   Blood Culture, Routine   Date Value Ref Range Status   04/26/2022 24 Hours no growth  Preliminary     Respiratory cultures No results found for: RESPCULTURE No results found for: LABGRAM  Urine   Urine Culture, Routine   Date Value Ref Range Status   04/26/2022 >100,000 CFU/ml  Final     Legionella No results found for: LABLEGI  C Diff PCR No results found for: CDIFPCR  Wound culture/abscess: No results for input(s): WNDABS in the last 72 hours. Tip culture:No results for input(s): CXCATHTIP in the last 72 hours.       Oxygen:     Vent Information  Ventilator ID: [id number]  Vent Mode: AC/VC  Ventilator Initiate: Yes  Additional Respiratory Assessments  Pulse: 102  Resp: 25  SpO2: 98 %  Position: Semi-Tang's  Humidification Source: Heated wire  Humidification Temp: 37  Circuit Condensation: Drained  Subglottic Suction Done: Yes  Airway Type: ET  Airway Size: 7.5  Cuff Pressure (cm H2O): 29 cm H2O  Swallow: Chokes on liquids       Urinary Catheter-Output (mL): 300 mL  [REMOVED] Urinary Catheter Pink-Output (mL): 700 mL    Imaging Studies:  XR CHEST PORTABLE   Final Result   1. The chest x-ray is rotated which limits evaluation of the mediastinum. Dense consolidation within the right perihilar region is suspected   2. Patchy right lower lobe airspace disease   3. Vague patchy left lower lobe airspace disease. XR CHEST PORTABLE   Final Result   1. Partial interval clearing of the bibasilar airspace disease   2. There is mild residual bibasilar airspace disease. XR CHEST PORTABLE   Final Result   1. Patchy bilateral infrahilar and lower lobe airspace disease   2. The lung volumes are diminished. 3. There is no pneumothorax. XR CHEST PORTABLE   Final Result   *Right perihilar airspace disease. Please note the chest x-ray is rotated and   the hilar vessels are superimposed. The airspace disease within the right   hilum may be exaggerated. *Trace right pleural effusion. *Left pleural effusion. RECOMMENDATION:   1.      XR CHEST PORTABLE   Final Result   Improved aeration at the left base associated with a greater degree of   inspiration. XR ABDOMEN FOR NG/OG/NE TUBE PLACEMENT   Final Result   Catheter is in the stomach. XR CHEST PORTABLE   Final Result   ET tube tip 2.4 cm from the pascual. XR CHEST PORTABLE   Final Result   No acute process. ET tube tip within the right mainstem bronchus and should be retracted at   least 4.0 cm. Findings given to the core team to be relayed to the license caregiver on   04/26/2022 at 2:09 p.m. Delma Corrigan CT HEAD WO CONTRAST   Final Result   1. Late acute/early subacute multiple scattered infarcts in the vascular   distribution of the right middle cerebral artery as above commented. 2.  No indication for hemorrhagic component.       3.  No indication for new sizable area of acute recent insult in progression   to the brain parenchyma. 4.  There is no midline shift. RECOMMENDATIONS:   Unavailable         US DUP LOWER EXTREMITIES BILATERAL VENOUS   Final Result   There is evidence for deep venous thrombosis      ALERT:  THIS IS AN ABNORMAL REPORT               XR CHEST PORTABLE   Final Result   Findings suggest interstitial scarring and chronic bronchitis. No   significant acute infiltrates are noted         VL DUP CAROTID BILATERAL   Final Result   Atherosclerotic disease. No hemodynamically significant stenosis is   identified   Estimated stenosis by NASCET criteria in the proximal right carotid   artery is between 0% and 49%. Estimated stenosis by NASCET criteria in the proximal left carotid   artery is between 0% and 49%. MRI brain without contrast   Final Result   Multiple small recent infarcts throughout the right MCA territory. There is   evidence of slow vascular flow in the right MCA territory. Consider CTA of   the head/neck for further evaluation. Advanced chronic microvascular ischemic changes. No mass effect. No hemorrhage. No hydrocephalus. XR CHEST PORTABLE    (Results Pending)        Resident's Assessment and Plan     Assessment:    1. Acute hypoxic respiratory failure 2/2 large mucus plug  2. Acute RMCA Stroke no hemorraghic convergence on CT  3. Acute RLE DVT: seen on US in the popliteal vein and Tibioperoneal Trunk  4. Oral and nasal bleeding 2/2 OG tube insertion ?, ENT consulted placed rhino in nose for 3-5 days and Kerlex packing for mouth  5. Concern for UTI due UA showing bacteria: Cultures concerning for E. Coli  6. Acute on Chronic Anemia 2/2 blood lose: [facility]  7.  CURT 2/2 poor oral intake/nutrion and blood loss: Pre-renal, FeNa= 0.2% indicating pre-renal, may have component of CKD, based upon Care Everywhere chart records patient has creatinine of 1-1.3 on baseline, recently started seeing a nephrologist in [place]  8. 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 Hx Hypertension on Cardizem and ramipril at home  10.  Hx Hyperlipidemia    Plan:  · S/p extubation  · Monitor respiratory status  · Continue decadron q4h x 6 doses  · Continue holding anticoagulation, bleeding appears to have resolved mainly clots and dried blood at this point  · Hold aspirin and eliquis   · ENT has seen patient, no longer requiring oral packing, Rhino Rocket's were removed today  · Continue home Cardizem and lipitor  · Hold Ramipril in setting of CURT   · Continue tube feeds  · Finished course of antibiotics  · Follow HgB transfuse in below 7    # Peptic ulcer prophylaxis: Protonix  # DVT Prophylaxis: Held due to bleed  # Disposition: Cont current care     Fatuma Esquivel MD, PGY-1    Attending Physician: Dr. Sushil Baez

## 2022-05-01 NOTE — PROGRESS NOTES
Placed patient on bipap 12/5. Stridor still prominent. Aerosol txs given. Stridor is much improved, now turbulent airflow heard.

## 2022-05-02 ENCOUNTER — APPOINTMENT (OUTPATIENT)
Dept: GENERAL RADIOLOGY | Age: 77
DRG: 064 | End: 2022-05-02
Attending: FAMILY MEDICINE
Payer: MEDICARE

## 2022-05-02 LAB
ABO/RH: NORMAL
ANION GAP SERPL CALCULATED.3IONS-SCNC: 9 MMOL/L (ref 7–16)
ANION GAP SERPL CALCULATED.3IONS-SCNC: 9 MMOL/L (ref 7–16)
ANTIBODY SCREEN: NORMAL
BLOOD BANK DISPENSE STATUS: NORMAL
BLOOD BANK PRODUCT CODE: NORMAL
BPU ID: NORMAL
BUN BLDV-MCNC: 34 MG/DL (ref 6–23)
BUN BLDV-MCNC: 35 MG/DL (ref 6–23)
CALCIUM SERPL-MCNC: 8.1 MG/DL (ref 8.6–10.2)
CALCIUM SERPL-MCNC: 8.2 MG/DL (ref 8.6–10.2)
CHLORIDE BLD-SCNC: 100 MMOL/L (ref 98–107)
CHLORIDE BLD-SCNC: 108 MMOL/L (ref 98–107)
CO2: 25 MMOL/L (ref 22–29)
CO2: 26 MMOL/L (ref 22–29)
CREAT SERPL-MCNC: 0.9 MG/DL (ref 0.5–1)
CREAT SERPL-MCNC: 1 MG/DL (ref 0.5–1)
DESCRIPTION BLOOD BANK: NORMAL
GFR AFRICAN AMERICAN: >60
GFR AFRICAN AMERICAN: >60
GFR NON-AFRICAN AMERICAN: 54 ML/MIN/1.73
GFR NON-AFRICAN AMERICAN: >60 ML/MIN/1.73
GLUCOSE BLD-MCNC: 167 MG/DL (ref 74–99)
GLUCOSE BLD-MCNC: 435 MG/DL (ref 74–99)
HCT VFR BLD CALC: 21.9 % (ref 34–48)
HCT VFR BLD CALC: 22.8 % (ref 34–48)
HCT VFR BLD CALC: 28.3 % (ref 34–48)
HEMOGLOBIN: 6.7 G/DL (ref 11.5–15.5)
HEMOGLOBIN: 7 G/DL (ref 11.5–15.5)
HEMOGLOBIN: 9 G/DL (ref 11.5–15.5)
MCH RBC QN AUTO: 27.9 PG (ref 26–35)
MCHC RBC AUTO-ENTMCNC: 30.7 % (ref 32–34.5)
MCV RBC AUTO: 90.8 FL (ref 80–99.9)
METER GLUCOSE: 129 MG/DL (ref 74–99)
METER GLUCOSE: 157 MG/DL (ref 74–99)
METER GLUCOSE: 169 MG/DL (ref 74–99)
METER GLUCOSE: 186 MG/DL (ref 74–99)
METER GLUCOSE: 194 MG/DL (ref 74–99)
PDW BLD-RTO: 14.2 FL (ref 11.5–15)
PLATELET # BLD: 296 E9/L (ref 130–450)
PMV BLD AUTO: 9.9 FL (ref 7–12)
POTASSIUM SERPL-SCNC: 3.9 MMOL/L (ref 3.5–5)
POTASSIUM SERPL-SCNC: 4.8 MMOL/L (ref 3.5–5)
RBC # BLD: 2.51 E12/L (ref 3.5–5.5)
SODIUM BLD-SCNC: 134 MMOL/L (ref 132–146)
SODIUM BLD-SCNC: 143 MMOL/L (ref 132–146)
WBC # BLD: 14 E9/L (ref 4.5–11.5)

## 2022-05-02 PROCEDURE — 86901 BLOOD TYPING SEROLOGIC RH(D): CPT

## 2022-05-02 PROCEDURE — 94640 AIRWAY INHALATION TREATMENT: CPT

## 2022-05-02 PROCEDURE — 97530 THERAPEUTIC ACTIVITIES: CPT

## 2022-05-02 PROCEDURE — 85027 COMPLETE CBC AUTOMATED: CPT

## 2022-05-02 PROCEDURE — 82962 GLUCOSE BLOOD TEST: CPT

## 2022-05-02 PROCEDURE — 97166 OT EVAL MOD COMPLEX 45 MIN: CPT

## 2022-05-02 PROCEDURE — 36430 TRANSFUSION BLD/BLD COMPNT: CPT

## 2022-05-02 PROCEDURE — 0BJ08ZZ INSPECTION OF TRACHEOBRONCHIAL TREE, VIA NATURAL OR ARTIFICIAL OPENING ENDOSCOPIC: ICD-10-PCS | Performed by: INTERNAL MEDICINE

## 2022-05-02 PROCEDURE — 2580000003 HC RX 258: Performed by: INTERNAL MEDICINE

## 2022-05-02 PROCEDURE — 85018 HEMOGLOBIN: CPT

## 2022-05-02 PROCEDURE — 36415 COLL VENOUS BLD VENIPUNCTURE: CPT

## 2022-05-02 PROCEDURE — 97164 PT RE-EVAL EST PLAN CARE: CPT

## 2022-05-02 PROCEDURE — 31622 DX BRONCHOSCOPE/WASH: CPT

## 2022-05-02 PROCEDURE — 71045 X-RAY EXAM CHEST 1 VIEW: CPT

## 2022-05-02 PROCEDURE — 2000000000 HC ICU R&B

## 2022-05-02 PROCEDURE — 86923 COMPATIBILITY TEST ELECTRIC: CPT

## 2022-05-02 PROCEDURE — 85014 HEMATOCRIT: CPT

## 2022-05-02 PROCEDURE — 94660 CPAP INITIATION&MGMT: CPT

## 2022-05-02 PROCEDURE — P9016 RBC LEUKOCYTES REDUCED: HCPCS

## 2022-05-02 PROCEDURE — 97535 SELF CARE MNGMENT TRAINING: CPT

## 2022-05-02 PROCEDURE — 6370000000 HC RX 637 (ALT 250 FOR IP)

## 2022-05-02 PROCEDURE — 6360000002 HC RX W HCPCS

## 2022-05-02 PROCEDURE — 6360000002 HC RX W HCPCS: Performed by: NURSE PRACTITIONER

## 2022-05-02 PROCEDURE — C9113 INJ PANTOPRAZOLE SODIUM, VIA: HCPCS | Performed by: INTERNAL MEDICINE

## 2022-05-02 PROCEDURE — 6370000000 HC RX 637 (ALT 250 FOR IP): Performed by: INTERNAL MEDICINE

## 2022-05-02 PROCEDURE — 86850 RBC ANTIBODY SCREEN: CPT

## 2022-05-02 PROCEDURE — 6360000002 HC RX W HCPCS: Performed by: INTERNAL MEDICINE

## 2022-05-02 PROCEDURE — 86900 BLOOD TYPING SEROLOGIC ABO: CPT

## 2022-05-02 PROCEDURE — 97168 OT RE-EVAL EST PLAN CARE: CPT

## 2022-05-02 PROCEDURE — 6370000000 HC RX 637 (ALT 250 FOR IP): Performed by: STUDENT IN AN ORGANIZED HEALTH CARE EDUCATION/TRAINING PROGRAM

## 2022-05-02 PROCEDURE — 80048 BASIC METABOLIC PNL TOTAL CA: CPT

## 2022-05-02 PROCEDURE — 6370000000 HC RX 637 (ALT 250 FOR IP): Performed by: NURSE PRACTITIONER

## 2022-05-02 PROCEDURE — 2700000000 HC OXYGEN THERAPY PER DAY

## 2022-05-02 RX ORDER — DEXAMETHASONE SODIUM PHOSPHATE 10 MG/ML
10 INJECTION INTRAMUSCULAR; INTRAVENOUS EVERY 8 HOURS
Status: COMPLETED | OUTPATIENT
Start: 2022-05-02 | End: 2022-05-03

## 2022-05-02 RX ORDER — INSULIN LISPRO 100 [IU]/ML
0-12 INJECTION, SOLUTION INTRAVENOUS; SUBCUTANEOUS
Status: DISCONTINUED | OUTPATIENT
Start: 2022-05-02 | End: 2022-05-11 | Stop reason: HOSPADM

## 2022-05-02 RX ORDER — INSULIN LISPRO 100 [IU]/ML
0-18 INJECTION, SOLUTION INTRAVENOUS; SUBCUTANEOUS
Status: DISCONTINUED | OUTPATIENT
Start: 2022-05-02 | End: 2022-05-02

## 2022-05-02 RX ORDER — INSULIN LISPRO 100 [IU]/ML
0-6 INJECTION, SOLUTION INTRAVENOUS; SUBCUTANEOUS NIGHTLY
Status: DISCONTINUED | OUTPATIENT
Start: 2022-05-02 | End: 2022-05-11 | Stop reason: HOSPADM

## 2022-05-02 RX ORDER — DEXTROSE MONOHYDRATE 50 MG/ML
INJECTION, SOLUTION INTRAVENOUS CONTINUOUS
Status: DISCONTINUED | OUTPATIENT
Start: 2022-05-02 | End: 2022-05-04

## 2022-05-02 RX ORDER — SODIUM CHLORIDE 9 MG/ML
INJECTION, SOLUTION INTRAVENOUS PRN
Status: DISCONTINUED | OUTPATIENT
Start: 2022-05-02 | End: 2022-05-03

## 2022-05-02 RX ORDER — INSULIN LISPRO 100 [IU]/ML
0-9 INJECTION, SOLUTION INTRAVENOUS; SUBCUTANEOUS NIGHTLY
Status: DISCONTINUED | OUTPATIENT
Start: 2022-05-02 | End: 2022-05-02

## 2022-05-02 RX ADMIN — INSULIN LISPRO 1 UNITS: 100 INJECTION, SOLUTION INTRAVENOUS; SUBCUTANEOUS at 12:04

## 2022-05-02 RX ADMIN — DEXAMETHASONE SODIUM PHOSPHATE 10 MG: 10 INJECTION INTRAMUSCULAR; INTRAVENOUS at 14:32

## 2022-05-02 RX ADMIN — DEXTROSE MONOHYDRATE: 50 INJECTION, SOLUTION INTRAVENOUS at 10:22

## 2022-05-02 RX ADMIN — INSULIN LISPRO 2 UNITS: 100 INJECTION, SOLUTION INTRAVENOUS; SUBCUTANEOUS at 07:41

## 2022-05-02 RX ADMIN — SALINE NASAL SPRAY 2 SPRAY: 1.5 SOLUTION NASAL at 14:03

## 2022-05-02 RX ADMIN — INSULIN LISPRO 2 UNITS: 100 INJECTION, SOLUTION INTRAVENOUS; SUBCUTANEOUS at 17:31

## 2022-05-02 RX ADMIN — 0.12% CHLORHEXIDINE GLUCONATE 15 ML: 1.2 RINSE ORAL at 07:41

## 2022-05-02 RX ADMIN — ALBUTEROL SULFATE 2.5 MG: 2.5 SOLUTION RESPIRATORY (INHALATION) at 04:06

## 2022-05-02 RX ADMIN — RACEPINEPHRINE HYDROCHLORIDE 11.25 MG: 11.25 SOLUTION RESPIRATORY (INHALATION) at 21:59

## 2022-05-02 RX ADMIN — SALINE NASAL SPRAY 2 SPRAY: 1.5 SOLUTION NASAL at 07:43

## 2022-05-02 RX ADMIN — DEXAMETHASONE SODIUM PHOSPHATE 10 MG: 10 INJECTION INTRAMUSCULAR; INTRAVENOUS at 21:49

## 2022-05-02 RX ADMIN — SALINE NASAL SPRAY 2 SPRAY: 1.5 SOLUTION NASAL at 20:14

## 2022-05-02 RX ADMIN — DEXAMETHASONE SODIUM PHOSPHATE 4 MG: 4 INJECTION, SOLUTION INTRA-ARTICULAR; INTRALESIONAL; INTRAMUSCULAR; INTRAVENOUS; SOFT TISSUE at 00:46

## 2022-05-02 RX ADMIN — DEXAMETHASONE SODIUM PHOSPHATE 4 MG: 4 INJECTION, SOLUTION INTRA-ARTICULAR; INTRALESIONAL; INTRAMUSCULAR; INTRAVENOUS; SOFT TISSUE at 04:19

## 2022-05-02 RX ADMIN — RACEPINEPHRINE HYDROCHLORIDE 11.25 MG: 11.25 SOLUTION RESPIRATORY (INHALATION) at 04:07

## 2022-05-02 RX ADMIN — PANTOPRAZOLE SODIUM 40 MG: 40 INJECTION, POWDER, FOR SOLUTION INTRAVENOUS at 07:41

## 2022-05-02 RX ADMIN — HYDRALAZINE HYDROCHLORIDE 10 MG: 20 INJECTION INTRAMUSCULAR; INTRAVENOUS at 21:35

## 2022-05-02 RX ADMIN — PANTOPRAZOLE SODIUM 40 MG: 40 INJECTION, POWDER, FOR SOLUTION INTRAVENOUS at 20:14

## 2022-05-02 ASSESSMENT — PAIN SCALES - GENERAL
PAINLEVEL_OUTOF10: 0

## 2022-05-02 NOTE — PROGRESS NOTES
Occupational Therapy  OCCUPATIONAL THERAPY RE-EVALUATION     [address]  [address] [facility], [facility], [address]                                                Patient Name: Reynaldo Kemp  MRN: [id number]  : 1945  Room: [address] [place] [address]    Re-evaluating OT: Betty Lisa OTD, OTR/L, [id number]    Evaluating OT: Mathew Merritt OTR/L #[id number]     Re-evaluation indicated d/t pt transfer to ICU for respiratory distress on . Referring Provider: Johanne Ordonez DO  Specific Provider Orders/Date: OT eval and treat 22    Diagnosis: Stroke-like symptoms [R29.90]   Pt admitted to hospital with L sided weakness, aphasia, dysarthria        Pertinent Medical History:  has a past medical history of CAD (coronary artery disease), DVT, bilateral lower limbs ([address] HTN (hypertension), Hyperlipidemia, and Pulmonary embolism ([facility] [address]        Precautions:  Fall Risk, flaccid LUE, R gaze, L inattention, dysarthria, , bed alarm, R lateral lean    Assessment of current deficits    [x] Functional mobility  [x]ADLs  [x] Strength               [x]Cognition    [x] Functional transfers   [x] IADLs         [x] Safety Awareness   [x]Endurance    [x] Fine Coordination              [x] Balance      [x] Vision/perception   [x]Sensation     [x]Gross Motor Coordination  [x] ROM  [] Delirium                   [x] Motor Control     OT PLAN OF CARE   OT POC based on physician orders, patient diagnosis and results of clinical assessment    Frequency/Duration 1-5 days/wk for 2 weeks PRN   Specific OT Treatment Interventions to include:   * Instruction/training on adapted ADL techniques and AE recommendations to increase functional independence within precautions       * Training on energy conservation strategies, correct breathing pattern and techniques to improve independence/tolerance for self-care routine  * Functional transfer/mobility training/DME recommendations for increased independence, safety, and fall prevention  * Patient/Family education to increase follow through with safety techniques and functional independence  * Recommendation of environmental modifications for increased safety with functional transfers/mobility and ADLs  * Cognitive retraining/development of therapeutic activities to improve problem solving, judgement, memory, and attention for increased safety/participation in ADL/IADL tasks  * Sensory re-education to improve body/limb awareness, maintain/improve skin integrity, and improve hand/UE motor function  * Visual-perceptual training to improve environmental scanning, visual attention/focus, and oculomotor skills for increased safety/independence with functional transfers/mobility and ADLs  * Splinting/positioning for increased function, prevention of contractures, and improve skin integrity  * Therapeutic exercise to improve motor endurance, ROM, and functional strength for ADLs/functional transfers  * Therapeutic activities to facilitate/challenge dynamic balance, stand tolerance for increased safety and independence with ADLs  * Therapeutic activities to facilitate gross/fine motor skills for increased independence with ADLs  * Neuro-muscular re-education: facilitation of righting/equilibrium reactions, midline orientation, scapular stability/mobility, normalization of muscle tone, and facilitation of volitional active controled movement  * Positioning to improve skin integrity, interaction with environment and functional independence  * Manual techniques for edema management      Modified No Scale (MRS)  Score     Description  0             No symptoms  1             No significant disability despite symptoms  2             Slight disability; able to look after own affairs  3             Moderate disability; able to ambulate without assist/ requires assist with ADLs  4             Moderate/Severe disability;requires assist to ambulate/assist with ADLs  5             Severe disability;bedridden/incontinent   6               Score:  5      Recommended Adaptive Equipment:  TBD     Home Living: Pt lives alone in a 1 story home with 1 ESTRELLA and 1 hand rail. Bathroom setup: walk-in shower; shower chair    Equipment owned: shower chair, [address]    Prior Level of Function: mod I with ADLs , mod I with IADLs; ambulated with [address]   Driving: no   Occupation: Pt enjoys crocheting and hopes to return to the activity to complete an unfinished blanket. Pain Level: Pt denies pain this session    Cognition: A&O: 3/4 Follows 1 step directions   Memory:  P+   Sequencing:  P+   Problem solving:  P+   Judgement/safety:  P+       Functional Assessment:  AM-PAC Daily Activity Raw Score:    Initial Eval Status  Date: 22 Re-evaluation Status  Date: 22 Treatment Status  Date: STGs = LTGs  Time frame: 10-14 days   Feeding NPO  NPO  NT  Minimal Assist   Once cleared for diet. Grooming Moderate Assist  Maximal Assist overall  AAROM to wash face while seated at EOB. Minimal Assist   UB Dressing Maximal Assist  Max A    Minimal Assist    LB Dressing Dependent  Dep    Moderate Assist    Bathing Maximal Assist Dep    Moderate Assist    Toileting Dependent  Dep   for toileting hygiene while supine in bed. Assist to roll L/R and for posterior hygiene. Moderate Assist    Bed Mobility  Supine to sit: Maximal Assist x2  Sit to supine: Maximal Assist x2 Supine to sit: Maximal Assist x2  Sit to supine: Maximal Assist x2        Supine to sit: Moderate Assist   Sit to supine: Moderate Assist    Functional Transfers Maximal Assist x2    Sit to stands Maximal Assist x2    STS from EOB  Moderate Assist    Functional Mobility Maximal Assist x2    1 Side step to [facility] with HHA NT  Pt unable this date.   Moderate Assist    Balance Sitting: max - min A  (L lateral lean; mod/max cuing; able to correct with min A at times)    Standing: max A x2 Sitting:  Static: Max A; R lateral lean  Dynamic: Dep  Standing: Max A x2    Sitting:  Static: SBA  Dynamic: Min A  Standing: Mod A   Activity Tolerance F-   F+   Visual/  Perceptual R gaze  L inattention    Continue to assess    R gaze preference  L inattention                Hand Dominance right   Strength ROM Additional Info:    RUE   3-/5 wfl good  and wfl FMC/dexterity noted during ADL tasks     LUE  Grossly 0/5   PROM/AAROM WFL     absent  and absent  FMC/dexterity noted during ADL tasks         Vitals:   HR at rest: 100 bpm HR at end of session: 106 bpm   Spo2 at rest: 94% Spo2 at end of session 93%   BP at rest: 153/69 mmHg BP at end of session 139/83 mmHg       Hearing: wfl  Sensation: absent L side (light touch / deep pressure)  Tone: impaired L UE / LE  Edema: Unremarkable     Treatment:     Instruction/training on safety and adapted techniques for completion of ADLs: to increase independence in self-care.   Instruction/training on safe bed mobility/functional mobility/transfer techniques: with focus on safety, body mechanics, and precautions    Instruction/training on energy conservation/work simplification for completion of ADLs:. techniques to increase independence with self-care ADLs and IADLs, work simplification to improve endurance.   Proper Positioning/Alignment  for optimal healing, skin integrity, to prevent breakdown, decrease edema, and reduce risk of contracture in LUE.  Skilled Monitoring of Vitals:  to include BP, spO2, and HR throughout session to maximize safety.  Sitting/Standing Balance/Tolerance:  to increase balance and activity tolerance during ADLs and facilitate proper posture and positioning.  Delirium Prevention: Environmental and sensory modifications assessed and implemented to decrease ICU acquired delirium and to improve overall orientation, mentation and pt interaction with family/staff.     Line management and environmental modifications made prior to and end of session to ensure patient safety and to increase efficiency of session. Skilled monitoring of HR, O2 saturation, blood pressure and patient's response to activity performed throughout session. Comments: Pt case discussed in rounds, OK from RN to see patient. Upon arrival, patient supine in bed. Pt demo fair tolerance with fair understanding of education/techniques. At end of session, patient supine in bed with daughter present. Call light within reach, all lines and tubes intact. Pt instructed on use of call light for assistance and fall prevention. Nursing notified of patient positioning. Patient presents with decreased ROM/strength, activity tolerance, dynamic balance, functional mobility limiting completion of ADLs and safety. Pt can benefit from continued skilled OT services to increase safety, functional independence and quality of life. Rehab Potential: Good for established goals     Patient / Family Goal: return home      Patient and/or family were instructed on functional diagnosis, prognosis/goals and OT plan of care. Demonstrated fair understanding. Eval Complexity: Re-eval    Time In: 10:26a  Time Out: 11:05a  Total Treatment Time: 24 minutes    Min Units   OT Eval Low 54628       OT Eval Medium 93219      OT Eval High 29801      OT Re-Eval 97168 x  1   Therapeutic Ex 24771       Therapeutic Activities 36270 12  1   ADL/Self Care 67518 12  1   Orthotic Management 38935       Manual [id number]     Neuro Re-Ed [id number]       Non-Billable Time          Evaluation Time additionally includes thorough review of current medical information, gathering information on past medical history/social history and prior level of function, interpretation of standardized testing/informal observation of tasks, assessment of data and development of plan of care and goals.         [address] [place] [address], OTR/L, [id number]

## 2022-05-02 NOTE — PROGRESS NOTES
[facility]   Department of Internal Medicine   Internal Medicine Residency  MICU Progress Note    Patient:  Matt Saunders 68 y.o. female   MRN: [id number]       Date of Service: 2022    Allergy: Patient has no allergy information on record. Subjective     Overnight, patient was extubated however had stridor at night. She was given racemic epi x 2 doses and albuerol breathing treatment which improved breathing. Patient was kept on PAP overnight. Patient was seen and examined this morning at bedside on 5 L O2 per NC. Patient was clearing her throat which was concerning for vocal cord weakness and risk for aspiration. Patient was not able to talk but was otherwise awake and alert and tries to communicate with hand gestures. ENT was consulted again for evaluation for possible vocal cord paralysis. Objective     TEMPERATURE:  Current - Temp: 98.8 °F (37.1 °C); Max - Temp  Av.2 °F (37.3 °C)  Min: 98.8 °F (37.1 °C)  Max: 99.5 °F (37.5 °C)  RESPIRATIONS RANGE: Resp  Av.7  Min: 13  Max: 29  PULSE RANGE: Pulse  Av.3  Min: 85  Max: 117  BLOOD PRESSURE RANGE:  Systolic (95DLK), SMY:728 , Min:123 , FWR:809   ; Diastolic (07TBZ), YPV:84, Min:63, Max:136    PULSE OXIMETRY RANGE: SpO2  Av.1 %  Min: 95 %  Max: 100 %    I & O - 24hr:    Intake/Output Summary (Last 24 hours) at 2022 1502  Last data filed at 2022 1200  Gross per 24 hour   Intake 56 ml   Output 1371 ml   Net -1315 ml     I/O last 3 completed shifts: In: 1100.5 [I.V.:461.5; NG/GT:639]  Out: 4096 [Urine:4096] I/O this shift:  In: -   Out: 60 [Urine:60]   Weight change: -12.8 oz (-0.363 kg)    Physical Exam:  General Appearance:    Alert, cooperative, moderate respiratory distress   HEENT:    NC/AT, mucous membranes are moist, dried blood on bilateral nares   Neck:   Supple, no jugular venous distention.     Resp:     Intermittent stridor, No wheezes, No rhonchi, no use of accessory muscles   Heart:    RRR, S1 and S2 normal, no murmur, rub or gallop. Abdomen:     Soft, non-tender, non-distended with normal bowel sounds   Extremities:   Atraumatic, no cyanosis, +trace pedal edema bilaterally   Pulses:  Radial and pedal pulses are intact bilaterally   Neurologic:   Awake and alert, unable to produce speech       Medications     Continuous Infusions:   dextrose 50 mL/hr at 05/02/22 1022    dextrose       Scheduled Meds:   dexamethasone  10 mg IntraVENous Q8H    sodium chloride  2 spray Each Nostril TID    insulin lispro  0-6 Units SubCUTAneous TID WC    insulin lispro  0-3 Units SubCUTAneous Nightly    pantoprazole  40 mg IntraVENous BID    QUEtiapine  12.5 mg Oral 2 times per day    melatonin  5 mg Oral QPM    [Held by provider] enoxaparin  1 mg/kg SubCUTAneous BID    [Held by provider] apixaban  5 mg Oral BID    vitamin D  5,000 Units Oral Daily    dilTIAZem  120 mg Oral Daily    [Held by provider] ramipril  5 mg Oral Daily    [Held by provider] aspirin  81 mg Oral Daily    Or    [Held by provider] aspirin  300 mg Rectal Daily    atorvastatin  80 mg Oral Nightly     PRN Meds: sodium chloride, glycerin moisturizing mouth spray, hydrALAZINE, dextrose, glucagon (rDNA), dextrose, glucose, racepinephrine HCl, acetaminophen, melatonin, albuterol, acetaminophen, ondansetron **OR** ondansetron, polyethylene glycol, perflutren lipid microspheres  Nutrition:   NG/OG tube TF type: Pulmocare/Nephro/Glucerna/Jevity        At rate: NPO    Labs and Imaging Studies     CBC:   Recent Labs     04/30/22  0549 04/30/22  0549 05/01/22  0430 05/02/22  0426 05/02/22  1415   WBC 13.5*  --  14.7* 14.0*  --    HGB 7.3*   < > 7.1* 7.0* 6.7*   HCT 23.6*   < > 22.3* 22.8* 21.9*   MCV 91.1  --  87.8 90.8  --      --  341 296  --     < > = values in this interval not displayed.        BMP:    Recent Labs     04/30/22  0549 05/01/22  0430 05/02/22  0425    143 143   K 4.7 4.8 4.8   * 109* 108*   CO2 18* 23 26   BUN 45* 41* 35*   CREATININE 1.3* 1.1* 1.0   GLUCOSE 217* 150* 167*       LIVER PROFILE:   No results for input(s): AST, ALT, LIPASE, BILIDIR, BILITOT, ALKPHOS in the last 72 hours. Invalid input(s): AMYLASE,  ALB    PT/INR:   No results for input(s): PROTIME, INR in the last 72 hours. APTT:   No results for input(s): APTT in the last 72 hours. Fasting Lipid Panel:    Lab Results   Component Value Date    CHOL 140 04/23/2022    TRIG 87 04/23/2022    HDL 48 04/23/2022       Cardiac Enzymes:    No results found for: CKTOTAL, CKMB, CKMBINDEX, TROPONINI    Notable Cultures:      Blood cultures   Blood Culture, Routine   Date Value Ref Range Status   04/26/2022 5 Days no growth  Final     Respiratory cultures No results found for: RESPCULTURE No results found for: LABGRAM  Urine   Urine Culture, Routine   Date Value Ref Range Status   04/26/2022 >100,000 CFU/ml  Final     Legionella No results found for: LABLEGI  C Diff PCR No results found for: CDIFPCR  Wound culture/abscess: No results for input(s): WNDABS in the last 72 hours. Tip culture:No results for input(s): CXCATHTIP in the last 72 hours. Oxygen:     Vent Information  Ventilator ID: [id number]  Vent Mode: AC/VC  Ventilator Initiate: Yes  Additional Respiratory Assessments  Pulse: 97  Resp: 18  SpO2: 98 %  Position: Semi-Tang's  Humidification Source: Heated wire  Humidification Temp: 37  Circuit Condensation: Drained  Subglottic Suction Done: Yes  Airway Type: ET  Airway Size: 7.5  Cuff Pressure (cm H2O): 29 cm H2O  Swallow: Chokes on liquids       Urinary Catheter-Output (mL): 15 mL  [REMOVED] Urinary Catheter Pink-Output (mL): 700 mL    Imaging Studies:  XR CHEST PORTABLE   Final Result   Removal of support lines. Somewhat improving aeration on the right. XR CHEST PORTABLE   Final Result   1. The chest x-ray is rotated which limits evaluation of the mediastinum. Dense consolidation within the right perihilar region is suspected   2.  Patchy right lower lobe airspace disease   3. Vague patchy left lower lobe airspace disease. XR CHEST PORTABLE   Final Result   1. Partial interval clearing of the bibasilar airspace disease   2. There is mild residual bibasilar airspace disease. XR CHEST PORTABLE   Final Result   1. Patchy bilateral infrahilar and lower lobe airspace disease   2. The lung volumes are diminished. 3. There is no pneumothorax. XR CHEST PORTABLE   Final Result   *Right perihilar airspace disease. Please note the chest x-ray is rotated and   the hilar vessels are superimposed. The airspace disease within the right   hilum may be exaggerated. *Trace right pleural effusion. *Left pleural effusion. RECOMMENDATION:   1.      XR CHEST PORTABLE   Final Result   Improved aeration at the left base associated with a greater degree of   inspiration. XR ABDOMEN FOR NG/OG/NE TUBE PLACEMENT   Final Result   Catheter is in the stomach. XR CHEST PORTABLE   Final Result   ET tube tip 2.4 cm from the pascual. XR CHEST PORTABLE   Final Result   No acute process. ET tube tip within the right mainstem bronchus and should be retracted at   least 4.0 cm. Findings given to the core team to be relayed to the license caregiver on   04/26/2022 at 2:09 p.m. Maciej Collins CT HEAD WO CONTRAST   Final Result   1. Late acute/early subacute multiple scattered infarcts in the vascular   distribution of the right middle cerebral artery as above commented. 2.  No indication for hemorrhagic component. 3.  No indication for new sizable area of acute recent insult in progression   to the brain parenchyma. 4.  There is no midline shift.       RECOMMENDATIONS:   Unavailable         US DUP LOWER EXTREMITIES BILATERAL VENOUS   Final Result   There is evidence for deep venous thrombosis      ALERT:  THIS IS AN ABNORMAL REPORT               XR CHEST PORTABLE   Final Result   Findings suggest interstitial scarring and chronic bronchitis. No   significant acute infiltrates are noted         VL DUP CAROTID BILATERAL   Final Result   Atherosclerotic disease. No hemodynamically significant stenosis is   identified   Estimated stenosis by NASCET criteria in the proximal right carotid   artery is between 0% and 49%. Estimated stenosis by NASCET criteria in the proximal left carotid   artery is between 0% and 49%. MRI brain without contrast   Final Result   Multiple small recent infarcts throughout the right MCA territory. There is   evidence of slow vascular flow in the right MCA territory. Consider CTA of   the head/neck for further evaluation. Advanced chronic microvascular ischemic changes. No mass effect. No hemorrhage. No hydrocephalus. XR CHEST PORTABLE    (Results Pending)   Fluoroscopy modified barium swallow with video    (Results Pending)        Resident's Assessment and Plan     Assessment and Plan:    1. Acute hypoxic respiratory failure 2/2 mucus plug  2. 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 Bilateral DVT seen on LE Ultrasound   · Was on eliquis at home  · Currently on theraputic lovenox   4. 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 Oral and nasal bleeding 2/2 OG tube insertion  · Has dissolvable nasal foam bilaterally  6. UTI 2/2 E. Coli  · Urine culture >100,000 CFU E. Coli  · S/P rocephin x 5 days, last dose 4/30/22  7. Acute on chronic anemia had work up at [address] [place] [address].  Hypertension  · Was on cardizem and ramipril at home  9. Hx Hyperlipidemia      Resolved:  1. CURT likely 2/2 poor oral intake vs acute blood loss      PLAN:  1. S/P extubation May 1, 2022  2. Monitor respiratory status  3. ENT consulted again for evaluation for possible vocal cord weakness  4. SLP video swallow ordered   5. Fluroscopy modified barium swallow with video  6.  Follow ENT recs: decadron 10 mg Q8H x 3 doses  7. Pt has dissolvable nasal packing per ENT  8. Continue holding aspirin and anticoagulation 2/2 drop in Hg  9. Monitor Hg, goal Hg >7.0  10. Continue cardizem and lipitor   11. Continue holding ramipril 2/2 CURT  12. Maintain NPO as patient high risk for aspiration, started on D5 IVF at 50 cc/hr  13. Daily CXR  14. POCT glucose checks Q4H while NPO, continue LDSS      # Peptic ulcer prophylaxis: protonix  # DVT Prophylaxis: anticoagulation on hold 2/2 nasal bleeding  # Disposition: Cont current care     Chanelle Saldivar MD, PGY-1    Attending Physician: Dr. Cory Nowak    I personally saw, examined and provided care for the patient. Radiographs, labs and medication list were reviewed by me independently. Review of Residents documentation was conducted and revisions were made as appropriate. I agree with the above documented exam, problem list and plan of care. Patient extubated. Has been given racemic epi. Intermittent report of stridor. Currently able to phonate. Reconsult ENT to evaluate stridor, vocal cords  Continue Decadron  Aspiration precautions, video swallow. IVF.     CCT excluding procedures 34 minutes    Moreno Luciano,

## 2022-05-02 NOTE — PROGRESS NOTES
Department of Internal Medicine  Nephrology Progress Note    Events reviewed. s/p extubation    SUBJECTIVE:  We are following Ms. Lani Boone for CURT. She denies any complaints or concerns, daughter at the bedside and updated.      PHYSICAL EXAM:      Vitals:    VITALS:  BP (!) 164/76   Pulse 106   Temp 99 °F (37.2 °C) (Bladder)   Resp 29   Ht 5' (1.524 m)   Wt 192 lb 9.6 oz (87.4 kg)   SpO2 97%   BMI 37.61 kg/m²   24HR INTAKE/OUTPUT:      Intake/Output Summary (Last 24 hours) at 5/2/2022 0923  Last data filed at 5/2/2022 0900  Gross per 24 hour   Intake 56 ml   Output 2256 ml   Net -2200 ml       Constitutional: Alert, speech delayed, no acute distress  HEENT: PERRLA, normocephalic, atraumtic  Respiratory:  Diminished bilateral bases  Cardiovascular/Edema:  RRR, S1/S2  Gastrointestinal:  abd rounded, non tender, BS hypoactive  Neurologic: Alert and oriented, no focal deficits noted  Skin:  Warm,dry, ecchymosis to BUE  Other:  Pink catheter draining minimal yellow urine    Scheduled Meds:   sodium chloride  2 spray Each Nostril TID    insulin lispro  0-6 Units SubCUTAneous TID WC    insulin lispro  0-3 Units SubCUTAneous Nightly    pantoprazole  40 mg IntraVENous BID    senna  1 tablet Oral Nightly    polyethylene glycol  17 g Oral Daily    chlorhexidine  15 mL Mouth/Throat BID    QUEtiapine  12.5 mg Oral 2 times per day    melatonin  5 mg Oral QPM    [Held by provider] enoxaparin  1 mg/kg SubCUTAneous BID    [Held by provider] apixaban  5 mg Oral BID    vitamin D  5,000 Units Oral Daily    dilTIAZem  120 mg Oral Daily    [Held by provider] ramipril  5 mg Oral Daily    [Held by provider] aspirin  81 mg Oral Daily    Or    [Held by provider] aspirin  300 mg Rectal Daily    atorvastatin  80 mg Oral Nightly     Continuous Infusions:   sodium chloride      sodium chloride      fentaNYL Stopped (04/29/22 8445)    dextrose      dexmedetomidine (PRECEDEX) IV infusion Stopped (04/28/22 0909) PRN Meds:.sodium chloride, glycerin moisturizing mouth spray, sodium chloride, sodium chloride, hydrALAZINE, dextrose, glucagon (rDNA), dextrose, glucose, racepinephrine HCl, acetaminophen, melatonin, albuterol, acetaminophen, ondansetron **OR** ondansetron, polyethylene glycol, perflutren lipid microspheres    DATA:    CBC:   Lab Results   Component Value Date    WBC 14.0 05/02/2022    RBC 2.51 05/02/2022    HGB 7.0 05/02/2022    HCT 22.8 05/02/2022    MCV 90.8 05/02/2022    MCH 27.9 05/02/2022    MCHC 30.7 05/02/2022    RDW 14.2 05/02/2022     05/02/2022    MPV 9.9 05/02/2022     CMP:    Lab Results   Component Value Date     05/02/2022    K 4.8 05/02/2022    K 3.6 04/27/2022     05/02/2022    CO2 26 05/02/2022    BUN 35 05/02/2022    CREATININE 1.0 05/02/2022    GFRAA >60 05/02/2022    LABGLOM 54 05/02/2022    GLUCOSE 167 05/02/2022    CALCIUM 8.2 05/02/2022     Magnesium:    Lab Results   Component Value Date    MG 1.8 04/27/2022     Phosphorus:  No results found for: PHOS  Radiology Review:        CXR 5/2/2022   Removal of support lines.  Somewhat improving aeration on the right. BRIEF SUMMARY OF INITIAL CONSULT:  Briefly, Ms. Cynthia Gonzalez is a 68year old female with a PMH of HTN, CAD with PCI 2017, BLE DVT 11/2021, HLD, GERD who was admitted on April 22, 2022 after she was transferred from [facility] with stroke like symptoms. Patient presented to [facility] with complaint of strokelike symptoms and sudden onset left-sided weakness with aphasia, with MRI indicating right MCA without hemorrhagic conversion. Patient was originally admitted to the medical floor at [facility] and became progressively obtunded with stridorous respiratory failure, was intubated and transferred to MICU 4/26/2022. Patient developed severe epistaxis requiring packing per ENT.   Labs on admission were significant for sodium of 141, potassium 3.7, chloride 111, bicarbonate 18, BUN 21, creatinine 1.2, and hemoglobin of 7.1. We are consulted for worsening CURT and decreased urine output. Notably patient's daughter states that patient has been recently evaluated by nephrology in [place] and underwent blood work and renal ultrasound however did not receive the results yet. Ramipril, diltiazem, atorvastatin, apixaban, and omeprazole are medications patient was taking prior to admission. Problems resolved:  · Hypocalcemia, 2/2 vitamin D deficiency. On cholecalciferol, ionized calcium 1.16  · Vitamin D deficiency, on cholecalciferol      IMPRESSION/RECOMMENDATIONS:      1. CURT stage III based on urine output, likely volume responsive pre-renal CURT from poor oral intake and volume loss with severe anemia in the setting of ACEi administration vs ischemic ATN from acute hypotension. Oliguric. FENa 0.3%. Renal function improved beyond baseline, 1 mg/dL with excellent urine output 2.4L. 2. CKD Stage 3 with proteinuria, Renal US done at Dr. Mirella Rubio office in [place], [place] shows increased cortical echogenicity consistent with medical renal disease. Baseline creatinine 1.3 mg deciliter. 3. Alkalemia, pH 7.509, PCO2 28, HCO3 21.8, respiratory alkalosis  4. HTN, on cardizem, ramipril (on hold)  ------------------------------------------------------------------------  5. Acute hypoxic respiratory failure 2/2 mucus plug, intubated on 4/26, extubated on 5/1, S/p bronchoscopy  6. CVA, MRI demonstrates infarcts in right MCA without hemorrhagic conversion. Neurology following, concern for already embolic source. Echo ordered for PFO evaluation to r/o paradoxical embolus. Borderline intracranial atherosclerosis. 7. Historical DVT and current right LE+, patiently chronically anticoagulated on Eliquis at home, was being treated with Lovenox for transition to [address] [place] [address], now on hold secondary to epistaxis  8. Acute epistaxis, nasal packing in place  9.  Anemia, acute drop in hemoglobin 5.6, with previous work-up at [facility]. HLD, on statin   11. GERD, on omeprazole at home  12. UTI, cultures pending, on ceftriaxone.    13. N.p.o., awaiting for video swallow, on D5W at 50cc/hour for nutrition while n.p.o        Plan:    · Continue to monitor renal function, repeat BMP at 1600  · Continue to monitor H&H, transfuse to keep <7  · Continue to monitor blood pressure  · Awaiting video swallow    Electronically signed by PATRICIA Yu CNP on 5/2/2022 at 9:28 AM

## 2022-05-02 NOTE — PROGRESS NOTES
OTOLARYNGOLOGY  DAILY PROGRESS NOTE  5/2/2022      ELÍAS Gregory  is a 68 y.o. female who ENT was consulted for evaluation of stridor. Patient with history of CVA with associated respiratory failure requiring intubation on 4/26. ENT was previously consulted for nasal/oral bleeding for which she was packed with balloon packing, subsequently removed 2 days ago. She was extubated on 5/1 and was noted to have increasing respiratory effort and stridor and ENT was consulte for evaluation. At time of examination patient is on 6LPM nasal cannula. No active bleeding from nose or mouth is noted. Review of Systems   Constitutional: Negative for activity change, fatigue and fever. HENT: Positive for nosebleeds. Negative for congestion, ear discharge, ear pain, hearing loss, postnasal drip, rhinorrhea, sinus pressure, sinus pain, sore throat, tinnitus, trouble swallowing and voice change. Respiratory: Positive for shortness of breath. Negative for cough, choking, wheezing and stridor. Cardiovascular: Negative for chest pain. Gastrointestinal: Negative. Genitourinary: Negative. Skin: Negative for color change and rash. Neurological: Negative for speech difficulty, light-headedness, numbness and headaches. Hematological: Negative for adenopathy. Psychiatric/Behavioral: Negative for behavioral problems. Past Medical History:   Diagnosis Date    CAD (coronary artery disease)     s/p PCI in 2017    DVT, bilateral lower limbs ([address] 11/2021    on eliquis    HTN (hypertension)     Hyperlipidemia     Pulmonary embolism [address] [address] 11/2021    on eliquis       Past Surgical History:   Procedure Laterality Date    CHOLECYSTECTOMY  2011    HYSTERECTOMY  02/09/2011       Medications Prior to Admission:    Prior to Admission medications    Medication Sig Start Date End Date Taking?  Authorizing Provider   ramipril (ALTACE) 5 MG capsule Take 5 mg by mouth daily   Yes Historical Provider, MD hannonTIAZem (CARDIZEM CD) 120 MG extended release capsule Take 120 mg by mouth daily   Yes Historical Provider, MD   atorvastatin (LIPITOR) 80 MG tablet Take 80 mg by mouth daily Please send 2 40mg tablets easier for patient to swallow   Yes Historical Provider, MD   apixaban (ELIQUIS) 5 MG TABS tablet Take 5 mg by mouth 2 times daily   Yes Historical Provider, MD   Cholecalciferol (DIALYVITE VITAMIN D 5000 PO) Take 5,000 Units/day by mouth   Yes Historical Provider, MD   omeprazole (PRILOSEC) 20 MG delayed release capsule Take 20 mg by mouth daily   Yes Historical Provider, MD       Not on File    No family history on file. Social History     Tobacco Use    Smoking status: Not on file    Smokeless tobacco: Not on file   Substance Use Topics    Alcohol use: Not on file    Drug use: Not on file           PHYSICAL EXAM:    Vitals:    05/02/22 1200   BP: (!) 169/136   Pulse: 97   Resp: 18   Temp:    SpO2: 98%       General Appearance:  Laying in bed, awake, alert, no apparent distress  Head/face:  NC/AT  Eyes: PERRL, EOMI  ENT: Bilateral external ears WNL, bilateral nares with absorbable packing in place. Neck: Supple, no adenopathy  Lungs:  Non-labored, good respiratory effort, stridor present  Heart:  RR  Neuro: Facial nerve symmetric and intact. House Brackmann 1/6, bilaterally. LABS:  CBC  Recent Labs     05/02/22  0426   WBC 14.0*   HGB 7.0*   HCT 22.8*          RADIOLOGY  MRI brain without contrast    Result Date: 4/23/2022  EXAMINATION: MRI OF THE BRAIN WITHOUT CONTRAST  4/23/2022 7:41 am TECHNIQUE: Multiplanar multisequence MRI of the brain was performed without the administration of intravenous contrast. COMPARISON: None.  HISTORY: ORDERING SYSTEM PROVIDED HISTORY: Strokelike symptoms TECHNOLOGIST PROVIDED HISTORY: Reason for exam:->Strokelike symptoms What reading provider will be dictating this exam?->CRC FINDINGS: INTRACRANIAL STRUCTURES/VENTRICLES: Scattered small foci of restricted diffusion essentially throughout the right MCA territory, including the basal ganglia. There is FLAIR hyperintensity of these foci. Chronic microvascular ischemic changes, fairly advanced. FLAIR vascular hyperintensity in the right MCA territory. No mass effect. No intracranial hemorrhage. No hydrocephalus. ORBITS: The visualized portion of the orbits demonstrate no acute abnormality. SINUSES: The visualized paranasal sinuses and mastoid air cells demonstrate no acute abnormality. BONES/SOFT TISSUES: The bone marrow signal intensity appears normal. The soft tissues demonstrate no acute abnormality. Multiple small recent infarcts throughout the right MCA territory. There is evidence of slow vascular flow in the right MCA territory. Consider CTA of the head/neck for further evaluation. Advanced chronic microvascular ischemic changes. No mass effect. No hemorrhage. No hydrocephalus. Nasopharyngoscopy  Procedure note- after pt verbally consented, was sprayed nasally with 1:1 neosynephrine and xylocaine. Scope was passed and found nasal cavity with no lesion. nasopharynx clear, tonsil wnl without asymmetry, tongue mobile and no masses, vocal cords mobile bilaterally with full ab and ad duction. Hypopharynx clear, open and no masses. Posterior glottic scar band noted along with interarytenoid scarring and pachydermic changes noted. Edematous true and false vocal folds. ASSESSMENT:  68 y.o. female with stridor likely secondary to posterior glottic/interarytenoid scar band vs mucosal crusting. Jesus Razo PLAN:  · Patient seen and examined, NPL performed at bedside with findings above. · Recommend decadron 10mg q8 for 3 doses or longer based on clinical progression. · ENT will follow with re-scope. Patient seen by resident, will discuss with attending.     Electronically signed by Luisa Farias DO on 5/2/2022 at 2:23 PM

## 2022-05-02 NOTE — CARE COORDINATION
5/2/22 Update CM Note; Patient is extubated. Nasal packing is removed. PT 7/24 and OT 9/24. [place] given referral and will follow per choice of the daughter. Referral sent to [facility] via voice mail to Alma Delia Lakhani. Will follow as she will require a precert with insurance and covid testing.  Electronically signed by Dajuan Weaver RN on 5/2/2022 at 3:38 PM

## 2022-05-02 NOTE — PROGRESS NOTES
Hospitalist Progress Note      Synopsis: Patient admitted as a transfer from [facility] for strokelike symptoms. Patient presented to [facility] with sudden onset of left-sided weakness and aphasia that occurred while in the middle of a conversation with her daughter. In ED she was noted to have severe aphasia, severe dysarthria, and left arm and leg drift, and rightward gaze, and left-sided hemianopia. MRI of the brain right MCA stroke likely embolic in nature. Neurology is following. Carotid ultrasound with no significant stenosis. Awaiting echo to assess for paradoxical embolus. If unrevealing plan is for Zio patch at discharge. She is currently being treated with therapeutic Lovenox with plans to switch to Coumadin when she is able to take p.o. she began obtunded and stridorous requiring transfer to ICU and intubation. A large mucous plug was extracted from her airway. Patient began having bleeding from the nose and mouth. ENT was consulted. Nasal and oral packing remain in place. Anticoagulation on hold. Nephrology following for CURT with oliguria. Nasal packing was removed and replaced with absorbable sinofoam. She was successfully extubated. Hospital day 10     Subjective:  Stable overnight. No issues reported  Patient seen and examined. Able to nod. Reports difficulty breathing. Nasal cannula not in place. 87% on bedside monitor. Records reviewed.      Temp (24hrs), Av.4 °F (37.4 °C), Min:99 °F (37.2 °C), Max:99.7 °F (37.6 °C)    DIET: Diet NPO  ADULT TUBE FEEDING; Orogastric; Standard with Fiber; Continuous; 10; Yes; 15; Q 4 hours; 50; 100; Q 4 hours; Protein; 1 Proteinex Daily  CODE: Full Code    Intake/Output Summary (Last 24 hours) at 2022 0902  Last data filed at 2022 0800  Gross per 24 hour   Intake 56 ml   Output 2236 ml   Net -2180 ml       Review of Systems:    KURTIS given mental status    Objective:    BP (!) 148/63   Pulse 85   Temp 99 °F (37.2 °C) (Bladder)   Resp 18   Ht 5' (1.524 m)   Wt 192 lb 9.6 oz (87.4 kg)   SpO2 98%   BMI 37.61 kg/m²     General appearance: Obese elderly female in apparent mild respiratory distress. Follows commands. HEENT: Conjunctivae/corneas clear. Mucous membranes dry. Neck: Supple. No JVD. Respiratory: Stridor. Significantly diminished in the bilateral bases. Cardiovascular:  RRR. S1, S2 without MRG. PV: Pulses palpable. No edema. Abdomen: Soft, non-tender, non-distended. +BS  Musculoskeletal: No obvious deformities. Skin: Normal skin color. No rashes or lesions. Good turgor. Neurologic: Generalized weakness though significantly worse on the left. Able to nod appropriately.      Medications:  REVIEWED DAILY    Infusion Medications    sodium chloride      sodium chloride      fentaNYL Stopped (04/29/22 1505)    dextrose      dexmedetomidine (PRECEDEX) IV infusion Stopped (04/28/22 0909)     Scheduled Medications    sodium chloride  2 spray Each Nostril TID    insulin lispro  0-6 Units SubCUTAneous TID WC    insulin lispro  0-3 Units SubCUTAneous Nightly    pantoprazole  40 mg IntraVENous BID    senna  1 tablet Oral Nightly    polyethylene glycol  17 g Oral Daily    chlorhexidine  15 mL Mouth/Throat BID    QUEtiapine  12.5 mg Oral 2 times per day    melatonin  5 mg Oral QPM    [Held by provider] enoxaparin  1 mg/kg SubCUTAneous BID    [Held by provider] apixaban  5 mg Oral BID    vitamin D  5,000 Units Oral Daily    dilTIAZem  120 mg Oral Daily    [Held by provider] ramipril  5 mg Oral Daily    [Held by provider] aspirin  81 mg Oral Daily    Or    [Held by provider] aspirin  300 mg Rectal Daily    atorvastatin  80 mg Oral Nightly     PRN Meds: sodium chloride, glycerin moisturizing mouth spray, sodium chloride, sodium chloride, hydrALAZINE, dextrose, glucagon (rDNA), dextrose, glucose, racepinephrine HCl, acetaminophen, melatonin, albuterol, acetaminophen, ondansetron **OR** ondansetron, polyethylene glycol, perflutren lipid microspheres    Labs:     Recent Labs     04/30/22  0549 05/01/22  0430 05/02/22  0426   WBC 13.5* 14.7* 14.0*   HGB 7.3* 7.1* 7.0*   HCT 23.6* 22.3* 22.8*    341 296       Recent Labs     04/30/22  0549 05/01/22  0430 05/02/22  0425    143 143   K 4.7 4.8 4.8   * 109* 108*   CO2 18* 23 26   BUN 45* 41* 35*   CREATININE 1.3* 1.1* 1.0   CALCIUM 8.0* 7.9* 8.2*       No results for input(s): PROT, ALB, ALKPHOS, ALT, AST, BILITOT, AMYLASE, LIPASE in the last 72 hours. No results for input(s): INR in the last 72 hours. No results for input(s): Terence Seed in the last 72 hours.     Chronic labs:    Lab Results   Component Value Date    CHOL 140 04/23/2022    TRIG 87 04/23/2022    HDL 48 04/23/2022    LDLCALC 75 04/23/2022    INR 1.3 04/27/2022    LABA1C 5.6 04/23/2022       Radiology: REVIEWED DAILY    Assessment:  Acute respiratory failure secondary to mucous plugging  Right MCA stroke- severe aphasia, severe dysarthria, and left arm and leg drift, and rightward gaze, and left-sided hemianopia  Encephalopathy  CKD III  Nasal/oral bleeding  Acute on chronic anemia   Dysphagia  Acute delirium   UTI s/p 5 days of Ceftri  Leukocytosis  CKD, unknown baseline  CAD s/p PCI in 2017  HTN  HLD  Hx of BLE DVT + PE in 11/2021 anticoagulated on Eliquis    Plan:  ICU team and nephrology following  S/p extubation on 5/1, now on NIV  ACE inhibitor on hold   Monitor renal function, I&O  Neurology signed off with recs to resume anticoagulation if and when possible as well as Zio patch at discharge  Anticoagulation on hold  Monitor H&H, transfuse for Hb < 7  Will make outpatient referral to hematology/oncology for hypercoagulable work-up given history of DVT/PE and failure of Eliquis     DVT Prophylaxis [] Lovenox  []  Heparin [] DOAC [] PCDs [] Ambulation    GI Prophylaxis [x] PPI  [] H2 Blocker   [] Carafate  [] Diet/Tube Feeds   Level of care [] Med/Surg  [] Intermediate  [x]  ICU   Diet Diet NPO  ADULT TUBE FEEDING; Orogastric; Standard with Fiber; Continuous; 10; Yes; 15; Q 4 hours; 50; 100; Q 4 hours; Protein; 1 Proteinex Daily    Family contact [x]  N/A - per ICU team  [] At bedside   [] Phone call      Discharge Plan: TBD pending further clinical course    +++++++++++++++++++++++++++++++++++++++++++++++++  Karen Cantor, [place] [address], [place]  +++++++++++++++++++++++++++++++++++++++++++++++++  NOTE: This report was transcribed using voice recognition software. Every effort was made to ensure accuracy; however, inadvertent computerized transcription errors may be present.

## 2022-05-02 NOTE — PROGRESS NOTES
Physical Therapy    Physical Therapy Re-Assessment     Name: Mikala Ruiz  : 1945  MRN: [id number]      Date of Service: 2022    Re-Evaluating PT:  José Miguel Wade PT, DPT  [id number]     Room #:  3310/8970-M  Diagnosis:  Stroke-like symptoms [R29.90]  PMHx/PSHx:  CAD s/p PCI, DVT, HTN, HLD, PE   Procedure/Surgery:  Intubated , Extubated   Precautions:  Falls, L hemiparesis (flaccid LUE), L inattention  Equipment Needs:  TBD    Reason for re-evaluation:  Change in medical status requiring transfer to intensive care with intubation. Pt extubated   Date of re-evaluation:  22    SUBJECTIVE:    Pt lives alone in a 1 story home with 1 ESTRELLA 1 rail. Pt ambulated with single point cane PTA. OBJECTIVE:   Re-Evaluation  Date: 22 Treatment Short Term/ Long Term   Goals   AM-PAC 6 Clicks      Was pt agreeable to Re-Eval/treatment? Yes      Does pt have pain? None reported      Bed Mobility  Rolling: Max A  Supine to sit: Max A x2  Sit to supine: Max A x2  Scooting: Max A to EOB  Rolling: Mod A  Supine to sit: Mod A  Sit to supine: Mod A  Scooting: Mod A   Transfers Sit to stand: Max A x2  Stand to sit: Max A x2  Stand pivot: NT  Sit to stand: Mod A  Stand to sit: Mod A  Stand pivot: Mod A with AAD   Ambulation    NT  >5 feet with AAD Max A   Stair negotiation: ascended and descended  NT  NA   ROM BUE:  Per OT eval  BLE:  WFL     Strength BUE:  Per OT eval  RLE:  Grossly 4/5  LLE:  Grossly 3-/5     Balance Sitting EOB:  Mod A  Static Standing: Max A x2  Sitting EOB:  SBA  Dynamic Standing:   Mod A     Pt is A & O x 3  RASS:  0  CAM-ICU:  NT  Sensation:  Pt denies numbness and tingling to extremities  Edema:  Unremarkable    Vitals:  Blood Pressure at rest 153/69 mmHg  Blood Pressure post session 139/83 mmHg   Heart Rate at rest 89 bpm  Heart Rate post session 111 bpm    SPO2 at rest 95% on O2 SPO2 post session 93% on O2         Functional Status Score-Intensive Care Unit (FSS-ICU)   Rolling  during rolling and supine>sit as well as provided with physical assistance to complete task     Sitting EOB for >12 minutes for upright tolerance, postural awareness and BLE ROM   STS training EOB to progress gait tolerance - pt educated on proper hand and foot placement, safety and sequencing, and use of no AD to safely complete sit<>stand along EOB with hands on assistance to complete task safely    Skilled positioning - Pt placed in the chair position with pillows utilized to facilitate upright posture, joint and skin integrity, and interaction with environment. Pt's/ family goals   1. Return to PLOF    Prognosis is fair for reaching above PT goals. Patient and or family understand(s) diagnosis, prognosis, and plan of care.   yes    PHYSICAL THERAPY PLAN OF CARE:    PT POC is established based on physician order and patient diagnosis     Referring provider/PT Order:    PT evaluation and treat - Sophie Mane DO   Diagnosis:  Stroke-like symptoms [R29.90]  Specific instructions for next treatment:  Progress EOB sitting tolerance, STS and transfer training, gait training     Current Treatment Recommendations:     [x] Strengthening to improve independence with functional mobility   [] ROM to improve independence with functional mobility   [x] Balance Training to improve static/dynamic balance and to reduce fall risk  [x] Endurance Training to improve activity tolerance during functional mobility   [x] Transfer Training to improve safety and independence with all functional transfers   [x] Gait Training to improve gait mechanics, endurance and asses need for appropriate assistive device  [] Stair Training in preparation for safe discharge home and/or into the community   [x] Positioning to prevent skin breakdown and contractures  [x] Safety and Education Training   [x] Patient/Caregiver Education   [x] HEP  [] Other     PT long term treatment goals are located in above grid    Frequency of treatments: 2-5x/week x 1-2 weeks. Time in  1025  Time out  1105    Total Treatment Time  30 minutes     Re-Evaluation Time includes thorough review of current medical information, gathering information on past medical history/social history and prior level of function, completion of standardized testing/informal observation of tasks, assessment of data and education on plan of care and goals.     CPT codes:  [] Low Complexity PT evaluation 51046  [] Moderate Complexity PT evaluation 52477  [] High Complexity PT evaluation 57963  [x] PT Re-evaluation 69293  [] Gait training 45151 -- minutes  [] Manual therapy 32640 -- minutes  [x] Therapeutic activities 23680 30 minutes  [] Therapeutic exercises 70933 - minutes  [] Neuromuscular reeducation 99603 -- minutes     [facility], PT, DPT  [id number]

## 2022-05-03 ENCOUNTER — APPOINTMENT (OUTPATIENT)
Dept: GENERAL RADIOLOGY | Age: 77
DRG: 064 | End: 2022-05-03
Attending: FAMILY MEDICINE
Payer: MEDICARE

## 2022-05-03 LAB
ANION GAP SERPL CALCULATED.3IONS-SCNC: 12 MMOL/L (ref 7–16)
BUN BLDV-MCNC: 36 MG/DL (ref 6–23)
CALCIUM SERPL-MCNC: 8.4 MG/DL (ref 8.6–10.2)
CHLORIDE BLD-SCNC: 108 MMOL/L (ref 98–107)
CO2: 26 MMOL/L (ref 22–29)
CREAT SERPL-MCNC: 0.9 MG/DL (ref 0.5–1)
GFR AFRICAN AMERICAN: >60
GFR NON-AFRICAN AMERICAN: >60 ML/MIN/1.73
GLUCOSE BLD-MCNC: 164 MG/DL (ref 74–99)
HCT VFR BLD CALC: 27.8 % (ref 34–48)
HEMOGLOBIN: 8.9 G/DL (ref 11.5–15.5)
MCH RBC QN AUTO: 28.3 PG (ref 26–35)
MCHC RBC AUTO-ENTMCNC: 32 % (ref 32–34.5)
MCV RBC AUTO: 88.5 FL (ref 80–99.9)
METER GLUCOSE: 134 MG/DL (ref 74–99)
METER GLUCOSE: 153 MG/DL (ref 74–99)
METER GLUCOSE: 154 MG/DL (ref 74–99)
METER GLUCOSE: 163 MG/DL (ref 74–99)
METER GLUCOSE: 167 MG/DL (ref 74–99)
METER GLUCOSE: 168 MG/DL (ref 74–99)
METER GLUCOSE: 174 MG/DL (ref 74–99)
PDW BLD-RTO: 14.4 FL (ref 11.5–15)
PLATELET # BLD: 357 E9/L (ref 130–450)
PMV BLD AUTO: 9.8 FL (ref 7–12)
POTASSIUM SERPL-SCNC: 4.3 MMOL/L (ref 3.5–5)
RBC # BLD: 3.14 E12/L (ref 3.5–5.5)
SODIUM BLD-SCNC: 146 MMOL/L (ref 132–146)
WBC # BLD: 17.9 E9/L (ref 4.5–11.5)

## 2022-05-03 PROCEDURE — 6360000002 HC RX W HCPCS

## 2022-05-03 PROCEDURE — 6360000002 HC RX W HCPCS: Performed by: INTERNAL MEDICINE

## 2022-05-03 PROCEDURE — 80048 BASIC METABOLIC PNL TOTAL CA: CPT

## 2022-05-03 PROCEDURE — 97110 THERAPEUTIC EXERCISES: CPT

## 2022-05-03 PROCEDURE — 2580000003 HC RX 258: Performed by: INTERNAL MEDICINE

## 2022-05-03 PROCEDURE — 2700000000 HC OXYGEN THERAPY PER DAY

## 2022-05-03 PROCEDURE — 6370000000 HC RX 637 (ALT 250 FOR IP): Performed by: INTERNAL MEDICINE

## 2022-05-03 PROCEDURE — 71045 X-RAY EXAM CHEST 1 VIEW: CPT

## 2022-05-03 PROCEDURE — 85027 COMPLETE CBC AUTOMATED: CPT

## 2022-05-03 PROCEDURE — 6370000000 HC RX 637 (ALT 250 FOR IP): Performed by: NURSE PRACTITIONER

## 2022-05-03 PROCEDURE — 92611 MOTION FLUOROSCOPY/SWALLOW: CPT | Performed by: SPEECH-LANGUAGE PATHOLOGIST

## 2022-05-03 PROCEDURE — 99233 SBSQ HOSP IP/OBS HIGH 50: CPT | Performed by: INTERNAL MEDICINE

## 2022-05-03 PROCEDURE — 94640 AIRWAY INHALATION TREATMENT: CPT

## 2022-05-03 PROCEDURE — 6370000000 HC RX 637 (ALT 250 FOR IP): Performed by: STUDENT IN AN ORGANIZED HEALTH CARE EDUCATION/TRAINING PROGRAM

## 2022-05-03 PROCEDURE — 92526 ORAL FUNCTION THERAPY: CPT | Performed by: SPEECH-LANGUAGE PATHOLOGIST

## 2022-05-03 PROCEDURE — 82962 GLUCOSE BLOOD TEST: CPT

## 2022-05-03 PROCEDURE — 2060000000 HC ICU INTERMEDIATE R&B

## 2022-05-03 PROCEDURE — 74230 X-RAY XM SWLNG FUNCJ C+: CPT

## 2022-05-03 PROCEDURE — 97530 THERAPEUTIC ACTIVITIES: CPT

## 2022-05-03 PROCEDURE — 94660 CPAP INITIATION&MGMT: CPT

## 2022-05-03 RX ORDER — IPRATROPIUM BROMIDE AND ALBUTEROL SULFATE 2.5; .5 MG/3ML; MG/3ML
1 SOLUTION RESPIRATORY (INHALATION)
Status: DISCONTINUED | OUTPATIENT
Start: 2022-05-03 | End: 2022-05-04

## 2022-05-03 RX ORDER — SODIUM CHLORIDE FOR INHALATION 3 %
4 VIAL, NEBULIZER (ML) INHALATION 2 TIMES DAILY
Status: DISCONTINUED | OUTPATIENT
Start: 2022-05-03 | End: 2022-05-11 | Stop reason: HOSPADM

## 2022-05-03 RX ORDER — DEXAMETHASONE SODIUM PHOSPHATE 4 MG/ML
4 INJECTION, SOLUTION INTRA-ARTICULAR; INTRALESIONAL; INTRAMUSCULAR; INTRAVENOUS; SOFT TISSUE EVERY 6 HOURS
Status: DISCONTINUED | OUTPATIENT
Start: 2022-05-03 | End: 2022-05-04

## 2022-05-03 RX ADMIN — INSULIN LISPRO 2 UNITS: 100 INJECTION, SOLUTION INTRAVENOUS; SUBCUTANEOUS at 17:15

## 2022-05-03 RX ADMIN — SALINE NASAL SPRAY 2 SPRAY: 1.5 SOLUTION NASAL at 20:18

## 2022-05-03 RX ADMIN — DEXAMETHASONE SODIUM PHOSPHATE 4 MG: 4 INJECTION, SOLUTION INTRA-ARTICULAR; INTRALESIONAL; INTRAMUSCULAR; INTRAVENOUS; SOFT TISSUE at 09:57

## 2022-05-03 RX ADMIN — DEXAMETHASONE SODIUM PHOSPHATE 4 MG: 4 INJECTION, SOLUTION INTRA-ARTICULAR; INTRALESIONAL; INTRAMUSCULAR; INTRAVENOUS; SOFT TISSUE at 20:18

## 2022-05-03 RX ADMIN — SALINE NASAL SPRAY 2 SPRAY: 1.5 SOLUTION NASAL at 13:51

## 2022-05-03 RX ADMIN — SALINE NASAL SPRAY 2 SPRAY: 1.5 SOLUTION NASAL at 07:23

## 2022-05-03 RX ADMIN — IPRATROPIUM BROMIDE AND ALBUTEROL SULFATE 1 AMPULE: .5; 2.5 SOLUTION RESPIRATORY (INHALATION) at 19:41

## 2022-05-03 RX ADMIN — INSULIN LISPRO 2 UNITS: 100 INJECTION, SOLUTION INTRAVENOUS; SUBCUTANEOUS at 13:51

## 2022-05-03 RX ADMIN — HYDRALAZINE HYDROCHLORIDE 10 MG: 20 INJECTION INTRAMUSCULAR; INTRAVENOUS at 09:18

## 2022-05-03 RX ADMIN — DEXAMETHASONE SODIUM PHOSPHATE 10 MG: 10 INJECTION INTRAMUSCULAR; INTRAVENOUS at 05:39

## 2022-05-03 RX ADMIN — INSULIN LISPRO 2 UNITS: 100 INJECTION, SOLUTION INTRAVENOUS; SUBCUTANEOUS at 08:11

## 2022-05-03 RX ADMIN — SODIUM CHLORIDE SOLN NEBU 3% 4 ML: 3 NEBU SOLN at 19:41

## 2022-05-03 RX ADMIN — DEXAMETHASONE SODIUM PHOSPHATE 4 MG: 4 INJECTION, SOLUTION INTRA-ARTICULAR; INTRALESIONAL; INTRAMUSCULAR; INTRAVENOUS; SOFT TISSUE at 17:14

## 2022-05-03 RX ADMIN — Medication 5 MG: at 17:14

## 2022-05-03 ASSESSMENT — PAIN SCALES - GENERAL
PAINLEVEL_OUTOF10: 0

## 2022-05-03 NOTE — PROGRESS NOTES
[facility] [address] [facility]   Department of Internal Medicine   Internal Medicine Residency  MICU Progress Note    Patient:  Keagan Hardy 68 y.o. female   MRN: [id number]       Date of Service: 5/3/2022    Allergy: Patient has no allergy information on record. Subjective     Overnight, patient's Hg dropped to 6.7 and was transfused 1 u pRBC and Hg corrected. Patient was seen and examined this morning at bedside on 5 L O2 per NC. Patient was phonating much better than yesterday but still difficult to understand. She did not have stridor on auscultation. Patient is for transfer to telemetry. Objective     TEMPERATURE:  Current - Temp: 98.1 °F (36.7 °C); Max - Temp  Av.9 °F (37.2 °C)  Min: 97.5 °F (36.4 °C)  Max: 99.5 °F (37.5 °C)  RESPIRATIONS RANGE: Resp  Av.7  Min: 14  Max: 24  PULSE RANGE: Pulse  Av.6  Min: 77  Max: 104  BLOOD PRESSURE RANGE:  Systolic (22UMQ), NRR:599 , Min:136 , EAL:268   ; Diastolic (75ZHK), FEB:41, Min:64, Max:110    PULSE OXIMETRY RANGE: SpO2  Av.4 %  Min: 90 %  Max: 99 %    I & O - 24hr:    Intake/Output Summary (Last 24 hours) at 5/3/2022 1330  Last data filed at 5/3/2022 1200  Gross per 24 hour   Intake 1653.47 ml   Output 1045 ml   Net 608.47 ml     I/O last 3 completed shifts: In: 1409.5 [I.V.:1035.5; Blood:374]  Out: 1098 [EQJDY:6602] I/O this shift:  In: 300 [I.V.:300]  Out: 200 [Urine:200]   Weight change:     Physical Exam:  General Appearance:    Alert, cooperative, moderate respiratory distress   HEENT:    NC/AT, mucous membranes are moist, dried blood on bilateral nares   Neck:   Supple, no jugular venous distention. Resp:     Course breath sounds left middle lung field, No wheezes, stridor or rhonchi, no use of accessory muscles   Heart:    RRR, S1 and S2 normal, no murmur, rub or gallop.     Abdomen:     Soft, non-tender, non-distended with normal bowel sounds   Extremities:   Atraumatic, no cyanosis, no edema   Pulses:  Radial and pedal pulses are intact bilaterally +1   Neurologic:   Awake and alert, phonating better, has 0/5 strength on left upper extremity, able to move and squeeze right hand, able to move bilateral toes, gaze can track       Medications     Continuous Infusions:   dextrose 50 mL/hr at 05/03/22 1200    dextrose       Scheduled Meds:   dexamethasone  4 mg IntraVENous Q6H    insulin lispro  0-12 Units SubCUTAneous TID WC    insulin lispro  0-6 Units SubCUTAneous Nightly    sodium chloride  2 spray Each Nostril TID    pantoprazole  40 mg IntraVENous BID    QUEtiapine  12.5 mg Oral 2 times per day    melatonin  5 mg Oral QPM    [Held by provider] enoxaparin  1 mg/kg SubCUTAneous BID    [Held by provider] apixaban  5 mg Oral BID    vitamin D  5,000 Units Oral Daily    dilTIAZem  120 mg Oral Daily    [Held by provider] ramipril  5 mg Oral Daily    [Held by provider] aspirin  81 mg Oral Daily    Or    [Held by provider] aspirin  300 mg Rectal Daily    atorvastatin  80 mg Oral Nightly     PRN Meds: sodium chloride, glycerin moisturizing mouth spray, hydrALAZINE, dextrose, glucagon (rDNA), dextrose, glucose, racepinephrine HCl, acetaminophen, melatonin, albuterol, acetaminophen, ondansetron **OR** ondansetron, polyethylene glycol, perflutren lipid microspheres  Nutrition:   NG/OG tube TF type: Pulmocare/Nephro/Glucerna/Jevity        At rate: NPO    Labs and Imaging Studies     CBC:   Recent Labs     05/01/22  0430 05/01/22  0430 05/02/22  0426 05/02/22  0426 05/02/22  1415 05/02/22  2158 05/03/22  0530   WBC 14.7*  --  14.0*  --   --   --  17.9*   HGB 7.1*   < > 7.0*   < > 6.7* 9.0* 8.9*   HCT 22.3*   < > 22.8*   < > 21.9* 28.3* 27.8*   MCV 87.8  --  90.8  --   --   --  88.5     --  296  --   --   --  357    < > = values in this interval not displayed.        BMP:    Recent Labs     05/02/22  0425 05/02/22  1400 05/03/22  0530    134 146   K 4.8 3.9 4.3   * 100 108*   CO2 26 25 26   BUN 35* 34* 36*   CREATININE 1.0 0.9 0.9   GLUCOSE 167* 435* 164*       LIVER PROFILE:   No results for input(s): AST, ALT, LIPASE, BILIDIR, BILITOT, ALKPHOS in the last 72 hours. Invalid input(s): AMYLASE,  ALB    PT/INR:   No results for input(s): PROTIME, INR in the last 72 hours. APTT:   No results for input(s): APTT in the last 72 hours. Fasting Lipid Panel:    Lab Results   Component Value Date    CHOL 140 04/23/2022    TRIG 87 04/23/2022    HDL 48 04/23/2022       Cardiac Enzymes:    No results found for: CKTOTAL, CKMB, CKMBINDEX, TROPONINI    Notable Cultures:      Blood cultures   Blood Culture, Routine   Date Value Ref Range Status   04/26/2022 5 Days no growth  Final     Respiratory cultures No results found for: RESPCULTURE No results found for: LABGRAM  Urine   Urine Culture, Routine   Date Value Ref Range Status   04/26/2022 >100,000 CFU/ml  Final     Legionella No results found for: LABLEGI  C Diff PCR No results found for: CDIFPCR  Wound culture/abscess: No results for input(s): WNDABS in the last 72 hours. Tip culture:No results for input(s): CXCATHTIP in the last 72 hours. Oxygen:     Vent Information  Ventilator ID: [id number]  Vent Mode: AC/VC  Ventilator Initiate: Yes  Additional Respiratory Assessments  Pulse: 88  Resp: 20  SpO2: 95 %  Position: Semi-Tang's  Humidification Source: Heated wire  Humidification Temp: 37  Circuit Condensation: Drained  Subglottic Suction Done: Yes  Airway Type: ET  Airway Size: 7.5  Cuff Pressure (cm H2O): 29 cm H2O  Swallow: Chokes on liquids       Urinary Catheter-Output (mL): 40 mL  [REMOVED] Urinary Catheter Pink-Output (mL): 700 mL    Imaging Studies:  XR CHEST PORTABLE   Final Result   1. Cardiomegaly. 2. Hazy bilateral airspace disease favored to represent mild pulmonary   edema/vascular congestion. XR CHEST PORTABLE   Final Result   Removal of support lines. Somewhat improving aeration on the right. XR CHEST PORTABLE   Final Result   1.  The chest x-ray is rotated which limits evaluation of the mediastinum. Dense consolidation within the right perihilar region is suspected   2. Patchy right lower lobe airspace disease   3. Vague patchy left lower lobe airspace disease. XR CHEST PORTABLE   Final Result   1. Partial interval clearing of the bibasilar airspace disease   2. There is mild residual bibasilar airspace disease. XR CHEST PORTABLE   Final Result   1. Patchy bilateral infrahilar and lower lobe airspace disease   2. The lung volumes are diminished. 3. There is no pneumothorax. XR CHEST PORTABLE   Final Result   *Right perihilar airspace disease. Please note the chest x-ray is rotated and   the hilar vessels are superimposed. The airspace disease within the right   hilum may be exaggerated. *Trace right pleural effusion. *Left pleural effusion. RECOMMENDATION:   1.      XR CHEST PORTABLE   Final Result   Improved aeration at the left base associated with a greater degree of   inspiration. XR ABDOMEN FOR NG/OG/NE TUBE PLACEMENT   Final Result   Catheter is in the stomach. XR CHEST PORTABLE   Final Result   ET tube tip 2.4 cm from the pascual. XR CHEST PORTABLE   Final Result   No acute process. ET tube tip within the right mainstem bronchus and should be retracted at   least 4.0 cm. Findings given to the core team to be relayed to the license caregiver on   04/26/2022 at 2:09 p.m. Nick Gomes CT HEAD WO CONTRAST   Final Result   1. Late acute/early subacute multiple scattered infarcts in the vascular   distribution of the right middle cerebral artery as above commented. 2.  No indication for hemorrhagic component. 3.  No indication for new sizable area of acute recent insult in progression   to the brain parenchyma. 4.  There is no midline shift.       RECOMMENDATIONS:   Unavailable         US DUP LOWER EXTREMITIES BILATERAL VENOUS   Final Result   There is evidence for deep venous thrombosis      ALERT:  THIS IS AN ABNORMAL REPORT               XR CHEST PORTABLE   Final Result   Findings suggest interstitial scarring and chronic bronchitis. No   significant acute infiltrates are noted         VL DUP CAROTID BILATERAL   Final Result   Atherosclerotic disease. No hemodynamically significant stenosis is   identified   Estimated stenosis by NASCET criteria in the proximal right carotid   artery is between 0% and 49%. Estimated stenosis by NASCET criteria in the proximal left carotid   artery is between 0% and 49%. MRI brain without contrast   Final Result   Multiple small recent infarcts throughout the right MCA territory. There is   evidence of slow vascular flow in the right MCA territory. Consider CTA of   the head/neck for further evaluation. Advanced chronic microvascular ischemic changes. No mass effect. No hemorrhage. No hydrocephalus. Fluoroscopy modified barium swallow with video    (Results Pending)        Resident's Assessment and Plan     Assessment and Plan:    1. Acute hypoxic respiratory failure 2/2 mucus plug  2. Possible posterior glottic/interarytenoid scar band vs mucosal crusting  · Consulted ENT  · ENT recs: decadron 10 mg Q8H x 3 doses  3. Bilateral DVT seen on LE Ultrasound   · Was on eliquis at home  · Currently on theraputic lovenox   4. 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 Oral and nasal bleeding 2/2 OG tube insertion  · Has dissolvable nasal foam bilaterally  6. UTI 2/2 E. Coli  · Urine culture >100,000 CFU E. Coli  · S/P rocephin x 5 days, last dose 4/30/22  7. Acute on chronic anemia had work up at [facility]  · S/p 1 u pRBC 5/2/22  8. Hx Hypertension  · Was on cardizem and ramipril at home  9. Hx Hyperlipidemia      Resolved:  1. CURT likely 2/2 poor oral intake vs acute blood loss      PLAN:  1. S/P extubation May 1, 2022  2. Monitor respiratory status  3.  Follow ENT recs regarding possible posterior glottic/interarytenoid scar band vs mucosal crusting, finished decadron 10 mg x 3 doses, continue decadron 4 mg Q8H  4. Pulmonology consulted for stridor, high risk for aspiration and mucus plugging  5. SLP video swallow ordered   6. Fluroscopy modified barium swallow with video  7. Pt has dissolvable nasal packing per ENT  8. Continue holding aspirin and anticoagulation 2/2 drop in Hg  9. Monitor Hg, goal Hg >7.0  10. Continue cardizem and lipitor   11. Continue holding ramipril 2/2 CURT  12. Maintain NPO as patient high risk for aspiration, continue on D5 IVF at 50 cc/hr  13. Daily CXR  14. POCT glucose checks Q4H while NPO, continue LDSS      # Peptic ulcer prophylaxis: protonix  # DVT Prophylaxis: anticoagulation on hold 2/2 Hg drop  # Disposition: Transfer to telemetry    Hebert Wagner MD, PGY-1    Attending Physician: Dr. Jazmín Sherman    I personally saw, examined and provided care for the patient. Radiographs, labs and medication list were reviewed by me independently. Review of Residents documentation was conducted and revisions were made as appropriate. I agree with the above documented exam, problem list and plan of care.         CCT excluding procedures 32 minutes    Delicia Lemons DO

## 2022-05-03 NOTE — CARE COORDINATION
5/3: Update CM Note:  Pt was a transfer from [facility] for concerns of stroke like symptoms. MRI showed small recent infarcts. Pt became obtunded, in respiratory distress & was transferred to MICU. Pt was intubated & a large mucous plug removed. Pt was extubated on 5/1. Pt is on 5L/NC at 94%. remains intubated in bilateral wrist restraints. Pt is on Iv Decadron, Iv Protonix, uids, Iv Fentanyl, IV decadron & PO Eliquis. 2 D echo done. ENT to re scope after decadron doses. Will need a video swallow. [facility] papers faxed to PCP Dr. Claudette Frederic for daughter & left paperwork in room. Pt will need PT/OT/Speech evals. Daughter was given a list of [place] rehab facilities. CM spoke with daughter Lesvia Thompson. She has the list & so far she is interested in [facility] in [facility] sent to [place], left 2 messages for Liseht Acosta, pending call back. She will look over the list & get back to me.  Neurology has signed off. Per Neurology not resume anticoagulation if & when possible as well as Zio patch at dc. Will need precert & covid testing on dc. Sw/CHELLE will continue to follow for dc planning.  Electronically signed by Sagar Davidson RN on 5/3/2022 at 11:12 AM

## 2022-05-03 NOTE — PLAN OF CARE
Problem: Discharge Planning  Goal: Discharge to home or other facility with appropriate resources  Outcome: Progressing     Problem: Skin/Tissue Integrity  Goal: Absence of new skin breakdown  Description: 1. Monitor for areas of redness and/or skin breakdown  2. Assess vascular access sites hourly  3. Every 4-6 hours minimum:  Change oxygen saturation probe site  4. Every 4-6 hours:  If on nasal continuous positive airway pressure, respiratory therapy assess nares and determine need for appliance change or resting period.   Outcome: Progressing     Problem: Safety - Adult  Goal: Free from fall injury  Outcome: Progressing     Problem: Pain  Goal: Verbalizes/displays adequate comfort level or baseline comfort level  Outcome: Progressing     Problem: Respiratory - Adult  Goal: Achieves optimal ventilation and oxygenation  Outcome: Progressing     Problem: Nutrition Deficit:  Goal: Optimize nutritional status  5/3/2022 1808 by Mari Faye RN  Outcome: Progressing  5/3/2022 1333 by Sofia Burnett RD, LD  Outcome: Progressing     Problem: ABCDS Injury Assessment  Goal: Absence of physical injury  Outcome: Progressing

## 2022-05-03 NOTE — PROGRESS NOTES
Comprehensive Nutrition Assessment    Type and Reason for Visit:  Reassess    Nutrition Recommendations/Plan:     Pt failed MBS today, SLP Rec NPO. Updated TF Rec once feeding route placed:   Standard with fiber (Jevity 1.5) @ 35 ml/hr + 1 protein modular daily  Will provide: 840 ml tv, 1260 kcals, 54 gm pro (1360 kcals & 80 gm pro w/ mod), 638 ml free water     Malnutrition Assessment:  Malnutrition Status: At risk for malnutrition  (04/27/22 1309)    Context:  Acute Illness     Findings of the 6 clinical characteristics of malnutrition:  Energy Intake:  50% or less of estimated energy requirements for 5 or more days  Weight Loss:  No significant weight loss (10.5% x 11 mon, does not met sig criteria)     Body Fat Loss:  No significant body fat loss     Muscle Mass Loss:  No significant muscle mass loss    Fluid Accumulation:  No significant fluid accumulation     Strength:  Not Performed    Nutrition Assessment:    Pt now extubated however remains at risk d/t ongoing need for MICU care. Admit transferred from [facility] 2/2 R MCA. Noted CURT improving. PMHx CAD. Noted severe aphasia & previous dysphagia. NPO status as pt just failed MBS this afternoon. Will provide updated TF recs & monitor.     Nutrition Related Findings:    Pt lethargic s/p extubation, MAP WNL, +I/O's, +2/+3 edema, active BS Wound Type: None       Current Nutrition Intake & Therapies:    Average Meal Intake: NPO     Current Tube Feeding (TF) Orders:  · Feeding Route:  (removed s/p extubation)  · Formula: Standard with Fiber remains ordered  · Schedule: Continuous  · Feeding Regimen: 50 ml/hr  · Goal TF & Flush Orders Provides: 1200 ml tv, 1800 kcals, 77 gm pro, 912 ml free water    Anthropometric Measures:  Height: 5' (152.4 cm)  Ideal Body Weight (IBW): 100 lbs (45 kg)    Admission Body Weight: 162 lb (73.5 kg) (4/26 first measured)  Current Body Weight: 162 lb (73.5 kg) (4/26 adm wt as CBW elevated)   Current BMI (kg/m2): 31.6  Usual Body Weight: 181 lb (82.1 kg) (5/10/21 EMR measured wt from [facility] encounter)  % Weight Change (Calculated): -10.5 x 11 mon                    BMI Categories: Obese Class 1 (BMI 30.0-34. 9)    Estimated Daily Nutrient Needs:  Energy Requirements Based On: Formula  Weight Used for Energy Requirements: Admission  Energy (kcal/day): MSJ 1142 x 1.2 SF= 4562-0238  Weight Used for Protein Requirements: Ideal  Protein (g/day): 1.3-1.5 g/kg IBW; 70-80     Fluid (ml/day): per critical care    Nutrition Diagnosis:   · Inadequate oral intake related to swallowing difficulty as evidenced by NPO or clear liquid status due to medical condition,swallow study results      Nutrition Interventions:   Nutrition Education/Counseling: Education not appropriate  Coordination of Nutrition Care: Continue to monitor while inpatient       Goals:  Previous Goal Met: No Progress toward Goal(s) (EN stopped, New goal)  Goals: Initiate nutrition support       Nutrition Monitoring and Evaluation:      Food/Nutrient Intake Outcomes: Diet Advancement/Tolerance,Enteral Nutrition Intake/Tolerance  Physical Signs/Symptoms Outcomes: Biochemical Data,Chewing or Swallowing,Nutrition Focused Physical Findings,Skin,Weight,GI Status,Fluid Status or Edema,Hemodynamic Status    Discharge Planning:     Too soon to determine     Hayley Wahl RD, LD  Contact: Ext [phone]

## 2022-05-03 NOTE — PROGRESS NOTES
Date: 5/2/2022    Time: 10:03 PM    Patient Placed On BIPAP/CPAP/ Non-Invasive Ventilation? Yes    If no must comment. Facial area red/color change? No           If YES are Blister/Lesion present? No   If yes must notify nursing staff  BIPAP/CPAP skin barrier? Yes    Skin barrier type:mepilexlite       Comments: pt having stridor. Racepinephrine given.        Aziza Farias RCP

## 2022-05-03 NOTE — PROGRESS NOTES
Department of Internal Medicine  Nephrology Progress Note    Events reviewed    SUBJECTIVE:  We are following Ms. Gregory  for CURT. She denies any complaints or concerns, daughter at the bedside and updated.      PHYSICAL EXAM:      Vitals:    VITALS:  BP (!) 148/89   Pulse 88   Temp 98.1 °F (36.7 °C) (Bladder)   Resp 20   Ht 5' (1.524 m)   Wt 192 lb 9.6 oz (87.4 kg)   SpO2 99%   BMI 37.61 kg/m²   24HR INTAKE/OUTPUT:      Intake/Output Summary (Last 24 hours) at 5/3/2022 1317  Last data filed at 5/3/2022 1200  Gross per 24 hour   Intake 1653.47 ml   Output 1045 ml   Net 608.47 ml       Constitutional: Alert, speech delayed, no acute distress  HEENT: PERRLA, normocephalic, atraumtic  Respiratory:  Diminished bilateral bases  Cardiovascular/Edema:  RRR, S1/S2  Gastrointestinal:  abd rounded, non tender, BS hypoactive  Neurologic: Alert and oriented, no focal deficits noted  Skin:  Warm,dry, ecchymosis to BUE  Other:  Pink catheter draining minimal yellow urine    Scheduled Meds:   dexamethasone  4 mg IntraVENous Q6H    insulin lispro  0-12 Units SubCUTAneous TID WC    insulin lispro  0-6 Units SubCUTAneous Nightly    sodium chloride  2 spray Each Nostril TID    pantoprazole  40 mg IntraVENous BID    QUEtiapine  12.5 mg Oral 2 times per day    melatonin  5 mg Oral QPM    [Held by provider] enoxaparin  1 mg/kg SubCUTAneous BID    [Held by provider] apixaban  5 mg Oral BID    vitamin D  5,000 Units Oral Daily    dilTIAZem  120 mg Oral Daily    [Held by provider] ramipril  5 mg Oral Daily    [Held by provider] aspirin  81 mg Oral Daily    Or    [Held by provider] aspirin  300 mg Rectal Daily    atorvastatin  80 mg Oral Nightly     Continuous Infusions:   dextrose 50 mL/hr at 05/03/22 1200    dextrose       PRN Meds:.sodium chloride, glycerin moisturizing mouth spray, hydrALAZINE, dextrose, glucagon (rDNA), dextrose, glucose, racepinephrine HCl, acetaminophen, melatonin, albuterol, acetaminophen, ondansetron **OR** ondansetron, polyethylene glycol, perflutren lipid microspheres    DATA:    CBC:   Lab Results   Component Value Date    WBC 17.9 05/03/2022    RBC 3.14 05/03/2022    HGB 8.9 05/03/2022    HCT 27.8 05/03/2022    MCV 88.5 05/03/2022    MCH 28.3 05/03/2022    MCHC 32.0 05/03/2022    RDW 14.4 05/03/2022     05/03/2022    MPV 9.8 05/03/2022     CMP:    Lab Results   Component Value Date     05/03/2022    K 4.3 05/03/2022    K 3.6 04/27/2022     05/03/2022    CO2 26 05/03/2022    BUN 36 05/03/2022    CREATININE 0.9 05/03/2022    GFRAA >60 05/03/2022    LABGLOM >60 05/03/2022    GLUCOSE 164 05/03/2022    CALCIUM 8.4 05/03/2022     Magnesium:    Lab Results   Component Value Date    MG 1.8 04/27/2022     Phosphorus:  No results found for: PHOS  Radiology Review:      Chest x-ray May 3, 2022   1. Cardiomegaly. 2. Hazy bilateral airspace disease favored to represent mild pulmonary   edema/vascular congestion. BRIEF SUMMARY OF INITIAL CONSULT:  Briefly, Ms. Macho Whyte is a 68year old female with a PMH of HTN, CAD with PCI 2017, BLE DVT 11/2021, HLD, GERD who was admitted on April 22, 2022 after she was transferred from [facility] with stroke like symptoms. Patient presented to [facility] with complaint of strokelike symptoms and sudden onset left-sided weakness with aphasia, with MRI indicating right MCA without hemorrhagic conversion. Patient was originally admitted to the medical floor at [address] [place] [address] and became progressively obtunded with stridorous respiratory failure, was intubated and transferred to MICU 4/26/2022. Patient developed severe epistaxis requiring packing per ENT. Labs on admission were significant for sodium of 141, potassium 3.7, chloride 111, bicarbonate 18, BUN 21, creatinine 1.2, and hemoglobin of 7.1. We are consulted for worsening CURT and decreased urine output.   Notably patient's daughter states that patient has been recently evaluated by nephrology in [place] and underwent blood work and renal ultrasound however did not receive the results yet. Ramipril, diltiazem, atorvastatin, apixaban, and omeprazole are medications patient was taking prior to admission. Problems resolved:  Hypocalcemia, 2/2 vitamin D deficiency. On cholecalciferol, ionized calcium 1.16  Vitamin D deficiency, on cholecalciferol      IMPRESSION/RECOMMENDATIONS:      CURT stage III based on urine output, likely volume responsive pre-renal CURT from poor oral intake and volume loss with severe anemia in the setting of ACEi administration vs ischemic ATN from acute hypotension. Oliguric. FENa 0.3%. Renal function improved beyond baseline, 0.9 mg/dL with excellent urine output 2.4L. CKD Stage 2 with proteinuria, Renal US done at Dr. Halle Hunter office in [place], [place] shows increased cortical echogenicity consistent with medical renal disease. Baseline creatinine 1.3 mg deciliter. Hypernatremia, secondary to decreased free water intake patient is NPO. Increase D5 water to 80 cc/h  Alkalemia, pH 7.509, PCO2 28, HCO3 21.8, respiratory alkalosis  HTN, on cardizem and hydralazine, ramipril (on hold)  ------------------------------------------------------------------------  Acute hypoxic respiratory failure 2/2 mucus plug, intubated on 4/26, extubated on 5/1, S/p bronchoscopy  CVA, MRI demonstrates infarcts in right MCA without hemorrhagic conversion. Neurology following, concern for already embolic source. Echo ordered for PFO evaluation to r/o paradoxical embolus. Borderline intracranial atherosclerosis. Historical DVT and current right LE+, patiently chronically anticoagulated on Eliquis at home, was being treated with Lovenox for transition to [address] [place] [address], now on hold secondary to epistaxis  Acute epistaxis, nasal packing in place  Anemia, acute drop in hemoglobin with [facility]   Hemoglobin stable  HLD, on statin   GERD, on omeprazole at home  UTI, 2/2 E. coli s/p ceftriaxone. N.p.o., awaiting for video swallow, on D5W at 80cc/hour for nutrition while n.p.o        Plan:    Continue to monitor renal function, repeat BMP at 1600  Continue to monitor H&H, transfuse to keep <7  Continue to monitor blood pressure  Awaiting video swallow  Obtain ionized calcium in a.m.     Electronically signed by PATRICIA Crystal CNP on 5/3/2022 at 1:17 PM

## 2022-05-03 NOTE — PATIENT CARE CONFERENCE
P Quality Flow/Interdisciplinary Rounds Progress Note        Quality Flow Rounds held on May 3, 2022    Disciplines Attending: , pt/ot, resp therapy, bedside nurse, charge nurse, nursing management    Myrtha Leventhal was admitted on 4/22/2022  9:38 PM    Anticipated Discharge Date:  Expected Discharge Date: 05/04/22    Disposition:    Jamarcus Score:  Jamarcus Scale Score: 14    Readmission Risk              Risk of Unplanned Readmission:  21           Discussed patient goal for the day, patient clinical progression, and barriers to discharge. The following Goal(s) of the Day/Commitment(s) have been identified:  Check transfer.       Carol Bowie RN  May 3, 2022

## 2022-05-03 NOTE — PROGRESS NOTES
Physical Therapy    Physical Therapy Daily Treatment Note      Name: Matt Saunders  : 1945  MRN: [id number]      Date of Service: 5/3/2022    Re-Evaluating PT:  Lacretia Denver, PT, DPT  [id number]     Room #:  2331/9581-M  Diagnosis:  Stroke-like symptoms [R29.90]  PMHx/PSHx:  CAD s/p PCI, DVT, HTN, HLD, PE   Procedure/Surgery:  Intubated , Extubated   Precautions:  Falls, L hemiparesis (flaccid LUE), L inattention  Equipment Needs:  TBD    Reason for re-evaluation:  Change in medical status requiring transfer to intensive care with intubation. Pt extubated   Date of re-evaluation:  22    SUBJECTIVE:    Pt lives alone in a 1 story home with 1 [address] 1 rail. Pt ambulated with single point cane PTA. OBJECTIVE:   Re-Evaluation  Date: 22 Treatment  5/3/22 Short Term/ Long Term   Goals   AM-PAC 6 Clicks     Was pt agreeable to Re-Eval/treatment? Yes  Yes     Does pt have pain? None reported  None reported     Bed Mobility  Rolling: Max A  Supine to sit: Max A x2  Sit to supine: Max A x2  Scooting: Max A to EOB Rolling: Max A  Supine to sit: Max A  Sit to supine: Max A  Scooting: Max A to EOB Rolling: Mod A  Supine to sit: Mod A  Sit to supine: Mod A  Scooting: Mod A   Transfers Sit to stand: Max A x2  Stand to sit: Max A x2  Stand pivot: NT Sit to stand: Max A x2  Stand to sit: Max A x2  Stand pivot: NT Sit to stand: Mod A  Stand to sit: Mod A  Stand pivot: Mod A with AAD   Ambulation    NT NT >5 feet with AAD Max A   Stair negotiation: ascended and descended  NT NT NA   ROM BUE:  Per OT eval  BLE:  WFL BLE WFL    Strength BUE:  Per OT eval  RLE:  Grossly 4/5  LLE:  Grossly 3-/5 RLE grossly 4/5  LLE grossly 3+/5    Balance Sitting EOB:  Mod A  Static Standing: Max A x2 Sitting EOB:  Mod A  Static Standing: Max A x2 Sitting EOB:  SBA  Dynamic Standing:   Mod A     Pt is A & O x 3  RASS:  0  CAM-ICU:  NT  Sensation:  Pt denies numbness and tingling to extremities  Edema: Unremarkable    Vitals:  Blood Pressure at rest 142/95 mmHg  Blood Pressure post session 162/87 mmHg   Heart Rate at rest 92 bpm  Heart Rate post session 97 bpm    SPO2 at rest 97% on O2 SPO2 post session 97% on O2       Functional Status Score-Intensive Care Unit (FSS-ICU)   Rolling 2/7   Supine to sit transfer 1/7   Unsupported sitting  3/7   Sit to stand transfers 1/7   Ambulation 0/7   Total  7/35     Therapeutic Exercises:    EOB exercise: B LAQs 2 x10 reps   B hip marching 2 x10 reps   B ankle pumps 2 x 10 reps    STS x1 rep     Supine exercise:   L arm forward punches PROM x10 reps   L arm raises PROM x10 reps    L elbow flexion/extension x10 reps    BLE hip/knee bends x15 reps AAROM    Patient education  Pt educated on safety during functional mobility, sitting balance and posture, standing balance and posture, positioning in bed     Patient response to education:   Pt verbalized understanding Pt demonstrated skill Pt requires further education in this area   Yes  Yes  Reinforce      RE ASSESSMENT:    Conditions Requiring Skilled Therapeutic Intervention:    [x]Decreased strength     []Decreased ROM  [x]Decreased functional mobility  [x]Decreased balance   [x]Decreased endurance   [x]Decreased posture  [x]Decreased sensation  [x]Decreased coordination   []Decreased vision  [x]Decreased safety awareness   []Increased pain       Comments:  Pt received supine and agreeable to PT treatment. Pt cleared for participation by RN prior to session. Vitals monitored during session. Pt's dtr present. Improved strength of LLE today but LUE still flaccid. Pt educated extensively on attending to LUE and positioning with mobility and at rest.  Pt completed EOB and supine exercises today. Completed STS with x2 person assistance but much improved upright posture compared to yesterday's STS. Pt returned to supine d/t increased RR. Scooted up in bed and placed in chair position.   Pt left with call button in reach, lines attached, and needs met. Treatment:  Patient practiced and was instructed in the following treatment:     Bed mobility training - pt given verbal and tactile cues to facilitate proper sequencing and safety during rolling and supine<>sit as well as provided with physical assistance to complete task     Sitting EOB for >12 minutes for upright tolerance, postural awareness and BLE ROM   STS training EOB to progress gait tolerance - pt educated on proper hand and foot placement, safety and sequencing, and use of no AD to safely complete sit<>stand along EOB with hands on assistance to complete task safely   Lower extremity therapeutic exercises  - in supine and sitting EOB   Skilled positioning - Pt placed in the chair position with pillows utilized to facilitate upright posture, joint and skin integrity, and interaction with environment. PHYSICAL THERAPY PLAN OF CARE:  Pt is making progress towards established goals. Continue PT POC.      Specific instructions for next treatment:  Progress EOB sitting tolerance, STS and transfer training, gait training     Time in  1010  Time out  1055    Total Treatment Time  45 minutes     CPT codes:  [] Low Complexity PT evaluation 73267  [] Moderate Complexity PT evaluation 46927  [] High Complexity PT evaluation 91135  [] PT Re-evaluation 42677  [] Gait training 68491 -- minutes  [] Manual therapy 51507 -- minutes  [x] Therapeutic activities 61533 30 minutes  [x] Therapeutic exercises 24427 15 minutes  [] Neuromuscular reeducation 69841 -- minutes     Lacretia Denver, PT, DPT  [id number]

## 2022-05-03 NOTE — PROGRESS NOTES
[place]  Department of Pulmonary, Critical Care and Sleep Medicine  [address]  Department of Internal Medicine  Progress Note    SUBJECTIVE:    Patient seen and examined while in ICU. She is for transfer today. FiO2 needs remain between 3 to 5 L nasal cannula. She does report that she does still feel short of breath at times. Per residents did again have episode of stridor yesterday. Laryngoscopy was completed by ENT who felt that she may have a posterior glottic or interarytenoid scar band or mucosal crusting. OBJECTIVE:  Vitals:    05/03/22 1247 05/03/22 1300 05/03/22 1400 05/03/22 1500   BP:   122/79 (!) 139/94   Pulse:  82 97 88   Resp: 20 16 17 15   Temp:       TempSrc:       SpO2: 95% 100% 94% 99%   Weight:       Height:         Constitutional: Alert,     EENT: EOMI SRINIVAS. MMM. No icterus. No thrush. Voice remains hoarse  Neck: No thyromegaly. No elevated JVP. Trachea was midline. Respiratory: Symmetrical.  Breath sounds were clear. Cardiovascular: Regular, No murmur. No rubs. Pulses:  Equal bilaterally. Abdomen: Soft without organomegaly. No rebound, rigidity. No guarding. Lymphatic: No lymphadenopathy. Musculoskeletal: Without weakness or gross deficits  Extremities:  No lower extremity edema. Reflexes appear adequate. Skin:  Warm and dry. No skin rashes. Neurological/Psychiatric: Continued left-sided weakness      DATA:    Monitor Strips:  Reviewed & discusses with technical team. No changes noted. RADIOLOGY:  Barium swallow study completed today which shows penetration and aspiration with nectar consistently liquids. Chest x-ray consistent with pulmonary edema/vascular congestion.       CBC with Differential:    Lab Results   Component Value Date    WBC 17.9 05/03/2022    RBC 3.14 05/03/2022    HGB 8.9 05/03/2022    HCT 27.8 05/03/2022     05/03/2022    MCV 88.5 05/03/2022    MCH 28.3 05/03/2022 MCHC 32.0 05/03/2022    RDW 14.4 05/03/2022     CMP:    Lab Results   Component Value Date     05/03/2022    K 4.3 05/03/2022    K 3.6 04/27/2022     05/03/2022    CO2 26 05/03/2022    BUN 36 05/03/2022    CREATININE 0.9 05/03/2022    GFRAA >60 05/03/2022    LABGLOM >60 05/03/2022    GLUCOSE 164 05/03/2022    CALCIUM 8.4 05/03/2022       CLINICAL ASSESMENT:  1. Acute hypoxemic respiratory failure  2. Epistaxis-resolving  3. Mucous plugging  4. Concern for posterior glottic/interarytenoid scar band versus mucosal crusting. 5. Bilateral DVT on lower extremity ultrasound  6. Right MCA stroke  7. E. coli UTI  8. Acute on chronic anemia secondary to epistaxis    PLAN: If needed the case was discussed with the care team  1. Wean FiO2 as tolerated  2. Continue BiPAP at at bedtime and as needed  3. Continue Decadron  4. Recommend aggressive airway hygiene measures with incentive spirometry and flutter valve. 3% saline nebs  5. Remainder of medical problems as per primary team.  Dr. Demarcus Nowak will be following this patient May 4 through 8.       Gonzales Glasgow DO

## 2022-05-03 NOTE — PROGRESS NOTES
SPEECH/LANGUAGE PATHOLOGY  VIDEOFLUOROSCOPIC STUDY OF SWALLOWING (MBS)   and PLAN OF CARE    PATIENT NAME:  Shaista Stephens  (female)     MRN:  [id number]    :  1945  (68 y.o.)  STATUS:  Inpatient: Room 4429/4429-A    TODAY'S DATE:  5/3/2022  05/02/22 1000   SLP video swallow Start: 22 1000, End: 22 1000, ONE TIME, Standing Count: 1 Occurrences, Yelena Agrawal MD  REASON FOR REFERRAL: Assessment of oropharyngeal swallow function; s/p extubation and swelling of the true and false vocal folds   EVALUATING THERAPIST: Ashley Santa      RESULTS:      DYSPHAGIA DIAGNOSIS:  severe-profound oropharyngeal phase dysphagia     Study discontinued due to severity of aspiration during first bolus (coated tsp of nectar thick liquid).      DIET RECOMMENDATIONS:  NPO     FEEDING RECOMMENDATIONS:    Assistance level:  Not applicable     Compensatory strategies recommended: Not applicable     Discussed recommendations with nursing and/or faxed report to referring provider: Yes    Laryngeal Penetration and Aspiration:  Penetration WITH aspiration was observed in today's study with  mildly thick liquid (nectar)    SPEECH THERAPY  PLAN OF CARE   The dysphagia POC is established based on physician order and dysphagia diagnosis    Skilled SLP intervention for dysphagia management up to 5x per week until goals met, pt plateaus in function and/or discharged from hospital      Conditions Requiring Skilled Therapeutic Intervention for dysphagia:    Oral motor strength/coordination impairment  Swallow triggered when bolus head at level of laryngeal surface of epiglottis increasing risk of aspiration  Incoordination of swallow/breathing pattern   reduced sensation     SPECIFIC DYSPHAGIA INTERVENTIONS TO INCLUDE:     Therapeutic exercises  ongoing skilled PO analysis to determine if PO diet can be initiated     Specific instructions for next treatment:  initiate instruction of therapeutic exercises  and ongoing skilled PO analysis to determine if PO diet can be initiated   Treatment Goals:    Short Term Goals:  Pt will participate in ongoing evaluation of swallow function to determine when PO diet can be safely initiated  Pt will complete BOTR strength/ ROM exercises to reduce pharyngeal residuals and improve epiglottic inversion minimal verbal prompts  Pt will complete laryngeal strength/ ROM therapeutic exercises to improve airway protection for the least restrictive PO diet minimal verbal prompts  Pt will complete Effortful Swallow therapeutically to target increased oral and base of tongue pressure, increased pharyngeal constrictor contractions, and increased UES relaxation duration to reduce pharyngeal residue with minimal verbal prompts   Pt will complete Marni Maneuver therapeutically to target increased pharyngeal constrictor contractions to reduce pharyngeal residue with minimal verbal prompts     Long Term Goals:   Pt will maintain adequate nutrition/hydration via PO intake of the least restrictive oral diet with implementation of safe swallow/ compensatory strategies and decrease signs/symptoms of aspiration to less than 1 x/day. Pt will improve oropharyngeal swallow function to ensure airway protection during PO intake to maintain adequate nutrition/hydration and decrease signs/symptoms of aspiration to less than 1 x/day.         Patient/family Goal:    Patient not able to accurately state due to impaired cognition and/or communication at time of eval     Plan of care discussed with Patient   The Patient understand(s) the diagnosis, prognosis and plan of care     Rehabilitation Potential/Prognosis: fair                      ADMITTING DIAGNOSIS: Stroke-like symptoms [R29.90]     VISIT DIAGNOSIS:         PATIENT REPORT/COMPLAINT: patient currently NPO pending results of this evaluation    PRIOR LEVEL OF SWALLOW FUNCTION:    Past History of Dysphagia?:  none reported    Home diet: Regular consistency solids (IDDSI level 7) with  thin liquids (IDDSI level 0)  Current Diet Order:  Diet NPO  ADULT TUBE FEEDING; Orogastric; Standard with Fiber; Continuous; 10; Yes; 15; Q 4 hours; 50; 100; Q 4 hours; Protein; 1 Proteinex Daily    PROCEDURE:  Consistencies Administered During the Evaluation   Liquids: nectar thick liquid   Solids:  not appropriate      Method of Intake:   coated spoon  Fed by clinician      Position:   Seated, upright    INSTRUMENTAL ASSESSMENT:    ORAL PREP/ ORAL PHASE:    Impaired oral initiation  Delayed A-P transit due to: decreased ability for initiation    and cognitive function   Decreased bolus formation resulting in observed premature pharyngeal spillage     PHARYNGEAL PHASE:     ONSET TIME       Delayed initiation of the pharyngeal swallow was noted with swallow reflex triggered at the level of the vallecula        PHARYNGEAL RESIDUALS        Vallecula/Pharyngeal Wall           No significant residuals were noted in the vallecula      Pyriform Sinuses      No significant residuals were noted in the pyriform sinuses     LARYNGEAL PENETRATION   Laryngeal penetration occurred prior to aspiration. Further details under aspiration section. ASPIRATION  Aspiration occurred DURING the swallow for nectar consistency liquid due to  delayed laryngeal closure . Aspiration was severe-profound and occurred consistently . an absent cough/throat clear was noted    PENETRATION-ASPIRATION SCALE (PAS):  THIN item not administered  MILDLY THICK 8 = Material enters the airway, passes below the vocal folds, and no effort is made to eject   MODERATELY THICK item not administered  PUREE item not administered  HARD SOLID item not administered       COMPENSATORY STRATEGIES    Compensatory strategies were not attempted      STRUCTURAL/FUNCTIONAL ANOMALIES   No structural/functional anomalies were noted    CERVICAL ESOPHAGEAL STAGE :     The cervical esophagus appeared adequate          ___________    Cognition:   Confusion noted    Oral Peripheral Examination   Generalized oral weakness    Current Respiratory Status   6 liters nasal cannula     Parameters of Speech Production  Respiration:  Shortness of breath  Quality:   Strained  Intensity: Quiet    Pain: No pain reported. EDUCATION:   The Speech Language Pathologist (SLP) completed education regarding results of evaluation and that intervention is warranted at this time. Learner: Patient  Education: Reviewed results and recommendations of this evaluation, Reviewed diet and strategies and Reviewed signs, symptoms and risks of aspiration  Evaluation of Education:  Verbalizes understanding    This plan may be re-evaluated and revised as warranted. Evaluation Time includes thorough review of current medical information, gathering information on past medical history/social history and prior level of function, completion of standardized testing/informal observation of tasks, assessment of data and education on plan of care and goals. [x]The admitting diagnosis and active problem list, have been reviewed prior to initiation of this evaluation. CPT Code: 22786  dysphagia study    INTERVENTION  CPT Code: 20285  dysphagia tx    SLP educated pt on the risk of aspiration that pt is at and the risk that puts her at for pneumonia. Explained the results of the study and recommendations for NPO. ACTIVE PROBLEM LIST:   Patient Active Problem List   Diagnosis    Stroke-like symptoms    Oral bleeding    TIA (transient ischemic attack)    Nasal bleeding    Anticoagulated       Alexis HENRY   Speech Language Pathology

## 2022-05-03 NOTE — PLAN OF CARE
Problem: Skin/Tissue Integrity  Goal: Absence of new skin breakdown  Description: 1. Monitor for areas of redness and/or skin breakdown  2. Assess vascular access sites hourly  3. Every 4-6 hours minimum:  Change oxygen saturation probe site  4. Every 4-6 hours:  If on nasal continuous positive airway pressure, respiratory therapy assess nares and determine need for appliance change or resting period.   Outcome: Progressing     Problem: ABCDS Injury Assessment  Goal: Absence of physical injury  Outcome: Progressing

## 2022-05-03 NOTE — PROGRESS NOTES
Hospitalist Progress Note      Synopsis: Patient admitted as a transfer from [facility] for strokelike symptoms. [facility] presented to [facility] with sudden onset of left-sided weakness and aphasia that occurred while in the middle of a conversation with her daughter. In ED she was noted to have severe aphasia, severe dysarthria, and left arm and leg drift, and rightward gaze, and left-sided hemianopia. MRI of the brain right MCA stroke likely embolic in nature. Neurology is following. Carotid ultrasound with no significant stenosis. Awaiting echo to assess for paradoxical embolus. If unrevealing plan is for Zio patch at discharge. She is currently being treated with therapeutic Lovenox with plans to switch to Coumadin when she is able to take p.o. she began obtunded and stridorous requiring transfer to ICU and intubation. A large mucous plug was extracted from her airway. Patient began having bleeding from the nose and mouth. ENT was consulted. Nasal and oral packing remain in place. Anticoagulation on hold. Nephrology following for CURT with oliguria. Nasal packing was removed and replaced with absorbable sinofoam. She was successfully extubated. Hospital day 11     Subjective: Major overnight changes. Kallie afebrile.     Temp (24hrs), Av.9 °F (37.2 °C), Min:97.5 °F (36.4 °C), Max:99.5 °F (37.5 °C)    DIET: Diet NPO  ADULT TUBE FEEDING; Orogastric; Standard with Fiber; Continuous; 10; Yes; 15; Q 4 hours; 50; 100; Q 4 hours; Protein; 1 Proteinex Daily  CODE: Full Code    Intake/Output Summary (Last 24 hours) at 5/3/2022 1229  Last data filed at 5/3/2022 1200  Gross per 24 hour   Intake 1653.47 ml   Output 1045 ml   Net 608.47 ml       Review of Systems:    KURTIS given mental status    Objective:    BP (!) 148/89   Pulse 88   Temp 98.1 °F (36.7 °C) (Bladder)   Resp 20   Ht 5' (1.524 m)   Wt 192 lb 9.6 oz (87.4 kg)   SpO2 99%   BMI 37.61 kg/m²     General appearance: Obese elderly female in apparent mild respiratory distress. Follows commands. HEENT: Conjunctivae/corneas clear. Mucous membranes dry. Neck: Supple. No JVD. Respiratory: Decreased breath sounds  Cardiovascular:  RRR. S1, S2 without MRG. PV: Pulses palpable. No edema. Abdomen: Soft, non-tender, non-distended. +BS  Musculoskeletal: No obvious deformities. Skin: Normal skin color. No rashes or lesions. Good turgor. Neurologic: Generalized weakness though significantly worse on the left. Able to nod appropriately.      Medications:  REVIEWED DAILY    Infusion Medications    dextrose 50 mL/hr at 05/03/22 1200    dextrose       Scheduled Medications    dexamethasone  4 mg IntraVENous Q6H    insulin lispro  0-12 Units SubCUTAneous TID WC    insulin lispro  0-6 Units SubCUTAneous Nightly    sodium chloride  2 spray Each Nostril TID    pantoprazole  40 mg IntraVENous BID    QUEtiapine  12.5 mg Oral 2 times per day    melatonin  5 mg Oral QPM    [Held by provider] enoxaparin  1 mg/kg SubCUTAneous BID    [Held by provider] apixaban  5 mg Oral BID    vitamin D  5,000 Units Oral Daily    dilTIAZem  120 mg Oral Daily    [Held by provider] ramipril  5 mg Oral Daily    [Held by provider] aspirin  81 mg Oral Daily    Or    [Held by provider] aspirin  300 mg Rectal Daily    atorvastatin  80 mg Oral Nightly     PRN Meds: sodium chloride, glycerin moisturizing mouth spray, hydrALAZINE, dextrose, glucagon (rDNA), dextrose, glucose, racepinephrine HCl, acetaminophen, melatonin, albuterol, acetaminophen, ondansetron **OR** ondansetron, polyethylene glycol, perflutren lipid microspheres    Labs:     Recent Labs     05/01/22  0430 05/01/22  0430 05/02/22  0426 05/02/22  0426 05/02/22  1415 05/02/22  2158 05/03/22  0530   WBC 14.7*  --  14.0*  --   --   --  17.9*   HGB 7.1*   < > 7.0*   < > 6.7* 9.0* 8.9*   HCT 22.3*   < > 22.8*   < > 21.9* 28.3* 27.8*     --  296  --   --   --  357    < > = values in this interval not displayed. Recent Labs     05/02/22  0425 05/02/22  1400 05/03/22  0530    134 146   K 4.8 3.9 4.3   * 100 108*   CO2 26 25 26   BUN 35* 34* 36*   CREATININE 1.0 0.9 0.9   CALCIUM 8.2* 8.1* 8.4*       No results for input(s): PROT, ALB, ALKPHOS, ALT, AST, BILITOT, AMYLASE, LIPASE in the last 72 hours. No results for input(s): INR in the last 72 hours. No results for input(s): Connee Matar in the last 72 hours.     Chronic labs:    Lab Results   Component Value Date    CHOL 140 04/23/2022    TRIG 87 04/23/2022    HDL 48 04/23/2022    LDLCALC 75 04/23/2022    INR 1.3 04/27/2022    LABA1C 5.6 04/23/2022       Radiology: REVIEWED DAILY    Assessment:  Acute respiratory failure secondary to mucous plugging  Right MCA stroke- severe aphasia, severe dysarthria, and left arm and leg drift, and rightward gaze, and left-sided hemianopia  Encephalopathy  CKD III  Nasal/oral bleeding  Acute on chronic anemia   Dysphagia  Acute delirium   UTI s/p 5 days of Ceftri  Leukocytosis  CKD, unknown baseline  CAD s/p PCI in 2017  HTN  HLD  Hx of BLE DVT + PE in 11/2021 anticoagulated on Eliquis    Plan:  ICU team and nephrology following  S/p extubation on 5/1, now on NIV  ACE inhibitor on hold   Monitor renal function, I&O  Neurology signed off with recs to resume anticoagulation if and when possible as well as Zio patch at discharge  Anticoagulation on hold  Monitor H&H, transfuse for Hb < 7   referral to hematology/oncology for hypercoagulable work-up given history of DVT/PE and failure of Eliquis   Wbc 17.9    DVT Prophylaxis [] Lovenox  []  Heparin [] DOAC [] PCDs [] Ambulation    GI Prophylaxis [x] PPI  [] H2 Blocker   [] Carafate  [] Diet/Tube Feeds   Level of care [] Med/Surg  [] Intermediate  [x]  ICU   Diet Diet NPO  ADULT TUBE FEEDING; Orogastric; Standard with Fiber; Continuous; 10; Yes; 15; Q 4 hours; 50; 100; Q 4 hours; Protein; 1 Proteinex Daily    Family contact [x]  N/A - per ICU team  [] At bedside   [] Phone call      Discharge Plan: TBD pending further clinical course    +++++++++++++++++++++++++++++++++++++++++++++++++  Sandra Au MD   [facility], [address]  +++++++++++++++++++++++++++++++++++++++++++++++++  NOTE: This report was transcribed using voice recognition software. Every effort was made to ensure accuracy; however, inadvertent computerized transcription errors may be present.

## 2022-05-04 LAB
ANION GAP SERPL CALCULATED.3IONS-SCNC: 11 MMOL/L (ref 7–16)
BUN BLDV-MCNC: 38 MG/DL (ref 6–23)
CALCIUM IONIZED: 1.22 MMOL/L (ref 1.15–1.33)
CALCIUM SERPL-MCNC: 8.2 MG/DL (ref 8.6–10.2)
CHLORIDE BLD-SCNC: 103 MMOL/L (ref 98–107)
CO2: 25 MMOL/L (ref 22–29)
CREAT SERPL-MCNC: 0.9 MG/DL (ref 0.5–1)
GFR AFRICAN AMERICAN: >60
GFR NON-AFRICAN AMERICAN: >60 ML/MIN/1.73
GLUCOSE BLD-MCNC: 112 MG/DL (ref 74–99)
METER GLUCOSE: 129 MG/DL (ref 74–99)
METER GLUCOSE: 150 MG/DL (ref 74–99)
METER GLUCOSE: 179 MG/DL (ref 74–99)
POTASSIUM SERPL-SCNC: 4.1 MMOL/L (ref 3.5–5)
SODIUM BLD-SCNC: 139 MMOL/L (ref 132–146)

## 2022-05-04 PROCEDURE — 82962 GLUCOSE BLOOD TEST: CPT

## 2022-05-04 PROCEDURE — 80048 BASIC METABOLIC PNL TOTAL CA: CPT

## 2022-05-04 PROCEDURE — 92526 ORAL FUNCTION THERAPY: CPT | Performed by: SPEECH-LANGUAGE PATHOLOGIST

## 2022-05-04 PROCEDURE — 2700000000 HC OXYGEN THERAPY PER DAY

## 2022-05-04 PROCEDURE — 94640 AIRWAY INHALATION TREATMENT: CPT

## 2022-05-04 PROCEDURE — 36415 COLL VENOUS BLD VENIPUNCTURE: CPT

## 2022-05-04 PROCEDURE — 6370000000 HC RX 637 (ALT 250 FOR IP): Performed by: STUDENT IN AN ORGANIZED HEALTH CARE EDUCATION/TRAINING PROGRAM

## 2022-05-04 PROCEDURE — 6360000002 HC RX W HCPCS: Performed by: STUDENT IN AN ORGANIZED HEALTH CARE EDUCATION/TRAINING PROGRAM

## 2022-05-04 PROCEDURE — 97530 THERAPEUTIC ACTIVITIES: CPT

## 2022-05-04 PROCEDURE — 2580000003 HC RX 258

## 2022-05-04 PROCEDURE — 6370000000 HC RX 637 (ALT 250 FOR IP): Performed by: INTERNAL MEDICINE

## 2022-05-04 PROCEDURE — 97535 SELF CARE MNGMENT TRAINING: CPT

## 2022-05-04 PROCEDURE — 94660 CPAP INITIATION&MGMT: CPT

## 2022-05-04 PROCEDURE — 2580000003 HC RX 258: Performed by: INTERNAL MEDICINE

## 2022-05-04 PROCEDURE — 82330 ASSAY OF CALCIUM: CPT

## 2022-05-04 PROCEDURE — C9113 INJ PANTOPRAZOLE SODIUM, VIA: HCPCS | Performed by: STUDENT IN AN ORGANIZED HEALTH CARE EDUCATION/TRAINING PROGRAM

## 2022-05-04 PROCEDURE — 2060000000 HC ICU INTERMEDIATE R&B

## 2022-05-04 PROCEDURE — 6360000002 HC RX W HCPCS: Performed by: INTERNAL MEDICINE

## 2022-05-04 PROCEDURE — 99233 SBSQ HOSP IP/OBS HIGH 50: CPT | Performed by: INTERNAL MEDICINE

## 2022-05-04 RX ORDER — LANOLIN ALCOHOL/MO/W.PET/CERES
6 CREAM (GRAM) TOPICAL NIGHTLY
Status: DISCONTINUED | OUTPATIENT
Start: 2022-05-04 | End: 2022-05-04

## 2022-05-04 RX ORDER — DEXTROSE, SODIUM CHLORIDE, SODIUM LACTATE, POTASSIUM CHLORIDE, AND CALCIUM CHLORIDE 5; .6; .31; .03; .02 G/100ML; G/100ML; G/100ML; G/100ML; G/100ML
INJECTION, SOLUTION INTRAVENOUS CONTINUOUS
Status: DISCONTINUED | OUTPATIENT
Start: 2022-05-04 | End: 2022-05-04

## 2022-05-04 RX ORDER — IPRATROPIUM BROMIDE AND ALBUTEROL SULFATE 2.5; .5 MG/3ML; MG/3ML
1 SOLUTION RESPIRATORY (INHALATION) EVERY 6 HOURS
Status: DISCONTINUED | OUTPATIENT
Start: 2022-05-04 | End: 2022-05-07

## 2022-05-04 RX ORDER — FLUCONAZOLE 2 MG/ML
200 INJECTION, SOLUTION INTRAVENOUS EVERY 24 HOURS
Status: COMPLETED | OUTPATIENT
Start: 2022-05-04 | End: 2022-05-08

## 2022-05-04 RX ORDER — CHLORHEXIDINE GLUCONATE 0.12 MG/ML
15 RINSE ORAL 2 TIMES DAILY
Status: DISCONTINUED | OUTPATIENT
Start: 2022-05-04 | End: 2022-05-11 | Stop reason: HOSPADM

## 2022-05-04 RX ADMIN — CHLORHEXIDINE GLUCONATE 15 ML: 1.2 RINSE ORAL at 01:32

## 2022-05-04 RX ADMIN — IPRATROPIUM BROMIDE AND ALBUTEROL SULFATE 1 AMPULE: .5; 2.5 SOLUTION RESPIRATORY (INHALATION) at 09:03

## 2022-05-04 RX ADMIN — IPRATROPIUM BROMIDE AND ALBUTEROL SULFATE 1 AMPULE: .5; 2.5 SOLUTION RESPIRATORY (INHALATION) at 19:32

## 2022-05-04 RX ADMIN — SALINE NASAL SPRAY 2 SPRAY: 1.5 SOLUTION NASAL at 11:09

## 2022-05-04 RX ADMIN — DEXAMETHASONE SODIUM PHOSPHATE 4 MG: 4 INJECTION, SOLUTION INTRA-ARTICULAR; INTRALESIONAL; INTRAMUSCULAR; INTRAVENOUS; SOFT TISSUE at 05:10

## 2022-05-04 RX ADMIN — SODIUM CHLORIDE SOLN NEBU 3% 4 ML: 3 NEBU SOLN at 09:02

## 2022-05-04 RX ADMIN — IPRATROPIUM BROMIDE AND ALBUTEROL SULFATE 1 AMPULE: .5; 2.5 SOLUTION RESPIRATORY (INHALATION) at 12:32

## 2022-05-04 RX ADMIN — SODIUM CHLORIDE SOLN NEBU 3% 4 ML: 3 NEBU SOLN at 19:33

## 2022-05-04 RX ADMIN — INSULIN LISPRO 2 UNITS: 100 INJECTION, SOLUTION INTRAVENOUS; SUBCUTANEOUS at 16:42

## 2022-05-04 RX ADMIN — DEXAMETHASONE SODIUM PHOSPHATE 4 MG: 4 INJECTION, SOLUTION INTRA-ARTICULAR; INTRALESIONAL; INTRAMUSCULAR; INTRAVENOUS; SOFT TISSUE at 11:08

## 2022-05-04 RX ADMIN — SALINE NASAL SPRAY 2 SPRAY: 1.5 SOLUTION NASAL at 20:34

## 2022-05-04 RX ADMIN — FLUCONAZOLE IN SODIUM CHLORIDE 200 MG: 2 INJECTION, SOLUTION INTRAVENOUS at 14:42

## 2022-05-04 RX ADMIN — PANTOPRAZOLE SODIUM 40 MG: 40 INJECTION, POWDER, FOR SOLUTION INTRAVENOUS at 11:08

## 2022-05-04 RX ADMIN — SODIUM CHLORIDE, SODIUM LACTATE, POTASSIUM CHLORIDE, CALCIUM CHLORIDE AND DEXTROSE MONOHYDRATE: 5; 600; 310; 30; 20 INJECTION, SOLUTION INTRAVENOUS at 11:07

## 2022-05-04 RX ADMIN — SALINE NASAL SPRAY 2 SPRAY: 1.5 SOLUTION NASAL at 14:33

## 2022-05-04 RX ADMIN — INSULIN LISPRO 1 UNITS: 100 INJECTION, SOLUTION INTRAVENOUS; SUBCUTANEOUS at 20:34

## 2022-05-04 ASSESSMENT — PAIN SCALES - GENERAL: PAINLEVEL_OUTOF10: 0

## 2022-05-04 NOTE — PLAN OF CARE
Problem: Discharge Planning  Goal: Discharge to home or other facility with appropriate resources  5/3/2022 1808 by Hector Coles RN  Outcome: Progressing     Problem: Skin/Tissue Integrity  Goal: Absence of new skin breakdown  Description: 1. Monitor for areas of redness and/or skin breakdown  2. Assess vascular access sites hourly  3. Every 4-6 hours minimum:  Change oxygen saturation probe site  4. Every 4-6 hours:  If on nasal continuous positive airway pressure, respiratory therapy assess nares and determine need for appliance change or resting period.   5/4/2022 0154 by Rosy Hein RN  Outcome: Progressing  5/3/2022 1808 by Hector Coles RN  Outcome: Progressing     Problem: Safety - Adult  Goal: Free from fall injury  5/4/2022 0154 by Rosy Hein RN  Outcome: Progressing  5/3/2022 1808 by Hector Coles RN  Outcome: Progressing     Problem: Pain  Goal: Verbalizes/displays adequate comfort level or baseline comfort level  5/4/2022 0154 by Rosy Hein RN  Outcome: Progressing  5/3/2022 1808 by Hector Coles RN  Outcome: Progressing     Problem: Respiratory - Adult  Goal: Achieves optimal ventilation and oxygenation  5/4/2022 0154 by Rosy Hein RN  Outcome: Progressing  5/3/2022 1808 by Hector Coles RN  Outcome: Progressing     Problem: Nutrition Deficit:  Goal: Optimize nutritional status  5/4/2022 0154 by Rosy Hein RN  Outcome: Not Progressing  5/3/2022 1808 by Hector Coles RN  Outcome: Progressing  5/3/2022 1333 by Tito Bull RD, LD  Outcome: Progressing     Problem: ABCDS Injury Assessment  Goal: Absence of physical injury  5/4/2022 0154 by Rosy Hien RN  Outcome: Progressing  5/3/2022 1808 by Hector Coles RN  Outcome: Progressing

## 2022-05-04 NOTE — PLAN OF CARE
Problem: Discharge Planning  Goal: Discharge to home or other facility with appropriate resources  Outcome: Progressing     Problem: Skin/Tissue Integrity  Goal: Absence of new skin breakdown  Description: 1. Monitor for areas of redness and/or skin breakdown  2. Assess vascular access sites hourly  3. Every 4-6 hours minimum:  Change oxygen saturation probe site  4. Every 4-6 hours:  If on nasal continuous positive airway pressure, respiratory therapy assess nares and determine need for appliance change or resting period.   5/4/2022 1303 by Judge Andres RN  Outcome: Progressing  5/4/2022 0154 by Saji Tripp RN  Outcome: Progressing     Problem: Safety - Adult  Goal: Free from fall injury  5/4/2022 1303 by Judge Andres RN  Outcome: Progressing  5/4/2022 0154 by Saji Tripp RN  Outcome: Progressing     Problem: Pain  Goal: Verbalizes/displays adequate comfort level or baseline comfort level  5/4/2022 1303 by Judge Andres RN  Outcome: Progressing  5/4/2022 0154 by Saji Tripp RN  Outcome: Progressing     Problem: Respiratory - Adult  Goal: Achieves optimal ventilation and oxygenation  5/4/2022 1303 by Judge Andres RN  Outcome: Progressing  5/4/2022 0154 by Saji Tripp RN  Outcome: Progressing     Problem: Nutrition Deficit:  Goal: Optimize nutritional status  5/4/2022 1303 by Judge Andres RN  Outcome: Progressing  5/4/2022 0154 by Saji Tripp RN  Outcome: Not Progressing     Problem: ABCDS Injury Assessment  Goal: Absence of physical injury  5/4/2022 1303 by Judge Andres RN  Outcome: Progressing  5/4/2022 0154 by Saji Tripp RN  Outcome: Progressing

## 2022-05-04 NOTE — PROGRESS NOTES
Hospitalist Progress Note      Synopsis: Patient admitted as a transfer from [facility] for strokelike symptoms. Patient presented to [facility] with sudden onset of left-sided weakness and aphasia that occurred while in the middle of a conversation with her daughter. In ED she was noted to have severe aphasia, severe dysarthria, and left arm and leg drift, and rightward gaze, and left-sided hemianopia. MRI of the brain right MCA stroke likely embolic in nature. Neurology is following. Carotid ultrasound with no significant stenosis. Awaiting echo to assess for paradoxical embolus. If unrevealing plan is for Zio patch at discharge. She is currently being treated with therapeutic Lovenox with plans to switch to Coumadin when she is able to take p.o. she began obtunded and stridorous requiring transfer to ICU and intubation. A large mucous plug was extracted from her airway. Patient began having bleeding from the nose and mouth. ENT was consulted. Nasal and oral packing remain in place. Anticoagulation on hold. Nephrology following for CURT with oliguria. Nasal packing was removed and replaced with absorbable sinofoam. She was successfully extubated. Hospital day 12     Subjective:    Transferred out of ICU yesterday  Remains afebrile  Lethargic. Left-sided weakness.     Temp (24hrs), Av.2 °F (36.8 °C), Min:97.8 °F (36.6 °C), Max:98.4 °F (36.9 °C)    DIET: Diet NPO  ADULT TUBE FEEDING; Orogastric; Standard with Fiber; Continuous; 10; Yes; 15; Q 4 hours; 50; 100; Q 4 hours; Protein; 1 Proteinex Daily  CODE: Full Code    Intake/Output Summary (Last 24 hours) at 2022 1324  Last data filed at 5/3/2022 2322  Gross per 24 hour   Intake 568.33 ml   Output 250 ml   Net 318.33 ml       Review of Systems:    KURTIS given mental status    Objective:    BP (!) 143/70   Pulse 85   Temp 97.8 °F (36.6 °C) (Temporal)   Resp 18   Ht 5' (1.524 m)   Wt 192 lb 9.6 oz (87.4 kg)   SpO2 98% BMI 37.61 kg/m²     General appearance: Obese elderly female in apparent mild respiratory distress. Follows commands. HEENT: Conjunctivae/corneas clear. Mucous membranes dry. Neck: Supple. No JVD. Respiratory: Decreased breath sounds  Cardiovascular:  RRR. S1, S2 without MRG. PV: Pulses palpable. No edema. Abdomen: Soft, non-tender, non-distended. +BS  Musculoskeletal: No obvious deformities. Skin: Normal skin color. No rashes or lesions. Good turgor. Neurologic: Generalized weakness though significantly worse on the left. Able to nod appropriately.      Medications:  REVIEWED DAILY    Infusion Medications    dextrose 5% in lactated ringers 60 mL/hr at 05/04/22 1107    dextrose       Scheduled Medications    melatonin  6 mg Oral Nightly    chlorhexidine  15 mL Mouth/Throat BID    dexamethasone  4 mg IntraVENous Q6H    sodium chloride (Inhalant)  4 mL Nebulization BID    ipratropium-albuterol  1 ampule Inhalation Q4H WA    insulin lispro  0-12 Units SubCUTAneous TID WC    insulin lispro  0-6 Units SubCUTAneous Nightly    sodium chloride  2 spray Each Nostril TID    pantoprazole  40 mg IntraVENous BID    QUEtiapine  12.5 mg Oral 2 times per day    [Held by provider] enoxaparin  1 mg/kg SubCUTAneous BID    [Held by provider] apixaban  5 mg Oral BID    vitamin D  5,000 Units Oral Daily    dilTIAZem  120 mg Oral Daily    [Held by provider] ramipril  5 mg Oral Daily    [Held by provider] aspirin  81 mg Oral Daily    Or    [Held by provider] aspirin  300 mg Rectal Daily    atorvastatin  80 mg Oral Nightly     PRN Meds: sodium chloride, glycerin moisturizing mouth spray, hydrALAZINE, dextrose, glucagon (rDNA), dextrose, glucose, racepinephrine HCl, acetaminophen, albuterol, acetaminophen, ondansetron **OR** ondansetron, polyethylene glycol, perflutren lipid microspheres    Labs:     Recent Labs     05/02/22  0426 05/02/22  0426 05/02/22  1415 05/02/22  2158 05/03/22  0530   WBC 14.0*  --   -- --  17.9*   HGB 7.0*   < > 6.7* 9.0* 8.9*   HCT 22.8*   < > 21.9* 28.3* 27.8*     --   --   --  357    < > = values in this interval not displayed. Recent Labs     05/02/22  1400 05/03/22  0530 05/04/22  0857    146 139   K 3.9 4.3 4.1    108* 103   CO2 25 26 25   BUN 34* 36* 38*   CREATININE 0.9 0.9 0.9   CALCIUM 8.1* 8.4* 8.2*       No results for input(s): PROT, ALB, ALKPHOS, ALT, AST, BILITOT, AMYLASE, LIPASE in the last 72 hours. No results for input(s): INR in the last 72 hours. No results for input(s): Emily Dodd in the last 72 hours. Chronic labs:    Lab Results   Component Value Date    CHOL 140 04/23/2022    TRIG 87 04/23/2022    HDL 48 04/23/2022    LDLCALC 75 04/23/2022    INR 1.3 04/27/2022    LABA1C 5.6 04/23/2022       Radiology: REVIEWED DAILY    Assessment:  Acute respiratory failure secondary to mucous plugging  Right MCA stroke- severe aphasia, severe dysarthria, and left arm and leg drift, and rightward gaze, and left-sided hemianopia  Encephalopathy  CKD III  Nasal/oral bleeding  Acute on chronic anemia   Dysphagia  Acute delirium   UTI s/p 5 days of Ceftri  Leukocytosis  CKD, unknown baseline  CAD s/p PCI in 2017  HTN  HLD  Hx of BLE DVT + PE in 11/2021 anticoagulated on Eliquis  Nasal bleed  Plan:  S/p extubation on 5/1  ACE inhibitor on hold   On  D5LR @ 60 ml/hr  Nephrology following     Neurology signed off with recs to resume anticoagulation if and when possible as well as Zio patch at discharge  Anticoagulation on hold    Monitor H&H, transfuse for Hb < 7     referral to hematology/oncology for hypercoagulable work-up given history of DVT/PE and failure of Eliquis   Wbc 17.9    For nasal bleed ENT recommended  · Bilateral nares packed with Rapid Rhino, to stay 3-5 days   Currently anticoagulation remains on hold.     Neurology following recommended IV antifungal and continue BiPAP      DVT Prophylaxis [] Lovenox  []  Heparin [] DOAC [] PCDs [] Ambulation    GI Prophylaxis [x] PPI  [] H2 Blocker   [] Carafate  [] Diet/Tube Feeds   Level of care [] Med/Surg  [] Intermediate  [x]  ICU   Diet Diet NPO  ADULT TUBE FEEDING; Orogastric; Standard with Fiber; Continuous; 10; Yes; 15; Q 4 hours; 50; 100; Q 4 hours; Protein; 1 Proteinex Daily    Family contact [x]  N/A - per ICU team  [] At bedside   [] Phone call      Discharge Plan:  Pending rehab placement     +++++++++++++++++++++++++++++++++++++++++++++++++  Jinx MD Sharita   [address]  +++++++++++++++++++++++++++++++++++++++++++++++++  NOTE: This report was transcribed using voice recognition software. Every effort was made to ensure accuracy; however, inadvertent computerized transcription errors may be present.

## 2022-05-04 NOTE — PROGRESS NOTES
[place]  Department of Pulmonary, Critical Care and Sleep Medicine  [address] [facility]  Department of Internal Medicine  Progress Note    SUBJECTIVE:    On the floor with family  Failed swallowing test  + edema  Signifiant thrush noted  FiO2 needs remain between 3 to 5 L nasal cannula. Laryngoscopy was completed by ENT who felt that she may have a posterior glottic or interarytenoid scar band or mucosal crusting. OBJECTIVE:  Vitals:    05/04/22 0050 05/04/22 0830 05/04/22 0904 05/04/22 1232   BP:  (!) 143/70     Pulse:  85     Resp: 16 18 20 18   Temp:  97.8 °F (36.6 °C)     TempSrc:  Temporal     SpO2:  100% 98% 98%   Weight:       Height:         Constitutional: Alert,     EENT: EOMI SRINIVAS. MMM. No icterus. +++ thrush. Voice remains hoarse   Neck: No thyromegaly. No elevated JVP. Trachea was midline. Respiratory: Symmetrical.  Breath sounds were clear. Cardiovascular: Regular, No murmur. No rubs. Pulses:  Equal bilaterally. Abdomen: Soft without organomegaly. No rebound, rigidity. No guarding. Lymphatic: No lymphadenopathy. Musculoskeletal: Without weakness or gross deficits  Extremities: +  lower extremity edema. Reflexes appear adequate. Skin:  Warm and dry. No skin rashes. Neurological/Psychiatric: Continued left-sided weakness      DATA:    Monitor Strips:  Reviewed & discusses with technical team. No changes noted. RADIOLOGY:  Barium swallow study completed today which shows penetration and aspiration with nectar consistently liquids. Chest x-ray consistent with pulmonary edema/vascular congestion.       CBC with Differential:    Lab Results   Component Value Date    WBC 17.9 05/03/2022    RBC 3.14 05/03/2022    HGB 8.9 05/03/2022    HCT 27.8 05/03/2022     05/03/2022    MCV 88.5 05/03/2022    MCH 28.3 05/03/2022    MCHC 32.0 05/03/2022    RDW 14.4 05/03/2022     CMP:    Lab Results   Component Value Date     05/04/2022    K 4.1 05/04/2022    K 3.6 04/27/2022     05/04/2022    CO2 25 05/04/2022    BUN 38 05/04/2022    CREATININE 0.9 05/04/2022    GFRAA >60 05/04/2022    LABGLOM >60 05/04/2022    GLUCOSE 112 05/04/2022    CALCIUM 8.2 05/04/2022       CLINICAL ASSESMENT:  1. Acute hypoxemic respiratory failure  2. Epistaxis-resolving  3. Mucous plugging  4. Concern for posterior glottic/interarytenoid scar band versus mucosal crusting. 5. Bilateral DVT on lower extremity ultrasound  6. Right MCA stroke  7. E. coli UTI  8. Acute on chronic anemia secondary to epistaxis    PLAN: If needed the case was discussed with the care team  1. Wean FiO2 as tolerated  2. Continue BiPAP at at bedtime and as needed  3. Stop decadron  4. Antifungal IV  5. Recommend aggressive airway hygiene measures with incentive spirometry and flutter valve.   3% saline marika Robbins DO

## 2022-05-04 NOTE — PROGRESS NOTES
Occupational Therapy  OT BEDSIDE TREATMENT NOTE   [address] [facility] [address] [facility]  [address] [place] [address]  Patient Name: Jolene Quarles  MRN: [id number]  : 1945  Room: [address] [place] [address]     Per OT Eval:    Re-evaluating OT: Milagro Lancaster OTD, OTR/L, [id number]     Evaluating OT: Gabrielebonny Lombardo OTR/L #[id number]      Re-evaluation indicated d/t pt transfer to ICU for respiratory distress on .     Referring Provider: Glenys Antoine DO  Specific Provider Orders/Date: OT eval and treat 22     Diagnosis: Stroke-like symptoms [R29.90]   Pt admitted to hospital with L sided weakness, aphasia, dysarthria         Pertinent Medical History:  has a past medical history of CAD (coronary artery disease), DVT, bilateral lower limbs ([facility] [address] HTN (hypertension), Hyperlipidemia, and Pulmonary embolism ([facility] [address]       Precautions:  Fall Risk, flaccid LUE, R gaze, L inattention, dysarthria, , bed alarm, R lateral lean     Assessment of current deficits    [x]? Functional mobility          [x]? ADLs           [x]? Strength                  [x]? Cognition    [x]? Functional transfers        [x]? IADLs         [x]? Safety Awareness   [x]? Endurance    [x]? Fine Coordination                        [x]? Balance      [x]? Vision/perception   [x]? Sensation      [x]? Gross Motor Coordination            [x]? ROM           []? Delirium                   [x]?  Motor Control      OT PLAN OF CARE   OT POC based on physician orders, patient diagnosis and results of clinical assessment     Frequency/Duration 1-5 days/wk for 2 weeks PRN   Specific OT Treatment Interventions to include:   * Instruction/training on adapted ADL techniques and AE recommendations to increase functional independence within precautions       * Training on energy conservation strategies, correct breathing pattern and techniques to improve independence/tolerance for self-care routine  * Functional transfer/mobility training/DME recommendations for increased independence, safety, and fall prevention  * Patient/Family education to increase follow through with safety techniques and functional independence  * Recommendation of environmental modifications for increased safety with functional transfers/mobility and ADLs  * Cognitive retraining/development of therapeutic activities to improve problem solving, judgement, memory, and attention for increased safety/participation in ADL/IADL tasks  * Sensory re-education to improve body/limb awareness, maintain/improve skin integrity, and improve hand/UE motor function  * Visual-perceptual training to improve environmental scanning, visual attention/focus, and oculomotor skills for increased safety/independence with functional transfers/mobility and ADLs  * Splinting/positioning for increased function, prevention of contractures, and improve skin integrity  * Therapeutic exercise to improve motor endurance, ROM, and functional strength for ADLs/functional transfers  * Therapeutic activities to facilitate/challenge dynamic balance, stand tolerance for increased safety and independence with ADLs  * Therapeutic activities to facilitate gross/fine motor skills for increased independence with ADLs  * Neuro-muscular re-education: facilitation of righting/equilibrium reactions, midline orientation, scapular stability/mobility, normalization of muscle tone, and facilitation of volitional active controled movement  * Positioning to improve skin integrity, interaction with environment and functional independence  * Manual techniques for edema management        Modified [place] Scale (MRS)  Score     Description  0             No symptoms  1             No significant disability despite symptoms  2             Slight disability; able to look after own affairs  3             Moderate disability; able to ambulate without assist/ requires assist with ADLs  4 Moderate/Severe disability;requires assist to ambulate/assist with ADLs  5             Severe disability;bedridden/incontinent   6               Score:  5     Recommended Adaptive Equipment:  TBD      Home Living: Pt lives alone in a 1 story home with 1 ESTRELLA and 1 hand rail. Bathroom setup: walk-in shower; shower chair    Equipment owned: shower chair, [facility]     Prior Level of Function: mod I with ADLs , mod I with IADLs; ambulated with SPC   Driving: no   Occupation: Pt enjoys crocheting and hopes to return to the activity to complete an unfinished blanket.     Pain Level: Pt denies pain this session     Cognition: A&O: 3/ Follows 1 step directions, cues to focus on task             Memory:  P+             Sequencing:  P+             Problem solving:  P+             Judgement/safety:  P+                  Functional Assessment:  AM-PAC Daily Activity Raw Score:     Initial Eval Status  Date: 22 Re-evaluation Status  Date: 22 Treatment Status  Date:  22 STGs = LTGs  Time frame: 10-14 days   Feeding NPO  NPO  NT  NPO  Failed swallow test  Minimal Assist   Once cleared for diet. Grooming Moderate Assist  Maximal Assist overall  AAROM to wash face while seated at EOB.    Mod A  Supported in bed using R UE Minimal Assist   UB Dressing Maximal Assist  Max A     Max A  Continue to educate on cash-dressing techniques  Minimal Assist    LB Dressing Dependent  Dep     Dep  simulated Moderate Assist    Bathing Maximal Assist Dep    Max A   simulated Moderate Assist    Toileting Dependent  Dep   for toileting hygiene while supine in bed. Assist to roll L/R and for posterior hygiene.     Dep  Pink  Moderate Assist    Bed Mobility  Supine to sit: Maximal Assist x2  Sit to supine: Maximal Assist x2 Supine to sit: Maximal Assist x2  Sit to supine: Maximal Assist x2        Max A x 2  Supine < > sit  Supine to sit: Moderate Assist   Sit to supine:  Moderate Assist    Functional Transfers Maximal Assist x2     Sit to stands Maximal Assist x2     STS from EOB  Max A x 2  Sit < > stand   Moderate Assist    Functional Mobility Maximal Assist x2     1 Side step to [facility] with HHA NT  Pt unable this date. Max A x 2  1 side step, tone in L LE, significant L lateral lean Moderate Assist    Balance Sitting: max - min A  (L lateral lean; mod/max cuing; able to correct with min A at times)     Standing: max A x2 Sitting:  Static: Max A; R lateral lean  Dynamic: Dep  Standing: Max A x2     Sitting: Max-Mod A  Very brief periods of Min A  Standing: Max A x 2  L lateral lean    Sitting:  Static: SBA  Dynamic: Min A  Standing: Mod A   Activity Tolerance F-    Fair  99-99% on 4L rest & with exertion  F+   Visual/  Perceptual R gaze  L inattention     Continue to assess    R gaze preference  L inattention                         Hand Dominance right    Strength ROM Additional Info:    RUE   3-/5 wfl good  and wfl FMC/dexterity noted during ADL tasks      LUE  Grossly 0/5    PROM/AAROM WFL  5/4   mod tone noted     absent  and absent  FMC/dexterity noted during ADL tasks            Education:  Pt was educated through out treatment regarding proper technique & safety with bed mobility, functional transfers & mobility, focusing on maintaining mid-line with sitting & standing tasks, improving core strengthening & ADL cash-dressing techniques to ease tasks, improve safety & prevent falls to return home safely. Comments: Upon arrival pt was in bed & agreeable for therapy. At end of session pt was returned to bed, HOB upright, L UE supported, B heels off loaded, nsg present, all lines and tubes intact, call light within reach. · Pt has made slow progress towards set goals.    · Continue with current plan of care      Treatment Time In: 10:45            Treatment Time Out: 11:10           Treatment Charges: Mins Units   Ther Ex  [id number]     Manual Therapy Parva Wilbur [phone] [id number] [phone]   ADL/Home Mgt [id number]     Neuro Re-ed [id number]     Group Therapy      Orthotic manage/training  [id number]     Non-Billable Time     Total Timed Treatment 25 2       Corry GRAY  [address] [place] [address] Drive, [address] [place] [address]

## 2022-05-04 NOTE — PROGRESS NOTES
Physician Progress Note      PATIENT:               Aysha Littlejohn  CSN #:                  [id number]  :                       1945  ADMIT DATE:       2022 9:38 PM  [address] [place] DATE:  RESPONDING  PROVIDER #:        FABIOLA ARANA        QUERY TEXT:    Type of Encephalopathy: Please provide further specificity, if known. Clinical indicators include: encephalopathy, kd  Options provided:  -- Anoxic/hypoxic encephalopathy  -- Metabolic encephalopathy  -- Toxic encephalopathy  -- Hepatic encephalopathy  -- Hypertensive encephalopathy  -- Other - I will add my own diagnosis  -- Disagree - Not applicable / Not valid  -- Disagree - Clinically Unable to determine / Unknown        PROVIDER RESPONSE TEXT:    The patient has metabolic encephalopathy.       Electronically signed by:  Beverley Gresham 2022 7:01 AM

## 2022-05-04 NOTE — PROGRESS NOTES
SPEECH LANGUAGE PATHOLOGY  DAILY PROGRESS NOTE        PATIENT NAME:  Mikala Ruiz      :  1945          TODAY'S DATE:  2022 ROOM:  [address] [place], [place] [address]B    Patient seen for dysphagia management tx. SLP educated pt on the need to participating in exercises that can help strengthen pharyngeal muscles to improve airway protection. SLP explained to patient that during MBSS video swallow yesterday the small amount that was given to her went into her airway and lungs and that can cause pneumonia. Pt demonstrated good carryover of this information. Pt completed 2 sets of 10 effortful swallows with good effort. Pt displayed fair carryover of the exercises when asked to complete second set pt required a cue to begin. SLP educated pt that exercise can be completed on own time. SLP attempted to have pt complete jimmy exercise. Pt required max cues to complete exercise 5 times. Patient fatigued quickly during session. Pt still on 4 LNC. Will continue to follow.        CPT code(s) 10654  dysphagia tx  Total minutes :  15 minutes    Sammy FU  Speech Language Pathology

## 2022-05-04 NOTE — CARE COORDINATION
Per notes-. COREY's from [facility] for strokelike sx. She was having sudden onset left-sided weakness and aphasia. In [facility], she had severe aphasia, dysarthria, left arm/leg drift and rightward gaze with left-sided hemianopsia. She was transferred to [facility] ICU. Call placed to [facility]  vm message left wait call back. Call placed to hema Romero who is on her way to [facility] now. Second choice - [facility]- Spoke to Bettye Bauman- am pac scores too low. 3rd choice [facility] in [facility]. Cm/sw to follow. Electronically signed by Ankit Champion RN on 5/4/2022 at 12:44 PM    Call from hema Romero- she did tour [facility] and does not want her mother to go there. She will  Let us know other choices for SNF tomorrow. Electronically signed by Ankit Champion RN on 5/4/2022 at 3:53 PM

## 2022-05-04 NOTE — PROGRESS NOTES
Patient breathing improved with resolution of stridor. Suspect obstruction was due to mucus band vs scar band. Patient to follow up with ENT outpatient for further ENT evaluation as needed. ENT to sign off.     Mohini Justice DO,  Resident Physician, PGY-1  Department of Otolaryngology, [facility])

## 2022-05-04 NOTE — PROGRESS NOTES
Department of Internal Medicine  Nephrology Progress Note    Events reviewed    SUBJECTIVE:  We are following Ms. Jaycob Brown for CURT. She denies any complaints or concerns, daughter at the bedside and updated.      PHYSICAL EXAM:      Vitals:    VITALS:  BP (!) 143/70   Pulse 85   Temp 97.8 °F (36.6 °C) (Temporal)   Resp 18   Ht 5' (1.524 m)   Wt 192 lb 9.6 oz (87.4 kg)   SpO2 98%   BMI 37.61 kg/m²   24HR INTAKE/OUTPUT:      Intake/Output Summary (Last 24 hours) at 5/4/2022 1259  Last data filed at 5/3/2022 2322  Gross per 24 hour   Intake 568.33 ml   Output 300 ml   Net 268.33 ml       Constitutional: Alert, speech delayed, no acute distress  HEENT: PERRLA, normocephalic, atraumtic  Respiratory:  Diminished bilateral bases  Cardiovascular/Edema:  RRR, S1/S2  Gastrointestinal:  abd rounded, non tender, BS hypoactive  Neurologic: Alert and oriented, no focal deficits noted  Skin:  Warm,dry, ecchymosis to BUE  Other:  Pink catheter draining minimal yellow urine    Scheduled Meds:   melatonin  6 mg Oral Nightly    chlorhexidine  15 mL Mouth/Throat BID    dexamethasone  4 mg IntraVENous Q6H    sodium chloride (Inhalant)  4 mL Nebulization BID    ipratropium-albuterol  1 ampule Inhalation Q4H WA    insulin lispro  0-12 Units SubCUTAneous TID WC    insulin lispro  0-6 Units SubCUTAneous Nightly    sodium chloride  2 spray Each Nostril TID    pantoprazole  40 mg IntraVENous BID    QUEtiapine  12.5 mg Oral 2 times per day    [Held by provider] enoxaparin  1 mg/kg SubCUTAneous BID    [Held by provider] apixaban  5 mg Oral BID    vitamin D  5,000 Units Oral Daily    dilTIAZem  120 mg Oral Daily    [Held by provider] ramipril  5 mg Oral Daily    [Held by provider] aspirin  81 mg Oral Daily    Or    [Held by provider] aspirin  300 mg Rectal Daily    atorvastatin  80 mg Oral Nightly     Continuous Infusions:   dextrose 5% in lactated ringers 60 mL/hr at 05/04/22 1107    dextrose 50 mL/hr at 05/03/22 2322    dextrose       PRN Meds:.sodium chloride, glycerin moisturizing mouth spray, hydrALAZINE, dextrose, glucagon (rDNA), dextrose, glucose, racepinephrine HCl, acetaminophen, albuterol, acetaminophen, ondansetron **OR** ondansetron, polyethylene glycol, perflutren lipid microspheres    DATA:    CBC:   Lab Results   Component Value Date    WBC 17.9 05/03/2022    RBC 3.14 05/03/2022    HGB 8.9 05/03/2022    HCT 27.8 05/03/2022    MCV 88.5 05/03/2022    MCH 28.3 05/03/2022    MCHC 32.0 05/03/2022    RDW 14.4 05/03/2022     05/03/2022    MPV 9.8 05/03/2022     CMP:    Lab Results   Component Value Date     05/04/2022    K 4.1 05/04/2022    K 3.6 04/27/2022     05/04/2022    CO2 25 05/04/2022    BUN 38 05/04/2022    CREATININE 0.9 05/04/2022    GFRAA >60 05/04/2022    LABGLOM >60 05/04/2022    GLUCOSE 112 05/04/2022    CALCIUM 8.2 05/04/2022     Magnesium:    Lab Results   Component Value Date    MG 1.8 04/27/2022     Phosphorus:  No results found for: PHOS  Radiology Review:      Chest x-ray May 3, 2022   1. Cardiomegaly. 2. Hazy bilateral airspace disease favored to represent mild pulmonary   edema/vascular congestion. BRIEF SUMMARY OF INITIAL CONSULT:  Briefly, Ms. Bishop Nelson is a 68year old female with a PMH of HTN, CAD with PCI 2017, BLE DVT 11/2021, HLD, GERD who was admitted on April 22, 2022 after she was transferred from [facility] with stroke like symptoms. Patient presented to [facility] with complaint of strokelike symptoms and sudden onset left-sided weakness with aphasia, with MRI indicating right MCA without hemorrhagic conversion. Patient was originally admitted to the medical floor at [facility] and became progressively obtunded with stridorous respiratory failure, was intubated and transferred to MICU 4/26/2022. Patient developed severe epistaxis requiring packing per ENT.   Labs on admission were significant for sodium of 141, potassium 3.7, chloride 111, bicarbonate 18, BUN 21, creatinine 1.2, and hemoglobin of 7.1. We are consulted for worsening CURT and decreased urine output. Notably patient's daughter states that patient has been recently evaluated by nephrology in [place] and underwent blood work and renal ultrasound however did not receive the results yet. Ramipril, diltiazem, atorvastatin, apixaban, and omeprazole are medications patient was taking prior to admission. Problems resolved:  · Hypocalcemia, 2/2 vitamin D deficiency. On cholecalciferol, ionized calcium 1.16  · Vitamin D deficiency, on cholecalciferol  · Hypernatremia, secondary to decreased free water intake patient is NPO. Increase D5 water to 80 cc/h  · Alkalemia, pH 7.509, PCO2 28, HCO3 21.8, respiratory alkalosis  · Acute epistaxis, nasal packing in place  · Acute hypoxic respiratory failure 2/2 mucus plug, intubated on 4/26, extubated on 5/1, S/p bronchoscopy      IMPRESSION/RECOMMENDATIONS:      1. CURT stage III based on urine output,  volume responsive pre-renal CURT from poor oral intake and volume loss with severe anemia in the setting of ACEi administration. Oliguric. FENa 0.3%. Resolved, renal function improved beyond baseline, 0.9 mg/dL. 2. CKD Stage 2 with proteinuria, Renal US done at Dr. Latosha Mchugh office in [place], [place] shows increased cortical echogenicity consistent with medical renal disease. 3. HTN, on cardizem and hydralazine, ramipril (on hold)  ------------------------------------------------------------------------    4. CVA, MRI demonstrates infarcts in right MCA without hemorrhagic conversion. Neurology following, concern for already embolic source. Echo ordered for PFO evaluation to r/o paradoxical embolus. Borderline intracranial atherosclerosis.   5. Historical DVT and current right LE+, patiently chronically anticoagulated on Eliquis at home, was being treated with Lovenox for transition to [facility], now on hold secondary to epistaxis  6. Anemia, acute drop in hemoglobin with previous work-up at [facility]. Hemoglobin stable  7. HLD, on statin   8. GERD, on omeprazole at home  9. UTI, 2/2 E. coli s/p ceftriaxone. 10. N.p.o., failed MBSS. Start D5LR at 60 cc/hr . PEG tube? Plan:    · Start D5LR @ 60 cc/hr  · Continue to monitor renal function  · Continue to monitor H&H, transfuse to keep <7  · Continue to monitor blood pressure  · Failed swallow evaluation  · CBC in a.m.     Electronically signed by PATRICIA Alva CNP on 5/4/2022 at 12:59 PM

## 2022-05-04 NOTE — PROGRESS NOTES
05/03/22 2213   NIV Type   NIV Started/Stopped On   Equipment Type V60   Mode Bilevel   Mask Type Full face mask   Mask Size Small   Settings/Measurements   IPAP 10 cmH20   CPAP/EPAP 5 cmH2O   Rate Ordered 16   Resp 19   Insp Rise Time (%) 3 %   FiO2  40 %   I Time/ I Time % 0.9 s   Vt Exhaled 361 mL   Minute Volume 5.8 Liters   Mask Leak (lpm) 39 lpm   Date: 5/3/2022    Time: 10:14 PM    Patient Placed On BIPAP/CPAP/ Non-Invasive Ventilation? Yes    If no must comment. Facial area red/color change? No           If YES are Blister/Lesion present? No   If yes must notify nursing staff  BIPAP/CPAP skin barrier? Yes    Skin barrier type:mepilexlite       Comments:         Lola Essex, RCP

## 2022-05-05 ENCOUNTER — ANESTHESIA EVENT (OUTPATIENT)
Dept: ENDOSCOPY | Age: 77
DRG: 064 | End: 2022-05-05
Payer: MEDICARE

## 2022-05-05 ENCOUNTER — APPOINTMENT (OUTPATIENT)
Dept: ULTRASOUND IMAGING | Age: 77
DRG: 064 | End: 2022-05-05
Attending: FAMILY MEDICINE
Payer: MEDICARE

## 2022-05-05 PROBLEM — I63.9 ACUTE CVA (CEREBROVASCULAR ACCIDENT) (HCC): Status: ACTIVE | Noted: 2022-05-05

## 2022-05-05 PROBLEM — I63.9 STROKE OF UNKNOWN ETIOLOGY (HCC): Status: ACTIVE | Noted: 2022-05-05

## 2022-05-05 LAB
ALBUMIN SERPL-MCNC: 2.9 G/DL (ref 3.5–5.2)
ALP BLD-CCNC: 77 U/L (ref 35–104)
ALT SERPL-CCNC: 52 U/L (ref 0–32)
ANION GAP SERPL CALCULATED.3IONS-SCNC: 13 MMOL/L (ref 7–16)
AST SERPL-CCNC: 35 U/L (ref 0–31)
BILIRUB SERPL-MCNC: 0.7 MG/DL (ref 0–1.2)
BUN BLDV-MCNC: 31 MG/DL (ref 6–23)
CALCIUM SERPL-MCNC: 8.5 MG/DL (ref 8.6–10.2)
CHLORIDE BLD-SCNC: 108 MMOL/L (ref 98–107)
CO2: 23 MMOL/L (ref 22–29)
CREAT SERPL-MCNC: 0.9 MG/DL (ref 0.5–1)
GFR AFRICAN AMERICAN: >60
GFR NON-AFRICAN AMERICAN: >60 ML/MIN/1.73
GLUCOSE BLD-MCNC: 104 MG/DL (ref 74–99)
HCT VFR BLD CALC: 28.5 % (ref 34–48)
HEMOGLOBIN: 9 G/DL (ref 11.5–15.5)
MCH RBC QN AUTO: 28.2 PG (ref 26–35)
MCHC RBC AUTO-ENTMCNC: 31.6 % (ref 32–34.5)
MCV RBC AUTO: 89.3 FL (ref 80–99.9)
METER GLUCOSE: 115 MG/DL (ref 74–99)
METER GLUCOSE: 126 MG/DL (ref 74–99)
METER GLUCOSE: 86 MG/DL (ref 74–99)
METER GLUCOSE: 88 MG/DL (ref 74–99)
METER GLUCOSE: 99 MG/DL (ref 74–99)
PDW BLD-RTO: 14.2 FL (ref 11.5–15)
PLATELET # BLD: 426 E9/L (ref 130–450)
PMV BLD AUTO: 9.6 FL (ref 7–12)
POTASSIUM REFLEX MAGNESIUM: 4 MMOL/L (ref 3.5–5)
RBC # BLD: 3.19 E12/L (ref 3.5–5.5)
SODIUM BLD-SCNC: 144 MMOL/L (ref 132–146)
TOTAL PROTEIN: 5.8 G/DL (ref 6.4–8.3)
WBC # BLD: 20.5 E9/L (ref 4.5–11.5)

## 2022-05-05 PROCEDURE — 85027 COMPLETE CBC AUTOMATED: CPT

## 2022-05-05 PROCEDURE — 97530 THERAPEUTIC ACTIVITIES: CPT

## 2022-05-05 PROCEDURE — 80053 COMPREHEN METABOLIC PANEL: CPT

## 2022-05-05 PROCEDURE — 6370000000 HC RX 637 (ALT 250 FOR IP): Performed by: INTERNAL MEDICINE

## 2022-05-05 PROCEDURE — 36415 COLL VENOUS BLD VENIPUNCTURE: CPT

## 2022-05-05 PROCEDURE — 6370000000 HC RX 637 (ALT 250 FOR IP): Performed by: STUDENT IN AN ORGANIZED HEALTH CARE EDUCATION/TRAINING PROGRAM

## 2022-05-05 PROCEDURE — 97535 SELF CARE MNGMENT TRAINING: CPT

## 2022-05-05 PROCEDURE — 93970 EXTREMITY STUDY: CPT

## 2022-05-05 PROCEDURE — 93971 EXTREMITY STUDY: CPT | Performed by: RADIOLOGY

## 2022-05-05 PROCEDURE — 2580000003 HC RX 258: Performed by: INTERNAL MEDICINE

## 2022-05-05 PROCEDURE — 2700000000 HC OXYGEN THERAPY PER DAY

## 2022-05-05 PROCEDURE — 94660 CPAP INITIATION&MGMT: CPT

## 2022-05-05 PROCEDURE — 99233 SBSQ HOSP IP/OBS HIGH 50: CPT | Performed by: INTERNAL MEDICINE

## 2022-05-05 PROCEDURE — 82962 GLUCOSE BLOOD TEST: CPT

## 2022-05-05 PROCEDURE — 94640 AIRWAY INHALATION TREATMENT: CPT

## 2022-05-05 PROCEDURE — 2060000000 HC ICU INTERMEDIATE R&B

## 2022-05-05 PROCEDURE — 6360000002 HC RX W HCPCS: Performed by: INTERNAL MEDICINE

## 2022-05-05 RX ADMIN — SALINE NASAL SPRAY 2 SPRAY: 1.5 SOLUTION NASAL at 22:59

## 2022-05-05 RX ADMIN — IPRATROPIUM BROMIDE AND ALBUTEROL SULFATE 1 AMPULE: .5; 2.5 SOLUTION RESPIRATORY (INHALATION) at 02:38

## 2022-05-05 RX ADMIN — SODIUM CHLORIDE SOLN NEBU 3% 4 ML: 3 NEBU SOLN at 09:27

## 2022-05-05 RX ADMIN — IPRATROPIUM BROMIDE AND ALBUTEROL SULFATE 1 AMPULE: .5; 2.5 SOLUTION RESPIRATORY (INHALATION) at 09:27

## 2022-05-05 RX ADMIN — FLUCONAZOLE IN SODIUM CHLORIDE 200 MG: 2 INJECTION, SOLUTION INTRAVENOUS at 15:15

## 2022-05-05 RX ADMIN — IPRATROPIUM BROMIDE AND ALBUTEROL SULFATE 1 AMPULE: .5; 2.5 SOLUTION RESPIRATORY (INHALATION) at 14:38

## 2022-05-05 RX ADMIN — SALINE NASAL SPRAY 2 SPRAY: 1.5 SOLUTION NASAL at 13:13

## 2022-05-05 ASSESSMENT — PAIN SCALES - GENERAL
PAINLEVEL_OUTOF10: 0

## 2022-05-05 ASSESSMENT — LIFESTYLE VARIABLES: SMOKING_STATUS: 0

## 2022-05-05 ASSESSMENT — PAIN SCALES - WONG BAKER: WONGBAKER_NUMERICALRESPONSE: 0

## 2022-05-05 NOTE — CONSULTS
GENERAL SURGERY  CONSULT NOTE  5/5/2022    Physician Consulted: Dr. Sylvie Riggins  Reason for Consult: Dysphagia      HPI  Heri Jimenez is a 68 y.o. female who presents for evaluation of dysphagia. Patient was originally transferred from [facility] secondary to strokelike symptoms. She presented to [facility] with sudden onset of left-sided weakness and aphasia during the middle of a conversation with her daughter. MRI of the brain showed right MCA stroke likely embolic in nature. She is currently being treated with therapeutic Lovenox with plans to switch to Coumadin if she is able to take p.o. Patient underwent a video swallow study that showed that she had aspiration events. Due to patient's dysphagia and persistent aspiration events general surgery was consulted for possible PEG tube placement. Past Medical History:   Diagnosis Date    CAD (coronary artery disease)     s/p PCI in 2017    DVT, bilateral lower limbs ([facility] [address] 11/2021    on eliquis    HTN (hypertension)     Hyperlipidemia     Pulmonary embolism ([facility] [address] 11/2021    on eliquis       Past Surgical History:   Procedure Laterality Date    CHOLECYSTECTOMY  2011    HYSTERECTOMY  02/09/2011       Medications Prior to Admission:    Prior to Admission medications    Medication Sig Start Date End Date Taking?  Authorizing Provider   ramipril (ALTACE) 5 MG capsule Take 5 mg by mouth daily   Yes Historical Provider, MD   dilTIAZem (CARDIZEM CD) 120 MG extended release capsule Take 120 mg by mouth daily   Yes Historical Provider, MD   atorvastatin (LIPITOR) 80 MG tablet Take 80 mg by mouth daily Please send 2 40mg tablets easier for patient to swallow   Yes Historical Provider, MD   apixaban (ELIQUIS) 5 MG TABS tablet Take 5 mg by mouth 2 times daily   Yes Historical Provider, MD   Cholecalciferol (DIALYVITE VITAMIN D 5000 PO) Take 5,000 Units/day by mouth   Yes Historical Provider, MD   omeprazole (PRILOSEC) 20 MG delayed release capsule Take 20 mg by mouth daily   Yes Historical Provider, MD       Not on File    No family history on file. Social History     Tobacco Use    Smoking status: Not on file    Smokeless tobacco: Not on file   Substance Use Topics    Alcohol use: Not on file    Drug use: Not on file         Review of Systems   General ROS: negative  Hematological and Lymphatic ROS: negative  Respiratory ROS: negative  Cardiovascular ROS: negative  Gastrointestinal ROS: negative  Genito-Urinary ROS: negative  Musculoskeletal ROS: negative      PHYSICAL EXAM:    Vitals:    05/05/22 1110   BP: (!) 135/98   Pulse: 89   Resp: 18   Temp: 96.7 °F (35.9 °C)   SpO2: 97%       General Appearance:  awake, alert, oriented, in no acute distress  Skin:  Skin color, texture, turgor normal. No rashes or lesions. Head/face:  NCAT  Eyes:  PERRL  Lungs:  No chest wall tenderness. Heart:  Heart regular rate and rhythm  Abdomen:  Soft, non-tender, normal bowel sounds. No bruits, organomegaly or masses. Extremities: pulses present in all extremities      LABS:    CBC  Recent Labs     05/05/22  0450   WBC 20.5*   HGB 9.0*   HCT 28.5*        BMP  Recent Labs     05/05/22  0450      K 4.0   *   CO2 23   BUN 31*   CREATININE 0.9   CALCIUM 8.5*     Liver Function  Recent Labs     05/05/22 0450   BILITOT 0.7   AST 35*   ALT 52*   ALKPHOS 77   PROT 5.8*   LABALBU 2.9*     No results for input(s): LACTATE in the last 72 hours. No results for input(s): INR, PTT in the last 72 hours. Invalid input(s): PT    RADIOLOGY    MRI brain without contrast    Result Date: 4/23/2022  EXAMINATION: MRI OF THE BRAIN WITHOUT CONTRAST  4/23/2022 7:41 am TECHNIQUE: Multiplanar multisequence MRI of the brain was performed without the administration of intravenous contrast. COMPARISON: None.  HISTORY: ORDERING SYSTEM PROVIDED HISTORY: Strokelike symptoms TECHNOLOGIST PROVIDED HISTORY: Reason for exam:->Strokelike symptoms What reading provider will be dictating this exam?->CRC FINDINGS: INTRACRANIAL STRUCTURES/VENTRICLES: Scattered small foci of restricted diffusion essentially throughout the right MCA territory, including the basal ganglia. There is FLAIR hyperintensity of these foci. Chronic microvascular ischemic changes, fairly advanced. FLAIR vascular hyperintensity in the right MCA territory. No mass effect. No intracranial hemorrhage. No hydrocephalus. ORBITS: The visualized portion of the orbits demonstrate no acute abnormality. SINUSES: The visualized paranasal sinuses and mastoid air cells demonstrate no acute abnormality. BONES/SOFT TISSUES: The bone marrow signal intensity appears normal. The soft tissues demonstrate no acute abnormality. Multiple small recent infarcts throughout the right MCA territory. There is evidence of slow vascular flow in the right MCA territory. Consider CTA of the head/neck for further evaluation. Advanced chronic microvascular ischemic changes. No mass effect. No hemorrhage. No hydrocephalus.          ASSESSMENT:  68 y.o. female with dysphagia    PLAN:  Npo,IVF  Will plan PEG tube placement 5/6    Electronically signed by Jennie Torres DO on 5/5/22 at 2:04 PM EDT

## 2022-05-05 NOTE — DISCHARGE INSTR - COC
Continuity of Care Form    Patient Name: Meghan Pearson   :  1945  MRN:  [id number]    Admit date:  2022  Discharge date:  2022    Code Status Order: Full Code   Advance Directives:      Admitting Physician:  Sophie Mane DO  PCP: Uyen Renteria MD    Discharging Nurse: [place]field Proc. [place] [address] Unit/Room#: 4922/4010-G  Discharging Unit Phone Number: [phone]    Emergency Contact:   Extended Emergency Contact Information  Primary Emergency Contact: jason mackay  Home Phone: [phone]  Relation: Child    Past Surgical History:  Past Surgical History:   Procedure Laterality Date    CHOLECYSTECTOMY      HYSTERECTOMY  2011       Immunization History: There is no immunization history on file for this patient. Active Problems:  Patient Active Problem List   Diagnosis Code    Stroke-like symptoms R29.90    Oral bleeding K13.79    TIA (transient ischemic attack) G45.9    Nasal bleeding R04.0    Anticoagulated Z79.01    Stroke of unknown etiology (HCC) I63.9    Acute CVA (cerebrovascular accident) ([facility] [address] I63.9       Isolation/Infection:   Isolation            No Isolation          Patient Infection Status       None to display            Nurse Assessment:  Last Vital Signs: BP (!) 135/98   Pulse 89   Temp 96.7 °F (35.9 °C) (Temporal)   Resp 18   Ht 5' (1.524 m)   Wt 192 lb 9.6 oz (87.4 kg)   SpO2 97%   BMI 37.61 kg/m²     Last documented pain score (0-10 scale): Pain Level: 0  Last Weight:   Wt Readings from Last 1 Encounters:   22 192 lb 9.6 oz (87.4 kg)     Mental Status:  disoriented and alert to name and knows she's in the hospital    IV Access:  - None    Nursing Mobility/ADLs:  Walking   Dependent  Transfer  Assisted  Bathing  Assisted  Dressing  Assisted  Toileting  Assisted  Feeding  Assisted  Med Admin  Assisted  Med Delivery   whole and prefers mixed with applesauce    Wound Care Documentation and Therapy:        Elimination:  Continence:    Bowel: SECTION    Prognosis: Good    Condition at Discharge: Stable    Rehab Potential (if transferring to Rehab): Good    Recommended Labs or Other Treatments After Discharge: Follow-up with PCP within 1 week of discharge. Follow-up with consultants as indicated by them. Compliance with medications as prescribed on discharge. Zio patch to be followed by neurology  Follow up with hematology/oncology as per discharge instructions for hypercoagulable work up  Follow up with urology as per discharge instruction for urinary retention   Check H&H in 3-5 days. Recommend OP colonoscopy   BiPAP at [facility]    Physician Certification: I certify the above information and transfer of Marilyn Santiago  is necessary for the continuing treatment of the diagnosis listed and that she requires [place] for less 30 days.      Update Admission H&P: No change in H&P    PHYSICIAN SIGNATURE:  Electronically signed by PATRICIA Brannon NP on 5/11/22 at 2:18 PM EDT

## 2022-05-05 NOTE — PROGRESS NOTES
Hospitalist Progress Note      Synopsis: Patient admitted as a transfer from [facility] for strokelike symptoms. Patient presented to [facility] with sudden onset of left-sided weakness and aphasia that occurred while in the middle of a conversation with her daughter. In ED she was noted to have severe aphasia, severe dysarthria, and left arm and leg drift, and rightward gaze, and left-sided hemianopia. MRI of the brain right MCA stroke likely embolic in nature. Neurology is following. Carotid ultrasound with no significant stenosis. Awaiting echo to assess for paradoxical embolus. If unrevealing plan is for Zio patch at discharge. She is currently being treated with therapeutic Lovenox with plans to switch to Coumadin when she is able to take p.o. she began obtunded and stridorous requiring transfer to ICU and intubation. A large mucous plug was extracted from her airway. Patient began having bleeding from the nose and mouth. ENT was consulted. Nasal and oral packing remain in place. Anticoagulation on hold. Nephrology following for CURT with oliguria. Nasal packing was removed and replaced with absorbable sinofoam. She was successfully extubated. Hospital day 13     Subjective:    More awake today  Continues to have left-sided hemiparesis  Failed swallow evaluation.     Temp (24hrs), Av.5 °F (36.4 °C), Min:96.7 °F (35.9 °C), Max:98.3 °F (36.8 °C)    DIET: Diet NPO  ADULT TUBE FEEDING; Orogastric; Standard with Fiber; Continuous; 10; Yes; 15; Q 4 hours; 50; 100; Q 4 hours; Protein; 1 Proteinex Daily  CODE: Full Code    Intake/Output Summary (Last 24 hours) at 2022 7576  Last data filed at 2022 0355  Gross per 24 hour   Intake 0 ml   Output --   Net 0 ml       Review of Systems:    KURTIS given mental status    Objective:    BP (!) 135/98   Pulse 89   Temp 96.7 °F (35.9 °C) (Temporal)   Resp 18   Ht 5' (1.524 m)   Wt 192 lb 9.6 oz (87.4 kg)   SpO2 97%   BMI 37.61 kg/m²     General appearance: Obese elderly female . Follows commands. HEENT: Conjunctivae/corneas clear. Mucous membranes dry. Neck: Supple. No JVD. Respiratory: Decreased breath sounds  Cardiovascular:  RRR. S1, S2 without MRG. PV: Pulses palpable. No edema. Abdomen: Soft, non-tender, non-distended. +BS  Musculoskeletal: No obvious deformities. Skin: Normal skin color. No rashes or lesions. Good turgor. Neurologic: Left sided weakness with hemiparesis.     Medications:  REVIEWED DAILY    Infusion Medications    dextrose       Scheduled Medications    chlorhexidine  15 mL Mouth/Throat BID    fluconazole  200 mg IntraVENous Q24H    ipratropium-albuterol  1 ampule Inhalation Q6H    sodium chloride (Inhalant)  4 mL Nebulization BID    insulin lispro  0-12 Units SubCUTAneous TID WC    insulin lispro  0-6 Units SubCUTAneous Nightly    sodium chloride  2 spray Each Nostril TID    QUEtiapine  12.5 mg Oral 2 times per day    [Held by provider] enoxaparin  1 mg/kg SubCUTAneous BID    [Held by provider] apixaban  5 mg Oral BID    vitamin D  5,000 Units Oral Daily    dilTIAZem  120 mg Oral Daily    [Held by provider] ramipril  5 mg Oral Daily    [Held by provider] aspirin  81 mg Oral Daily    atorvastatin  80 mg Oral Nightly     PRN Meds: sodium chloride, glycerin moisturizing mouth spray, hydrALAZINE, dextrose, glucagon (rDNA), dextrose, glucose, racepinephrine HCl, acetaminophen, albuterol, acetaminophen, ondansetron **OR** ondansetron, polyethylene glycol, perflutren lipid microspheres    Labs:     Recent Labs     05/02/22  2158 05/03/22  0530 05/05/22  0450   WBC  --  17.9* 20.5*   HGB 9.0* 8.9* 9.0*   HCT 28.3* 27.8* 28.5*   PLT  --  357 426       Recent Labs     05/03/22  0530 05/04/22  0857 05/05/22  0450    139 144   K 4.3 4.1 4.0   * 103 108*   CO2 26 25 23   BUN 36* 38* 31*   CREATININE 0.9 0.9 0.9   CALCIUM 8.4* 8.2* 8.5*       Recent Labs     05/05/22  0450   PROT 5.8* ALKPHOS 77   ALT 52*   AST 35*   BILITOT 0.7       No results for input(s): INR in the last 72 hours. No results for input(s): Lennox Falket in the last 72 hours. Chronic labs:    Lab Results   Component Value Date    CHOL 140 04/23/2022    TRIG 87 04/23/2022    HDL 48 04/23/2022    LDLCALC 75 04/23/2022    INR 1.3 04/27/2022    LABA1C 5.6 04/23/2022       Radiology: REVIEWED DAILY    Assessment:  Acute respiratory failure secondary to mucous plugging  Right MCA stroke- severe aphasia, severe dysarthria, and left arm and leg drift, and rightward gaze, and left-sided hemianopia  Encephalopathy  CKD III  Nasal/oral bleeding  Acute on chronic anemia   Dysphagia  Acute delirium   UTI s/p 5 days of Ceftri  Leukocytosis  CKD, unknown baseline  CAD s/p PCI in 2017  HTN  HLD  Hx of BLE DVT + PE in 11/2021 anticoagulated on Eliquis  Nasal bleed  Failed swallow eval     Plan:  S/p extubation on 5/1  ACE inhibitor on hold   On  D5LR @ 60 ml/hr  Nephrology following     Neurology signed off with recs to resume anticoagulation if and when possible as well as Zio patch at discharge  Anticoagulation on hold    Monitor H&H, transfuse for Hb < 7     referral to hematology/oncology for hypercoagulable work-up given history of DVT/PE and failure of Eliquis   Wbc 20.5    For nasal bleed ENT recommended  · Bilateral nares packed with Rapid Rhino, to stay 3-5 days   Currently anticoagulation remains on hold. Pulmonology following recommended IV antifungal and continue BiPAP and oxygen.   Stopped Decadron     consulted general surgery for PEG tube placement     Discussed with patient's daughter at bedside, she is asking if we can repeat lower ext dopplers   Will obtain dopplers     DVT Prophylaxis [] Lovenox  []  Heparin [] DOAC [] PCDs [] Ambulation    GI Prophylaxis [x] PPI  [] H2 Blocker   [] Carafate  [] Diet/Tube Feeds   Level of care [] Med/Surg  [] Intermediate  [x]  ICU   Diet Diet NPO  ADULT TUBE FEEDING; Orogastric; Standard with Fiber; Continuous; 10; Yes; 15; Q 4 hours; 50; 100; Q 4 hours; Protein; 1 Proteinex Daily    Family contact [x]  N/A - per ICU team  [] At bedside   [] Phone call      Discharge Plan:  Pending rehab placement     +++++++++++++++++++++++++++++++++++++++++++++++++  Dick Hsu MD   [address]  +++++++++++++++++++++++++++++++++++++++++++++++++  NOTE: This report was transcribed using voice recognition software. Every effort was made to ensure accuracy; however, inadvertent computerized transcription errors may be present.

## 2022-05-05 NOTE — PROGRESS NOTES
Department of Internal Medicine  Nephrology Progress Note    Events reviewed. Patient for PEG tube    SUBJECTIVE:  We are following Ms. Elvi Kline for CURT. She denies any complaints, is confused.     PHYSICAL EXAM:      Vitals:    VITALS:  BP (!) 167/141   Pulse 98   Temp 98.3 °F (36.8 °C) (Temporal)   Resp 18   Ht 5' (1.524 m)   Wt 192 lb 9.6 oz (87.4 kg)   SpO2 91%   BMI 37.61 kg/m²   24HR INTAKE/OUTPUT:      Intake/Output Summary (Last 24 hours) at 5/5/2022 0955  Last data filed at 5/5/2022 035  Gross per 24 hour   Intake 0 ml   Output --   Net 0 ml       Constitutional: Alert, speech delayed, no acute distress  HEENT: PERRLA, normocephalic, atraumtic  Respiratory:  Diminished bilateral bases  Cardiovascular/Edema:  RRR, S1/S2  Gastrointestinal:  abd rounded, non tender, BS hypoactive  Neurologic: Alert and oriented, no focal deficits noted  Skin:  Warm,dry, ecchymosis to BUE  Other:  Pink catheter draining minimal yellow urine    Scheduled Meds:   chlorhexidine  15 mL Mouth/Throat BID    fluconazole  200 mg IntraVENous Q24H    ipratropium-albuterol  1 ampule Inhalation Q6H    sodium chloride (Inhalant)  4 mL Nebulization BID    insulin lispro  0-12 Units SubCUTAneous TID WC    insulin lispro  0-6 Units SubCUTAneous Nightly    sodium chloride  2 spray Each Nostril TID    QUEtiapine  12.5 mg Oral 2 times per day    [Held by provider] enoxaparin  1 mg/kg SubCUTAneous BID    [Held by provider] apixaban  5 mg Oral BID    vitamin D  5,000 Units Oral Daily    dilTIAZem  120 mg Oral Daily    [Held by provider] ramipril  5 mg Oral Daily    [Held by provider] aspirin  81 mg Oral Daily    atorvastatin  80 mg Oral Nightly     Continuous Infusions:   dextrose       PRN Meds:.sodium chloride, glycerin moisturizing mouth spray, hydrALAZINE, dextrose, glucagon (rDNA), dextrose, glucose, racepinephrine HCl, acetaminophen, albuterol, acetaminophen, ondansetron **OR** ondansetron, polyethylene glycol, perflutren lipid microspheres    DATA:    CBC:   Lab Results   Component Value Date    WBC 20.5 05/05/2022    RBC 3.19 05/05/2022    HGB 9.0 05/05/2022    HCT 28.5 05/05/2022    MCV 89.3 05/05/2022    MCH 28.2 05/05/2022    MCHC 31.6 05/05/2022    RDW 14.2 05/05/2022     05/05/2022    MPV 9.6 05/05/2022     CMP:    Lab Results   Component Value Date     05/05/2022    K 4.0 05/05/2022     05/05/2022    CO2 23 05/05/2022    BUN 31 05/05/2022    CREATININE 0.9 05/05/2022    GFRAA >60 05/05/2022    LABGLOM >60 05/05/2022    GLUCOSE 104 05/05/2022    PROT 5.8 05/05/2022    LABALBU 2.9 05/05/2022    CALCIUM 8.5 05/05/2022    BILITOT 0.7 05/05/2022    ALKPHOS 77 05/05/2022    AST 35 05/05/2022    ALT 52 05/05/2022     Magnesium:    Lab Results   Component Value Date    MG 1.8 04/27/2022     Phosphorus:  No results found for: PHOS  Radiology Review:      Chest x-ray May 3, 2022   1. Cardiomegaly. 2. Hazy bilateral airspace disease favored to represent mild pulmonary   edema/vascular congestion. BRIEF SUMMARY OF INITIAL CONSULT:  Briefly, Ms. Bishop Nelson is a 68year old female with a PMH of HTN, CAD with PCI 2017, BLE DVT 11/2021, HLD, GERD who was admitted on April 22, 2022 after she was transferred from [facility] with stroke like symptoms. Patient [facility] to [facility] with complaint of strokelike symptoms and sudden onset left-sided weakness with aphasia, with MRI indicating right MCA without hemorrhagic conversion. Patient was originally admitted to the medical floor at [facility] and became progressively obtunded with stridorous respiratory failure, was intubated and transferred to MICU 4/26/2022. Patient developed severe epistaxis requiring packing per ENT. Labs on admission were significant for sodium of 141, potassium 3.7, chloride 111, bicarbonate 18, BUN 21, creatinine 1.2, and hemoglobin of 7.1.   We are consulted for worsening CURT and decreased urine output. Notably patient's daughter states that patient has been recently evaluated by nephrology in [place] and underwent blood work and renal ultrasound however did not receive the results yet. Ramipril, diltiazem, atorvastatin, apixaban, and omeprazole are medications patient was taking prior to admission. Problems resolved:  · Hypocalcemia, 2/2 vitamin D deficiency. On cholecalciferol, ionized calcium 1.16  · Vitamin D deficiency, on cholecalciferol  · Hypernatremia, secondary to decreased free water intake patient is NPO. Increase D5 water to 80 cc/h  · Alkalemia, pH 7.509, PCO2 28, HCO3 21.8, respiratory alkalosis  · Acute epistaxis, nasal packing in place  · Acute hypoxic respiratory failure 2/2 mucus plug, intubated on 4/26, extubated on 5/1, S/p bronchoscopy  · CURT stage III based on urine output,  volume responsive pre-renal CURT from poor oral intake and volume loss with severe anemia in the setting of ACEi administration. Oliguric. FENa 0.3%. Resolved, renal function improved beyond baseline, 0.9 mg/dL. IMPRESSION/RECOMMENDATIONS:      1. CKD Stage 2 with proteinuria, Renal US done at Dr. Mirella Rubio office in [place], [place] shows increased cortical echogenicity consistent with medical renal disease. Renal function stable. 2. HTN, on cardizem and hydralazine, ramipril (on hold)  ------------------------------------------------------------------------    3. CVA, MRI demonstrates infarcts in right MCA without hemorrhagic conversion. Neurology following, concern for already embolic source. Echo ordered for PFO evaluation to r/o paradoxical embolus. Borderline intracranial atherosclerosis. 4. Historical DVT and current right LE+, patiently chronically anticoagulated on Eliquis at home, was being treated with Lovenox for transition to [address] [place] [address], now on hold secondary to epistaxis  5. Anemia, acute drop in hemoglobin with previous work-up at [facility]. Hemoglobin stable  6.  HLD, on statin   7. GERD, on omeprazole at home  8. UTI, 2/2 E. coli s/p ceftriaxone.    9. N.p.o., TF 50 cc/hr with  cc Q4hrs, awaiting PEG tube placement      Plan:    · Stop IVF   · Continue TF via NG as tolerated  · Continue to monitor renal function  · Continue to monitor H&H, transfuse to keep <7  · Continue to monitor blood pressure  · For PEG tube placement    Electronically signed by PATRICIA Smith CNP on 5/5/2022 at 9:55 AM

## 2022-05-05 NOTE — PROGRESS NOTES
Physical Therapy    Physical Therapy Daily Treatment Note      Name: Macho Whyte  : 1945  MRN: [id number]      Date of Service: 2022    Re-Evaluating PT:  Fern Paris PT, ADAL  [id number]     Room #:  0527/2937-O  Diagnosis:  Stroke-like symptoms [R29.90]  St[facility] of unknown [facility] ([facility] [I63.9]  Acute CVA (cerebrovascular accident) ([facility] [I63.9]  PMHx/PSHx:  CAD s/p PCI, DVT, HTN, HLD, PE   Procedure/Surgery:  Intubated , Extubated   Precautions:  Falls, L hemiparesis (flaccid LUE), L inattention  Equipment Needs:  TBD    Reason for re-evaluation:  Change in medical status requiring transfer to intensive care with intubation. Pt extubated   Date of re-evaluation:  22    SUBJECTIVE:    Pt lives alone in a 1 story home with 1 ESTRELLA 1 rail. Pt ambulated with single point cane PTA. OBJECTIVE:   Re-Evaluation  Date: 22 Treatment  22 Short Term/ Long Term   Goals   AM-PAC 6 Clicks     Was pt agreeable to Re-Eval/treatment? Yes  Yes     Does pt have pain? None reported  None reported     Bed Mobility  Rolling: Max A  Supine to sit: Max A x2  Sit to supine: Max A x2  Scooting: Max A to EOB Rolling: Max A  Supine to sit: Max A of 2   Sit to supine: Max A of 2  Scooting: Max A to EOB Rolling: Mod A  Supine to sit: Mod A  Sit to supine: Mod A  Scooting: Mod A   Transfers Sit to stand: Max A x2  Stand to sit: Max A x2  Stand pivot: NT Sit to stand: Max A x2  Stand to sit: Max A x2  Stand pivot: NT Sit to stand: Mod A  Stand to sit: Mod A  Stand pivot: Mod A with AAD   Ambulation    NT NT  >5 feet with AAD Max A   Stair negotiation: ascended and descended  NT NT NA   ROM BUE:  Per OT eval  BLE:  WFL BLE WFL    Strength BUE:  Per OT eval  RLE:  Grossly 4/5  LLE:  Grossly 3-/5 RLE grossly 4/5  LLE grossly 3+/5    Balance Sitting EOB:  Mod A  Static Standing: Max A x2 Sitting EOB:  Mod A  Dynamic Standing: Max A x2 Sitting EOB:  SBA  Dynamic Standing:   Mod A         Vitals:  Room air 93-94 % with activity. Patient education  Pt educated on proper sitting balance and maintaining midline     Patient response to education:   Pt verbalized understanding Pt demonstrated skill Pt requires further education in this area   Partially  No  x     RE ASSESSMENT:    Conditions Requiring Skilled Therapeutic Intervention:    [x]Decreased strength     []Decreased ROM  [x]Decreased functional mobility  [x]Decreased balance   [x]Decreased endurance   [x]Decreased posture  [x]Decreased sensation  [x]Decreased coordination   []Decreased vision  [x]Decreased safety awareness   []Increased pain       Comments: Nursing cleared pt for physical therapy. Pt in bed upon arrival and agreed to participate in therapy. Pt very distracted throughout treatment. Pt required constant verbal cues to focus on task. Pt completed functional mobility as noted above. Pt sat on edge of bed x30 minutes with mod/max A ( pt fluctuated) due to heavy L and posterior lean. Pt use R UE on rail however pt pushed over to L and unable to correct despite constant verbal cues. Stood from edge of bed with heavy L lean and unable to correct. Pt returned to supine as pt is unsafe at this time to complete transfers. Pt remained in supine with call light in reach. Treatment:  Patient practiced and was instructed in the following treatment:     Bed mobility training - Verbal instruction for technique with bed mobility. Assistance required to complete task.  Sitting EOB for 30  minutes for upright tolerance and  postural awareness  Transfers training - verbal instruction for hand placement and technique with transfers. Assistance required to complete task        PHYSICAL THERAPY PLAN OF CARE:  Pt is making progress towards established goals. Continue PT POC.      Specific instructions for next treatment:  Progress EOB sitting tolerance, STS and transfer training, gait training     Time in  0935  Time out  1015    Total Treatment Time 40 minutes     CPT codes:  [] Low Complexity PT evaluation 67863  [] Moderate Complexity PT evaluation 23914  [] High Complexity PT evaluation 67168  [] PT Re-evaluation 13359  [] Gait training 65627 -- minutes  [] Manual therapy 48855 -- minutes  [x] Therapeutic activities 05377 40 minutes  [] Therapeutic exercises 02477 - minutes  [] Neuromuscular reeducation 46431 -- minutes      Soila Padron [id number]

## 2022-05-05 NOTE — PROGRESS NOTES
Occupational Therapy  OT BEDSIDE TREATMENT NOTE   [facility] [address] [facility]  [address] [place] [address], [facility], [address] [facility] [address]  Patient Name: Jud Lopez  MRN: [id number]  : 1945  Room: [address] [place] [address]     Per OT Eval:    Re-evaluating OT: Avril Lorenzana OTD, OTR/L, [id number]     Evaluating OT: Chales Round OTR/L #[id number]      Re-evaluation indicated d/t pt transfer to ICU for respiratory distress on .     Referring Provider: Zoe Huffman DO  Specific Provider Orders/Date: OT eval and treat 22     Diagnosis: Stroke-like symptoms [R29.90]   Pt admitted to hospital with L sided weakness, aphasia, dysarthria      Pertinent Medical History:  has a past medical history of CAD (coronary artery disease), DVT, bilateral lower limbs ([facility] HTN (hypertension), Hyperlipidemia, and Pulmonary embolism ([facility] Precautions:  Fall Risk, L cash,, L inattention, dysarthria, bed alarm, L lateral lean \"pusher\"     Assessment of current deficits    [x]? Functional mobility          [x]? ADLs           [x]? Strength                  [x]? Cognition    [x]? Functional transfers        [x]? IADLs         [x]? Safety Awareness   [x]? Endurance    [x]? Fine Coordination                        [x]? Balance      [x]? Vision/perception   [x]? Sensation      [x]? Gross Motor Coordination            [x]? ROM           []? Delirium                   [x]?  Motor Control      OT PLAN OF CARE   OT POC based on physician orders, patient diagnosis and results of clinical assessment     Frequency/Duration 1-5 days/wk for 2 weeks PRN   Specific OT Treatment Interventions to include:   * Instruction/training on adapted ADL techniques and AE recommendations to increase functional independence within precautions       * Training on energy conservation strategies, correct breathing pattern and techniques to improve independence/tolerance for self-care routine  * Functional transfer/mobility training/DME recommendations for increased independence, safety, and fall prevention  * Patient/Family education to increase follow through with safety techniques and functional independence  * Recommendation of environmental modifications for increased safety with functional transfers/mobility and ADLs  * Cognitive retraining/development of therapeutic activities to improve problem solving, judgement, memory, and attention for increased safety/participation in ADL/IADL tasks  * Sensory re-education to improve body/limb awareness, maintain/improve skin integrity, and improve hand/UE motor function  * Visual-perceptual training to improve environmental scanning, visual attention/focus, and oculomotor skills for increased safety/independence with functional transfers/mobility and ADLs  * Splinting/positioning for increased function, prevention of contractures, and improve skin integrity  * Therapeutic exercise to improve motor endurance, ROM, and functional strength for ADLs/functional transfers  * Therapeutic activities to facilitate/challenge dynamic balance, stand tolerance for increased safety and independence with ADLs  * Therapeutic activities to facilitate gross/fine motor skills for increased independence with ADLs  * Neuro-muscular re-education: facilitation of righting/equilibrium reactions, midline orientation, scapular stability/mobility, normalization of muscle tone, and facilitation of volitional active controled movement  * Positioning to improve skin integrity, interaction with environment and functional independence  * Manual techniques for edema management        Modified No Scale (MRS)  Score     Description  0             No symptoms  1             No significant disability despite symptoms  2             Slight disability; able to look after own affairs  3             Moderate disability; able to ambulate without assist/ requires assist with ADLs  4             Moderate/Severe disability;requires assist to ambulate/assist with ADLs  5             Severe disability;bedridden/incontinent   6               Score:  5     Recommended Adaptive Equipment:  TBD      Home Living: Pt lives alone in a 1 story home with 1 ESTRELLA and 1 hand rail. Bathroom setup: walk-in shower; shower chair    Equipment owned: shower chair, Janina Fang     Prior Level of Function: mod I with ADLs , mod I with IADLs; ambulated with SPC   Driving: no   Occupation: Pt enjoys crocheting and hopes to return to the activity to complete an unfinished blanket.     Pain Level: Pt denies pain this session     Cognition: A&O: 3/4 Follows 1 step directions, cues to focus on task, easily distracted, confusion at times              Memory:  P+             Sequencing:  P+             Problem solving:  P+             Judgement/safety:  P+     Functional Assessment:  AM-PAC Daily Activity Raw Score:     Initial Eval Status  Date: 22 Re-evaluation Status  Date: 22 Treatment Status  Date:  22 STGs = LTGs  Time frame: 10-14 days   Feeding NPO  NPO  NT  NPO  Failed swallow test  Minimal Assist   Once cleared for diet. Grooming Moderate Assist  Maximal Assist overall  AAROM to wash face while seated at EOB.    Mod A  overall  SBA washing off face   Min A applying deodorant, assist to hold L UE while pt completed task, pt required Mod A for sitting balance due to posterior lean Minimal Assist   UB Dressing Maximal Assist  Max A     Max A  Continue to educate on cash-dressing techniques   Doff/arnaldo gown seated at EOB Minimal Assist    LB Dressing Dependent  Dep     Dep  Arnaldo/doff socks bed level Moderate Assist    Bathing Maximal Assist Dep    UB: Mod/Max A  Cues needed for sequencing through tasks, pt easily distracted   LB: Dep  Completing sponge bathing seated at EOB, Mod A through out bathing tasks for sitting balance, posterior L lateral lean  Moderate Assist    Toileting Dependent  Dep   for toileting hygiene while supine in bed.  Assist to roll was in bed & agreeable for therapy. At end of session pt was returned to bed, HOB upright, L UE supported, B heels off loaded, nsg present, all lines and tubes intact, call light within reach. Daughter present through out session, demonstrates good understanding of education & current level of status. · Pt has made slow progress towards set goals. · Continue with current plan of care      Treatment Time In: 9:35            Treatment Time Out: 10:15          Treatment Charges: Mins Units   Ther Ex  50277     Manual Therapy [address] [facility]     Thera Activities 19876 10 1   ADL/Home Mgt 78774 30 2   Neuro Re-ed 11598     Group Therapy      Orthotic manage/training  59231     Non-Billable Time     Total Timed Treatment 40 3       Corry GRAY  [address] [place] [address] Drive, [address] [place] [address]

## 2022-05-05 NOTE — CONSULTS
Department of General Surgery - Adult  Surgical Service   Attending Consult Note      Reason for Consult: Dysphagia  Requesting Physician:  Carol Bravo COMPLAINT: Dysphagia    History Obtained From:  patient    HISTORY OF PRESENT ILLNESS:                The patient is a 68 y.o. female who presented with strokelike symptoms. She currently complains of no complaints at this time. However she underwent modified barium swallow which showed aspiration of even nectar thick consistency liquids. She denies any fevers or chills. She denies any abdominal pain shortness of breath or chest pain at this time. She denies any nausea or vomiting.     Past Medical History:        Diagnosis Date    CAD (coronary artery disease)     s/p PCI in 2017    DVT, bilateral lower limbs ([facility] 11/2021    on eliquis    HTN (hypertension)     Hyperlipidemia     Pulmonary embolism ([facility] 11/2021    on eliquis     Past Surgical History:        Procedure Laterality Date    CHOLECYSTECTOMY  2011    HYSTERECTOMY  02/09/2011     Current Medications:   Current Facility-Administered Medications: chlorhexidine (PERIDEX) 0.12 % solution 15 mL, 15 mL, Mouth/Throat, BID  fluconazole (DIFLUCAN) in 0.9 % sodium chloride IVPB 200 mg, 200 mg, IntraVENous, Q24H  ipratropium-albuterol (DUONEB) nebulizer solution 1 ampule, 1 ampule, Inhalation, Q6H  sodium chloride (Inhalant) 3 % nebulizer solution 4 mL, 4 mL, Nebulization, BID  insulin lispro (HUMALOG) injection vial 0-12 Units, 0-12 Units, SubCUTAneous, TID WC  insulin lispro (HUMALOG) injection vial 0-6 Units, 0-6 Units, SubCUTAneous, Nightly  sodium chloride (OCEAN, BABY AYR) 0.65 % nasal spray 1 spray, 1 spray, Each Nostril, Q4H PRN  sodium chloride (OCEAN, BABY AYR) 0.65 % nasal spray 2 spray, 2 spray, Each Nostril, TID  glycerin moisturizing mouth spray (OASIS) 35 % 2 spray, 2 spray, Mouth/Throat, PRN  hydrALAZINE (APRESOLINE) injection 10 mg, 10 mg, IntraVENous, Q4H PRN  dextrose 50 % IV solution, 12.5 g, IntraVENous, PRN  glucagon (rDNA) injection 1 mg, 1 mg, IntraMUSCular, PRN  dextrose 5 % solution, 100 mL/hr, IntraVENous, PRN  glucose chewable tablet 16 g, 16 g, Oral, PRN  racepinephrine HCl (VAPONEFPRIN) 2.25 % nebulizer solution NEBU 11.25 mg, 11.25 mg, Nebulization, Q4H PRN  QUEtiapine (SEROQUEL) tablet 12.5 mg, 12.5 mg, Oral, 2 times per day  acetaminophen (TYLENOL) suppository 650 mg, 650 mg, Rectal, Q4H PRN  albuterol (PROVENTIL) nebulizer solution 2.5 mg, 2.5 mg, Nebulization, Q6H PRN  [Held by provider] enoxaparin (LOVENOX) injection 80 mg, 1 mg/kg, SubCUTAneous, BID  acetaminophen (TYLENOL) tablet 1,000 mg, 1,000 mg, Oral, Q8H PRN  [Held by provider] apixaban (ELIQUIS) tablet 5 mg, 5 mg, Oral, BID  vitamin D (CHOLECALCIFEROL) tablet 5,000 Units, 5,000 Units, Oral, Daily  dilTIAZem (CARDIZEM CD) extended release capsule 120 mg, 120 mg, Oral, Daily  [Held by provider] ramipril (ALTACE) capsule 5 mg, 5 mg, Oral, Daily  ondansetron (ZOFRAN-ODT) disintegrating tablet 4 mg, 4 mg, Oral, Q8H PRN **OR** ondansetron (ZOFRAN) injection 4 mg, 4 mg, IntraVENous, Q6H PRN  polyethylene glycol (GLYCOLAX) packet 17 g, 17 g, Oral, Daily PRN  [Held by provider] aspirin EC tablet 81 mg, 81 mg, Oral, Daily **OR** [DISCONTINUED] aspirin suppository 300 mg, 300 mg, Rectal, Daily  perflutren lipid microspheres (DEFINITY) injection 1.65 mg, 1.5 mL, IntraVENous, ONCE PRN  atorvastatin (LIPITOR) tablet 80 mg, 80 mg, Oral, Nightly  Allergies:  Patient has no allergy information on record. Social History:   TOBACCO:   has no history on file for tobacco use. ETOH:   has no history on file for alcohol use. Family History:   No family history on file.     REVIEW OF SYSTEMS:    Comprehensive review of systems was obtained and was negative with the exception of HPI    PHYSICAL EXAM:    VITALS:  BP (!) 135/98   Pulse 89   Temp 96.7 °F (35.9 °C) (Temporal)   Resp 18   Ht 5' (1.524 m)   Wt 192 lb 9.6 oz (87.4 kg) SpO2 97%   BMI 37.61 kg/m²     General: No acute distress, AAOx3  Eyes: Extraocular movements intact, no scleral icterus  Respiratory: Normal work of breathing, no cyanosis  Cardiovascular: Normal capillary refill, 2+ radial pulse  Abdomen: Soft nontender to palpation nondistended  Skin: Normal skin turgor, no rashes  Extremities: No deformities, no contractures  Neurologic: No focal neurologic deficits, AAO x3      DATA:    CBC with Differential:    Lab Results   Component Value Date    WBC 20.5 05/05/2022    RBC 3.19 05/05/2022    HGB 9.0 05/05/2022    HCT 28.5 05/05/2022     05/05/2022    MCV 89.3 05/05/2022    MCH 28.2 05/05/2022    MCHC 31.6 05/05/2022    RDW 14.2 05/05/2022     BMP:    Lab Results   Component Value Date     05/05/2022    K 4.0 05/05/2022     05/05/2022    CO2 23 05/05/2022    BUN 31 05/05/2022    LABALBU 2.9 05/05/2022    CREATININE 0.9 05/05/2022    CALCIUM 8.5 05/05/2022    GFRAA >60 05/05/2022    LABGLOM >60 05/05/2022    GLUCOSE 104 05/05/2022       IMPRESSION/RECOMMENDATIONS:      70-year-old female with dysphagia  -Plan for PEG tube placement. Risk benefits alternatives were discussed with patient all questions were answered to patient satisfaction  -Continue to hold anticoagulation for PEG tube.     Carolee Faustin MD

## 2022-05-05 NOTE — CARE COORDINATION
SOCIAL WORK/CASEMANAGEMENT TRANSITION OF CARE PLANNINGSasha Abarca, [address]): met with daughter, Jose Solano, this a.m. she and a family member are working on [facility]. They want [place] which pt will not meet criteria per rep. For elizabeth they want [address] [place] [address], referral made and chart faxed( [phone] att: Andria Cooney) . They will not have a bed until at least Monday. [facility] [place]- left vm for rep garret to call back, [phone] ). DERECK Horne  5/5/2022   I called garret at [facility] and they Will not take pt due to not being vaccinated.  DERECK Horne  5/5/2022

## 2022-05-05 NOTE — PROGRESS NOTES
[place]  Department of Pulmonary, Critical Care and Sleep Medicine  [facility]  Department of Internal Medicine  Progress Note    SUBJECTIVE:    Failed swallowing test  + edema  Signifiant thrush noted  FiO2 needs remain between 3 to 5 L nasal cannula. Laryngoscopy was completed   ENT ? posterior glottic or interarytenoid scar band or mucosal crusting. OBJECTIVE:  Vitals:    05/05/22 0200 05/05/22 0355 05/05/22 0736 05/05/22 1110   BP:  122/86 (!) 167/141 (!) 135/98   Pulse:  94 98 89   Resp:  18 18 18   Temp:  97.2 °F (36.2 °C) 98.3 °F (36.8 °C) 96.7 °F (35.9 °C)   TempSrc:  Temporal Temporal Temporal   SpO2: 96% 93% 91% 97%   Weight:       Height:         Constitutional: Alert,     EENT: EOMI SRINIVAS. MMM. No icterus. +++ thrush. Voice remains hoarse   Neck: No thyromegaly. No elevated JVP. Trachea was midline. Respiratory: Symmetrical.  Breath sounds were clear. Cardiovascular: Regular, No murmur. No rubs. Pulses:  Equal bilaterally. Abdomen: Soft without organomegaly. No rebound, rigidity. No guarding. Lymphatic: No lymphadenopathy. Musculoskeletal: Without weakness or gross deficits  Extremities: +  lower extremity edema. Reflexes appear adequate. Skin:  Warm and dry. No skin rashes. Neurological/Psychiatric: Continued left-sided weakness      DATA:    Monitor Strips:  Reviewed & discusses with technical team. No changes noted. RADIOLOGY:  Barium swallow study completed today which shows penetration and aspiration with nectar consistently liquids. Chest x-ray consistent with pulmonary edema/vascular congestion.       CBC with Differential:    Lab Results   Component Value Date    WBC 20.5 05/05/2022    RBC 3.19 05/05/2022    HGB 9.0 05/05/2022    HCT 28.5 05/05/2022     05/05/2022    MCV 89.3 05/05/2022    MCH 28.2 05/05/2022    MCHC 31.6 05/05/2022    RDW 14.2 05/05/2022     CMP:    Lab Results   Component Value Date     05/05/2022    K 4.0 05/05/2022     05/05/2022    CO2 23 05/05/2022    BUN 31 05/05/2022    CREATININE 0.9 05/05/2022    GFRAA >60 05/05/2022    LABGLOM >60 05/05/2022    GLUCOSE 104 05/05/2022    PROT 5.8 05/05/2022    LABALBU 2.9 05/05/2022    CALCIUM 8.5 05/05/2022    BILITOT 0.7 05/05/2022    ALKPHOS 77 05/05/2022    AST 35 05/05/2022    ALT 52 05/05/2022       CLINICAL ASSESMENT:  1. Acute hypoxemic respiratory failure  2. Epistaxis-resolving  3. Mucous plugging  4. Concern for posterior glottic/interarytenoid scar band versus mucosal crusting. 5. Bilateral DVT on lower extremity ultrasound  6. Right MCA stroke  7. E. coli UTI  8. Acute on chronic anemia secondary to epistaxis    PLAN: If needed the case was discussed with the care team  1. Wean FiO2 as tolerated  2. Continue BiPAP at at bedtime and as needed  3. Stop decadron  4. Antifungal IV  5. Recommend aggressive airway hygiene measures with incentive spirometry and flutter valve.   3% saline marika Macedo DO

## 2022-05-05 NOTE — PROGRESS NOTES
Date: 5/4/2022    Time: 10:20 PM    Patient Placed On BIPAP/CPAP/ Non-Invasive Ventilation? Yes    If no must comment. Facial area red/color change? No           If YES are Blister/Lesion present? No   If yes must notify nursing staff  BIPAP/CPAP skin barrier?   Yes    Skin barrier type:mepilexlite       Comments:        Pantera Mckeon RCP

## 2022-05-05 NOTE — ANESTHESIA PRE PROCEDURE
Department of Anesthesiology  Preprocedure Note       Name:  Johnny Mcclellan   Age:  68 y.o.  :  1945                                          MRN:  [id number]         Date:  2022      Surgeon: Lucrecia Mclean):  Parvez Matthews MD    Procedure: Procedure(s):  EGD PEG TUBE PLACEMENT    Medications prior to admission:   Prior to Admission medications    Medication Sig Start Date End Date Taking?  Authorizing Provider   ramipril (ALTACE) 5 MG capsule Take 5 mg by mouth daily   Yes Historical Provider, MD   dilTIAZem (CARDIZEM CD) 120 MG extended release capsule Take 120 mg by mouth daily   Yes Historical Provider, MD   atorvastatin (LIPITOR) 80 MG tablet Take 80 mg by mouth daily Please send 2 40mg tablets easier for patient to swallow   Yes Historical Provider, MD   apixaban (ELIQUIS) 5 MG TABS tablet Take 5 mg by mouth 2 times daily   Yes Historical Provider, MD   Cholecalciferol (DIALYVITE VITAMIN D 5000 PO) Take 5,000 Units/day by mouth   Yes Historical Provider, MD   omeprazole (PRILOSEC) 20 MG delayed release capsule Take 20 mg by mouth daily   Yes Historical Provider, MD       Current medications:    Current Facility-Administered Medications   Medication Dose Route Frequency Provider Last Rate Last Admin    chlorhexidine (PERIDEX) 0.12 % solution 15 mL  15 mL Mouth/Throat BID Yury Lopez MD   15 mL at 22 0132    fluconazole (DIFLUCAN) in 0.9 % sodium chloride IVPB 200 mg  200 mg IntraVENous Q24H Louie Barber,  mL/hr at 22 1442 200 mg at 22 1442    ipratropium-albuterol (DUONEB) nebulizer solution 1 ampule  1 ampule Inhalation Q6H Aicha Salas, DO   1 ampule at 22 1438    sodium chloride (Inhalant) 3 % nebulizer solution 4 mL  4 mL Nebulization BID Adrianna Mihir, DO   4 mL at 22 0927    insulin lispro (HUMALOG) injection vial 0-12 Units  0-12 Units SubCUTAneous TID  Yury Lopez MD   2 Units at 22 1642    insulin lispro (HUMALOG) injection vial 0-6 Units  0-6 Units SubCUTAneous Nightly Digna Rolon MD   1 Units at 05/04/22 2034    sodium chloride (OCEAN, BABY AYR) 0.65 % nasal spray 1 spray  1 spray Each Nostril Q4H PRN Digna Rolon MD        sodium chloride (OCEAN, BABY AYR) 0.65 % nasal spray 2 spray  2 spray Each Nostril TID Dgina Rolon MD   2 spray at 05/05/22 1313    glycerin moisturizing mouth spray (OASIS) 35 % 2 spray  2 spray Mouth/Throat PRN Digna Rolon MD        hydrALAZINE (APRESOLINE) injection 10 mg  10 mg IntraVENous Q4H PRN Digna Rolon MD   10 mg at 05/03/22 0918    dextrose 50 % IV solution  12.5 g IntraVENous PRN Digna Rolon MD        glucagon (rDNA) injection 1 mg  1 mg IntraMUSCular PRN Digna Rolon MD        dextrose 5 % solution  100 mL/hr IntraVENous PRN Digna Rolon MD        glucose chewable tablet 16 g  16 g Oral PRN Digna Rolon MD        racepinephrine HCl (VAPONEFPRIN) 2.25 % nebulizer solution NEBU 11.25 mg  11.25 mg Nebulization Q4H PRN Digna Rolon MD   11.25 mg at 05/02/22 2159    QUEtiapine (SEROQUEL) tablet 12.5 mg  12.5 mg Oral 2 times per day Digna Rolon MD   12.5 mg at 05/01/22 0800    acetaminophen (TYLENOL) suppository 650 mg  650 mg Rectal Q4H PRN Digna Rolon MD   650 mg at 04/25/22 1621    albuterol (PROVENTIL) nebulizer solution 2.5 mg  2.5 mg Nebulization Q6H PRN Digna Rolon MD   2.5 mg at 05/02/22 0406    [Held by provider] enoxaparin (LOVENOX) injection 80 mg  1 mg/kg SubCUTAneous BID Digna Rolon MD   80 mg at 04/27/22 0830    acetaminophen (TYLENOL) tablet 1,000 mg  1,000 mg Oral Q8H PRN Digna Rolon MD   1,000 mg at 04/27/22 2112    [Held by provider] apixaban (ELIQUIS) tablet 5 mg  5 mg Oral BID Digna Rolon MD        vitamin D (CHOLECALCIFEROL) tablet 5,000 Units  5,000 Units Oral Daily Digna Rolon MD   5,000 Units at 05/01/22 0759    dilTIAZem (CARDIZEM CD) extended release capsule 120 mg  120 mg Oral Daily Digna Rolon MD   120 mg at 05/01/22 0800    [Held by provider] ramipril (ALTACE) capsule 5 mg  5 mg Oral Daily Jamaal Cabrera MD        ondansetron (ZOFRAN-ODT) disintegrating tablet 4 mg  4 mg Oral Q8H PRN Jamaal Cabrera MD        Or    ondansetron [facility]) injection 4 mg  4 mg IntraVENous Q6H PRN Jamaal Cabrera MD        polyethylene glycol [facility]) packet 17 g  17 g Oral Daily PRN Jamaal Cabrera MD        [facility] [place] by provider] aspirin EC tablet 81 mg  81 mg Oral Daily Jamaal Cabrera MD   81 mg at 04/27/22 2306    perflutren lipid microspheres (DEFINITY) injection 1.65 mg  1.5 mL IntraVENous ONCE PRN Jamaal Cabrera MD        atorvastatin (LIPITOR) tablet 80 mg  80 mg Oral Nightly Jamaal Cabrera MD   80 mg at 04/30/22 2040       Allergies:  Not on File    Problem List:    Patient Active Problem List   Diagnosis Code    Stroke-like symptoms R29.90    Oral bleeding K13.79    TIA (transient ischemic attack) G45.9    Nasal bleeding R04.0    Anticoagulated Z79.01    Stroke of unknown etiology ([facility] [address] I63.9    Acute CVA (cerebrovascular accident) ([facility] [address] I63.9       Past Medical History:        Diagnosis Date    CAD (coronary artery disease)     s/p PCI in 2017    DVT, bilateral lower limbs ([facility] [address] 11/2021    on eliquis    HTN (hypertension)     Hyperlipidemia     Pulmonary embolism ([facility] [address] 11/2021    on eliquis       Past Surgical History:        Procedure Laterality Date    CHOLECYSTECTOMY  2011    HYSTERECTOMY  02/09/2011       Social History:    Social History     Tobacco Use    Smoking status: Not on file    Smokeless tobacco: Not on file   Substance Use Topics    Alcohol use: Not on file                                Counseling given: Not Answered      Vital Signs (Current):   Vitals:    05/05/22 0355 05/05/22 0736 05/05/22 1110 05/05/22 1436   BP: 122/86 (!) 167/141 (!) 135/98 (!) 163/70   Pulse: 94 98 89 89   Resp: 18 18 18 18   Temp: 36.2 °C (97.2 °F) 36.8 °C (98.3 °F) 35.9 °C (96.7 °F) 36.2 °C (97.1 °F)   TempSrc: Temporal Temporal Temporal Temporal   SpO2: 93% 91% 97% 94%   Weight:       Height:                                                  BP Readings from Last 3 Encounters:   05/05/22 (!) 163/70       NPO Status:                                                                                 BMI:   Wt Readings from Last 3 Encounters:   05/02/22 192 lb 9.6 oz (87.4 kg)   04/22/22 176 lb (79.8 kg)     Body mass index is 37.61 kg/m². CBC:   Lab Results   Component Value Date    WBC 20.5 05/05/2022    RBC 3.19 05/05/2022    HGB 9.0 05/05/2022    HCT 28.5 05/05/2022    MCV 89.3 05/05/2022    RDW 14.2 05/05/2022     05/05/2022       CMP:   Lab Results   Component Value Date     05/05/2022    K 4.0 05/05/2022     05/05/2022    CO2 23 05/05/2022    BUN 31 05/05/2022    CREATININE 0.9 05/05/2022    GFRAA >60 05/05/2022    LABGLOM >60 05/05/2022    GLUCOSE 104 05/05/2022    PROT 5.8 05/05/2022    CALCIUM 8.5 05/05/2022    BILITOT 0.7 05/05/2022    ALKPHOS 77 05/05/2022    AST 35 05/05/2022    ALT 52 05/05/2022       POC Tests: No results for input(s): POCGLU, POCNA, POCK, POCCL, POCBUN, POCHEMO, POCHCT in the last 72 hours.     Coags:   Lab Results   Component Value Date    PROTIME 14.9 04/27/2022    INR 1.3 04/27/2022    APTT 48.0 04/27/2022       HCG (If Applicable): No results found for: PREGTESTUR, PREGSERUM, HCG, HCGQUANT     ABGs: No results found for: PHART, PO2ART, JCP8RNQ, UAP8EUT, BEART, [id number]     Type & Screen (If Applicable):  No results found for: LABABO, LABRH    Drug/Infectious Status (If Applicable):  No results found for: HIV, HEPCAB    COVID-19 Screening (If Applicable): No results found for: COVID19        Anesthesia Evaluation  Patient summary reviewed and Nursing notes reviewed no history of anesthetic complications:   Airway: Mallampati: IV  TM distance: <3 FB   Neck ROM: full  Mouth opening: < 3 FB Dental:          Pulmonary: breath sounds clear to auscultation  (+) sleep apnea: on CPAP,      (-) not a current smoker                           Cardiovascular:    (+) hypertension:, past MI:, CABG/stent (x1):, SILVA:, hyperlipidemia      ECG reviewed  Rhythm: regular  Rate: normal  Echocardiogram reviewed         Beta Blocker:  Not on Beta Blocker         Neuro/Psych:   (+) CVA (left sided weakness right MCA stroke ): residual symptoms, TIA,             GI/Hepatic/Renal:   (+) GERD: well controlled,           Endo/Other:    (+) DiabetesType II DM, using insulin, blood dyscrasia (on eliquis): anticoagulation therapy, arthritis:., .                 Abdominal:   (+) obese,           Vascular:   + DVT, PE. Other Findings:      EKG 4/25/22  Normal sinus rhythm    ECHO 4/27/22  Summary   Normal left ventricular systolic function. Ejection fraction is visually estimated at > 60%. Normal right ventricular size and function (TAPSE 2.8 cm). There is doppler evidence of stage I diastolic dysfunction. No evidence of interatrial shunting on bubble study. Mild aortic stenosis. Anesthesia Plan      MAC     ASA 3       Induction: intravenous. Anesthetic plan and risks discussed with patient. Use of blood products discussed with patient whom consented to blood products.                    Jeff Wilkerson RN   5/5/2022

## 2022-05-06 ENCOUNTER — ANESTHESIA (OUTPATIENT)
Dept: ENDOSCOPY | Age: 77
DRG: 064 | End: 2022-05-06
Payer: MEDICARE

## 2022-05-06 ENCOUNTER — APPOINTMENT (OUTPATIENT)
Dept: GENERAL RADIOLOGY | Age: 77
DRG: 064 | End: 2022-05-06
Attending: FAMILY MEDICINE
Payer: MEDICARE

## 2022-05-06 VITALS
OXYGEN SATURATION: 83 % | SYSTOLIC BLOOD PRESSURE: 113 MMHG | RESPIRATION RATE: 22 BRPM | DIASTOLIC BLOOD PRESSURE: 65 MMHG

## 2022-05-06 LAB
ALBUMIN SERPL-MCNC: 2.8 G/DL (ref 3.5–5.2)
ALP BLD-CCNC: 78 U/L (ref 35–104)
ALT SERPL-CCNC: 46 U/L (ref 0–32)
ANION GAP SERPL CALCULATED.3IONS-SCNC: 16 MMOL/L (ref 7–16)
AST SERPL-CCNC: 31 U/L (ref 0–31)
BILIRUB SERPL-MCNC: 0.9 MG/DL (ref 0–1.2)
BUN BLDV-MCNC: 21 MG/DL (ref 6–23)
CALCIUM SERPL-MCNC: 8.2 MG/DL (ref 8.6–10.2)
CHLORIDE BLD-SCNC: 104 MMOL/L (ref 98–107)
CO2: 23 MMOL/L (ref 22–29)
CREAT SERPL-MCNC: 0.8 MG/DL (ref 0.5–1)
GFR AFRICAN AMERICAN: >60
GFR NON-AFRICAN AMERICAN: >60 ML/MIN/1.73
GLUCOSE BLD-MCNC: 79 MG/DL (ref 74–99)
HCT VFR BLD CALC: 31 % (ref 34–48)
HEMOGLOBIN: 9.8 G/DL (ref 11.5–15.5)
MAGNESIUM: 2 MG/DL (ref 1.6–2.6)
MCH RBC QN AUTO: 28.6 PG (ref 26–35)
MCHC RBC AUTO-ENTMCNC: 31.6 % (ref 32–34.5)
MCV RBC AUTO: 90.4 FL (ref 80–99.9)
METER GLUCOSE: 81 MG/DL (ref 74–99)
METER GLUCOSE: 87 MG/DL (ref 74–99)
METER GLUCOSE: 93 MG/DL (ref 74–99)
PDW BLD-RTO: 14.3 FL (ref 11.5–15)
PLATELET # BLD: 384 E9/L (ref 130–450)
PMV BLD AUTO: 9.5 FL (ref 7–12)
POTASSIUM REFLEX MAGNESIUM: 3.5 MMOL/L (ref 3.5–5)
RBC # BLD: 3.43 E12/L (ref 3.5–5.5)
SODIUM BLD-SCNC: 143 MMOL/L (ref 132–146)
TOTAL PROTEIN: 5.6 G/DL (ref 6.4–8.3)
WBC # BLD: 18.3 E9/L (ref 4.5–11.5)

## 2022-05-06 PROCEDURE — 6370000000 HC RX 637 (ALT 250 FOR IP): Performed by: INTERNAL MEDICINE

## 2022-05-06 PROCEDURE — 94640 AIRWAY INHALATION TREATMENT: CPT

## 2022-05-06 PROCEDURE — 2060000000 HC ICU INTERMEDIATE R&B

## 2022-05-06 PROCEDURE — 6360000002 HC RX W HCPCS: Performed by: INTERNAL MEDICINE

## 2022-05-06 PROCEDURE — 97530 THERAPEUTIC ACTIVITIES: CPT

## 2022-05-06 PROCEDURE — 7100000000 HC PACU RECOVERY - FIRST 15 MIN: Performed by: STUDENT IN AN ORGANIZED HEALTH CARE EDUCATION/TRAINING PROGRAM

## 2022-05-06 PROCEDURE — 85027 COMPLETE CBC AUTOMATED: CPT

## 2022-05-06 PROCEDURE — 74230 X-RAY XM SWLNG FUNCJ C+: CPT

## 2022-05-06 PROCEDURE — 0DH63UZ INSERTION OF FEEDING DEVICE INTO STOMACH, PERCUTANEOUS APPROACH: ICD-10-PCS | Performed by: INTERNAL MEDICINE

## 2022-05-06 PROCEDURE — 2500000003 HC RX 250 WO HCPCS: Performed by: STUDENT IN AN ORGANIZED HEALTH CARE EDUCATION/TRAINING PROGRAM

## 2022-05-06 PROCEDURE — 83735 ASSAY OF MAGNESIUM: CPT

## 2022-05-06 PROCEDURE — 3700000000 HC ANESTHESIA ATTENDED CARE: Performed by: STUDENT IN AN ORGANIZED HEALTH CARE EDUCATION/TRAINING PROGRAM

## 2022-05-06 PROCEDURE — 3700000001 HC ADD 15 MINUTES (ANESTHESIA): Performed by: STUDENT IN AN ORGANIZED HEALTH CARE EDUCATION/TRAINING PROGRAM

## 2022-05-06 PROCEDURE — 2709999900 HC NON-CHARGEABLE SUPPLY: Performed by: STUDENT IN AN ORGANIZED HEALTH CARE EDUCATION/TRAINING PROGRAM

## 2022-05-06 PROCEDURE — 6370000000 HC RX 637 (ALT 250 FOR IP): Performed by: STUDENT IN AN ORGANIZED HEALTH CARE EDUCATION/TRAINING PROGRAM

## 2022-05-06 PROCEDURE — 3E0G76Z INTRODUCTION OF NUTRITIONAL SUBSTANCE INTO UPPER GI, VIA NATURAL OR ARTIFICIAL OPENING: ICD-10-PCS | Performed by: INTERNAL MEDICINE

## 2022-05-06 PROCEDURE — 92611 MOTION FLUOROSCOPY/SWALLOW: CPT | Performed by: SPEECH-LANGUAGE PATHOLOGIST

## 2022-05-06 PROCEDURE — 6360000002 HC RX W HCPCS: Performed by: NURSE PRACTITIONER

## 2022-05-06 PROCEDURE — C9113 INJ PANTOPRAZOLE SODIUM, VIA: HCPCS | Performed by: INTERNAL MEDICINE

## 2022-05-06 PROCEDURE — 7100000001 HC PACU RECOVERY - ADDTL 15 MIN: Performed by: STUDENT IN AN ORGANIZED HEALTH CARE EDUCATION/TRAINING PROGRAM

## 2022-05-06 PROCEDURE — 80053 COMPREHEN METABOLIC PANEL: CPT

## 2022-05-06 PROCEDURE — A4216 STERILE WATER/SALINE, 10 ML: HCPCS | Performed by: INTERNAL MEDICINE

## 2022-05-06 PROCEDURE — 97535 SELF CARE MNGMENT TRAINING: CPT

## 2022-05-06 PROCEDURE — 36415 COLL VENOUS BLD VENIPUNCTURE: CPT

## 2022-05-06 PROCEDURE — 2580000003 HC RX 258: Performed by: ANESTHESIOLOGIST ASSISTANT

## 2022-05-06 PROCEDURE — 3609013300 HC EGD TUBE PLACEMENT: Performed by: STUDENT IN AN ORGANIZED HEALTH CARE EDUCATION/TRAINING PROGRAM

## 2022-05-06 PROCEDURE — 99233 SBSQ HOSP IP/OBS HIGH 50: CPT | Performed by: INTERNAL MEDICINE

## 2022-05-06 PROCEDURE — 6360000002 HC RX W HCPCS: Performed by: ANESTHESIOLOGIST ASSISTANT

## 2022-05-06 PROCEDURE — 94660 CPAP INITIATION&MGMT: CPT

## 2022-05-06 PROCEDURE — 2580000003 HC RX 258: Performed by: INTERNAL MEDICINE

## 2022-05-06 PROCEDURE — 92526 ORAL FUNCTION THERAPY: CPT | Performed by: SPEECH-LANGUAGE PATHOLOGIST

## 2022-05-06 PROCEDURE — 82962 GLUCOSE BLOOD TEST: CPT

## 2022-05-06 RX ORDER — LIDOCAINE HYDROCHLORIDE 20 MG/ML
INJECTION, SOLUTION INTRAVENOUS PRN
Status: DISCONTINUED | OUTPATIENT
Start: 2022-05-06 | End: 2022-05-06 | Stop reason: SDUPTHER

## 2022-05-06 RX ORDER — PROPOFOL 10 MG/ML
INJECTION, EMULSION INTRAVENOUS PRN
Status: DISCONTINUED | OUTPATIENT
Start: 2022-05-06 | End: 2022-05-06 | Stop reason: SDUPTHER

## 2022-05-06 RX ORDER — SODIUM CHLORIDE 9 MG/ML
INJECTION, SOLUTION INTRAVENOUS CONTINUOUS PRN
Status: DISCONTINUED | OUTPATIENT
Start: 2022-05-06 | End: 2022-05-06 | Stop reason: SDUPTHER

## 2022-05-06 RX ORDER — POTASSIUM CHLORIDE 7.45 MG/ML
10 INJECTION INTRAVENOUS ONCE
Status: COMPLETED | OUTPATIENT
Start: 2022-05-06 | End: 2022-05-06

## 2022-05-06 RX ORDER — LIDOCAINE HYDROCHLORIDE 10 MG/ML
INJECTION, SOLUTION INFILTRATION; PERINEURAL PRN
Status: DISCONTINUED | OUTPATIENT
Start: 2022-05-06 | End: 2022-05-06 | Stop reason: ALTCHOICE

## 2022-05-06 RX ADMIN — CHLORHEXIDINE GLUCONATE 15 ML: 1.2 RINSE ORAL at 22:30

## 2022-05-06 RX ADMIN — PROPOFOL 30 MG: 10 INJECTION, EMULSION INTRAVENOUS at 17:26

## 2022-05-06 RX ADMIN — IPRATROPIUM BROMIDE AND ALBUTEROL SULFATE 1 AMPULE: .5; 2.5 SOLUTION RESPIRATORY (INHALATION) at 09:05

## 2022-05-06 RX ADMIN — IPRATROPIUM BROMIDE AND ALBUTEROL SULFATE 1 AMPULE: .5; 2.5 SOLUTION RESPIRATORY (INHALATION) at 12:50

## 2022-05-06 RX ADMIN — LIDOCAINE HYDROCHLORIDE 50 MG: 20 INJECTION, SOLUTION INTRAVENOUS at 17:23

## 2022-05-06 RX ADMIN — SALINE NASAL SPRAY 2 SPRAY: 1.5 SOLUTION NASAL at 23:22

## 2022-05-06 RX ADMIN — SODIUM CHLORIDE, PRESERVATIVE FREE 40 MG: 5 INJECTION INTRAVENOUS at 22:31

## 2022-05-06 RX ADMIN — SALINE NASAL SPRAY 2 SPRAY: 1.5 SOLUTION NASAL at 18:52

## 2022-05-06 RX ADMIN — SODIUM CHLORIDE SOLN NEBU 3% 4 ML: 3 NEBU SOLN at 09:06

## 2022-05-06 RX ADMIN — ATORVASTATIN CALCIUM 80 MG: 40 TABLET, FILM COATED ORAL at 22:36

## 2022-05-06 RX ADMIN — PROPOFOL 20 MG: 10 INJECTION, EMULSION INTRAVENOUS at 17:33

## 2022-05-06 RX ADMIN — PROPOFOL 40 MG: 10 INJECTION, EMULSION INTRAVENOUS at 17:30

## 2022-05-06 RX ADMIN — SODIUM CHLORIDE: 9 INJECTION, SOLUTION INTRAVENOUS at 17:17

## 2022-05-06 RX ADMIN — POTASSIUM CHLORIDE 10 MEQ: 7.46 INJECTION, SOLUTION INTRAVENOUS at 19:09

## 2022-05-06 RX ADMIN — SODIUM CHLORIDE, PRESERVATIVE FREE 40 MG: 5 INJECTION INTRAVENOUS at 19:04

## 2022-05-06 RX ADMIN — PROPOFOL 40 MG: 10 INJECTION, EMULSION INTRAVENOUS at 17:23

## 2022-05-06 RX ADMIN — QUETIAPINE FUMARATE 12.5 MG: 25 TABLET ORAL at 23:23

## 2022-05-06 RX ADMIN — FLUCONAZOLE IN SODIUM CHLORIDE 200 MG: 2 INJECTION, SOLUTION INTRAVENOUS at 23:25

## 2022-05-06 ASSESSMENT — PAIN DESCRIPTION - FREQUENCY: FREQUENCY: CONTINUOUS

## 2022-05-06 ASSESSMENT — PAIN SCALES - GENERAL
PAINLEVEL_OUTOF10: 2
PAINLEVEL_OUTOF10: 0
PAINLEVEL_OUTOF10: 0
PAINLEVEL_OUTOF10: 2
PAINLEVEL_OUTOF10: 0
PAINLEVEL_OUTOF10: 0

## 2022-05-06 ASSESSMENT — PAIN DESCRIPTION - ORIENTATION: ORIENTATION: LOWER;RIGHT;LEFT

## 2022-05-06 ASSESSMENT — PAIN DESCRIPTION - LOCATION: LOCATION: ABDOMEN

## 2022-05-06 ASSESSMENT — PAIN DESCRIPTION - ONSET: ONSET: ON-GOING

## 2022-05-06 ASSESSMENT — PAIN DESCRIPTION - DESCRIPTORS: DESCRIPTORS: PRESSURE

## 2022-05-06 NOTE — PROGRESS NOTES
Occupational Therapy  OT BEDSIDE TREATMENT NOTE   [address] [address] [facility]  [address] [place] [address]  Patient Name: Jud Lopez  MRN: [id number]  : 1945  Room: [address] [place] [address]     Per OT Eval:    Re-evaluating OT: Avril Lorenzana OTD, OTR/L, [id number]     Evaluating OT: Evelyn Delgado OTR/L #[id number]      Re-evaluation indicated d/t pt transfer to ICU for respiratory distress on .     Referring Provider: Zoe Huffman DO  Specific Provider Orders/Date: OT eval and treat 22     Diagnosis: Stroke-like symptoms [R29.90]   Pt admitted to hospital with L sided weakness, aphasia, dysarthria      Pertinent Medical History:  has a past medical history of CAD (coronary artery disease), DVT, bilateral lower limbs ([facility] [address] HTN (hypertension), Hyperlipidemia, and Pulmonary embolism ([facility] [address] Precautions:  Fall Risk, L cash,, L inattention, dysarthria, bed alarm, L lateral lean \"pusher\"     Assessment of current deficits    [x]? Functional mobility          [x]? ADLs           [x]? Strength                  [x]? Cognition    [x]? Functional transfers        [x]? IADLs         [x]? Safety Awareness   [x]? Endurance    [x]? Fine Coordination                        [x]? Balance      [x]? Vision/perception   [x]? Sensation      [x]? Gross Motor Coordination            [x]? ROM           []? Delirium                   [x]?  Motor Control      OT PLAN OF CARE   OT POC based on physician orders, patient diagnosis and results of clinical assessment     Frequency/Duration 1-5 days/wk for 2 weeks PRN   Specific OT Treatment Interventions to include:   * Instruction/training on adapted ADL techniques and AE recommendations to increase functional independence within precautions       * Training on energy conservation strategies, correct breathing pattern and techniques to improve independence/tolerance for self-care routine  * Functional transfer/mobility training/DME ambulate/assist with ADLs  5             Severe disability;bedridden/incontinent   6               Score:  5     Recommended Adaptive Equipment:  TBD      Home Living: Pt lives alone in a 1 story home with 1 ESTRELLA and 1 hand rail. Bathroom setup: walk-in shower; shower chair    Equipment owned: shower chair, Janina Fang     Prior Level of Function: mod I with ADLs , mod I with IADLs; ambulated with SPC   Driving: no   Occupation: Pt enjoys crocheting and hopes to return to the activity to complete an unfinished blanket.     Pain Level: Pt denies pain this session     Cognition: A&O: 3/4 Follows 1 step directions, cues to focus on task, easily distracted, confusion at times              Memory:  P+             Sequencing:  P+             Problem solving:  P+             Judgement/safety:  P+     Functional Assessment:  AM-PAC Daily Activity Raw Score:     Initial Eval Status  Date: 22 Re-evaluation Status  Date: 22 Treatment Status  Date:  22 STGs = LTGs  Time frame: 10-14 days   Feeding NPO  NPO  NT  NPO  Failed swallow test  Minimal Assist   Once cleared for diet.    Grooming Moderate Assist  Maximal Assist overall  To wash face and apply deodorant seated EOB    Mod A  overall  SBA washing off face   Min A applying deodorant, assist to hold L UE while pt completed task, pt required Mod A for sitting balance due to posterior lean Minimal Assist   UB Dressing Maximal Assist  Max A   to don/doff gown seated EOB  Max A  Continue to educate on cash-dressing techniques   Doff/arnaldo gown seated at EOB Minimal Assist    LB Dressing Dependent  Dep  To don/doff socks    Dep  Arnaldo/doff socks bed level Moderate Assist    Bathing Maximal Assist Max A  Seated EOB UB: Mod/Max A  Cues needed for sequencing through tasks, pt easily distracted   LB: Dep  Completing sponge bathing seated at EOB, Mod A through out bathing tasks for sitting balance, posterior L lateral lean  Moderate Assist    Toileting Dependent Dep  For hygiene, incontinent of bladder     Dep  Incontinent of urine upon arrival   Moderate Assist    Bed Mobility  Supine to sit: Maximal Assist x2  Sit to supine: Maximal Assist x2 Supine to sit: Maximal Assist   Sit to supine: Maximal Assist x2    Educated pt on technique to increase independence. Max A x 2- scooting    Max A x 2  Supine < > sit  Supine to sit: Moderate Assist   Sit to supine: Moderate Assist    Functional Transfers Maximal Assist x2     Sit to stands Unable to stand with Max A of 1 attempt x 2  Max A x 2  Sit < > stand  Heavy L lateral lean Moderate Assist    Functional Mobility Maximal Assist x2     1 Side step to [facility] with HHA NT  Pt unable this date. NT    Per last session   Max A x 2  1 side step Moderate Assist    Balance Sitting: max - min A  (L lateral lean; mod/max cuing; able to correct with min A at times)     Standing: max A x2 Sitting:  Static: Max A initially improving to Min A with core strengthening exercises EOB  Dynamic: Dep  Standing: N/T     Sitting: Max-Mod A  Posterior L latearl lean   Standing: Max A x 2  L lateral lean    Sitting:  Static: SBA  Dynamic: Min A  Standing: Mod A   Activity Tolerance F- Fair-   Fair/Fair-  93-95%on RA through out session, more fatigued this date  F+   Visual/  Perceptual R gaze  L inattention     Continue to assess    R gaze preference  L inattention Difficulty tracking but improving  R inattention                         Hand Dominance right    Strength ROM Additional Info:    RUE   3-/5 wfl good  and wfl FMC/dexterity noted during ADL tasks      LUE  Grossly 0/5    Decreased ROM    absent  and absent  FMC/dexterity noted during ADL tasks               Comments: Upon arrival pt was in bed & agreeable for therapy. Pt educated on techniques to increase independence and safety during ADL's and bed mobility. Completed core strengthening EOB to increase sitting balance for increased independence with ADL's.  At end of session pt was returned to bed, HOB upright, L UE supported, nsg present, all lines and tubes intact, call light within reach. Daughter present through out session, demonstrates good understanding of education & current level of status. · Pt has made slow progress towards set goals.    · Continue with current plan of care      Treatment Time In: 9:35            Treatment Time Out: 10:12         Treatment Charges: Mins Units   Ther Ex  [id number]     Manual Therapy [id number] [facility]     Thera Activities [id number] 15 1   ADL/Home Mgt [id number] 23 2   Neuro Re-ed [id number]     Group Therapy      Orthotic manage/training  [id number]     Non-Billable Time     Total Timed Treatment [address] [place] [address]

## 2022-05-06 NOTE — PROGRESS NOTES
Hospitalist Progress Note      Synopsis: Patient admitted as a transfer from [facility] for strokelike symptoms. Patient presented to [facility] with sudden onset of left-sided weakness and aphasia that occurred while in the middle of a conversation with her daughter. In ED she was noted to have severe aphasia, severe dysarthria, and left arm and leg drift, and rightward gaze, and left-sided hemianopia. MRI of the brain right MCA stroke likely embolic in nature. Neurology is following. Carotid ultrasound with no significant stenosis. Awaiting echo to assess for paradoxical embolus. If unrevealing plan is for Zio patch at discharge. She is currently being treated with therapeutic Lovenox with plans to switch to Coumadin when she is able to take p.o. she began obtunded and stridorous requiring transfer to ICU and intubation. A large mucous plug was extracted from her airway. Patient began having bleeding from the nose and mouth. ENT was consulted. Nasal and oral packing remain in place. Anticoagulation on hold. Nephrology following for CURT with oliguria. Nasal packing was removed and replaced with absorbable sinofoam. She was successfully extubated. Hospital day 14     Subjective:    No major overnight changes. Remains afebrile  Continues to have left-sided hemiparesis      Temp (24hrs), Av.7 °F (36.5 °C), Min:97.1 °F (36.2 °C), Max:98.4 °F (36.9 °C)    DIET: Diet NPO  ADULT TUBE FEEDING; Orogastric; Standard with Fiber; Continuous; 10; Yes; 15; Q 4 hours; 50; 100; Q 4 hours; Protein; 1 Proteinex Daily  CODE: Full Code  No intake or output data in the 24 hours ending 22 1147    Objective:    BP (!) 165/79   Pulse 80   Temp 98 °F (36.7 °C) (Temporal)   Resp 20   Ht 5' (1.524 m)   Wt 192 lb 9.6 oz (87.4 kg)   SpO2 94%   BMI 37.61 kg/m²     General appearance: Obese elderly female . Follows commands. HEENT: Conjunctivae/corneas clear. Mucous membranes dry.    Neck: Supple. No JVD. Respiratory: Decreased breath sounds  Cardiovascular:  RRR. S1, S2 without MRG. PV: Pulses palpable. No edema. Abdomen: Soft, non-tender, non-distended. +BS  Musculoskeletal: No obvious deformities. Skin: Normal skin color. No rashes or lesions. Good turgor. Neurologic: Left sided weakness with hemiparesis.     Medications:  REVIEWED DAILY    Infusion Medications    dextrose       Scheduled Medications    potassium bicarb-citric acid  20 mEq Oral Once    chlorhexidine  15 mL Mouth/Throat BID    fluconazole  200 mg IntraVENous Q24H    ipratropium-albuterol  1 ampule Inhalation Q6H    sodium chloride (Inhalant)  4 mL Nebulization BID    insulin lispro  0-12 Units SubCUTAneous TID WC    insulin lispro  0-6 Units SubCUTAneous Nightly    sodium chloride  2 spray Each Nostril TID    QUEtiapine  12.5 mg Oral 2 times per day    [Held by provider] enoxaparin  1 mg/kg SubCUTAneous BID    [Held by provider] apixaban  5 mg Oral BID    vitamin D  5,000 Units Oral Daily    dilTIAZem  120 mg Oral Daily    [Held by provider] ramipril  5 mg Oral Daily    [Held by provider] aspirin  81 mg Oral Daily    atorvastatin  80 mg Oral Nightly     PRN Meds: sodium chloride, glycerin moisturizing mouth spray, hydrALAZINE, dextrose, glucagon (rDNA), dextrose, glucose, racepinephrine HCl, acetaminophen, albuterol, acetaminophen, ondansetron **OR** ondansetron, polyethylene glycol, perflutren lipid microspheres    Labs:     Recent Labs     05/05/22  0450 05/06/22  0604   WBC 20.5* 18.3*   HGB 9.0* 9.8*   HCT 28.5* 31.0*    384       Recent Labs     05/04/22  0857 05/05/22  0450 05/06/22  0604    144 143   K 4.1 4.0 3.5    108* 104   CO2 25 23 23   BUN 38* 31* 21   CREATININE 0.9 0.9 0.8   CALCIUM 8.2* 8.5* 8.2*       Recent Labs     05/05/22  0450 05/06/22  0604   PROT 5.8* 5.6*   ALKPHOS 77 78   ALT 52* 46*   AST 35* 31   BILITOT 0.7 0.9       No results for input(s): INR in the last 72 hours. No results for input(s): Emily Dodd in the last 72 hours. Chronic labs:    Lab Results   Component Value Date    CHOL 140 04/23/2022    TRIG 87 04/23/2022    HDL 48 04/23/2022    LDLCALC 75 04/23/2022    INR 1.3 04/27/2022    LABA1C 5.6 04/23/2022       Radiology: REVIEWED DAILY    Assessment:  Acute respiratory failure secondary to mucous plugging  Right MCA stroke- severe aphasia, severe dysarthria, and left arm and leg drift, and rightward gaze, and left-sided hemianopia  Encephalopathy  CKD IIIa  Nasal/oral bleeding  Acute on chronic anemia   Dysphagia  Acute delirium   UTI s/p 5 days of Ceftri  Leukocytosis  CKD, unknown baseline  CAD s/p PCI in 2017  HTN  HLD  Hx of BLE DVT + PE in 11/2021 anticoagulated on Eliquis  Nasal bleed  Failed swallow eval     Plan:  S/p extubation on 5/1  ACE inhibitor on hold   On  D5LR @ 60 ml/hr  Nephrology following     Neurology signed off with recs to resume anticoagulation if and when possible as well as Zio patch at discharge  Anticoagulation on hold    Monitor H&H, transfuse for Hb < 7     referral to hematology/oncology for hypercoagulable work-up given history of DVT/PE and failure of Eliquis   Wbc fluctuates, down to 18.3    For nasal bleed ENT recommended  · Bilateral nares packed with Rapid Rhino, to stay 3-5 days   Currently anticoagulation remains on hold. Pulmonology following recommended IV antifungal for 3 more days, stop BiPAP and titrate oxygen. Off Decadron     Repeated lower extremity Dopplers negative for DVT    consulted general surgery for PEG tube placement, plan for PEG tube placement today, consult dietary for tube feed    Disposition:  To rehab likely by Monday    DVT Prophylaxis [] Lovenox  []  Heparin [] DOAC [] PCDs [] Ambulation    GI Prophylaxis [x] PPI  [] H2 Blocker   [] Carafate  [] Diet/Tube Feeds   Level of care [] Med/Surg  [] Intermediate  [x]  ICU   Diet Diet NPO  ADULT TUBE FEEDING; Orogastric; Standard with Fiber; Continuous; 10; Yes; 15; Q 4 hours; 50; 100; Q 4 hours; Protein; 1 Proteinex Daily    Family contact [x]  N/A - per ICU team  [] At bedside   [] Phone call          +++++++++++++++++++++++++++++++++++++++++++++++++  Gaby Santana MD   [address]  +++++++++++++++++++++++++++++++++++++++++++++++++  NOTE: This report was transcribed using voice recognition software. Every effort was made to ensure accuracy; however, inadvertent computerized transcription errors may be present.

## 2022-05-06 NOTE — PLAN OF CARE
Problem: Discharge Planning  Goal: Discharge to home or other facility with appropriate resources  5/6/2022 1037 by Damaso Hatfield RN  Outcome: Progressing  5/6/2022 0116 by Fidel Barrera RN  Outcome: Progressing     Problem: Skin/Tissue Integrity  Goal: Absence of new skin breakdown  Description: 1. Monitor for areas of redness and/or skin breakdown  2. Assess vascular access sites hourly  3. Every 4-6 hours minimum:  Change oxygen saturation probe site  4. Every 4-6 hours:  If on nasal continuous positive airway pressure, respiratory therapy assess nares and determine need for appliance change or resting period.   5/6/2022 1037 by Damaso Hatfield RN  Outcome: Progressing  5/6/2022 0116 by Fidel Barrera RN  Outcome: Progressing     Problem: Safety - Adult  Goal: Free from fall injury  5/6/2022 1037 by Damaso Hatfield RN  Outcome: Progressing  5/6/2022 0116 by Fidel Barrera RN  Outcome: Progressing  Flowsheets (Taken 5/6/2022 0115)  Free From Fall Injury:   Instruct family/caregiver on patient safety   Based on caregiver fall risk screen, instruct family/caregiver to ask for assistance with transferring infant if caregiver noted to have fall risk factors     Problem: Pain  Goal: Verbalizes/displays adequate comfort level or baseline comfort level  5/6/2022 1037 by Damaso Hatfield RN  Outcome: Progressing  5/6/2022 0116 by Fidel Barrera RN  Outcome: Progressing     Problem: Respiratory - Adult  Goal: Achieves optimal ventilation and oxygenation  5/6/2022 1037 by Damaso Hatfield RN  Outcome: Progressing  5/6/2022 0116 by Fidel Barrera RN  Outcome: Progressing     Problem: Nutrition Deficit:  Goal: Optimize nutritional status  5/6/2022 1037 by Damaso Hatifeld RN  Outcome: Progressing  5/6/2022 0116 by Fidel Barrera RN  Outcome: Progressing     Problem: ABCDS Injury Assessment  Goal: Absence of physical injury  5/6/2022 1037 by Damaso Hatfield RN  Outcome: Progressing  5/6/2022 0116 by Fidel Barrera RN  Outcome: Progressing Problem: Chronic Conditions and Co-morbidities  Goal: Patient's chronic conditions and co-morbidity symptoms are monitored and maintained or improved  5/6/2022 1037 by Ray Echevarria RN  Outcome: Progressing  5/6/2022 0116 by Jovan Salas RN  Outcome: Progressing

## 2022-05-06 NOTE — PROGRESS NOTES
Department of Internal Medicine  Nephrology Progress Note    Events reviewed. Patient for repeat MBSS. SUBJECTIVE:  We are following Ms. Meghan Pearson for CURT. She denies any complaints, is confused.     PHYSICAL EXAM:      Vitals:    VITALS:  BP (!) 167/84   Pulse 91   Temp 97.3 °F (36.3 °C) (Temporal)   Resp 18   Ht 5' (1.524 m)   Wt 192 lb 9.6 oz (87.4 kg)   SpO2 98%   BMI 37.61 kg/m²   24HR INTAKE/OUTPUT:    No intake or output data in the 24 hours ending 05/06/22 1314    Constitutional: Alert, speech delayed, no acute distress  HEENT: PERRLA, normocephalic, atraumtic  Respiratory:  Diminished bilateral bases  Cardiovascular/Edema:  RRR, S1/S2  Gastrointestinal:  abd rounded, non tender, BS hypoactive  Neurologic: Alert and oriented, no focal deficits noted  Skin:  Warm,dry, ecchymosis to BUE  Other:  Pink catheter draining minimal yellow urine    Scheduled Meds:   potassium bicarb-citric acid  20 mEq Oral Once    chlorhexidine  15 mL Mouth/Throat BID    fluconazole  200 mg IntraVENous Q24H    ipratropium-albuterol  1 ampule Inhalation Q6H    sodium chloride (Inhalant)  4 mL Nebulization BID    insulin lispro  0-12 Units SubCUTAneous TID WC    insulin lispro  0-6 Units SubCUTAneous Nightly    sodium chloride  2 spray Each Nostril TID    QUEtiapine  12.5 mg Oral 2 times per day    [Held by provider] enoxaparin  1 mg/kg SubCUTAneous BID    [Held by provider] apixaban  5 mg Oral BID    vitamin D  5,000 Units Oral Daily    dilTIAZem  120 mg Oral Daily    [Held by provider] ramipril  5 mg Oral Daily    [Held by provider] aspirin  81 mg Oral Daily    atorvastatin  80 mg Oral Nightly     Continuous Infusions:   dextrose       PRN Meds:.sodium chloride, glycerin moisturizing mouth spray, hydrALAZINE, dextrose, glucagon (rDNA), dextrose, glucose, racepinephrine HCl, acetaminophen, albuterol, acetaminophen, ondansetron **OR** ondansetron, polyethylene glycol, perflutren lipid microspheres    DATA:    CBC:   Lab Results   Component Value Date    WBC 18.3 05/06/2022    RBC 3.43 05/06/2022    HGB 9.8 05/06/2022    HCT 31.0 05/06/2022    MCV 90.4 05/06/2022    MCH 28.6 05/06/2022    MCHC 31.6 05/06/2022    RDW 14.3 05/06/2022     05/06/2022    MPV 9.5 05/06/2022     CMP:    Lab Results   Component Value Date     05/06/2022    K 3.5 05/06/2022     05/06/2022    CO2 23 05/06/2022    BUN 21 05/06/2022    CREATININE 0.8 05/06/2022    GFRAA >60 05/06/2022    LABGLOM >60 05/06/2022    GLUCOSE 79 05/06/2022    PROT 5.6 05/06/2022    LABALBU 2.8 05/06/2022    CALCIUM 8.2 05/06/2022    BILITOT 0.9 05/06/2022    ALKPHOS 78 05/06/2022    AST 31 05/06/2022    ALT 46 05/06/2022     Magnesium:    Lab Results   Component Value Date    MG 2.0 05/06/2022     Phosphorus:  No results found for: PHOS  Radiology Review:      Chest x-ray May 3, 2022   1. Cardiomegaly. 2. Hazy bilateral airspace disease favored to represent mild pulmonary   edema/vascular congestion. BRIEF SUMMARY OF INITIAL CONSULT:  Briefly, Ms. Jeny Moran is a 68year old female with a PMH of HTN, CAD with PCI 2017, BLE DVT 11/2021, HLD, GERD who was admitted on April 22, 2022 after she was transferred from [facility] with stroke like symptoms. Patient presented to [facility] with complaint of strokelike symptoms and sudden onset left-sided weakness with aphasia, with MRI indicating right MCA without hemorrhagic conversion. Patient was originally admitted to the medical floor at [facility] and became progressively obtunded with stridorous respiratory failure, was intubated and transferred to MICU 4/26/2022. Patient developed severe epistaxis requiring packing per ENT. Labs on admission were significant for sodium of 141, potassium 3.7, chloride 111, bicarbonate 18, BUN 21, creatinine 1.2, and hemoglobin of 7.1.   We are consulted for worsening CURT and decreased urine output. Notably patient's daughter states that patient has been recently evaluated by nephrology in [place] and underwent blood work and renal ultrasound however did not receive the results yet. Ramipril, diltiazem, atorvastatin, apixaban, and omeprazole are medications patient was taking prior to admission. Problems resolved:  · Hypocalcemia, 2/2 vitamin D deficiency. On cholecalciferol, ionized calcium 1.16  · Vitamin D deficiency, on cholecalciferol  · Hypernatremia, secondary to decreased free water intake patient is NPO. Increase D5 water to 80 cc/h  · Alkalemia, pH 7.509, PCO2 28, HCO3 21.8, respiratory alkalosis  · Acute epistaxis, nasal packing in place  · Acute hypoxic respiratory failure 2/2 mucus plug, intubated on 4/26, extubated on 5/1, S/p bronchoscopy  · CURT stage III based on urine output,  volume responsive pre-renal CURT from poor oral intake and volume loss with severe anemia in the setting of ACEi administration. Oliguric. FENa 0.3%. Resolved, renal function improved beyond baseline, 0.9 mg/dL. IMPRESSION/RECOMMENDATIONS:      1. CKD Stage 2 with proteinuria, Renal US done at Dr. Zackery Garnica office in [place], [place] shows increased cortical echogenicity consistent with medical renal disease. Renal function stable. 2. HTN, on cardizem and hydralazine, ramipril (on hold)  ------------------------------------------------------------------------    3. CVA, MRI demonstrates infarcts in right MCA without hemorrhagic conversion. Neurology following, concern for already embolic source. Echo ordered for PFO evaluation to r/o paradoxical embolus. Borderline intracranial atherosclerosis. 4. Historical DVT and current right LE+, patiently chronically anticoagulated on Eliquis at home, was being treated with Lovenox for transition to [address] [place] [address], now on hold secondary to epistaxis  5. Anemia, acute drop in hemoglobin with previous work-up at [facility]. Hemoglobin stable  6. HLD, on statin   7.  GERD, on omeprazole at home  8. UTI, 2/2 E. coli s/p ceftriaxone.    9. N.p.o., TF 50 cc/hr with  cc Q4hrs, awaiting repeat MBSS before decision made for PEG tube placement      Plan:    · Continue TF via NG as tolerated  · Continue to monitor renal function  · Continue to monitor H&H, transfuse to keep <7  · Continue to monitor blood pressure  · Replace potassium 10 meq IV X1  · Monitor potassium levels  · For repeat MBSS before decision to PEG tube placement    Electronically signed by PATRICIA Garibay CNP on 5/6/2022 at 1:14 PM

## 2022-05-06 NOTE — PROGRESS NOTES
Floor Called, nurse to nurse given. Spoke with Noemi Nicely . Patients test results review, VS reported to receiving nurse. Any and all important information regarding patient disclosed.

## 2022-05-06 NOTE — PLAN OF CARE
Problem: Discharge Planning  Goal: Discharge to home or other facility with appropriate resources  Outcome: Progressing     Problem: Skin/Tissue Integrity  Goal: Absence of new skin breakdown  Description: 1. Monitor for areas of redness and/or skin breakdown  2. Assess vascular access sites hourly  3. Every 4-6 hours minimum:  Change oxygen saturation probe site  4. Every 4-6 hours:  If on nasal continuous positive airway pressure, respiratory therapy assess nares and determine need for appliance change or resting period.   Outcome: Progressing     Problem: Safety - Adult  Goal: Free from fall injury  Outcome: Progressing  Flowsheets (Taken 5/6/2022 0115)  Free From Fall Injury:   Instruct family/caregiver on patient safety   Based on caregiver fall risk screen, instruct family/caregiver to ask for assistance with transferring infant if caregiver noted to have fall risk factors     Problem: Pain  Goal: Verbalizes/displays adequate comfort level or baseline comfort level  Outcome: Progressing     Problem: Respiratory - Adult  Goal: Achieves optimal ventilation and oxygenation  Outcome: Progressing     Problem: Nutrition Deficit:  Goal: Optimize nutritional status  Outcome: Progressing     Problem: ABCDS Injury Assessment  Goal: Absence of physical injury  Outcome: Progressing     Problem: Chronic Conditions and Co-morbidities  Goal: Patient's chronic conditions and co-morbidity symptoms are monitored and maintained or improved  Outcome: Progressing

## 2022-05-06 NOTE — OP NOTE
Operative Note      Patient: Jenn Rowan  YOB: 1945  MRN: [id number]    Date of Procedure: 5/6/2022    Pre-Op Diagnosis: Dysphagia    Post-Op Diagnosis: Same       Procedure(s):  EGD PEG TUBE PLACEMENT    Surgeon(s):  Ella Davies MD    Assistant:   * No surgical staff found *    Anesthesia: Monitor Anesthesia Care    Estimated Blood Loss (mL): Minimal    Complications: None    Specimens:   * No specimens in log *    Implants:  * No implants in log *      Drains:   Gastrostomy/Enterostomy/Jejunostomy Tube Percutaneous Endoscopic Gastrostomy (PEG) LUQ 20 fr (Active)       [REMOVED] NG/OG/NJ/NE Tube Orogastric Right mouth (Removed)   Surrounding Skin Clean, dry & intact 05/01/22 1200   Securement device Tape 05/01/22 1200   Status Continuous feeding 05/01/22 1200   Placement Verified External Catheter Length;X-Ray (repeat) 04/29/22 2200   NG/OG/NJ/NE External Measurement (cm) 54 cm 05/01/22 1200   Drainage Appearance Boyle;Bile 05/01/22 1200   Tube Feeding Standard with Fiber 05/01/22 1200   Tube feeding/verify rate (mL/hr) 50 mL/hr 05/01/22 0800   Tube Feeding Intake (mL) 365 ml 05/01/22 0641   Free Water/Flush (mL) 80 mL 05/01/22 0800   Output (mL) 0 ml 04/29/22 0500   Action Taken Feed set changed 05/01/22 0400   Residual Volume (ml) 10 ml 05/01/22 0400       [REMOVED] Urinary Catheter Pink (Removed)   Catheter Indications Urinary retention (acute or chronic), continuous bladder irrigation or bladder outlet obstruction 04/23/22 0929   Site Assessment No urethral drainage 04/24/22 0745   Urine Color Yellow 04/23/22 2212   Urine Appearance Clear 04/23/22 2212   Collection Container Standard 04/23/22 0929   Output (mL) 700 mL 04/24/22 1446       [REMOVED] Urinary Catheter (Removed)   Catheter Indications Need for fluid volume management of the critically ill patient in a critical care setting 05/03/22 0800   Site Assessment Urethral drainage 05/03/22 0800   Urine Color Yellow 05/03/22 0800   Urine Appearance Clear 05/03/22 0800   Collection Container Standard 05/03/22 0800   Securement Method Securing device (Describe) 05/03/22 0800   Catheter Care Completed Yes 04/30/22 2000   Catheter Best Practices  Drainage tube clipped to bed;Catheter secured to thigh; Bag below bladder;Bag not on floor; Lack of dependent loop in tubing;Drainage bag less than half full 05/03/22 0800   Status Draining;Patent 05/03/22 0800   Output (mL) 50 mL 05/03/22 1835       Findings: PEG Tube 3cm from the skin, antral gastritis and erosions. Paraesophageal hernia    Detailed Description of Procedure:   Patient was taken to the endoscopy suite and laid supine. Anesthesia was induced timeout was conducted. A gastroscope was inserted in the patient's oropharynx passed down the esophagus and stomach. 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 The stomach was evaluated and there was noted to be some erosions and some moderate gastritis. At this point he is in a safe track technique local anesthesia and then a access needle was placed into the stomach. Following this wire was placed which was grasped with a snare moved out of the patient's mouth PEG tube was attached to the wire a skin incision was made at the wire wire was then pulled pulling the PEG tube through the skin to 3 cm. The scope was reinserted and the PEG tube was noted to be in good position. Following this the appropriate attachments were placed onto the PEG tube. The PEG tube again was noted be 3 cm from the skin. The patient was awakened from anesthesia and sent to recovery in stable condition.     Electronically signed by Jluis Chu MD on 5/6/2022 at 5:52 PM

## 2022-05-06 NOTE — PROGRESS NOTES
SPEECH/LANGUAGE PATHOLOGY  VIDEOFLUOROSCOPIC STUDY OF SWALLOWING (MBS)   and PLAN OF CARE    PATIENT NAME:  Sheridan Goltz  (female)     MRN:  [id number]    :  1945  (77 y.o.)  STATUS:  Inpatient: Room 8505/8505-A    TODAY'S DATE:  2022  REFERRING PROVIDER:   Dr. Divine Naqvi: [place] modified barium swallow with video  Date of order:  22   REASON FOR REFERRAL: assess oropharyngeal swallow function    EVALUATING THERAPIST: DAYLIN Aragon      RESULTS:      DYSPHAGIA DIAGNOSIS:  moderate oropharyngeal phase dysphagia     DIET RECOMMENDATIONS:  Pureed consistency solids (IDDSI level 4) with  honey consistency (moderately thick - IDDSI level 3)  Liquids    Plan to work to advance diet textures as able. Pt reports she wears dentures. MEDICATION ADMINISTRATION and Administer medication  Whole or crushed, as able, with pudding/applesauce    FEEDING RECOMMENDATIONS:    Assistance level:  Stand by assistance is needed during all oral intake, Set-up is required for all oral intake, Encourage self-feeding     Compensatory strategies recommended: Small bites/sips, Alternate solids and liquids, Check for oral pocketing and No straw     Discussed recommendations with nursing and/or faxed report to referring provider: Yes    Laryngeal Penetration and Aspiration:  Penetration WITH aspiration was observed in today's study with  mildly thick liquid (nectar)    SPEECH THERAPY  PLAN OF CARE   The dysphagia POC is established based on physician order and dysphagia diagnosis    Skilled SLP intervention for dysphagia management up to 5x per week until goals met, pt plateaus in function and/or discharged from hospital      Conditions Requiring Skilled Therapeutic Intervention for dysphagia:    Patient is performing below functional baseline d/t  current acute condition, Multiple diagnoses, multiple medications, and increased dependency upon caregivers.   Reduced laryngeal closure resulting in penetration  Reduced laryngeal closure resulting in aspiration     SPECIFIC DYSPHAGIA INTERVENTIONS TO INCLUDE:     Compensatory strategy training   Therapeutic exercises  Trials of upgraded diet/liquid     Specific instructions for next treatment:  therapeutic po trial to determine safety of advanced diet textures and consistencies, ongoing PO analysis to upgrade diet and evaluate tolerance of current PO recommendation and initiate instruction of therapeutic exercises   Treatment Goals:    Short Term Goals:  Pt will participate in ongoing mealtime assessment to provide diet modification and compensatory strategy implementation to minimize risk of aspiration associated with PO intake  Pt will complete laryngeal strength/ ROM therapeutic exercises to improve airway protection for the least restrictive PO diet minimal verbal prompts  Pt will complete Effortful Swallow therapeutically to target increased oral and base of tongue pressure, increased pharyngeal constrictor contractions, and increased UES relaxation duration to reduce pharyngeal residue with minimal verbal prompts     Long Term Goals:   Pt will improve oropharyngeal swallow function to ensure airway protection during PO intake to maintain adequate nutrition/hydration and decrease signs/symptoms of aspiration to less than 1 x/day.       Patient/family Goal:    To be able to [facility] discussed with Patient   The Patient understand(s) the diagnosis, prognosis and plan of care     Rehabilitation Potential/Prognosis: good                      ADMITTING DIAGNOSIS: Stroke-like symptoms [R29.90]  Stroke of unknown etiology ([facility] [I63.9]  Ac[facility] [I63.9]     VISIT DIAGNOSIS:         PATIENT REPORT/COMPLAINT: patient currently NPO pending results of this evaluation    PRIOR LEVEL OF SWALLOW FUNCTION:    Past History of Dysphagia?:  yes    Home diet: Regular consistency solids (IDDSI level 7) with  thin liquids (IDDSI level 0)  Current Diet Order:  Diet NPO  ADULT TUBE FEEDING; Orogastric; Standard with Fiber; Continuous; 10; Yes; 15; Q 4 hours; 50; 100; Q 4 hours; Protein; 1 Proteinex Daily    PROCEDURE:  Consistencies Administered During the Evaluation   Liquids: nectar thick liquid and honey thick liquid   Solids:  pureed foods      Method of Intake:   cup, spoon  Self fed, Fed by clinician      Position:   Seated, upright    INSTRUMENTAL ASSESSMENT:    ORAL PREP/ ORAL PHASE:    The oral stage of swallowing was within functional limits for consistencies administered  Dentition:  missing teeth     PHARYNGEAL PHASE:     ONSET TIME       Delayed initiation of the pharyngeal swallow was noted with swallow reflex triggered at the level of the tongue base         PHARYNGEAL RESIDUALS        Vallecula/Pharyngeal Wall           No significant residuals were noted in the vallecula      Pyriform Sinuses      No significant residuals were noted in the pyriform sinuses     LARYNGEAL PENETRATION   Laryngeal penetration occurred prior to aspiration. Further details under aspiration section. ASPIRATION  Aspiration occurred DURING the swallow for nectar consistency liquid due to  inadequate laryngeal closure . Aspiration was moderate and occurred inconsistently . an absent cough/throat clear was noted    PENETRATION-ASPIRATION SCALE (PAS):  THIN item not administered  MILDLY THICK 8 = Material enters the airway, passes below the vocal folds, and no effort is made to eject   MODERATELY THICK 1 = Material does not enter the airway  PUREE 1 = Material does not enter the airway  HARD SOLID item not administered       COMPENSATORY STRATEGIES    Compensatory strategies were not attempted      STRUCTURAL/FUNCTIONAL ANOMALIES   No structural/functional anomalies were noted    CERVICAL ESOPHAGEAL STAGE :     The cervical esophagus appeared adequate          ___________    Cognition:   Within functional limits for this exam    Oral Peripheral Examination   Adequate lingual/labial strength     Current Respiratory Status   room air     Parameters of Speech Production  Respiration:  Adequate for speech production  Quality:   Strained, much improvement since last MBSS  Intensity: Quiet    Pain: No pain reported. EDUCATION:   The Speech Language Pathologist (SLP) completed education regarding results of evaluation and that intervention is warranted at this time. Learner: Patient  Education: Reviewed results and recommendations of this evaluation, Reviewed diet and strategies, Reviewed signs, symptoms and risks of aspiration and Reviewed recommendations for follow-up  Evaluation of Education:  Verbalizes understanding    This plan may be re-evaluated and revised as warranted. Evaluation Time includes thorough review of current medical information, gathering information on past medical history/social history and prior level of function, completion of standardized testing/informal observation of tasks, assessment of data and education on plan of care and goals. [x]The admitting diagnosis and active problem list, have been reviewed prior to initiation of this evaluation. CPT Code: 76764  dysphagia study    INTERVENTION  CPT Code: 06235  dysphagia tx    Speech Pathologist (SLP) completed education with the patient/family regarding procedure of Modified Barium Swallow Study prior to exam and then type of swallowing impairment following completion of MBSS. Reviewed current solid/liquid consistency diet recommendations --   Dysphagia 1, Pureed solids with  honey consistency (moderately thick - IDDSI level 3)  liquids and discussed compensatory strategies (small bites/sips, no straws) to ensure safe PO intake. Images from MBSS reviewed with patient/ family and education provided. Reviewed aspiration precautions.  Encouraged patient and/or family to engage SLP in unstructured Q&A session relative to identified deficit areas; indicated understanding of all information provided via satisfactory verbal response.           ACTIVE PROBLEM LIST:   Patient Active Problem List   Diagnosis    Stroke-like symptoms    Oral bleeding    TIA (transient ischemic attack)    Nasal bleeding    Anticoagulated    Stroke of unknown etiology ([facility]    [facility]       Jackie Perla, [address] Pathologist  [id number]  5/6/2022

## 2022-05-06 NOTE — PROGRESS NOTES
[facility]  Department of Pulmonary, Critical Care and Sleep Medicine  [address] [facility]  Department of Internal Medicine  Progress Note    SUBJECTIVE:    Failed swallowing test, family requesting repeat swallow test before PEG since liast one test was in ICU  + edema  Improved thrush noted  FiO2 needs remain between 3 liters  Laryngoscopy was completed   ENT ? posterior glottic or interarytenoid scar band or mucosal crusting. OBJECTIVE:  Vitals:    05/1945 05/06/22 0000 05/06/22 0015 05/06/22 0905   BP: (!) 167/91  (!) 138/92    Pulse: 87 87 87    Resp: 16  18    Temp: 97.3 °F (36.3 °C)  98.4 °F (36.9 °C)    TempSrc: Temporal  Axillary    SpO2: 95%  95% 95%   Weight:       Height:         Constitutional: Alert,     EENT: EOMI SRINIVAS. MMM. No icterus. +++ thrush. Voice remains hoarse   Neck: No thyromegaly. No elevated JVP. Trachea was midline. Respiratory: Symmetrical.  Breath sounds were clear. Cardiovascular: Regular, No murmur. No rubs. Pulses:  Equal bilaterally. Abdomen: Soft without organomegaly. No rebound, rigidity. No guarding. Lymphatic: No lymphadenopathy. Musculoskeletal: Without weakness or gross deficits  Extremities: +  lower extremity edema. Reflexes appear adequate. Skin:  Warm and dry. No skin rashes. Neurological/Psychiatric: Continued left-sided weakness      DATA:    Monitor Strips:  Reviewed & discusses with technical team. No changes noted. RADIOLOGY:  Barium swallow study completed today which shows penetration and aspiration with nectar consistently liquids. Chest x-ray consistent with pulmonary edema/vascular congestion.       CBC with Differential:    Lab Results   Component Value Date    WBC 18.3 05/06/2022    RBC 3.43 05/06/2022    HGB 9.8 05/06/2022    HCT 31.0 05/06/2022     05/06/2022    MCV 90.4 05/06/2022    MCH 28.6 05/06/2022    MCHC 31.6 05/06/2022    RDW 14.3 05/06/2022     CMP: Lab Results   Component Value Date     05/06/2022    K 3.5 05/06/2022     05/06/2022    CO2 23 05/06/2022    BUN 21 05/06/2022    CREATININE 0.8 05/06/2022    GFRAA >60 05/06/2022    LABGLOM >60 05/06/2022    GLUCOSE 79 05/06/2022    PROT 5.6 05/06/2022    LABALBU 2.8 05/06/2022    CALCIUM 8.2 05/06/2022    BILITOT 0.9 05/06/2022    ALKPHOS 78 05/06/2022    AST 31 05/06/2022    ALT 46 05/06/2022       CLINICAL ASSESMENT:  1. Acute hypoxemic respiratory failure  2. Epistaxis-resolving  3. Mucous plugging  4. Concern for posterior glottic/interarytenoid scar band versus mucosal crusting. 5. Bilateral DVT on lower extremity ultrasound  6. Right MCA stroke  7. E. coli UTI  8. Acute on chronic anemia secondary to epistaxis    PLAN: If needed the case was discussed with the care team  1. Wean FiO2 as tolerated  2. Stop BIPAP  3. Stop decadron  4. Antifungal IV 3 more days  5. Recommend aggressive airway hygiene measures with incentive spirometry and flutter valve. 3% saline nebs  6. PT OT  7.  Repeat MBS today soon          Sallie Beltrán,

## 2022-05-06 NOTE — CARE COORDINATION
[facility] CARE PLANNINGJo Ann Abarca, [address]): met with jason the daughter this a.m. and she said pt was vaccinated x2 with moderna. I called garret at [address] [place] [address] of [employer]  [phone] and fax [phone] and faxed her the chart to assess. Per pcp pt will not be ready over weekend. garret will get back with me in the a.m. she also is in agreement with referrals made to [facility] , [facility] and [facility]. Pt is to have a video swallow redone per family request to see if can pass and if not then peg tube is scheduled for this afternoon. adele elizabeth arellano has chart and will not  Have a bed until at least Monday. Will follow up with her as well then. precert is needed. DERECK Hernández  .5/6/2022  [facility] will follow pt and not have a bed until at least Monday.  DERECK Hernández.5/6/2022

## 2022-05-07 LAB
ALBUMIN SERPL-MCNC: 2.5 G/DL (ref 3.5–5.2)
ALP BLD-CCNC: 74 U/L (ref 35–104)
ALT SERPL-CCNC: 34 U/L (ref 0–32)
ANION GAP SERPL CALCULATED.3IONS-SCNC: 15 MMOL/L (ref 7–16)
AST SERPL-CCNC: 23 U/L (ref 0–31)
BILIRUB SERPL-MCNC: 0.7 MG/DL (ref 0–1.2)
BUN BLDV-MCNC: 16 MG/DL (ref 6–23)
CALCIUM SERPL-MCNC: 8 MG/DL (ref 8.6–10.2)
CHLORIDE BLD-SCNC: 102 MMOL/L (ref 98–107)
CO2: 23 MMOL/L (ref 22–29)
CREAT SERPL-MCNC: 0.8 MG/DL (ref 0.5–1)
GFR AFRICAN AMERICAN: >60
GFR NON-AFRICAN AMERICAN: >60 ML/MIN/1.73
GLUCOSE BLD-MCNC: 116 MG/DL (ref 74–99)
HCT VFR BLD CALC: 29.5 % (ref 34–48)
HEMOGLOBIN: 9.2 G/DL (ref 11.5–15.5)
MAGNESIUM: 1.8 MG/DL (ref 1.6–2.6)
MCH RBC QN AUTO: 27.9 PG (ref 26–35)
MCHC RBC AUTO-ENTMCNC: 31.2 % (ref 32–34.5)
MCV RBC AUTO: 89.4 FL (ref 80–99.9)
METER GLUCOSE: 107 MG/DL (ref 74–99)
METER GLUCOSE: 132 MG/DL (ref 74–99)
METER GLUCOSE: 139 MG/DL (ref 74–99)
METER GLUCOSE: 148 MG/DL (ref 74–99)
METER GLUCOSE: 200 MG/DL (ref 74–99)
PDW BLD-RTO: 14.5 FL (ref 11.5–15)
PLATELET # BLD: 325 E9/L (ref 130–450)
PMV BLD AUTO: 9.4 FL (ref 7–12)
POTASSIUM REFLEX MAGNESIUM: 3.2 MMOL/L (ref 3.5–5)
RBC # BLD: 3.3 E12/L (ref 3.5–5.5)
SODIUM BLD-SCNC: 140 MMOL/L (ref 132–146)
TOTAL PROTEIN: 5.3 G/DL (ref 6.4–8.3)
WBC # BLD: 20.9 E9/L (ref 4.5–11.5)

## 2022-05-07 PROCEDURE — A4216 STERILE WATER/SALINE, 10 ML: HCPCS | Performed by: INTERNAL MEDICINE

## 2022-05-07 PROCEDURE — 80053 COMPREHEN METABOLIC PANEL: CPT

## 2022-05-07 PROCEDURE — 94640 AIRWAY INHALATION TREATMENT: CPT

## 2022-05-07 PROCEDURE — 36415 COLL VENOUS BLD VENIPUNCTURE: CPT

## 2022-05-07 PROCEDURE — 85027 COMPLETE CBC AUTOMATED: CPT

## 2022-05-07 PROCEDURE — 94660 CPAP INITIATION&MGMT: CPT

## 2022-05-07 PROCEDURE — C9113 INJ PANTOPRAZOLE SODIUM, VIA: HCPCS | Performed by: INTERNAL MEDICINE

## 2022-05-07 PROCEDURE — 99233 SBSQ HOSP IP/OBS HIGH 50: CPT | Performed by: INTERNAL MEDICINE

## 2022-05-07 PROCEDURE — 6360000002 HC RX W HCPCS: Performed by: INTERNAL MEDICINE

## 2022-05-07 PROCEDURE — 6370000000 HC RX 637 (ALT 250 FOR IP): Performed by: NURSE PRACTITIONER

## 2022-05-07 PROCEDURE — 6370000000 HC RX 637 (ALT 250 FOR IP): Performed by: STUDENT IN AN ORGANIZED HEALTH CARE EDUCATION/TRAINING PROGRAM

## 2022-05-07 PROCEDURE — 83735 ASSAY OF MAGNESIUM: CPT

## 2022-05-07 PROCEDURE — 6370000000 HC RX 637 (ALT 250 FOR IP): Performed by: INTERNAL MEDICINE

## 2022-05-07 PROCEDURE — 2580000003 HC RX 258: Performed by: INTERNAL MEDICINE

## 2022-05-07 PROCEDURE — 82962 GLUCOSE BLOOD TEST: CPT

## 2022-05-07 PROCEDURE — 2060000000 HC ICU INTERMEDIATE R&B

## 2022-05-07 RX ORDER — GUAIFENESIN 100 MG/5ML
200 SOLUTION ORAL 2 TIMES DAILY
Status: DISCONTINUED | OUTPATIENT
Start: 2022-05-07 | End: 2022-05-11 | Stop reason: HOSPADM

## 2022-05-07 RX ORDER — MAGNESIUM SULFATE IN WATER 40 MG/ML
2000 INJECTION, SOLUTION INTRAVENOUS ONCE
Status: COMPLETED | OUTPATIENT
Start: 2022-05-07 | End: 2022-05-07

## 2022-05-07 RX ORDER — LEVALBUTEROL INHALATION SOLUTION 0.63 MG/3ML
0.63 SOLUTION RESPIRATORY (INHALATION) EVERY 8 HOURS
Status: DISCONTINUED | OUTPATIENT
Start: 2022-05-07 | End: 2022-05-11 | Stop reason: HOSPADM

## 2022-05-07 RX ORDER — MAGNESIUM SULFATE HEPTAHYDRATE 500 MG/ML
2000 INJECTION, SOLUTION INTRAMUSCULAR; INTRAVENOUS ONCE
Status: DISCONTINUED | OUTPATIENT
Start: 2022-05-07 | End: 2022-05-07 | Stop reason: CLARIF

## 2022-05-07 RX ADMIN — MAGNESIUM SULFATE 2000 MG: 2 INJECTION INTRAVENOUS at 20:37

## 2022-05-07 RX ADMIN — Medication 5000 UNITS: at 09:04

## 2022-05-07 RX ADMIN — SODIUM CHLORIDE, PRESERVATIVE FREE 40 MG: 5 INJECTION INTRAVENOUS at 20:26

## 2022-05-07 RX ADMIN — INSULIN LISPRO 2 UNITS: 100 INJECTION, SOLUTION INTRAVENOUS; SUBCUTANEOUS at 11:50

## 2022-05-07 RX ADMIN — SODIUM CHLORIDE SOLN NEBU 3% 4 ML: 3 NEBU SOLN at 09:04

## 2022-05-07 RX ADMIN — SALINE NASAL SPRAY 2 SPRAY: 1.5 SOLUTION NASAL at 15:08

## 2022-05-07 RX ADMIN — CHLORHEXIDINE GLUCONATE 15 ML: 1.2 RINSE ORAL at 20:39

## 2022-05-07 RX ADMIN — IPRATROPIUM BROMIDE AND ALBUTEROL SULFATE 1 AMPULE: .5; 2.5 SOLUTION RESPIRATORY (INHALATION) at 13:48

## 2022-05-07 RX ADMIN — IPRATROPIUM BROMIDE AND ALBUTEROL SULFATE 1 AMPULE: .5; 2.5 SOLUTION RESPIRATORY (INHALATION) at 03:47

## 2022-05-07 RX ADMIN — INSULIN LISPRO 4 UNITS: 100 INJECTION, SOLUTION INTRAVENOUS; SUBCUTANEOUS at 17:38

## 2022-05-07 RX ADMIN — IPRATROPIUM BROMIDE AND ALBUTEROL SULFATE 1 AMPULE: .5; 2.5 SOLUTION RESPIRATORY (INHALATION) at 09:03

## 2022-05-07 RX ADMIN — POTASSIUM BICARBONATE 20 MEQ: 782 TABLET, EFFERVESCENT ORAL at 09:45

## 2022-05-07 RX ADMIN — SODIUM CHLORIDE SOLN NEBU 3% 4 ML: 3 NEBU SOLN at 20:42

## 2022-05-07 RX ADMIN — APIXABAN 5 MG: 5 TABLET, FILM COATED ORAL at 22:59

## 2022-05-07 RX ADMIN — SALINE NASAL SPRAY 2 SPRAY: 1.5 SOLUTION NASAL at 09:05

## 2022-05-07 RX ADMIN — QUETIAPINE FUMARATE 12.5 MG: 25 TABLET ORAL at 09:05

## 2022-05-07 RX ADMIN — LEVALBUTEROL 0.63 MG: 0.63 SOLUTION RESPIRATORY (INHALATION) at 20:41

## 2022-05-07 RX ADMIN — QUETIAPINE FUMARATE 12.5 MG: 25 TABLET ORAL at 20:41

## 2022-05-07 RX ADMIN — GUAIFENESIN 200 MG: 200 SOLUTION ORAL at 23:00

## 2022-05-07 RX ADMIN — CHLORHEXIDINE GLUCONATE 15 ML: 1.2 RINSE ORAL at 09:04

## 2022-05-07 RX ADMIN — SODIUM CHLORIDE, PRESERVATIVE FREE 40 MG: 5 INJECTION INTRAVENOUS at 09:04

## 2022-05-07 RX ADMIN — FLUCONAZOLE IN SODIUM CHLORIDE 200 MG: 2 INJECTION, SOLUTION INTRAVENOUS at 15:08

## 2022-05-07 RX ADMIN — DILTIAZEM HYDROCHLORIDE 120 MG: 120 CAPSULE, COATED, EXTENDED RELEASE ORAL at 09:05

## 2022-05-07 RX ADMIN — ATORVASTATIN CALCIUM 80 MG: 40 TABLET, FILM COATED ORAL at 20:42

## 2022-05-07 RX ADMIN — SALINE NASAL SPRAY 2 SPRAY: 1.5 SOLUTION NASAL at 20:41

## 2022-05-07 ASSESSMENT — PAIN DESCRIPTION - LOCATION: LOCATION: THROAT

## 2022-05-07 ASSESSMENT — PAIN - FUNCTIONAL ASSESSMENT: PAIN_FUNCTIONAL_ASSESSMENT: ACTIVITIES ARE NOT PREVENTED

## 2022-05-07 ASSESSMENT — PAIN DESCRIPTION - ONSET: ONSET: ON-GOING

## 2022-05-07 ASSESSMENT — PAIN DESCRIPTION - DESCRIPTORS: DESCRIPTORS: SORE

## 2022-05-07 ASSESSMENT — PAIN DESCRIPTION - FREQUENCY: FREQUENCY: CONTINUOUS

## 2022-05-07 ASSESSMENT — PAIN SCALES - GENERAL: PAINLEVEL_OUTOF10: 2

## 2022-05-07 ASSESSMENT — PAIN DESCRIPTION - PAIN TYPE: TYPE: ACUTE PAIN

## 2022-05-07 NOTE — PROGRESS NOTES
GENERAL SURGERY  DAILY PROGRESS NOTE  5/7/2022    CHIEF COMPLAINT:  No chief complaint on file. SUBJECTIVE:  PEG in place 4 cm at skin. Tube feeds running, no abdominal pain, no nausea or vomiting. Tube feeds running. OBJECTIVE:  BP (!) 148/69   Pulse 86   Temp 97.8 °F (36.6 °C) (Temporal)   Resp 22   Ht 5' (1.524 m)   Wt 192 lb 9.6 oz (87.4 kg)   SpO2 96%   BMI 37.61 kg/m²     GENERAL:  NAD. LUNGS:  No increased work of breathing. CARDIOVASCULAR: RR  ABDOMEN:  Soft, non-distended, non-tender. No guarding, rigidity, rebound. ASSESSMENT/PLAN:  68 y.o. female with dysphagia s/p PEG 5/6    Ok for tube feeds as tolerated. No further surgical intervention planned we will sign off at this time. Carmen Fleming MD  Surgery Resident PGY-1  5/7/2022  7:29 AM    Dr. Zahra Caro covering for Dr. Janell Zapien  Pt seen and examined; agree w above  TF per primary  No further surgical interventions planned    Adriana Wright MD  Minimally Invasive General Surgery and Endoscopy  [address] [facility].  [address]: [phone]  F: [phone]    Electronically signed by Alexandr Stephenson MD on 5/7/2022 at 9:56 AM

## 2022-05-07 NOTE — ANESTHESIA POSTPROCEDURE EVALUATION
Department of Anesthesiology  Postprocedure Note    Patient: Jaky Gallagher  MRN: [id number]  YOB: 1945  Date of evaluation: 5/7/2022  Time:  6:52 AM     Procedure Summary     Date: 05/06/22 Room / Location: [address] [place] [address] / [facility]    Anesthesia Start: 0375 Anesthesia Stop: 6950    Procedure: EGD PEG TUBE PLACEMENT (N/A ) Diagnosis: (/)    Surgeons: Gabrielle Gallardo MD Responsible Provider: Rosy Rosas MD    Anesthesia Type: MAC ASA Status: 3          Anesthesia Type: MAC    Jesus Phase I: Jesus Score: 10    Jesus Phase II:      Last vitals: Reviewed and per EMR flowsheets.        Anesthesia Post Evaluation    Patient location during evaluation: PACU  Patient participation: complete - patient participated  Level of consciousness: awake and alert  Airway patency: patent  Nausea & Vomiting: no nausea and no vomiting  Complications: no  Cardiovascular status: hemodynamically stable and blood pressure returned to baseline  Respiratory status: acceptable  Hydration status: euvolemic  Multimodal analgesia pain management approach

## 2022-05-07 NOTE — PLAN OF CARE
Problem: Discharge Planning  Goal: Discharge to home or other facility with appropriate resources  Outcome: Progressing     Problem: Skin/Tissue Integrity  Goal: Absence of new skin breakdown  Description: 1. Monitor for areas of redness and/or skin breakdown  2. Assess vascular access sites hourly  3. Every 4-6 hours minimum:  Change oxygen saturation probe site  4. Every 4-6 hours:  If on nasal continuous positive airway pressure, respiratory therapy assess nares and determine need for appliance change or resting period.   Outcome: Progressing     Problem: Safety - Adult  Goal: Free from fall injury  Outcome: Progressing     Problem: Pain  Goal: Verbalizes/displays adequate comfort level or baseline comfort level  Outcome: Progressing     Problem: Respiratory - Adult  Goal: Achieves optimal ventilation and oxygenation  Outcome: Progressing     Problem: Nutrition Deficit:  Goal: Optimize nutritional status  Outcome: Progressing     Problem: ABCDS Injury Assessment  Goal: Absence of physical injury  Outcome: Progressing     Problem: Chronic Conditions and Co-morbidities  Goal: Patient's chronic conditions and co-morbidity symptoms are monitored and maintained or improved  Outcome: Progressing

## 2022-05-07 NOTE — PROGRESS NOTES
Department of Internal Medicine  Nephrology Progress Note    Events reviewed. S/P PEG tube placement yesterday. SUBJECTIVE:  We are following Ms. Jolene Quarles for CURT. She denies any complaints, is confused. PHYSICAL EXAM:      Vitals:    VITALS:  BP (!) 167/77   Pulse 74   Temp 98.6 °F (37 °C) (Temporal)   Resp 20   Ht 5' (1.524 m)   Wt 192 lb 9.6 oz (87.4 kg)   SpO2 (!) 74%   BMI 37.61 kg/m²   24HR INTAKE/OUTPUT:      Intake/Output Summary (Last 24 hours) at 5/7/2022 0913  Last data filed at 5/7/2022 0430  Gross per 24 hour   Intake 797.51 ml   Output 1200 ml   Net -402.49 ml       Constitutional: Alert, speech delayed, no acute distress  HEENT: PERRLA, normocephalic, atraumtic  Respiratory:  Diminished bilateral bases  Cardiovascular/Edema:  RRR, S1/S2  Gastrointestinal:  abd rounded, non tender, BS hypoactive.  PEG tube present  Neurologic: Alert and oriented, no focal deficits noted  Skin:  Warm,dry, ecchymosis to BUE  Other:  Pink catheter draining minimal yellow urine    Scheduled Meds:   potassium bicarb-citric acid  20 mEq Oral Once    pantoprazole (PROTONIX) 40 mg injection  40 mg IntraVENous BID    chlorhexidine  15 mL Mouth/Throat BID    fluconazole  200 mg IntraVENous Q24H    ipratropium-albuterol  1 ampule Inhalation Q6H    sodium chloride (Inhalant)  4 mL Nebulization BID    insulin lispro  0-12 Units SubCUTAneous TID WC    insulin lispro  0-6 Units SubCUTAneous Nightly    sodium chloride  2 spray Each Nostril TID    QUEtiapine  12.5 mg Oral 2 times per day    [Held by provider] enoxaparin  1 mg/kg SubCUTAneous BID    [Held by provider] apixaban  5 mg Oral BID    vitamin D  5,000 Units Oral Daily    dilTIAZem  120 mg Oral Daily    [Held by provider] ramipril  5 mg Oral Daily    [Held by provider] aspirin  81 mg Oral Daily    atorvastatin  80 mg Oral Nightly     Continuous Infusions:   dextrose       PRN Meds:.sodium chloride, glycerin moisturizing mouth spray, hydrALAZINE, dextrose, glucagon (rDNA), dextrose, glucose, racepinephrine HCl, acetaminophen, albuterol, acetaminophen, ondansetron **OR** ondansetron, polyethylene glycol, perflutren lipid microspheres    DATA:    CBC:   Lab Results   Component Value Date    WBC 20.9 05/07/2022    RBC 3.30 05/07/2022    HGB 9.2 05/07/2022    HCT 29.5 05/07/2022    MCV 89.4 05/07/2022    MCH 27.9 05/07/2022    MCHC 31.2 05/07/2022    RDW 14.5 05/07/2022     05/07/2022    MPV 9.4 05/07/2022     CMP:    Lab Results   Component Value Date     05/07/2022    K 3.2 05/07/2022     05/07/2022    CO2 23 05/07/2022    BUN 16 05/07/2022    CREATININE 0.8 05/07/2022    GFRAA >60 05/07/2022    LABGLOM >60 05/07/2022    GLUCOSE 116 05/07/2022    PROT 5.3 05/07/2022    LABALBU 2.5 05/07/2022    CALCIUM 8.0 05/07/2022    BILITOT 0.7 05/07/2022    ALKPHOS 74 05/07/2022    AST 23 05/07/2022    ALT 34 05/07/2022     Magnesium:    Lab Results   Component Value Date    MG 1.8 05/07/2022     Phosphorus:  No results found for: PHOS  Radiology Review:      Chest x-ray May 3, 2022   1. Cardiomegaly. 2. Hazy bilateral airspace disease favored to represent mild pulmonary   edema/vascular congestion. BRIEF SUMMARY OF INITIAL CONSULT:  Briefly, Ms. Kalia Epperson is a 68year old female with a PMH of HTN, CAD with PCI 2017, BLE DVT 11/2021, HLD, GERD who was admitted on April 22, 2022 after she was transferred from [facility] with stroke like symptoms. Patient presented to [facility] with complaint of strokelike symptoms and sudden onset left-sided weakness with aphasia, with MRI indicating right MCA without hemorrhagic conversion. Patient was originally admitted to the medical floor at [facility] and became progressively obtunded with stridorous respiratory failure, was intubated and transferred to MICU 4/26/2022. Patient developed severe epistaxis requiring packing per ENT.   Labs on admission were significant for sodium of 141, potassium 3.7, chloride 111, bicarbonate 18, BUN 21, creatinine 1.2, and hemoglobin of 7.1. We are consulted for worsening CURT and decreased urine output. Notably patient's daughter states that patient has been recently evaluated by nephrology in [place] and underwent blood work and renal ultrasound however did not receive the results yet. Ramipril, diltiazem, atorvastatin, apixaban, and omeprazole are medications patient was taking prior to admission. Problems resolved:  · Hypocalcemia, 2/2 vitamin D deficiency. On cholecalciferol, ionized calcium 1.16  · Vitamin D deficiency, on cholecalciferol  · Hypernatremia, secondary to decreased free water intake patient is NPO. Increase D5 water to 80 cc/h  · Alkalemia, pH 7.509, PCO2 28, HCO3 21.8, respiratory alkalosis  · Acute epistaxis, nasal packing in place  · Acute hypoxic respiratory failure 2/2 mucus plug, intubated on 4/26, extubated on 5/1, S/p bronchoscopy  · CURT stage III based on urine output,  volume responsive pre-renal CURT from poor oral intake and volume loss with severe anemia in the setting of ACEi administration. Oliguric. FENa 0.3%. Resolved, renal function improved beyond baseline, 0.9 mg/dL. IMPRESSION/RECOMMENDATIONS:      1. CKD Stage 2 with proteinuria, Renal US done at Dr. Belinda Bradley office in [place], [place] shows increased cortical echogenicity consistent with medical renal disease. Renal function stable. 2. HTN, on cardizem and hydralazine, ramipril (on hold)  ------------------------------------------------------------------------    3. CVA, MRI demonstrates infarcts in right MCA without hemorrhagic conversion. Neurology following, concern for already embolic source. Echo ordered for PFO evaluation to r/o paradoxical embolus. Borderline intracranial atherosclerosis.   4. Historical DVT and current right LE+, patiently chronically anticoagulated on Eliquis at home, was being treated with Lovenox for transition to [facility], now on hold secondary to epistaxis  5. Anemia, acute drop in hemoglobin [facility]. Hemoglobin stable  6. HLD, on statin   7. GERD, on omeprazole at home  8. UTI, 2/2 E. coli s/p ceftriaxone.    9. N.p.o., TF 35cc/hr with  cc Q4hrs,     Plan:    · Continue Free water flushes, 100cc every 4 hours  · Continue to monitor renal function  · Continue to monitor H&H, transfuse to keep <7  · Continue to monitor blood pressure  · Replace potassium   · Monitor potassium levels  · Discharge planning    Electronically signed by PATRICIA Richards CNP on 5/7/2022 at 9:13 AM

## 2022-05-07 NOTE — PROGRESS NOTES
[place]  Department of Pulmonary, Critical Care and Sleep Medicine  [address] [facility] ([place], [place])  Department of Internal Medicine  Progress Note    SUBJECTIVE:    Failed swallowing test, family requesting repeat swallow test before PEG since liast one test was in ICU - repeat done - 4/6/2022Penetration seen with nectar and honey consistency liquids. There was aspiration also seen with nectar consistency liquids. + edema  Improved thrush noted  FiO2 needs remain between 3 liters  Laryngoscopy was completed   ENT ? posterior glottic or interarytenoid scar band or mucosal crusting. OBJECTIVE:  Vitals:    05/07/22 0430 05/07/22 0745 05/07/22 1135 05/07/22 1500   BP: (!) 148/69 (!) 167/77 120/62 (!) 114/47   Pulse: 86 74 75 81   Resp: 22 20 18 16   Temp: 97.8 °F (36.6 °C) 98.6 °F (37 °C) 97.1 °F (36.2 °C) 97.4 °F (36.3 °C)   TempSrc: Temporal Temporal Temporal Temporal   SpO2: 96% 94% 94% 93%   Weight:       Height:         Constitutional: Alert,     EENT: EOMI SRINIVAS. MMM. No icterus. +++ thrush. Voice remains hoarse   Neck: No thyromegaly. No elevated JVP. Trachea was midline. Respiratory: Symmetrical.  Breath sounds were clear. Cardiovascular: Regular, No murmur. No rubs. Pulses:  Equal bilaterally. Abdomen: Soft without organomegaly. No rebound, rigidity. No guarding. Lymphatic: No lymphadenopathy. Musculoskeletal: Without weakness or gross deficits  Extremities: +  lower extremity edema. Reflexes appear adequate. Skin:  Warm and dry. No skin rashes. Neurological/Psychiatric: Continued left-sided weakness      DATA:    Monitor Strips:  Reviewed & discusses with technical team. No changes noted. RADIOLOGY:  Barium swallow study completed today which shows penetration and aspiration with nectar consistently liquids. Chest x-ray consistent with pulmonary edema/vascular congestion.       CBC with Differential:    Lab Results   Component Value Date    WBC 20.9 05/07/2022    RBC 3.30 05/07/2022    HGB 9.2 05/07/2022    HCT 29.5 05/07/2022     05/07/2022    MCV 89.4 05/07/2022    MCH 27.9 05/07/2022    MCHC 31.2 05/07/2022    RDW 14.5 05/07/2022     CMP:    Lab Results   Component Value Date     05/07/2022    K 3.2 05/07/2022     05/07/2022    CO2 23 05/07/2022    BUN 16 05/07/2022    CREATININE 0.8 05/07/2022    GFRAA >60 05/07/2022    LABGLOM >60 05/07/2022    GLUCOSE 116 05/07/2022    PROT 5.3 05/07/2022    LABALBU 2.5 05/07/2022    CALCIUM 8.0 05/07/2022    BILITOT 0.7 05/07/2022    ALKPHOS 74 05/07/2022    AST 23 05/07/2022    ALT 34 05/07/2022       CLINICAL ASSESMENT:  1. Acute hypoxemic respiratory failure  2. Epistaxis-resolving  3. Mucous plugging  4. Concern for posterior glottic/interarytenoid scar band versus mucosal crusting. 5. Bilateral DVT on lower extremity ultrasound  6. Right MCA stroke  7. E. coli UTI  8. Acute on chronic anemia secondary to epistaxis    PLAN: If needed the case was discussed with the care team  1. Wean FiO2 as tolerated  2. Stop BIPAP  3. Stop decadron  4. Antifungal IV 2 more days  5. Recommend aggressive airway hygiene measures with incentive spirometry and flutter valve. 3% saline nebs  6. PT OT  7. Repeat MBS failed now with PEG, TF advanced  8. SNF placement  9. NPO per report  10. Resume ASA and Eliquis, Resume ACE  11.            Bryn Espitia DO

## 2022-05-07 NOTE — PROGRESS NOTES
Hospitalist Progress Note      Synopsis: Patient admitted as a transfer from [facility] for strokelike symptoms. Patient presented to [facility] with sudden onset of left-sided weakness and aphasia that occurred while in the middle of a conversation with her daughter. In ED she was noted to have severe aphasia, severe dysarthria, and left arm and leg drift, and rightward gaze, and left-sided hemianopia. MRI of the brain right MCA stroke likely embolic in nature. Neurology is following. Carotid ultrasound with no significant stenosis. Awaiting echo to assess for paradoxical embolus. If unrevealing plan is for Zio patch at discharge. She is currently being treated with therapeutic Lovenox with plans to switch to Coumadin when she is able to take p.o. she began obtunded and stridorous requiring transfer to ICU and intubation. A large mucous plug was extracted from her airway. Patient began having bleeding from the nose and mouth. ENT was consulted. Nasal and oral packing remain in place. Anticoagulation on hold. Nephrology following for CURT with oliguria. Nasal packing was removed and replaced with absorbable sinofoam. She was successfully extubated. Hospital day 15     Subjective:    No major overnight changes. Remains afebrile  Continues to have left-sided hemiparesis  Patient's daughter at bedside.   Patient continues to be on tube feed    Temp (24hrs), Av.9 °F (36.6 °C), Min:97.1 °F (36.2 °C), Max:98.6 °F (37 °C)    DIET: Diet NPO  ADULT TUBE FEEDING; Orogastric; Standard with Fiber; Continuous; 10; Yes; 15; Q 4 hours; 35; 100; Q 4 hours; Protein; 1 Proteinex Daily  CODE: Full Code    Intake/Output Summary (Last 24 hours) at 2022 1311  Last data filed at 2022 0745  Gross per 24 hour   Intake 897.51 ml   Output 1200 ml   Net -302.49 ml       Objective:    /62   Pulse 75   Temp 97.1 °F (36.2 °C) (Temporal)   Resp 18   Ht 5' (1.524 m)   Wt 192 lb 9.6 oz (87.4 kg)   SpO2 94%   BMI 37.61 kg/m²     General appearance: Obese elderly female . Follows commands. HEENT: Conjunctivae/corneas clear. Mucous membranes dry. Neck: Supple. No JVD. Respiratory: Decreased breath sounds  Cardiovascular:  RRR. S1, S2 without MRG. PV: Pulses palpable. No edema. Abdomen: Soft, non-tender, non-distended. +BS, tube feed in place  Musculoskeletal: No obvious deformities. Skin: Normal skin color. No rashes or lesions. Good turgor. Neurologic: Left sided weakness with hemiparesis.     Medications:  REVIEWED DAILY    Infusion Medications    dextrose       Scheduled Medications    pantoprazole (PROTONIX) 40 mg injection  40 mg IntraVENous BID    chlorhexidine  15 mL Mouth/Throat BID    fluconazole  200 mg IntraVENous Q24H    ipratropium-albuterol  1 ampule Inhalation Q6H    sodium chloride (Inhalant)  4 mL Nebulization BID    insulin lispro  0-12 Units SubCUTAneous TID WC    insulin lispro  0-6 Units SubCUTAneous Nightly    sodium chloride  2 spray Each Nostril TID    QUEtiapine  12.5 mg Oral 2 times per day    [Held by provider] enoxaparin  1 mg/kg SubCUTAneous BID    [Held by provider] apixaban  5 mg Oral BID    vitamin D  5,000 Units Oral Daily    dilTIAZem  120 mg Oral Daily    [Held by provider] ramipril  5 mg Oral Daily    [Held by provider] aspirin  81 mg Oral Daily    atorvastatin  80 mg Oral Nightly     PRN Meds: sodium chloride, glycerin moisturizing mouth spray, hydrALAZINE, dextrose, glucagon (rDNA), dextrose, glucose, racepinephrine HCl, acetaminophen, albuterol, acetaminophen, ondansetron **OR** ondansetron, polyethylene glycol, perflutren lipid microspheres    Labs:     Recent Labs     05/05/22  0450 05/06/22  0604 05/07/22  0533   WBC 20.5* 18.3* 20.9*   HGB 9.0* 9.8* 9.2*   HCT 28.5* 31.0* 29.5*    384 325       Recent Labs     05/05/22  0450 05/06/22  0604 05/07/22  0533    143 140   K 4.0 3.5 3.2*   * 104 102   CO2 23 23 23   BUN 31* 21 16   CREATININE 0.9 0.8 0.8   CALCIUM 8.5* 8.2* 8.0*       Recent Labs     05/05/22  0450 05/06/22  0604 05/07/22  0533   PROT 5.8* 5.6* 5.3*   ALKPHOS 77 78 74   ALT 52* 46* 34*   AST 35* 31 23   BILITOT 0.7 0.9 0.7       No results for input(s): INR in the last 72 hours. No results for input(s): Bennett Beto in the last 72 hours. Chronic labs:    Lab Results   Component Value Date    CHOL 140 04/23/2022    TRIG 87 04/23/2022    HDL 48 04/23/2022    LDLCALC 75 04/23/2022    INR 1.3 04/27/2022    LABA1C 5.6 04/23/2022       Radiology: REVIEWED DAILY    Assessment:  Acute respiratory failure secondary to mucous plugging  Right MCA stroke- severe aphasia, severe dysarthria, and left arm and leg drift, and rightward gaze, and left-sided hemianopia  Encephalopathy  CKD IIIa  Nasal/oral bleeding  Acute on chronic anemia   Dysphagia  Acute delirium   UTI s/p 5 days of Ceftri  Leukocytosis  CKD, unknown baseline  CAD s/p PCI in 2017  HTN  HLD  Hx of BLE DVT + PE in 11/2021 anticoagulated on Eliquis  Nasal bleed  Failed swallow eval     Plan:  S/p extubation on 5/1  ACE inhibitor on hold   On  D5LR @ 60 ml/hr  Nephrology following     Neurology signed off with recs to resume anticoagulation if and when possible as well as Zio patch at discharge  Anticoagulation on hold    Monitor H&H, transfuse for Hb < 7     referral to hematology/oncology for hypercoagulable work-up given history of DVT/PE and failure of Eliquis   Wbc fluctuates, down to 18.3    For nasal bleed ENT recommended  · Bilateral nares packed with Rapid Rhino, to stay 3-5 days   Currently anticoagulation remains on hold. Pulmonology following recommended IV antifungal for 3 days, off BiPAP and titrate oxygen. Off Decadron     Repeated lower extremity Dopplers negative for DVT    Status post PEG tube placement, continue tube feed, appreciate dietary input    Disposition:  To rehab likely by Monday    DVT Prophylaxis [] Lovenox  []  Heparin [] DOAC [] PCDs [] Ambulation    GI Prophylaxis [x] PPI  [] H2 Blocker   [] Carafate  [] Diet/Tube Feeds   Level of care [] Med/Surg  [] Intermediate  [x]  ICU   Diet Diet NPO  ADULT TUBE FEEDING; Orogastric; Standard with Fiber; Continuous; 10; Yes; 15; Q 4 hours; 35; 100; Q 4 hours; Protein; 1 Proteinex Daily    Family contact [x]  N/A - per ICU team  [] At bedside   [] Phone call          +++++++++++++++++++++++++++++++++++++++++++++++++  MD LUIS Smith/ Jose Mohr [address] [place] [address]  +++++++++++++++++++++++++++++++++++++++++++++++++  NOTE: This report was transcribed using voice recognition software. Every effort was made to ensure accuracy; however, inadvertent computerized transcription errors may be present.

## 2022-05-08 LAB
ALBUMIN SERPL-MCNC: 2.3 G/DL (ref 3.5–5.2)
ALP BLD-CCNC: 67 U/L (ref 35–104)
ALT SERPL-CCNC: 28 U/L (ref 0–32)
ANION GAP SERPL CALCULATED.3IONS-SCNC: 9 MMOL/L (ref 7–16)
AST SERPL-CCNC: 29 U/L (ref 0–31)
BILIRUB SERPL-MCNC: 0.5 MG/DL (ref 0–1.2)
BUN BLDV-MCNC: 20 MG/DL (ref 6–23)
CALCIUM IONIZED: 1.14 MMOL/L (ref 1.15–1.33)
CALCIUM SERPL-MCNC: 7.9 MG/DL (ref 8.6–10.2)
CHLORIDE BLD-SCNC: 107 MMOL/L (ref 98–107)
CO2: 24 MMOL/L (ref 22–29)
CREAT SERPL-MCNC: 1 MG/DL (ref 0.5–1)
GFR AFRICAN AMERICAN: >60
GFR NON-AFRICAN AMERICAN: 54 ML/MIN/1.73
GLUCOSE BLD-MCNC: 152 MG/DL (ref 74–99)
HCT VFR BLD CALC: 26.8 % (ref 34–48)
HEMOGLOBIN: 8.4 G/DL (ref 11.5–15.5)
MCH RBC QN AUTO: 28.1 PG (ref 26–35)
MCHC RBC AUTO-ENTMCNC: 31.3 % (ref 32–34.5)
MCV RBC AUTO: 89.6 FL (ref 80–99.9)
METER GLUCOSE: 128 MG/DL (ref 74–99)
METER GLUCOSE: 147 MG/DL (ref 74–99)
METER GLUCOSE: 173 MG/DL (ref 74–99)
METER GLUCOSE: 174 MG/DL (ref 74–99)
METER GLUCOSE: 188 MG/DL (ref 74–99)
PDW BLD-RTO: 14.9 FL (ref 11.5–15)
PLATELET # BLD: 309 E9/L (ref 130–450)
PMV BLD AUTO: 9.7 FL (ref 7–12)
POTASSIUM REFLEX MAGNESIUM: 4.3 MMOL/L (ref 3.5–5)
RBC # BLD: 2.99 E12/L (ref 3.5–5.5)
SODIUM BLD-SCNC: 140 MMOL/L (ref 132–146)
TOTAL PROTEIN: 4.9 G/DL (ref 6.4–8.3)
VITAMIN D 25-HYDROXY: 28 NG/ML (ref 30–100)
WBC # BLD: 17.2 E9/L (ref 4.5–11.5)

## 2022-05-08 PROCEDURE — 2580000003 HC RX 258: Performed by: INTERNAL MEDICINE

## 2022-05-08 PROCEDURE — 6370000000 HC RX 637 (ALT 250 FOR IP): Performed by: STUDENT IN AN ORGANIZED HEALTH CARE EDUCATION/TRAINING PROGRAM

## 2022-05-08 PROCEDURE — 80053 COMPREHEN METABOLIC PANEL: CPT

## 2022-05-08 PROCEDURE — 2580000003 HC RX 258: Performed by: NURSE PRACTITIONER

## 2022-05-08 PROCEDURE — 6360000002 HC RX W HCPCS: Performed by: INTERNAL MEDICINE

## 2022-05-08 PROCEDURE — 2700000000 HC OXYGEN THERAPY PER DAY

## 2022-05-08 PROCEDURE — 36415 COLL VENOUS BLD VENIPUNCTURE: CPT

## 2022-05-08 PROCEDURE — A4216 STERILE WATER/SALINE, 10 ML: HCPCS | Performed by: INTERNAL MEDICINE

## 2022-05-08 PROCEDURE — C9113 INJ PANTOPRAZOLE SODIUM, VIA: HCPCS | Performed by: INTERNAL MEDICINE

## 2022-05-08 PROCEDURE — 82330 ASSAY OF CALCIUM: CPT

## 2022-05-08 PROCEDURE — 82962 GLUCOSE BLOOD TEST: CPT

## 2022-05-08 PROCEDURE — 6360000002 HC RX W HCPCS: Performed by: NURSE PRACTITIONER

## 2022-05-08 PROCEDURE — 85027 COMPLETE CBC AUTOMATED: CPT

## 2022-05-08 PROCEDURE — 99232 SBSQ HOSP IP/OBS MODERATE 35: CPT | Performed by: INTERNAL MEDICINE

## 2022-05-08 PROCEDURE — 82306 VITAMIN D 25 HYDROXY: CPT

## 2022-05-08 PROCEDURE — 2060000000 HC ICU INTERMEDIATE R&B

## 2022-05-08 PROCEDURE — 6370000000 HC RX 637 (ALT 250 FOR IP): Performed by: INTERNAL MEDICINE

## 2022-05-08 PROCEDURE — 94640 AIRWAY INHALATION TREATMENT: CPT

## 2022-05-08 PROCEDURE — 94660 CPAP INITIATION&MGMT: CPT

## 2022-05-08 RX ADMIN — FLUCONAZOLE IN SODIUM CHLORIDE 200 MG: 2 INJECTION, SOLUTION INTRAVENOUS at 13:34

## 2022-05-08 RX ADMIN — ASPIRIN 81 MG: 81 TABLET, COATED ORAL at 08:29

## 2022-05-08 RX ADMIN — CHLORHEXIDINE GLUCONATE 15 ML: 1.2 RINSE ORAL at 22:37

## 2022-05-08 RX ADMIN — SODIUM CHLORIDE SOLN NEBU 3% 4 ML: 3 NEBU SOLN at 20:41

## 2022-05-08 RX ADMIN — APIXABAN 5 MG: 5 TABLET, FILM COATED ORAL at 22:38

## 2022-05-08 RX ADMIN — CHLORHEXIDINE GLUCONATE 15 ML: 1.2 RINSE ORAL at 08:29

## 2022-05-08 RX ADMIN — SODIUM CHLORIDE SOLN NEBU 3% 4 ML: 3 NEBU SOLN at 10:32

## 2022-05-08 RX ADMIN — DILTIAZEM HYDROCHLORIDE 120 MG: 120 CAPSULE, COATED, EXTENDED RELEASE ORAL at 08:30

## 2022-05-08 RX ADMIN — GUAIFENESIN 200 MG: 200 SOLUTION ORAL at 08:31

## 2022-05-08 RX ADMIN — APIXABAN 5 MG: 5 TABLET, FILM COATED ORAL at 08:29

## 2022-05-08 RX ADMIN — INSULIN LISPRO 1 UNITS: 100 INJECTION, SOLUTION INTRAVENOUS; SUBCUTANEOUS at 22:42

## 2022-05-08 RX ADMIN — ATORVASTATIN CALCIUM 80 MG: 40 TABLET, FILM COATED ORAL at 22:38

## 2022-05-08 RX ADMIN — INSULIN LISPRO 2 UNITS: 100 INJECTION, SOLUTION INTRAVENOUS; SUBCUTANEOUS at 17:20

## 2022-05-08 RX ADMIN — INSULIN LISPRO 2 UNITS: 100 INJECTION, SOLUTION INTRAVENOUS; SUBCUTANEOUS at 08:29

## 2022-05-08 RX ADMIN — Medication 5000 UNITS: at 08:31

## 2022-05-08 RX ADMIN — SODIUM CHLORIDE, PRESERVATIVE FREE 40 MG: 5 INJECTION INTRAVENOUS at 22:37

## 2022-05-08 RX ADMIN — GUAIFENESIN 200 MG: 200 SOLUTION ORAL at 22:37

## 2022-05-08 RX ADMIN — QUETIAPINE FUMARATE 12.5 MG: 25 TABLET ORAL at 22:37

## 2022-05-08 RX ADMIN — LEVALBUTEROL 0.63 MG: 0.63 SOLUTION RESPIRATORY (INHALATION) at 10:31

## 2022-05-08 RX ADMIN — CALCIUM GLUCONATE 1000 MG: 98 INJECTION, SOLUTION INTRAVENOUS at 12:18

## 2022-05-08 RX ADMIN — QUETIAPINE FUMARATE 12.5 MG: 25 TABLET ORAL at 08:30

## 2022-05-08 RX ADMIN — LEVALBUTEROL 0.63 MG: 0.63 SOLUTION RESPIRATORY (INHALATION) at 20:40

## 2022-05-08 RX ADMIN — SALINE NASAL SPRAY 2 SPRAY: 1.5 SOLUTION NASAL at 13:34

## 2022-05-08 RX ADMIN — SODIUM CHLORIDE, PRESERVATIVE FREE 40 MG: 5 INJECTION INTRAVENOUS at 08:29

## 2022-05-08 RX ADMIN — SALINE NASAL SPRAY 2 SPRAY: 1.5 SOLUTION NASAL at 22:48

## 2022-05-08 ASSESSMENT — PAIN DESCRIPTION - LOCATION: LOCATION: THROAT

## 2022-05-08 ASSESSMENT — PAIN DESCRIPTION - DESCRIPTORS: DESCRIPTORS: SORE

## 2022-05-08 ASSESSMENT — PAIN SCALES - WONG BAKER: WONGBAKER_NUMERICALRESPONSE: 2

## 2022-05-08 ASSESSMENT — PAIN DESCRIPTION - FREQUENCY: FREQUENCY: CONTINUOUS

## 2022-05-08 ASSESSMENT — PAIN SCALES - GENERAL: PAINLEVEL_OUTOF10: 2

## 2022-05-08 ASSESSMENT — PAIN DESCRIPTION - ONSET: ONSET: ON-GOING

## 2022-05-08 ASSESSMENT — PAIN - FUNCTIONAL ASSESSMENT: PAIN_FUNCTIONAL_ASSESSMENT: ACTIVITIES ARE NOT PREVENTED

## 2022-05-08 NOTE — PROGRESS NOTES
[place]  Department of Pulmonary, Critical Care and Sleep Medicine  [address] [facility]  Department of Internal Medicine  Progress Note    SUBJECTIVE:    Failed swallowing test, family requesting repeat swallow test before PEG since liast one test was in ICU - repeat done - 4/6/2022Penetration seen with nectar and honey consistency liquids. There was aspiration also seen with nectar consistency liquids. FiO2 needs remain between 3 liters  Laryngoscopy was completed = posterior glottic or interarytenoid scar band or mucosal crusting. OBJECTIVE:  Vitals:    05/08/22 0015 05/08/22 0415 05/08/22 0740 05/08/22 1602   BP: (!) 114/53 117/61 134/61 (!) 137/54   Pulse: 73 74 82 75   Resp: 18 18 18 18   Temp: 98 °F (36.7 °C) 98 °F (36.7 °C) 97.7 °F (36.5 °C) 97.4 °F (36.3 °C)   TempSrc: Oral Temporal Temporal Temporal   SpO2: 92% 95% 95% 98%   Weight:       Height:         Constitutional: Alert,     EENT: EOMI SRINIVAS. MMM. No icterus. +++ thrush. Voice remains hoarse   Neck: No thyromegaly. No elevated JVP. Trachea was midline. Respiratory: Symmetrical.  Breath sounds were clear. Cardiovascular: Regular, No murmur. No rubs. Pulses:  Equal bilaterally. Abdomen: Soft without organomegaly. No rebound, rigidity. No guarding. Lymphatic: No lymphadenopathy. Musculoskeletal: Without weakness or gross deficits  Extremities: +  lower extremity edema. Reflexes appear adequate. Skin:  Warm and dry. No skin rashes. Neurological/Psychiatric: Continued left-sided weakness      DATA:    Monitor Strips:  Reviewed & discusses with technical team. No changes noted. RADIOLOGY:  Barium swallow study completed today which shows penetration and aspiration with nectar consistently liquids. Chest x-ray consistent with pulmonary edema/vascular congestion.       CBC with Differential:    Lab Results   Component Value Date    WBC 17.2 05/08/2022    RBC 2.99 05/08/2022 HGB 8.4 05/08/2022    HCT 26.8 05/08/2022     05/08/2022    MCV 89.6 05/08/2022    MCH 28.1 05/08/2022    MCHC 31.3 05/08/2022    RDW 14.9 05/08/2022     CMP:    Lab Results   Component Value Date     05/08/2022    K 4.3 05/08/2022     05/08/2022    CO2 24 05/08/2022    BUN 20 05/08/2022    CREATININE 1.0 05/08/2022    GFRAA >60 05/08/2022    LABGLOM 54 05/08/2022    GLUCOSE 152 05/08/2022    PROT 4.9 05/08/2022    LABALBU 2.3 05/08/2022    CALCIUM 7.9 05/08/2022    BILITOT 0.5 05/08/2022    ALKPHOS 67 05/08/2022    AST 29 05/08/2022    ALT 28 05/08/2022       CLINICAL ASSESMENT:  1. Acute hypoxemic respiratory failure  2. Epistaxis-resolving  3. Mucous plugging  4. Concern for posterior glottic/interarytenoid scar band versus mucosal crusting. 5. Bilateral DVT on lower extremity ultrasound  6. Right MCA stroke  7. E. coli UTI  8. Acute on chronic anemia secondary to epistaxis    PLAN: If needed the case was discussed with the care team  1. Wean FiO2 as tolerated  2. Stop BIPAP  3. Stop decadron  4. Antifungal IV 2 more days  5. Recommend aggressive airway hygiene measures with incentive spirometry and flutter valve. 3% saline nebs  6. PT OT  7. Repeat MBS failed now with PEG, TF advanced  8. SNF placement  9. NPO per report  10. Resume ASA and Eliquis, Resume ACE  11.  Ok to [id number],

## 2022-05-08 NOTE — PROGRESS NOTES
Department of Internal Medicine  Nephrology Progress Note    Events reviewed. S/P PEG tube placement yesterday. SUBJECTIVE:  We are following MsNeo Ruiz for CURT. She denies any complaints, is confused. PHYSICAL EXAM:      Vitals:    VITALS:  /61   Pulse 82   Temp 97.7 °F (36.5 °C) (Temporal)   Resp 18   Ht 5' (1.524 m)   Wt 192 lb 9.6 oz (87.4 kg)   SpO2 95%   BMI 37.61 kg/m²   24HR INTAKE/OUTPUT:      Intake/Output Summary (Last 24 hours) at 5/8/2022 1117  Last data filed at 5/8/2022 0415  Gross per 24 hour   Intake 1371.12 ml   Output 700 ml   Net 671.12 ml       Constitutional: Alert, speech delayed, no acute distress  HEENT: PERRLA, normocephalic, atraumtic  Respiratory:  Diminished bilateral bases  Cardiovascular/Edema:  RRR, S1/S2  Gastrointestinal:  abd rounded, non tender, BS hypoactive.  PEG tube present  Neurologic: Alert and oriented, no focal deficits noted  Skin:  Warm,dry, ecchymosis to BUE  Other:  Pink catheter draining minimal yellow urine    Scheduled Meds:   guaiFENesin  200 mg Oral BID    levalbuterol  0.63 mg Nebulization Q8H    pantoprazole (PROTONIX) 40 mg injection  40 mg IntraVENous BID    chlorhexidine  15 mL Mouth/Throat BID    fluconazole  200 mg IntraVENous Q24H    sodium chloride (Inhalant)  4 mL Nebulization BID    insulin lispro  0-12 Units SubCUTAneous TID WC    insulin lispro  0-6 Units SubCUTAneous Nightly    sodium chloride  2 spray Each Nostril TID    QUEtiapine  12.5 mg Oral 2 times per day    apixaban  5 mg Oral BID    vitamin D  5,000 Units Oral Daily    dilTIAZem  120 mg Oral Daily    [Held by provider] ramipril  5 mg Oral Daily    aspirin  81 mg Oral Daily    atorvastatin  80 mg Oral Nightly     Continuous Infusions:   dextrose       PRN Meds:.sodium chloride, glycerin moisturizing mouth spray, hydrALAZINE, dextrose, glucagon (rDNA), dextrose, glucose, racepinephrine HCl, acetaminophen, albuterol, acetaminophen, ondansetron **OR** ondansetron, polyethylene glycol, perflutren lipid microspheres    DATA:    CBC:   Lab Results   Component Value Date    WBC 17.2 05/08/2022    RBC 2.99 05/08/2022    HGB 8.4 05/08/2022    HCT 26.8 05/08/2022    MCV 89.6 05/08/2022    MCH 28.1 05/08/2022    MCHC 31.3 05/08/2022    RDW 14.9 05/08/2022     05/08/2022    MPV 9.7 05/08/2022     CMP:    Lab Results   Component Value Date     05/08/2022    K 4.3 05/08/2022     05/08/2022    CO2 24 05/08/2022    BUN 20 05/08/2022    CREATININE 1.0 05/08/2022    GFRAA >60 05/08/2022    LABGLOM 54 05/08/2022    GLUCOSE 152 05/08/2022    PROT 4.9 05/08/2022    LABALBU 2.3 05/08/2022    CALCIUM 7.9 05/08/2022    BILITOT 0.5 05/08/2022    ALKPHOS 67 05/08/2022    AST 29 05/08/2022    ALT 28 05/08/2022     Magnesium:    Lab Results   Component Value Date    MG 1.8 05/07/2022     Phosphorus:  No results found for: PHOS  Radiology Review:      Chest x-ray May 3, 2022   1. Cardiomegaly. 2. Hazy bilateral airspace disease favored to represent mild pulmonary   edema/vascular congestion. BRIEF SUMMARY OF INITIAL CONSULT:  Briefly, Ms. Keagan Hardy is a 68year old female with a PMH of HTN, CAD with PCI 2017, BLE DVT 11/2021, HLD, GERD who was admitted on April 22, 2022 after she was transferred from [facility] with stroke like symptoms. Patient presented to [facility] with complaint of strokelike symptoms and sudden onset left-sided weakness with aphasia, with MRI indicating right MCA without hemorrhagic conversion. Patient was originally admitted to the medical floor at [facility] and became progressively obtunded with stridorous respiratory failure, was intubated and transferred to MICU 4/26/2022. Patient developed severe epistaxis requiring packing per ENT. Labs on admission were significant for sodium of 141, potassium 3.7, chloride 111, bicarbonate 18, BUN 21, creatinine 1.2, and hemoglobin of 7.1.   We are consulted for worsening CURT and decreased urine output. Notably patient's daughter states that patient has been recently evaluated by nephrology in [place] and underwent blood work and renal ultrasound however did not receive the results yet. Ramipril, diltiazem, atorvastatin, apixaban, and omeprazole are medications patient was taking prior to admission. Problems resolved:  · Hypocalcemia, 2/2 vitamin D deficiency. On cholecalciferol, ionized calcium 1.16  · Vitamin D deficiency, on cholecalciferol  · Hypernatremia, secondary to decreased free water intake patient is NPO. Increase D5 water to 80 cc/h  · Alkalemia, pH 7.509, PCO2 28, HCO3 21.8, respiratory alkalosis  · Acute epistaxis, nasal packing in place  · Acute hypoxic respiratory failure 2/2 mucus plug, intubated on 4/26, extubated on 5/1, S/p bronchoscopy  · CURT stage III based on urine output,  volume responsive pre-renal CURT from poor oral intake and volume loss with severe anemia in the setting of ACEi administration. Oliguric. FENa 0.3%. Resolved, renal function improved beyond baseline, 0.9 mg/dL. IMPRESSION/RECOMMENDATIONS:      1. CKD Stage 2 with proteinuria, Renal US done at Dr. Zoe Weaver office in [place], [place] shows increased cortical echogenicity consistent with medical renal disease. Renal function stable. 2. HTN, on cardizem and hydralazine, ramipril (on hold)  ------------------------------------------------------------------------    3. CVA, MRI demonstrates infarcts in right MCA without hemorrhagic conversion. Neurology following, concern for already embolic source. Echo ordered for PFO evaluation to r/o paradoxical embolus. Borderline intracranial atherosclerosis. 4. Historical DVT and current right LE+, patiently chronically anticoagulated on Eliquis at home, was being treated with Lovenox for transition to [address] [place] [address],   5. Anemia, acute drop in hemoglobin with previous work-up at [facility]. Hemoglobin stable  6.  HLD, on statin   7. GERD, on omeprazole at home  8. UTI, 2/2 E. coli s/p ceftriaxone.    9. N.p.o., TF 35cc/hr with  cc Q4hrs,     Plan:    · Continue Free water flushes, 100cc every 4 hours  · Replace calcium  · Continue to monitor renal function  · Continue to monitor H&H, transfuse to keep <7  · Continue to monitor blood pressure  · Discharge planning    Electronically signed by PATRICIA Jensen CNP on 5/8/2022 at 11:17 AM

## 2022-05-08 NOTE — PROGRESS NOTES
Pt removed BiPAP after wearing for 1 hour. Pt states that it \"hurts her throat. \" Per Respiratory, since pt was wearing BiPAP, she is required to wear a continuous pulse ox. This was left on even after pt removed BiPAP. Nurse was alerted that pt oxygen level dropped below 88% while the patient was sleeping. Nurse placed pt on 2L oxygen via n/c and advised pt that this was required to keep her oxygen levels above 92%. Pt balked at wearing oxygen but did follow nursing instructions. Earlier that evening, Nurse administered Chlorhexidine mouth rinse. Nurse instructed pt to   swish solution around in mouth then spit out. Pt swished solution around in mouth then swallowed without any incidents.

## 2022-05-08 NOTE — PROGRESS NOTES
Hospitalist Progress Note      Synopsis: Patient admitted as a transfer from [facility] for strokelike symptoms. Patient presented to [facility] with sudden onset of left-sided weakness and aphasia that occurred while in the middle of a conversation with her daughter. In ED she was noted to have severe aphasia, severe dysarthria, and left arm and leg drift, and rightward gaze, and left-sided hemianopia. MRI of the brain right MCA stroke likely embolic in nature. Neurology is following. Carotid ultrasound with no significant stenosis. Awaiting echo to assess for paradoxical embolus. If unrevealing plan is for Zio patch at discharge. She is currently being treated with therapeutic Lovenox with plans to switch to Coumadin when she is able to take p.o. she began obtunded and stridorous requiring transfer to ICU and intubation. A large mucous plug was extracted from her airway. Patient began having bleeding from the nose and mouth. ENT was consulted. Nasal and oral packing remain in place. Anticoagulation on hold. Nephrology following for CURT with oliguria. Nasal packing was removed and replaced with absorbable sinofoam. She was successfully extubated. Hospital day 16     Subjective:    No major overnight changes. Remains afebrile  Continues to have left-sided hemiparesis  Patient tolerating tube feed  Remains afebrile.     Temp (24hrs), Av.6 °F (36.4 °C), Min:97.1 °F (36.2 °C), Max:98 °F (36.7 °C)    DIET: Diet NPO  ADULT TUBE FEEDING; Orogastric; Standard with Fiber; Continuous; 10; Yes; 15; Q 4 hours; 35; 100; Q 4 hours; Protein; 1 Proteinex Daily  CODE: Full Code    Intake/Output Summary (Last 24 hours) at 2022 1013  Last data filed at 2022 0415  Gross per 24 hour   Intake 1371.12 ml   Output 700 ml   Net 671.12 ml       Objective:    /61   Pulse 82   Temp 97.7 °F (36.5 °C) (Temporal)   Resp 18   Ht 5' (1.524 m)   Wt 192 lb 9.6 oz (87.4 kg)   SpO2 95%   BMI 37.61 kg/m²     General appearance: Obese elderly female . Follows commands. HEENT: Conjunctivae/corneas clear. Neck: Supple. No JVD. Respiratory: Decreased breath sounds  Cardiovascular:  RRR. S1, S2 without MRG. PV: Pulses palpable. No edema. Abdomen: Soft, non-tender, non-distended. +BS, tube feed in place  Musculoskeletal: No obvious deformities. Skin: Normal skin color. No rashes or lesions. Good turgor. Neurologic: Left sided weakness with hemiparesis.     Medications:  REVIEWED DAILY    Infusion Medications    dextrose       Scheduled Medications    guaiFENesin  200 mg Oral BID    levalbuterol  0.63 mg Nebulization Q8H    pantoprazole (PROTONIX) 40 mg injection  40 mg IntraVENous BID    chlorhexidine  15 mL Mouth/Throat BID    fluconazole  200 mg IntraVENous Q24H    sodium chloride (Inhalant)  4 mL Nebulization BID    insulin lispro  0-12 Units SubCUTAneous TID WC    insulin lispro  0-6 Units SubCUTAneous Nightly    sodium chloride  2 spray Each Nostril TID    QUEtiapine  12.5 mg Oral 2 times per day    [Held by provider] enoxaparin  1 mg/kg SubCUTAneous BID    apixaban  5 mg Oral BID    vitamin D  5,000 Units Oral Daily    dilTIAZem  120 mg Oral Daily    [Held by provider] ramipril  5 mg Oral Daily    aspirin  81 mg Oral Daily    atorvastatin  80 mg Oral Nightly     PRN Meds: sodium chloride, glycerin moisturizing mouth spray, hydrALAZINE, dextrose, glucagon (rDNA), dextrose, glucose, racepinephrine HCl, acetaminophen, albuterol, acetaminophen, ondansetron **OR** ondansetron, polyethylene glycol, perflutren lipid microspheres    Labs:     Recent Labs     05/06/22  0604 05/07/22 0533 05/08/22 0624   WBC 18.3* 20.9* 17.2*   HGB 9.8* 9.2* 8.4*   HCT 31.0* 29.5* 26.8*    325 309       Recent Labs     05/06/22  0604 05/07/22 0533 05/08/22 0624    140 140   K 3.5 3.2* 4.3    102 107   CO2 23 23 24   BUN 21 16 20   CREATININE 0.8 0.8 1.0   CALCIUM 8.2* 8.0* 7.9* Recent Labs     05/06/22  0604 05/07/22  0533 05/08/22  0624   PROT 5.6* 5.3* 4.9*   ALKPHOS 78 74 67   ALT 46* 34* 28   AST 31 23 29   BILITOT 0.9 0.7 0.5       No results for input(s): INR in the last 72 hours. No results for input(s): Corrina Hawthorne in the last 72 hours. Chronic labs:    Lab Results   Component Value Date    CHOL 140 04/23/2022    TRIG 87 04/23/2022    HDL 48 04/23/2022    LDLCALC 75 04/23/2022    INR 1.3 04/27/2022    LABA1C 5.6 04/23/2022       Radiology: REVIEWED DAILY    Assessment:  Acute respiratory failure secondary to mucous plugging  Right MCA stroke- severe aphasia, severe dysarthria, and left arm and leg drift, and rightward gaze, and left-sided hemianopia  Encephalopathy  CKD IIIa  Nasal/oral bleeding  Acute on chronic anemia   Dysphagia  Acute delirium   UTI s/p 5 days of Ceftri  Leukocytosis  CKD, unknown baseline  CAD s/p PCI in 2017  HTN  HLD  Hx of BLE DVT + PE in 11/2021 anticoagulated on Eliquis  Nasal bleed  Failed swallow eval     Plan:  S/p extubation on 5/1  ACE inhibitor on hold   On  D5LR @ 60 ml/hr  Nephrology following     Neurology signed off with recs to resume anticoagulation if and when possible as well as Zio patch at discharge  Anticoagulation on hold, currently resumed by pulmonology. On Eliquis. Monitor H&H, transfuse for Hb < 7     referral to hematology/oncology for hypercoagulable work-up given history of DVT/PE and failure of Eliquis   Wbc fluctuates, down to 17.2    For nasal bleed ENT recommended  · Bilateral nares packed with Rapid Rhino, to stay 3-5 days   Anticoagulation resumed. Pulmonology following recommended IV antifungal for 3 days (to be completed today 5/8/2022), off BiPAP and titrate oxygen. Off Decadron     Repeated lower extremity Dopplers negative for DVT    Status post PEG tube placement, continue tube feed, appreciate dietary input    Disposition:  To rehab likely by Monday    DVT Prophylaxis [] Lovenox  []  Heparin [] DOAC [] PCDs [] Ambulation    GI Prophylaxis [x] PPI  [] H2 Blocker   [] Carafate  [] Diet/Tube Feeds   Level of care [] Med/Surg  [] Intermediate  [x]  ICU   Diet Diet NPO  ADULT TUBE FEEDING; Orogastric; Standard with Fiber; Continuous; 10; Yes; 15; Q 4 hours; 35; 100; Q 4 hours; Protein; 1 Proteinex Daily    Family contact [x]  N/A - per ICU team  [] At bedside   [] Phone call          +++++++++++++++++++++++++++++++++++++++++++++++++  Terra Chery MD   [address]  +++++++++++++++++++++++++++++++++++++++++++++++++  NOTE: This report was transcribed using voice recognition software. Every effort was made to ensure accuracy; however, inadvertent computerized transcription errors may be present.

## 2022-05-09 LAB
ALBUMIN SERPL-MCNC: 2.2 G/DL (ref 3.5–5.2)
ALP BLD-CCNC: 69 U/L (ref 35–104)
ALT SERPL-CCNC: 27 U/L (ref 0–32)
ANION GAP SERPL CALCULATED.3IONS-SCNC: 9 MMOL/L (ref 7–16)
AST SERPL-CCNC: 26 U/L (ref 0–31)
BACTERIA: ABNORMAL /HPF
BILIRUB SERPL-MCNC: 0.4 MG/DL (ref 0–1.2)
BILIRUBIN URINE: NEGATIVE
BLOOD, URINE: ABNORMAL
BUN BLDV-MCNC: 23 MG/DL (ref 6–23)
CALCIUM SERPL-MCNC: 7.8 MG/DL (ref 8.6–10.2)
CHLORIDE BLD-SCNC: 104 MMOL/L (ref 98–107)
CLARITY: ABNORMAL
CO2: 24 MMOL/L (ref 22–29)
COLOR: YELLOW
CREAT SERPL-MCNC: 1 MG/DL (ref 0.5–1)
GFR AFRICAN AMERICAN: >60
GFR NON-AFRICAN AMERICAN: 54 ML/MIN/1.73
GLUCOSE BLD-MCNC: 129 MG/DL (ref 74–99)
GLUCOSE URINE: NEGATIVE MG/DL
HCT VFR BLD CALC: 27.9 % (ref 34–48)
HEMOGLOBIN: 8.6 G/DL (ref 11.5–15.5)
KETONES, URINE: NEGATIVE MG/DL
LEUKOCYTE ESTERASE, URINE: NEGATIVE
MCH RBC QN AUTO: 28.3 PG (ref 26–35)
MCHC RBC AUTO-ENTMCNC: 30.8 % (ref 32–34.5)
MCV RBC AUTO: 91.8 FL (ref 80–99.9)
METER GLUCOSE: 105 MG/DL (ref 74–99)
METER GLUCOSE: 109 MG/DL (ref 74–99)
METER GLUCOSE: 114 MG/DL (ref 74–99)
METER GLUCOSE: 145 MG/DL (ref 74–99)
METER GLUCOSE: 148 MG/DL (ref 74–99)
METER GLUCOSE: 160 MG/DL (ref 74–99)
NITRITE, URINE: NEGATIVE
PDW BLD-RTO: 15.2 FL (ref 11.5–15)
PH UA: 8.5 (ref 5–9)
PLATELET # BLD: 351 E9/L (ref 130–450)
PMV BLD AUTO: 9.8 FL (ref 7–12)
POTASSIUM REFLEX MAGNESIUM: 4.5 MMOL/L (ref 3.5–5)
PROTEIN UA: NEGATIVE MG/DL
RBC # BLD: 3.04 E12/L (ref 3.5–5.5)
RBC UA: ABNORMAL /HPF (ref 0–2)
SODIUM BLD-SCNC: 137 MMOL/L (ref 132–146)
SPECIFIC GRAVITY UA: 1.01 (ref 1–1.03)
TOTAL PROTEIN: 5.1 G/DL (ref 6.4–8.3)
UROBILINOGEN, URINE: 0.2 E.U./DL
VITAMIN D 25-HYDROXY: 29 NG/ML (ref 30–100)
WBC # BLD: 17.8 E9/L (ref 4.5–11.5)
WBC UA: ABNORMAL /HPF (ref 0–5)

## 2022-05-09 PROCEDURE — 2700000000 HC OXYGEN THERAPY PER DAY

## 2022-05-09 PROCEDURE — 94660 CPAP INITIATION&MGMT: CPT

## 2022-05-09 PROCEDURE — 80053 COMPREHEN METABOLIC PANEL: CPT

## 2022-05-09 PROCEDURE — 2580000003 HC RX 258: Performed by: INTERNAL MEDICINE

## 2022-05-09 PROCEDURE — 6370000000 HC RX 637 (ALT 250 FOR IP): Performed by: STUDENT IN AN ORGANIZED HEALTH CARE EDUCATION/TRAINING PROGRAM

## 2022-05-09 PROCEDURE — 85027 COMPLETE CBC AUTOMATED: CPT

## 2022-05-09 PROCEDURE — 2580000003 HC RX 258

## 2022-05-09 PROCEDURE — A4216 STERILE WATER/SALINE, 10 ML: HCPCS | Performed by: INTERNAL MEDICINE

## 2022-05-09 PROCEDURE — 6360000002 HC RX W HCPCS: Performed by: INTERNAL MEDICINE

## 2022-05-09 PROCEDURE — 94640 AIRWAY INHALATION TREATMENT: CPT

## 2022-05-09 PROCEDURE — 36415 COLL VENOUS BLD VENIPUNCTURE: CPT

## 2022-05-09 PROCEDURE — 97530 THERAPEUTIC ACTIVITIES: CPT

## 2022-05-09 PROCEDURE — 82962 GLUCOSE BLOOD TEST: CPT

## 2022-05-09 PROCEDURE — 6370000000 HC RX 637 (ALT 250 FOR IP): Performed by: INTERNAL MEDICINE

## 2022-05-09 PROCEDURE — 97535 SELF CARE MNGMENT TRAINING: CPT

## 2022-05-09 PROCEDURE — 2060000000 HC ICU INTERMEDIATE R&B

## 2022-05-09 PROCEDURE — 6360000002 HC RX W HCPCS: Performed by: NURSE PRACTITIONER

## 2022-05-09 PROCEDURE — 81001 URINALYSIS AUTO W/SCOPE: CPT

## 2022-05-09 PROCEDURE — 82306 VITAMIN D 25 HYDROXY: CPT

## 2022-05-09 PROCEDURE — P9047 ALBUMIN (HUMAN), 25%, 50ML: HCPCS | Performed by: NURSE PRACTITIONER

## 2022-05-09 PROCEDURE — 99233 SBSQ HOSP IP/OBS HIGH 50: CPT | Performed by: INTERNAL MEDICINE

## 2022-05-09 PROCEDURE — C9113 INJ PANTOPRAZOLE SODIUM, VIA: HCPCS | Performed by: INTERNAL MEDICINE

## 2022-05-09 RX ORDER — ALBUMIN (HUMAN) 12.5 G/50ML
25 SOLUTION INTRAVENOUS EVERY 8 HOURS
Status: COMPLETED | OUTPATIENT
Start: 2022-05-09 | End: 2022-05-10

## 2022-05-09 RX ADMIN — ASPIRIN 81 MG: 81 TABLET, COATED ORAL at 09:59

## 2022-05-09 RX ADMIN — WATER 10 ML: 1 INJECTION INTRAMUSCULAR; INTRAVENOUS; SUBCUTANEOUS at 09:58

## 2022-05-09 RX ADMIN — QUETIAPINE FUMARATE 12.5 MG: 25 TABLET ORAL at 09:59

## 2022-05-09 RX ADMIN — SODIUM CHLORIDE SOLN NEBU 3% 4 ML: 3 NEBU SOLN at 05:27

## 2022-05-09 RX ADMIN — INSULIN LISPRO 2 UNITS: 100 INJECTION, SOLUTION INTRAVENOUS; SUBCUTANEOUS at 10:01

## 2022-05-09 RX ADMIN — GUAIFENESIN 200 MG: 200 SOLUTION ORAL at 09:58

## 2022-05-09 RX ADMIN — SODIUM CHLORIDE, PRESERVATIVE FREE 40 MG: 5 INJECTION INTRAVENOUS at 09:58

## 2022-05-09 RX ADMIN — SALINE NASAL SPRAY 2 SPRAY: 1.5 SOLUTION NASAL at 09:58

## 2022-05-09 RX ADMIN — DILTIAZEM HYDROCHLORIDE 120 MG: 120 CAPSULE, COATED, EXTENDED RELEASE ORAL at 09:59

## 2022-05-09 RX ADMIN — CHLORHEXIDINE GLUCONATE 15 ML: 1.2 RINSE ORAL at 09:58

## 2022-05-09 RX ADMIN — SODIUM CHLORIDE, PRESERVATIVE FREE 40 MG: 5 INJECTION INTRAVENOUS at 20:00

## 2022-05-09 RX ADMIN — ALBUMIN (HUMAN) 25 G: 0.25 INJECTION, SOLUTION INTRAVENOUS at 17:32

## 2022-05-09 RX ADMIN — ATORVASTATIN CALCIUM 80 MG: 40 TABLET, FILM COATED ORAL at 19:59

## 2022-05-09 RX ADMIN — Medication 5000 UNITS: at 09:59

## 2022-05-09 RX ADMIN — APIXABAN 5 MG: 5 TABLET, FILM COATED ORAL at 09:56

## 2022-05-09 RX ADMIN — LEVALBUTEROL 0.63 MG: 0.63 SOLUTION RESPIRATORY (INHALATION) at 21:44

## 2022-05-09 RX ADMIN — GUAIFENESIN 200 MG: 200 SOLUTION ORAL at 20:00

## 2022-05-09 RX ADMIN — INSULIN LISPRO 2 UNITS: 100 INJECTION, SOLUTION INTRAVENOUS; SUBCUTANEOUS at 17:48

## 2022-05-09 RX ADMIN — LEVALBUTEROL 0.63 MG: 0.63 SOLUTION RESPIRATORY (INHALATION) at 14:24

## 2022-05-09 RX ADMIN — CHLORHEXIDINE GLUCONATE 15 ML: 1.2 RINSE ORAL at 20:00

## 2022-05-09 RX ADMIN — SALINE NASAL SPRAY 2 SPRAY: 1.5 SOLUTION NASAL at 20:01

## 2022-05-09 RX ADMIN — SODIUM CHLORIDE SOLN NEBU 3% 4 ML: 3 NEBU SOLN at 21:45

## 2022-05-09 RX ADMIN — LEVALBUTEROL 0.63 MG: 0.63 SOLUTION RESPIRATORY (INHALATION) at 05:22

## 2022-05-09 RX ADMIN — ALBUMIN (HUMAN) 25 G: 0.25 INJECTION, SOLUTION INTRAVENOUS at 20:56

## 2022-05-09 RX ADMIN — APIXABAN 5 MG: 5 TABLET, FILM COATED ORAL at 19:59

## 2022-05-09 ASSESSMENT — PAIN SCALES - WONG BAKER
WONGBAKER_NUMERICALRESPONSE: 2
WONGBAKER_NUMERICALRESPONSE: 0
WONGBAKER_NUMERICALRESPONSE: 2
WONGBAKER_NUMERICALRESPONSE: 0

## 2022-05-09 ASSESSMENT — PAIN SCALES - GENERAL
PAINLEVEL_OUTOF10: 0
PAINLEVEL_OUTOF10: 0

## 2022-05-09 NOTE — PROGRESS NOTES
Comprehensive Nutrition Assessment    Type and Reason for Visit:  Reassess    Nutrition Recommendations/Plan:   1. Continue current nutrition regimen as tolerated. Will monitor for po intake/EN tolerance and nutrition progression. Will provide updated TF recs as needed. Malnutrition Assessment:  Malnutrition Status: At risk for malnutrition (Comment) (04/27/22 1309)    Context:  Acute Illness     Findings of the 6 clinical characteristics of malnutrition:  Energy Intake:  50% or less of estimated energy requirements for 5 or more days  Weight Loss:  No significant weight loss (10.5% x 11 mon, does not met sig criteria)     Body Fat Loss:  No significant body fat loss     Muscle Mass Loss:  No significant muscle mass loss    Fluid Accumulation:  No significant fluid accumulation     Strength:  Not Performed    Nutrition Assessment:    Pt remains at nutritional risk d/t ongoing EN support now s/p PEG placement 5/06. Pt. Admit transferred from [facility] 2/2 R MCA ; Acute hypoxic respiratory failure 2/2 mucus plug, intubated on 4/26, extubated on 5/1. Noted CURT improving. PMHx CAD. Per SLP; moderate oropharyngeal phase dysphagia; rec pureed diet with honey consistency liquids. no intakes to assess at this time. Pt. tolerating TF. Will provide updated TF recs as needed & monitor. Nutrition Related Findings:    Disoriented at times, +BS, Non-pitting edema, NC 2L,  s/p extubation, MAP WNL, +I/O's, PEG Wound Type:  (fluid filled blisters)       Current Nutrition Intake & Therapies:    Average Meal Intake: 0%  Average Supplements Intake: None Ordered  ADULT DIET; Dysphagia - Pureed; 4 carb choices (60 gm/meal);  Moderately Thick (Honey)  ADULT TUBE FEEDING; Orogastric; Standard with Fiber; Continuous; 10; Yes; 15; Q 4 hours; 35; 30; Q 4 hours; Protein; 1 Proteinex Daily  Current Tube Feeding (TF) Orders:  · Feeding Route: PEG  · Formula: Standard with Fiber  · Schedule: Continuous  · Feeding Regimen: @35 ml/hr +1 protein modular  · Additives/Modulars: Protein (once daily)  · Water Flushes: 30ml Q4 = 180ml  · Goal TF & Flush Orders Provides: 840 ml tv, 1260 kcals, 54 gm pro (1360 kcals & 80 gm pro w/ mod), 638 ml free water      Anthropometric Measures:  Height: 5' (152.4 cm)  Ideal Body Weight (IBW): 100 lbs (45 kg)    Admission Body Weight: 162 lb (73.5 kg) (4/26 first measured)  Current Body Weight: 162 lb (73.5 kg) (4/26 adm wt as CBW elevated), 162 % IBW. Current BMI (kg/m2): 31.6  Usual Body Weight: 181 lb (82.1 kg) (5/10/21 EMR measured wt from [facility] encounter)  % Weight Change (Calculated): -10.5                    BMI Categories: Obese Class 1 (BMI 30.0-34. 9)    Estimated Daily Nutrient Needs:  Energy Requirements Based On: Formula  Weight Used for Energy Requirements: Admission  Energy (kcal/day): MSJ 1142 x 1.2 SF= 4238-4396  Weight Used for Protein Requirements: Ideal  Protein (g/day): 1.3-1.5 g/kg IBW; 70-80     Fluid (ml/day): per critical care    Nutrition Diagnosis:   · Inadequate oral intake related to swallowing difficulty as evidenced by NPO or clear liquid status due to medical condition,swallow study results      Nutrition Interventions:   Food and/or Nutrient Delivery: Continue Current Diet,Continue Current Tube Feeding  Nutrition Education/Counseling: Education not appropriate  Coordination of Nutrition Care: Continue to monitor while inpatient       Goals:  Previous Goal Met: No Progress toward Goal(s) (EN stopped, New goal)  Goals:  Tolerate nutrition support at goal rate,PO intake 50% or greater (New goal pt advanced to pureed diet)       Nutrition Monitoring and Evaluation:   Behavioral-Environmental Outcomes: None Identified  Food/Nutrient Intake Outcomes: Diet Advancement/Tolerance,Food and Nutrient Intake,Enteral Nutrition Intake/Tolerance  Physical Signs/Symptoms Outcomes: Biochemical Data,Chewing or Swallowing,Nutrition Focused Physical Findings,Skin,Weight,GI Status,Fluid Status or Edema,Hemodynamic Status    Discharge Planning:     Too soon to determine     Shanna Webber RD  Contact: ext [phone]

## 2022-05-09 NOTE — CARE COORDINATION
SOCIAL WORK/CASEMANAGEMENT TRANSITION OF CARE PLANNINGJúnior Calistogabrandee Abarca, [address]): Called for updated PT and OT notes this a.m. met with pt and jason the daughter and she wants [facility]. They will have a bed for pt and pt and family is aware that there are 2 covid cases in the building but in a separate wing. precert to be started once therapy notes are in epic. All discharge paper work with exempt in process. DERECK Antoine  5/9/2022   passar completed and faxed to elizabeth. Rep from [employer] will try and submit precert today if not in a.m.  DERECK Antoine  5/9/2022

## 2022-05-09 NOTE — PROGRESS NOTES
Hospitalist Progress Note      SYNOPSIS: Patient admitted on 2022 for Stroke-like symptoms. She was admitted as a transfer from outside hospital with a complaint of sudden onset left sided weakness and aphasia. She also has severe dysarthria and left-sided hemianopia and left arm and leg drift. MRI of the brain showed right MCA stroke likely embolic in nature. Carotid ultrasound did not show any significant stenosis. 2D echo is pending. Neurology is on board. She is currently on therapeutic Lovenox due to concerns about emboli. Hospital course was complicated by acute hypoxic respiratory failure after she became more obtunded and started having stridor. She was intubated and a large mucous plug was extracted from her airway. Hospital course was again complicated by bleeding from her nose and mouth and ENT was consulted and she had a nasal and oral packing done. Anticoagulation was therefore held. Nephrology was also consulted for CURT with oliguria. She subsequently had nasal packing removed. [address] day 17. SUBJECTIVE:    Patient seen and examined. It was by her bedside. She had no active acute complaints. .  Nurse, she was doing much better. She is on 2 L of oxygen. She is on a puréed diet and is also having tube feeding. Pulmonology on board. Records reviewed. Temp (24hrs), Av.1 °F (36.7 °C), Min:97.4 °F (36.3 °C), Max:98.8 °F (37.1 °C)    DIET: ADULT TUBE FEEDING; Orogastric; Standard with Fiber; Continuous; 10; Yes; 15; Q 4 hours; 35; 100; Q 4 hours; Protein; 1 Proteinex Daily  ADULT DIET; Dysphagia - Pureed; 4 carb choices (60 gm/meal);  Moderately Thick (Honey)  CODE: Full Code    Intake/Output Summary (Last 24 hours) at 2022 1020  Last data filed at 2022 0400  Gross per 24 hour   Intake 1439 ml   Output 800 ml   Net 639 ml       OBJECTIVE:    BP (!) 138/59   Pulse 80   Temp 98.8 °F (37.1 °C) (Infrared)   Resp 16   Ht 5' (1.524 m)   Wt 192 lb 9.6 oz chlorhexidine  15 mL Mouth/Throat BID    sodium chloride (Inhalant)  4 mL Nebulization BID    insulin lispro  0-12 Units SubCUTAneous TID WC    insulin lispro  0-6 Units SubCUTAneous Nightly    sodium chloride  2 spray Each Nostril TID    QUEtiapine  12.5 mg Oral 2 times per day    apixaban  5 mg Oral BID    vitamin D  5,000 Units Oral Daily    dilTIAZem  120 mg Oral Daily    [Held by provider] ramipril  5 mg Oral Daily    aspirin  81 mg Oral Daily    atorvastatin  80 mg Oral Nightly     PRN Meds: sodium chloride, glycerin moisturizing mouth spray, hydrALAZINE, dextrose, glucagon (rDNA), dextrose, glucose, racepinephrine HCl, acetaminophen, albuterol, acetaminophen, ondansetron **OR** ondansetron, polyethylene glycol, perflutren lipid microspheres    Labs:     Recent Labs     05/07/22 0533 05/08/22  0624 05/09/22  0815   WBC 20.9* 17.2* 17.8*   HGB 9.2* 8.4* 8.6*   HCT 29.5* 26.8* 27.9*    309 351       Recent Labs     05/07/22 0533 05/08/22  0624 05/09/22  0815    140 137   K 3.2* 4.3 4.5    107 104   CO2 23 24 24   BUN 16 20 23   CREATININE 0.8 1.0 1.0   CALCIUM 8.0* 7.9* 7.8*       Recent Labs     05/07/22 0533 05/08/22  0624 05/09/22  0815   PROT 5.3* 4.9* 5.1*   ALKPHOS 74 67 69   ALT 34* 28 27   AST 23 29 26   BILITOT 0.7 0.5 0.4       No results for input(s): INR in the last 72 hours. No results for input(s): Carmen Breeze in the last 72 hours. Chronic labs:    Lab Results   Component Value Date    CHOL 140 04/23/2022    TRIG 87 04/23/2022    HDL 48 04/23/2022    LDLCALC 75 04/23/2022    INR 1.3 04/27/2022    LABA1C 5.6 04/23/2022       Radiology: REVIEWED DAILY    +++++++++++++++++++++++++++++++++++++++++++++++++  Arjun Alvarez MD  [facility] Physician - [address] [place] [address], [place]  +++++++++++++++++++++++++++++++++++++++++++++++++  NOTE: This report was transcribed using voice recognition software.  Every effort was made to ensure accuracy; however, inadvertent computerized transcription errors may be present.

## 2022-05-09 NOTE — PROGRESS NOTES
Department of Internal Medicine  Nephrology Progress Note    Events reviewed. S/P PEG tube placement yesterday. SUBJECTIVE:  We are following MsNeo Stephens for CURT. Patient is complaining of frequent urination. PHYSICAL EXAM:      Vitals:    VITALS:  BP (!) 138/59   Pulse 80   Temp 98.8 °F (37.1 °C) (Infrared)   Resp 16   Ht 5' (1.524 m)   Wt 192 lb 9.6 oz (87.4 kg)   SpO2 95%   BMI 37.61 kg/m²   24HR INTAKE/OUTPUT:      Intake/Output Summary (Last 24 hours) at 5/9/2022 1306  Last data filed at 5/9/2022 0400  Gross per 24 hour   Intake 1439 ml   Output 800 ml   Net 639 ml       Constitutional: Alert, speech delayed, no acute distress  HEENT: PERRLA, normocephalic, atraumtic  Respiratory:  Diminished bilateral bases  Cardiovascular/Edema:  RRR, S1/S2  Gastrointestinal:  abd rounded, non tender, BS hypoactive.  PEG tube present  Neurologic: Alert and oriented, no focal deficits noted  Skin:  Warm,dry, ecchymosis to BUE  Other:  Pink catheter draining minimal yellow urine    Scheduled Meds:   guaiFENesin  200 mg Oral BID    levalbuterol  0.63 mg Nebulization Q8H    pantoprazole (PROTONIX) 40 mg injection  40 mg IntraVENous BID    chlorhexidine  15 mL Mouth/Throat BID    sodium chloride (Inhalant)  4 mL Nebulization BID    insulin lispro  0-12 Units SubCUTAneous TID WC    insulin lispro  0-6 Units SubCUTAneous Nightly    sodium chloride  2 spray Each Nostril TID    QUEtiapine  12.5 mg Oral 2 times per day    apixaban  5 mg Oral BID    vitamin D  5,000 Units Oral Daily    dilTIAZem  120 mg Oral Daily    [Held by provider] ramipril  5 mg Oral Daily    aspirin  81 mg Oral Daily    atorvastatin  80 mg Oral Nightly     Continuous Infusions:   dextrose       PRN Meds:.sodium chloride, glycerin moisturizing mouth spray, hydrALAZINE, dextrose, glucagon (rDNA), dextrose, glucose, racepinephrine HCl, acetaminophen, albuterol, acetaminophen, ondansetron **OR** ondansetron, polyethylene glycol, perflutren lipid microspheres    DATA:    CBC:   Lab Results   Component Value Date    WBC 17.8 05/09/2022    RBC 3.04 05/09/2022    HGB 8.6 05/09/2022    HCT 27.9 05/09/2022    MCV 91.8 05/09/2022    MCH 28.3 05/09/2022    MCHC 30.8 05/09/2022    RDW 15.2 05/09/2022     05/09/2022    MPV 9.8 05/09/2022     CMP:    Lab Results   Component Value Date     05/09/2022    K 4.5 05/09/2022     05/09/2022    CO2 24 05/09/2022    BUN 23 05/09/2022    CREATININE 1.0 05/09/2022    GFRAA >60 05/09/2022    LABGLOM 54 05/09/2022    GLUCOSE 129 05/09/2022    PROT 5.1 05/09/2022    LABALBU 2.2 05/09/2022    CALCIUM 7.8 05/09/2022    BILITOT 0.4 05/09/2022    ALKPHOS 69 05/09/2022    AST 26 05/09/2022    ALT 27 05/09/2022     Magnesium:    Lab Results   Component Value Date    MG 1.8 05/07/2022     Phosphorus:  No results found for: PHOS  Radiology Review:      Chest x-ray May 3, 2022   1. Cardiomegaly. 2. Hazy bilateral airspace disease favored to represent mild pulmonary   edema/vascular congestion. BRIEF SUMMARY OF INITIAL CONSULT:  Briefly, Ms. Anjel Aden is a 68year old female with a PMH of HTN, CAD with PCI 2017, BLE DVT 11/2021, HLD, GERD who was admitted on April 22, 2022 after she was transferred from [facility] with stroke like symptoms. Patient presented to [facility] with complaint of strokelike symptoms and sudden onset left-sided weakness with aphasia, with MRI indicating right MCA without hemorrhagic conversion. Patient was originally admitted to the medical floor at [facility] and became progressively obtunded with stridorous respiratory failure, was intubated and transferred to MICU 4/26/2022. Patient developed severe epistaxis requiring packing per ENT. Labs on admission were significant for sodium of 141, potassium 3.7, chloride 111, bicarbonate 18, BUN 21, creatinine 1.2, and hemoglobin of 7.1.   We are consulted for worsening CURT and decreased urine output. Notably patient's daughter states that patient has been recently evaluated by nephrology in [place] and underwent blood work and renal ultrasound however did not receive the results yet. Ramipril, diltiazem, atorvastatin, apixaban, and omeprazole are medications patient was taking prior to admission. Problems resolved:  Hypocalcemia, 2/2 vitamin D deficiency. On cholecalciferol, ionized calcium 1.16  Vitamin D deficiency, on cholecalciferol  Hypernatremia, secondary to decreased free water intake patient is NPO. Increase D5 water to 80 cc/h  Alkalemia, pH 7.509, PCO2 28, HCO3 21.8, respiratory alkalosis  Acute epistaxis, nasal packing in place  Acute hypoxic respiratory failure 2/2 mucus plug, intubated on 4/26, extubated on 5/1, S/p bronchoscopy  CURT stage III based on urine output,  volume responsive pre-renal CURT from poor oral intake and volume loss with severe anemia in the setting of ACEi administration. Oliguric. FENa 0.3%. Resolved, renal function improved beyond baseline, 0.9 mg/dL. IMPRESSION/RECOMMENDATIONS:      CKD Stage 2 with proteinuria, Renal US done at Dr. Coates [facility] office in [place], [place] shows increased cortical echogenicity consistent with medical renal disease. Renal function stable. HTN, on cardizem and hydralazine, ramipril (on hold)  ------------------------------------------------------------------------    CVA, MRI demonstrates infarcts in right MCA without hemorrhagic conversion. Neurology following, concern for already embolic source. Echo ordered for PFO evaluation to r/o paradoxical embolus. Borderline intracranial atherosclerosis. Historical DVT and current right LE+, patiently chronically anticoagulated on Eliquis at home, was being treated with Lovenox for transition to [address] [place] [address] Road,   Anemia, acute drop in hemoglobin with previous work-up at [facility]. Hemoglobin stable  HLD, on statin   GERD, on omeprazole at home  UTI, 2/2 E. coli s/p ceftriaxone.    N.p.o., TF 35cc/hr with  cc Q4hrs,     Plan:    Decrease Free water flushes to 30cc every 4 hours  Repeat ionized calcium in am  Continue to monitor renal function  Continue to monitor H&H, transfuse to keep <7  Continue to monitor blood pressure  Give albumin 25g Q 8hrs X3 doses  Obtain UA  Discharge planning    Case and plan discussed with Dr. Giulia Reich  Electronically signed by Rob Aguiar, PATRICIA - CNP on 5/9/2022 at 1:06 PM  Patient seen and examined and agree with above as annotated  Giulia Osei MD

## 2022-05-09 NOTE — PROGRESS NOTES
Occupational Therapy  OT BEDSIDE TREATMENT NOTE   [facility] [address]  [address]  Patient Name: Anjel Aden  MRN: [id number]  : 1945  Room: [address] [place] [address]     Per OT Eval:    Re-evaluating OT: Andrzej Crook OTD, OTR/L, [id number]     Evaluating OT: José Antonio Emery OTR/L #[id number]      Re-evaluation indicated d/t pt transfer to ICU for respiratory distress on .     Referring Provider: Rachel Savage DO  Specific Provider Orders/Date: OT eval and treat 22     Diagnosis: Stroke-like symptoms [R29.90]   Pt admitted to hospital with L sided weakness, aphasia, dysarthria      Pertinent Medical History:  has a past medical history of CAD (coronary artery disease), DVT, bilateral lower limbs ([address] HTN (hypertension), Hyperlipidemia, and Pulmonary embolism ([address] Precautions:  Fall Risk, L cash,, L inattention, dysarthria, bed alarm, L lateral lean \"pusher\" stress incontinence     Assessment of current deficits    [x]? Functional mobility          [x]? ADLs           [x]? Strength                  [x]? Cognition    [x]? Functional transfers        [x]? IADLs         [x]? Safety Awareness   [x]? Endurance    [x]? Fine Coordination                        [x]? Balance      [x]? Vision/perception   [x]? Sensation      [x]? Gross Motor Coordination            [x]? ROM           []? Delirium                   [x]?  Motor Control      OT PLAN OF CARE   OT POC based on physician orders, patient diagnosis and results of clinical assessment     Frequency/Duration 1-5 days/wk for 2 weeks PRN   Specific OT Treatment Interventions to include:   * Instruction/training on adapted ADL techniques and AE recommendations to increase functional independence within precautions       * Training on energy conservation strategies, correct breathing pattern and techniques to improve independence/tolerance for self-care routine  * Functional transfer/mobility training/DME recommendations for increased independence, safety, and fall prevention  * Patient/Family education to increase follow through with safety techniques and functional independence  * Recommendation of environmental modifications for increased safety with functional transfers/mobility and ADLs  * Cognitive retraining/development of therapeutic activities to improve problem solving, judgement, memory, and attention for increased safety/participation in ADL/IADL tasks  * Sensory re-education to improve body/limb awareness, maintain/improve skin integrity, and improve hand/UE motor function  * Visual-perceptual training to improve environmental scanning, visual attention/focus, and oculomotor skills for increased safety/independence with functional transfers/mobility and ADLs  * Splinting/positioning for increased function, prevention of contractures, and improve skin integrity  * Therapeutic exercise to improve motor endurance, ROM, and functional strength for ADLs/functional transfers  * Therapeutic activities to facilitate/challenge dynamic balance, stand tolerance for increased safety and independence with ADLs  * Therapeutic activities to facilitate gross/fine motor skills for increased independence with ADLs  * Neuro-muscular re-education: facilitation of righting/equilibrium reactions, midline orientation, scapular stability/mobility, normalization of muscle tone, and facilitation of volitional active controled movement  * Positioning to improve skin integrity, interaction with environment and functional independence  * Manual techniques for edema management        Modified Arion Scale (MRS)  Score     Description  0             No symptoms  1             No significant disability despite symptoms  2             Slight disability; able to look after own affairs  3             Moderate disability; able to ambulate without assist/ requires assist with ADLs  4 Moderate/Severe disability;requires assist to ambulate/assist with ADLs  5             Severe disability;bedridden/incontinent   6               Score:  5     Recommended Adaptive Equipment:  TBD      Home Living: Pt lives alone in a 1 story home with 1 ESTRELLA and 1 hand rail. Bathroom setup: walk-in shower; shower chair    Equipment owned: shower chair, Janina Fang     Prior Level of Function: mod I with ADLs , mod I with IADLs; ambulated with SPC   Driving: no   Occupation: Pt enjoys crocheting and hopes to return to the activity to complete an unfinished blanket.     Pain Level: Pt denies pain this session     Cognition: A&O: 3/4 Follows 1 step directions, cues to focus on task, easily distracted, confusion at times              Memory:  P+             Sequencing:  P+             Problem solving:  P+             Judgement/safety:  P+     Functional Assessment:  AM-PAC Daily Activity Raw Score:     Initial Eval Status  Date: 22 Re-evaluation Status  Date: 22 Treatment Status  Date:  22 STGs = LTGs  Time frame: 10-14 days   Feeding NPO  NPO  NT  NPO  Failed swallow test  Minimal Assist   Once cleared for diet.    Grooming Moderate Assist  Maximal Assist overall  To wash face and apply deodorant seated EOB    Mod A  overall  SBA washing off face   Min A applying deodorant, assist to hold L UE while pt completed task, pt required Mod A for sitting balance due to posterior lean Minimal Assist   UB Dressing Maximal Assist  Max A   to don/doff gown seated EOB  Max A  Continue to educate on cash-dressing techniques    Minimal Assist    LB Dressing Dependent  Dep  To don/doff socks    Dep  Arnaldo/doff socks bed level Moderate Assist    Bathing Maximal Assist Max A  Seated EOB NT  Simulated   UB: Mod/Max A  LB: Dep Moderate Assist    Toileting Dependent  Dep  For hygiene, incontinent of bladder     Dep  Incontinent of urine upon arrival  & when standing Moderate Assist    Bed Mobility  Supine to sit: Maximal Assist x2  Sit to supine: Maximal Assist x2 Supine to sit: Maximal Assist   Sit to supine: Maximal Assist x2    Educated pt on technique to increase independence. Max A x 2- scooting    Max A   Supine < > sit  Supine to sit: Moderate Assist   Sit to supine: Moderate Assist    Functional Transfers Maximal Assist x2     Sit to stands Unable to stand with Max A of 1 attempt x 2  Max A  Sit < > stand  L lateral lean Moderate Assist    Functional Mobility Maximal Assist x2     1 Side step to [facility] with HHA NT  Pt unable this date. Mod A x 2  Side stepping along EOB, assist with weight shifting  Moderate Assist    Balance Sitting: max - min A  (L lateral lean; mod/max cuing; able to correct with min A at times)     Standing: max A x2 Sitting:  Static: Max A initially improving to Min A with core strengthening exercises EOB  Dynamic: Dep  Standing: N/T     Sitting: Min A  Significant improvement with visual & verbal cues able to correct L lateral lean  Standing: Mod A x 2  L lateral lean    Sitting:  Static: SBA  Dynamic: Min A  Standing: Mod A   Activity Tolerance F- Fair-   Fair/Fair-  96-99% on RA, fatigues with tasks but improved this date, more alert  F+   Visual/  Perceptual R gaze  L inattention     Continue to assess    R gaze preference  L inattention Continuing to improve  R inattention                         Hand Dominance right    Strength ROM Additional Info:    RUE   3-/5 wfl good  and wfl FMC/dexterity noted during ADL tasks      LUE  Grossly 0/5     continues to improve, decreased tone, AROM noted in distal planes, AAROM in proximal planes    absent  and absent  FMC/dexterity noted during ADL tasks             Comments: Upon arrival pt was in bed & agreeable for therapy, daughter present. Pt educated on techniques to increase independence and safety during ADL's and bed mobility, functional transfers & mobility with focus on mid-line & upright posture with Good results this date.  Completed core strengthening EOB to increase sitting balance for increased independence with ADL's. At end of session pt was returned to bed, HOB upright, L UE supported, all lines and tubes intact, call light within reach. Daughter present through out session, demonstrates good understanding of education & current level of status. · Pt has made Fair progress towards set goals. · Continue with current plan of care    Treatment Time In: 10:15          Treatment Time Out: 10:40         Treatment Charges: Mins Units   Ther Ex  54156     Manual Therapy 38580     Thera Activities 43132 15 1   ADL/Home Mgt 74447 10 1   Neuro Re-ed 14789     Group Therapy      Orthotic manage/training  04817     Non-Billable Time     Total Timed Treatment 25 2       Corry GRAY  [address] [place] [address], [address] [place] [address]

## 2022-05-09 NOTE — PROGRESS NOTES
[place]  Department of Pulmonary, Critical Care and Sleep Medicine  [facility]  Department of Internal Medicine  Progress Note    SUBJECTIVE:    Patient seen and examined. Currently on room air. Her daughter is present at bedside at time of my evaluation. Does complain of nasal mucosal dryness. She currently denies cough or shortness of breath. She has been working with physical therapy and her strength is improving. OBJECTIVE:  Vitals:    05/08/22 2200 05/09/22 0400 05/09/22 0829 05/09/22 1452   BP: 130/63 (!) 131/59 (!) 138/59 (!) 134/58   Pulse: 80 80 80 81   Resp: 18 20 16 17   Temp: 98.6 °F (37 °C) 97.4 °F (36.3 °C) 98.8 °F (37.1 °C) 99.2 °F (37.3 °C)   TempSrc: Temporal Temporal Infrared Infrared   SpO2: 98% 98% 95% 96%   Weight:       Height:         Constitutional: Alert,     EENT: EOMI SRINIVAS. Mucous membranes dry no icterus. Voice remains hoarse   Neck: No thyromegaly. Trachea was midline. Respiratory: Symmetrical.  Breath sounds were clear. Cardiovascular: Regular, No murmur. No rubs. Pulses:  Equal bilaterally. Abdomen: Soft without organomegaly. No rebound, rigidity. No guarding. Lymphatic: No lymphadenopathy. Musculoskeletal: Without weakness or gross deficits  Extremities: +  lower extremity edema. Reflexes appear adequate. Skin:  Warm and dry. No skin rashes. Neurological/Psychiatric: Continued left-sided weakness however this is slowly improving      DATA:    Monitor Strips:  Reviewed & discusses with technical team. No changes noted. RADIOLOGY:  Barium swallow study completed today which shows penetration and aspiration with nectar consistently liquids. Chest x-ray consistent with pulmonary edema/vascular congestion.       CBC with Differential:    Lab Results   Component Value Date    WBC 17.8 05/09/2022    RBC 3.04 05/09/2022    HGB 8.6 05/09/2022    HCT 27.9 05/09/2022     05/09/2022 MCV 91.8 05/09/2022    MCH 28.3 05/09/2022    MCHC 30.8 05/09/2022    RDW 15.2 05/09/2022     CMP:    Lab Results   Component Value Date     05/09/2022    K 4.5 05/09/2022     05/09/2022    CO2 24 05/09/2022    BUN 23 05/09/2022    CREATININE 1.0 05/09/2022    GFRAA >60 05/09/2022    LABGLOM 54 05/09/2022    GLUCOSE 129 05/09/2022    PROT 5.1 05/09/2022    LABALBU 2.2 05/09/2022    CALCIUM 7.8 05/09/2022    BILITOT 0.4 05/09/2022    ALKPHOS 69 05/09/2022    AST 26 05/09/2022    ALT 27 05/09/2022       CLINICAL ASSESMENT:  1. Acute hypoxemic respiratory failure-improving  2. Epistaxis-resolving  3. Mucous plugging  4. Concern for posterior glottic/interarytenoid scar band versus mucosal crusting. 5. Bilateral DVT on lower extremity ultrasound  6. Right MCA stroke  7. E. coli UTI  8. Acute on chronic anemia secondary to epistaxis    PLAN: If needed the case was discussed with the care team  1. Wean FiO2 as tolerated  2. Recommend aggressive airway hygiene measures with incentive spirometry and flutter valve. 3% saline nebs  3. PT OT  4. Repeat MBS failed now with PEG, TF advanced  5. SNF placement  6. Thickened diet as per dietary recommendations  7. Resume ASA and Eliquis, Resume ACE  8. Recommend discontinuing Seroquel. This was started in the ICU for agitation and is not a home med.        Arminda Vilchis DO

## 2022-05-09 NOTE — PLAN OF CARE
Problem: Discharge Planning  Goal: Discharge to home or other facility with appropriate resources  Outcome: Adequate for Discharge     Problem: Skin/Tissue Integrity  Goal: Absence of new skin breakdown  Description: 1. Monitor for areas of redness and/or skin breakdown  2. Assess vascular access sites hourly  3. Every 4-6 hours minimum:  Change oxygen saturation probe site  4. Every 4-6 hours:  If on nasal continuous positive airway pressure, respiratory therapy assess nares and determine need for appliance change or resting period.   Outcome: Progressing     Problem: Safety - Adult  Goal: Free from fall injury  Outcome: Adequate for Discharge     Problem: Pain  Goal: Verbalizes/displays adequate comfort level or baseline comfort level  Outcome: Adequate for Discharge     Problem: Respiratory - Adult  Goal: Achieves optimal ventilation and oxygenation  Outcome: Adequate for Discharge     Problem: Nutrition Deficit:  Goal: Optimize nutritional status  5/9/2022 1459 by Celeste Dodge RD  Outcome: Progressing     Problem: Nutrition Deficit:  Goal: Optimize nutritional status  5/9/2022 1840 by Juwan Lindsay RN  Outcome: Adequate for Discharge     Problem: ABCDS Injury Assessment  Goal: Absence of physical injury  Outcome: Adequate for Discharge     Problem: Chronic Conditions and Co-morbidities  Goal: Patient's chronic conditions and co-morbidity symptoms are monitored and maintained or improved  Outcome: Adequate for Discharge

## 2022-05-09 NOTE — PROGRESS NOTES
Physical Therapy    Physical Therapy Daily Treatment Note      Name: Shaista Stephens  : 1945  MRN: [id number]      Date of Service: 2022    Re-Evaluating PT:  Edwar Orta PT, DPT  [id number]     Room #:  6093/5303-Y  Diagnosis:  Stroke-like symptoms [R29.90]  Stroke of unknown etiology ([facility] [I63.9]  Acute CVA [facility]ce[facility]brovascular accident) ([facility] [I63.9]  PMHx/PSHx:  CAD s/p PCI, DVT, HTN, HLD, PE   Procedure/Surgery:  Intubated , Extubated   Precautions:  Falls, L hemiparesis (flaccid LUE), L inattention  Equipment Needs:  TBD    Reason for re-evaluation:  Change in medical status requiring transfer to intensive care with intubation. Pt extubated   Date of re-evaluation:  22    SUBJECTIVE:    Pt lives alone in a 1 story home with 1 ESTRELLA 1 rail. Pt ambulated with single point cane PTA. OBJECTIVE:   Re-Evaluation  Date: 22 Treatment  22 Short Term/ Long Term   Goals   AM-PAC 6 Clicks  90/81    Was pt agreeable to Re-Eval/treatment? Yes  Yes     Does pt have pain? None reported  None reported     Bed Mobility  Rolling: Max A  Supine to sit: Max A x2  Sit to supine: Max A x2  Scooting: Max A to EOB Rolling: Max A  Supine to sit: Max A  Sit to supine: Max A  Scooting: Max A to EOB Rolling: Mod A  Supine to sit: Mod A  Sit to supine: Mod A  Scooting: Mod A   Transfers Sit to stand: Max A x2  Stand to sit: Max A x2  Stand pivot: NT Sit to stand: Max A   Stand to sit: Max A  Stand pivot: NT Sit to stand: Mod A  Stand to sit: Mod A  Stand pivot: Mod A with AAD   Ambulation    NT 5 side steps x2 reps with no AD Mod A x2 >5 feet with AAD Max A   Stair negotiation: ascended and descended  NT NT NA   ROM BUE:  Per OT eval  BLE:  WFL BLE WFL    Strength BUE:  Per OT eval  RLE:  Grossly 4/5  LLE:  Grossly 3-/5 RLE grossly 4/5  LLE grossly 3+/5    Balance Sitting EOB:  Mod A  Static Standing: Max A x2 Sitting EOB:  Min A periods of CGA  Dynamic Standing:   Mod A x2 Sitting EOB:  SBA  Dynamic Standing: Mod A     Pt is A & O x 3  Sensation:  Pt denies numbness and tingling to extremities  Edema:  Unremarkable    Vitals:  SpO2 99% with activity on RA  SpO2 96% at rest on RA    Therapeutic Exercises:    STS x3 reps  Side stepping x2 reps     Patient education  Pt educated on safety during functional mobility, sitting balance and posture, standing balance and posture, positioning in bed     Patient response to education:   Pt verbalized understanding Pt demonstrated skill Pt requires further education in this area   Yes  Yes  Reinforce      RE ASSESSMENT:    Conditions Requiring Skilled Therapeutic Intervention:    [x]Decreased strength     []Decreased ROM  [x]Decreased functional mobility  [x]Decreased balance   [x]Decreased endurance   [x]Decreased posture  [x]Decreased sensation  [x]Decreased coordination   []Decreased vision  [x]Decreased safety awareness   []Increased pain       Comments:  Pt received supine and agreeable to PT treatment with OT collab. Pt cleared for participation by RN prior to session. Vitals monitored during session. Pt's dtr present. Pt demonstrates improved LUE movement this date. Able to sit up to EOB with activity. Improved EOB balance and midline posture. Performed x3 reps of STS at EOB. Performed x2 reps side steps with max cues for upright posture and step pattern. Pt fatigues quickly but demonstrates improved posture and ability to side step. Positioned in chair position on exit. Pt left with call button in reach, lines attached, and needs met.     Treatment:  Patient practiced and was instructed in the following treatment:     Bed mobility training - pt given verbal and tactile cues to facilitate proper sequencing and safety during rolling and supine<>sit as well as provided with physical assistance to complete task     Sitting EOB for >15 minutes for upright tolerance, postural awareness and BLE ROM   STS training and side stepping at EOB to progress gait tolerance - pt educated on proper hand and foot placement, safety and sequencing, and use of no AD to safely complete sit<>stand and side stepping along EOB with hands on assistance to complete task safely    Skilled positioning - Pt placed in the chair position with pillows utilized to facilitate upright posture, joint and skin integrity, and interaction with environment. PHYSICAL THERAPY PLAN OF CARE:  Pt is making progress towards established goals. Continue PT POC.      Specific instructions for next treatment:  Progress EOB sitting tolerance, STS and transfer training, gait training     Time in  1014  Time out  1040    Total Treatment Time  26 minutes     CPT codes:  [] Low Complexity PT evaluation 16547  [] Moderate Complexity PT evaluation 04946  [] High Complexity PT evaluation 52969  [] PT Re-evaluation 20243  [] Gait training 90866 -- minutes  [] Manual therapy [address] [facility] -- minutes  [x] Therapeutic activities 03452 26 minutes  [] Therapeutic exercises 11060 - minutes  [] Neuromuscular reeducation 96168 -- minutes     Jayna Moffett, PT, DPT  [id number]

## 2022-05-10 LAB
ALBUMIN SERPL-MCNC: 3.5 G/DL (ref 3.5–5.2)
ALP BLD-CCNC: 62 U/L (ref 35–104)
ALT SERPL-CCNC: 22 U/L (ref 0–32)
ANION GAP SERPL CALCULATED.3IONS-SCNC: 13 MMOL/L (ref 7–16)
AST SERPL-CCNC: 22 U/L (ref 0–31)
BILIRUB SERPL-MCNC: 0.5 MG/DL (ref 0–1.2)
BUN BLDV-MCNC: 22 MG/DL (ref 6–23)
CALCIUM SERPL-MCNC: 8.5 MG/DL (ref 8.6–10.2)
CHLORIDE BLD-SCNC: 105 MMOL/L (ref 98–107)
CO2: 24 MMOL/L (ref 22–29)
CREAT SERPL-MCNC: 1 MG/DL (ref 0.5–1)
GFR AFRICAN AMERICAN: >60
GFR NON-AFRICAN AMERICAN: 54 ML/MIN/1.73
GLUCOSE BLD-MCNC: 145 MG/DL (ref 74–99)
HCT VFR BLD CALC: 25.2 % (ref 34–48)
HCT VFR BLD CALC: 26 % (ref 34–48)
HEMOGLOBIN: 7.7 G/DL (ref 11.5–15.5)
HEMOGLOBIN: 7.8 G/DL (ref 11.5–15.5)
MCH RBC QN AUTO: 28.2 PG (ref 26–35)
MCHC RBC AUTO-ENTMCNC: 30 % (ref 32–34.5)
MCV RBC AUTO: 93.9 FL (ref 80–99.9)
METER GLUCOSE: 114 MG/DL (ref 74–99)
METER GLUCOSE: 124 MG/DL (ref 74–99)
METER GLUCOSE: 129 MG/DL (ref 74–99)
METER GLUCOSE: 130 MG/DL (ref 74–99)
METER GLUCOSE: 133 MG/DL (ref 74–99)
METER GLUCOSE: 162 MG/DL (ref 74–99)
PDW BLD-RTO: 15.3 FL (ref 11.5–15)
PLATELET # BLD: 306 E9/L (ref 130–450)
PMV BLD AUTO: 9.8 FL (ref 7–12)
POTASSIUM REFLEX MAGNESIUM: 4.2 MMOL/L (ref 3.5–5)
RBC # BLD: 2.77 E12/L (ref 3.5–5.5)
SODIUM BLD-SCNC: 142 MMOL/L (ref 132–146)
TOTAL PROTEIN: 6 G/DL (ref 6.4–8.3)
WBC # BLD: 15.6 E9/L (ref 4.5–11.5)

## 2022-05-10 PROCEDURE — 2060000000 HC ICU INTERMEDIATE R&B

## 2022-05-10 PROCEDURE — 6360000002 HC RX W HCPCS: Performed by: INTERNAL MEDICINE

## 2022-05-10 PROCEDURE — 80053 COMPREHEN METABOLIC PANEL: CPT

## 2022-05-10 PROCEDURE — P9047 ALBUMIN (HUMAN), 25%, 50ML: HCPCS | Performed by: NURSE PRACTITIONER

## 2022-05-10 PROCEDURE — 94640 AIRWAY INHALATION TREATMENT: CPT

## 2022-05-10 PROCEDURE — 6370000000 HC RX 637 (ALT 250 FOR IP): Performed by: STUDENT IN AN ORGANIZED HEALTH CARE EDUCATION/TRAINING PROGRAM

## 2022-05-10 PROCEDURE — 36415 COLL VENOUS BLD VENIPUNCTURE: CPT

## 2022-05-10 PROCEDURE — 6360000002 HC RX W HCPCS: Performed by: NURSE PRACTITIONER

## 2022-05-10 PROCEDURE — 85014 HEMATOCRIT: CPT

## 2022-05-10 PROCEDURE — 94660 CPAP INITIATION&MGMT: CPT

## 2022-05-10 PROCEDURE — A4216 STERILE WATER/SALINE, 10 ML: HCPCS | Performed by: INTERNAL MEDICINE

## 2022-05-10 PROCEDURE — 85018 HEMOGLOBIN: CPT

## 2022-05-10 PROCEDURE — 99233 SBSQ HOSP IP/OBS HIGH 50: CPT | Performed by: INTERNAL MEDICINE

## 2022-05-10 PROCEDURE — 2580000003 HC RX 258: Performed by: INTERNAL MEDICINE

## 2022-05-10 PROCEDURE — C9113 INJ PANTOPRAZOLE SODIUM, VIA: HCPCS | Performed by: INTERNAL MEDICINE

## 2022-05-10 PROCEDURE — 85027 COMPLETE CBC AUTOMATED: CPT

## 2022-05-10 PROCEDURE — 6370000000 HC RX 637 (ALT 250 FOR IP): Performed by: INTERNAL MEDICINE

## 2022-05-10 PROCEDURE — 82962 GLUCOSE BLOOD TEST: CPT

## 2022-05-10 RX ORDER — ALBUTEROL SULFATE 90 UG/1
2 AEROSOL, METERED RESPIRATORY (INHALATION) 4 TIMES DAILY PRN
Qty: 18 G | Refills: 0 | DISCHARGE
Start: 2022-05-10 | End: 2022-05-26

## 2022-05-10 RX ORDER — CALCIUM CARBONATE 200(500)MG
500 TABLET,CHEWABLE ORAL 3 TIMES DAILY PRN
Status: DISCONTINUED | OUTPATIENT
Start: 2022-05-10 | End: 2022-05-11 | Stop reason: HOSPADM

## 2022-05-10 RX ORDER — ASPIRIN 81 MG/1
81 TABLET ORAL DAILY
Qty: 30 TABLET | Refills: 0 | DISCHARGE
Start: 2022-05-11 | End: 2022-05-26

## 2022-05-10 RX ORDER — DICYCLOMINE HYDROCHLORIDE 10 MG/1
10 CAPSULE ORAL 3 TIMES DAILY PRN
Status: DISCONTINUED | OUTPATIENT
Start: 2022-05-10 | End: 2022-05-11 | Stop reason: HOSPADM

## 2022-05-10 RX ADMIN — DILTIAZEM HYDROCHLORIDE 120 MG: 120 CAPSULE, COATED, EXTENDED RELEASE ORAL at 08:56

## 2022-05-10 RX ADMIN — CHLORHEXIDINE GLUCONATE 15 ML: 1.2 RINSE ORAL at 08:56

## 2022-05-10 RX ADMIN — SODIUM CHLORIDE, PRESERVATIVE FREE 40 MG: 5 INJECTION INTRAVENOUS at 08:57

## 2022-05-10 RX ADMIN — GUAIFENESIN 200 MG: 200 SOLUTION ORAL at 21:04

## 2022-05-10 RX ADMIN — SALINE NASAL SPRAY 2 SPRAY: 1.5 SOLUTION NASAL at 12:14

## 2022-05-10 RX ADMIN — APIXABAN 5 MG: 5 TABLET, FILM COATED ORAL at 08:56

## 2022-05-10 RX ADMIN — LEVALBUTEROL 0.63 MG: 0.63 SOLUTION RESPIRATORY (INHALATION) at 19:56

## 2022-05-10 RX ADMIN — ASPIRIN 81 MG: 81 TABLET, COATED ORAL at 08:57

## 2022-05-10 RX ADMIN — SODIUM CHLORIDE, PRESERVATIVE FREE 40 MG: 5 INJECTION INTRAVENOUS at 21:04

## 2022-05-10 RX ADMIN — ATORVASTATIN CALCIUM 80 MG: 40 TABLET, FILM COATED ORAL at 21:05

## 2022-05-10 RX ADMIN — APIXABAN 5 MG: 5 TABLET, FILM COATED ORAL at 21:05

## 2022-05-10 RX ADMIN — SODIUM CHLORIDE SOLN NEBU 3% 4 ML: 3 NEBU SOLN at 19:56

## 2022-05-10 RX ADMIN — ALBUMIN (HUMAN) 25 G: 0.25 INJECTION, SOLUTION INTRAVENOUS at 05:36

## 2022-05-10 RX ADMIN — LEVALBUTEROL 0.63 MG: 0.63 SOLUTION RESPIRATORY (INHALATION) at 05:03

## 2022-05-10 RX ADMIN — CHLORHEXIDINE GLUCONATE 15 ML: 1.2 RINSE ORAL at 21:05

## 2022-05-10 RX ADMIN — Medication 5000 UNITS: at 08:56

## 2022-05-10 RX ADMIN — SODIUM CHLORIDE SOLN NEBU 3% 4 ML: 3 NEBU SOLN at 05:05

## 2022-05-10 RX ADMIN — SALINE NASAL SPRAY 2 SPRAY: 1.5 SOLUTION NASAL at 21:05

## 2022-05-10 RX ADMIN — GUAIFENESIN 200 MG: 200 SOLUTION ORAL at 08:56

## 2022-05-10 RX ADMIN — SALINE NASAL SPRAY 2 SPRAY: 1.5 SOLUTION NASAL at 14:33

## 2022-05-10 ASSESSMENT — PAIN SCALES - GENERAL
PAINLEVEL_OUTOF10: 0

## 2022-05-10 ASSESSMENT — PAIN SCALES - WONG BAKER
WONGBAKER_NUMERICALRESPONSE: 0
WONGBAKER_NUMERICALRESPONSE: 0

## 2022-05-10 NOTE — PROGRESS NOTES
Department of Internal Medicine  Nephrology Progress Note    Events reviewed. SUBJECTIVE:  We are following Ms. Jaky Gallagher for CURT. Patient denies any complaints. Daughter at the bedside at the time of my examination. PHYSICAL EXAM:      Vitals:    VITALS:  /70   Pulse 87   Temp 97.9 °F (36.6 °C) (Temporal)   Resp 18   Ht 5' (1.524 m)   Wt 192 lb 9.6 oz (87.4 kg)   SpO2 96%   BMI 37.61 kg/m²   24HR INTAKE/OUTPUT:      Intake/Output Summary (Last 24 hours) at 5/10/2022 1107  Last data filed at 5/10/2022 8024  Gross per 24 hour   Intake 421 ml   Output 1075 ml   Net -654 ml       Constitutional: Alert, speech delayed, no acute distress  HEENT: PERRLA, normocephalic, atraumtic  Respiratory:  Diminished bilateral bases  Cardiovascular/Edema:  RRR, S1/S2  Gastrointestinal:  abd rounded, non tender, BS hypoactive.  PEG tube present  Neurologic: Alert and oriented, no focal deficits noted  Skin:  Warm,dry, ecchymosis to BUE  Other:  Pink catheter draining minimal yellow urine    Scheduled Meds:   guaiFENesin  200 mg Oral BID    levalbuterol  0.63 mg Nebulization Q8H    pantoprazole (PROTONIX) 40 mg injection  40 mg IntraVENous BID    chlorhexidine  15 mL Mouth/Throat BID    sodium chloride (Inhalant)  4 mL Nebulization BID    insulin lispro  0-12 Units SubCUTAneous TID WC    insulin lispro  0-6 Units SubCUTAneous Nightly    sodium chloride  2 spray Each Nostril TID    apixaban  5 mg Oral BID    vitamin D  5,000 Units Oral Daily    dilTIAZem  120 mg Oral Daily    [Held by provider] ramipril  5 mg Oral Daily    aspirin  81 mg Oral Daily    atorvastatin  80 mg Oral Nightly     Continuous Infusions:   dextrose       PRN Meds:.sodium chloride, glycerin moisturizing mouth spray, hydrALAZINE, dextrose, glucagon (rDNA), dextrose, glucose, racepinephrine HCl, acetaminophen, albuterol, acetaminophen, ondansetron **OR** ondansetron, polyethylene glycol, perflutren lipid microspheres    DATA:    CBC:   Lab Results   Component Value Date    WBC 15.6 05/10/2022    RBC 2.77 05/10/2022    HGB 7.8 05/10/2022    HCT 26.0 05/10/2022    MCV 93.9 05/10/2022    MCH 28.2 05/10/2022    MCHC 30.0 05/10/2022    RDW 15.3 05/10/2022     05/10/2022    MPV 9.8 05/10/2022     CMP:    Lab Results   Component Value Date     05/10/2022    K 4.2 05/10/2022     05/10/2022    CO2 24 05/10/2022    BUN 22 05/10/2022    CREATININE 1.0 05/10/2022    GFRAA >60 05/10/2022    LABGLOM 54 05/10/2022    GLUCOSE 145 05/10/2022    PROT 6.0 05/10/2022    LABALBU 3.5 05/10/2022    CALCIUM 8.5 05/10/2022    BILITOT 0.5 05/10/2022    ALKPHOS 62 05/10/2022    AST 22 05/10/2022    ALT 22 05/10/2022     Magnesium:    Lab Results   Component Value Date    MG 1.8 05/07/2022     Phosphorus:  No results found for: PHOS  Radiology Review:      Chest x-ray May 3, 2022   1. Cardiomegaly. 2. Hazy bilateral airspace disease favored to represent mild pulmonary   edema/vascular congestion. BRIEF SUMMARY OF INITIAL CONSULT:  Briefly, Ms. Bishop Nelson is a 68year old female with a PMH of HTN, CAD with PCI 2017, BLE DVT 11/2021, HLD, GERD who was admitted on April 22, 2022 after she was transferred from [facility] with stroke like symptoms. Patient presented to [facility] with complaint of strokelike symptoms and sudden onset left-sided weakness with aphasia, with MRI indicating right MCA without hemorrhagic conversion. Patient was originally admitted to the medical floor at [facility] and became progressively obtunded with stridorous respiratory failure, was intubated and transferred to MICU 4/26/2022. Patient developed severe epistaxis requiring packing per ENT. Labs on admission were significant for sodium of 141, potassium 3.7, chloride 111, bicarbonate 18, BUN 21, creatinine 1.2, and hemoglobin of 7.1. We are consulted for worsening CURT and decreased urine output.   Notably patient's daughter states that patient has been recently evaluated by nephrology in [place] and underwent blood work and renal ultrasound however did not receive the results yet. Ramipril, diltiazem, atorvastatin, apixaban, and omeprazole are medications patient was taking prior to admission. Problems resolved:  Hypocalcemia, 2/2 vitamin D deficiency. On cholecalciferol, ionized calcium 1.16  Vitamin D deficiency, on cholecalciferol  Hypernatremia, secondary to decreased free water intake patient is NPO. Increase D5 water to 80 cc/h  Alkalemia, pH 7.509, PCO2 28, HCO3 21.8, respiratory alkalosis  Acute epistaxis, nasal packing in place  Acute hypoxic respiratory failure 2/2 mucus plug, intubated on 4/26, extubated on 5/1, S/p bronchoscopy  CURT stage III based on urine output,  volume responsive pre-renal CURT from poor oral intake and volume loss with severe anemia in the setting of ACEi administration. Oliguric. FENa 0.3%. Resolved, renal function improved beyond baseline, 0.9 mg/dL. IMPRESSION/RECOMMENDATIONS:      CKD Stage 2 with proteinuria, Renal US done at Dr. Halle Hunter office in [place], [place] shows increased cortical echogenicity consistent with medical renal disease. Renal function stable. HTN, on cardizem and hydralazine, ramipril (on hold)  ------------------------------------------------------------------------    CVA, MRI demonstrates infarcts in right MCA without hemorrhagic conversion. Neurology following, concern for already embolic source. Echo ordered for PFO evaluation to r/o paradoxical embolus. Borderline intracranial atherosclerosis. Historical DVT and current right LE+, patiently chronically anticoagulated on Eliquis at home, was being treated with Lovenox for transition to [address] [place] [address] Road,   Anemia, acute drop in hemoglobin [facility]. Hemoglobin stable  HLD, on statin   GERD, on omeprazole at home  UTI, 2/2 E. coli s/p ceftriaxone.    N.p.o., TF 35cc/hr with  cc Q4hrs, Plan:    Continue Free water flushes to 30cc every 4 hours  Continue to monitor renal function  Continue to monitor H&H, transfuse to keep <7  Continue to monitor blood pressure  Discharge planning    Case and plan discussed with Dr. Giulia Reich  Electronically signed by Rob Aguiar, PATRICIA - CNP on 5/10/2022 at 11:07 AM

## 2022-05-10 NOTE — PROGRESS NOTES
Hospitalist Progress Note      Synopsis: Patient admitted as a transfer from [facility] for strokelike symptoms. Patient presented to [facility] with sudden onset of left-sided weakness and aphasia that occurred while in the middle of a conversation with her daughter. In ED she was noted to have severe aphasia, severe dysarthria, and left arm and leg drift, and rightward gaze, and left-sided hemianopia. MRI of the brain right MCA stroke likely embolic in nature. Neurology is following. Carotid ultrasound with no significant stenosis. Awaiting echo to assess for paradoxical embolus. If unrevealing plan is for Zio patch at discharge. She is currently being treated with therapeutic Lovenox with plans to switch to Coumadin when she is able to take p.o. she began obtunded and stridorous requiring transfer to ICU and intubation. A large mucous plug was extracted from her airway. Patient began having bleeding from the nose and mouth. ENT was consulted. Nasal and oral packing remain in place. Anticoagulation on hold. Nephrology following for CURT with oliguria. Nasal packing was removed and replaced with absorbable sinofoam. She was successfully extubated. PEG tube was placed for dysphagia. Stable for discharge but awaiting pre-CERT to [facility]. Hospital day 18     Subjective:  Stable overnight. No issues reported  Patient seen and examined. Complains of abdominal cramping. Had a BM earlier today. Records reviewed. Temp (24hrs), Av.3 °F (36.8 °C), Min:97.9 °F (36.6 °C), Max:99.2 °F (37.3 °C)    DIET: ADULT DIET; Dysphagia - Pureed; 4 carb choices (60 gm/meal);  Moderately Thick (Honey)  ADULT TUBE FEEDING; Orogastric; Standard with Fiber; Continuous; 10; Yes; 15; Q 4 hours; 35; 30; Q 4 hours; Protein; 1 Proteinex Daily  CODE: Full Code    Intake/Output Summary (Last 24 hours) at 5/10/2022 1941  Last data filed at 5/10/2022 6012  Gross per 24 hour   Intake 421 ml   Output 1075 ml   Net -654 ml       Review of Systems:    KURTIS given mental status    Objective:    /70   Pulse 87   Temp 97.9 °F (36.6 °C) (Temporal)   Resp 18   Ht 5' (1.524 m)   Wt 192 lb 9.6 oz (87.4 kg)   SpO2 96%   BMI 37.61 kg/m²     General appearance: Obese elderly female in no apparent distress. Appears stated age and cooperative. HEENT: Conjunctivae/corneas clear. Mucous membranes moist.   Respiratory: CTA. No respiratory difficulty. Cardiovascular:  RRR. S1, S2. IV/VI systolic murmur  PV: Pulses palpable. No edema. Abdomen: Soft, non-tender, non-distended. +BS  Musculoskeletal: No obvious deformities. Skin: Normal skin color. No rashes or lesions. Good turgor. Neurologic: LUE flaccid. Generalized weakness otherwise.      Medications:  REVIEWED DAILY    Infusion Medications    dextrose       Scheduled Medications    guaiFENesin  200 mg Oral BID    levalbuterol  0.63 mg Nebulization Q8H    pantoprazole (PROTONIX) 40 mg injection  40 mg IntraVENous BID    chlorhexidine  15 mL Mouth/Throat BID    sodium chloride (Inhalant)  4 mL Nebulization BID    insulin lispro  0-12 Units SubCUTAneous TID WC    insulin lispro  0-6 Units SubCUTAneous Nightly    sodium chloride  2 spray Each Nostril TID    apixaban  5 mg Oral BID    vitamin D  5,000 Units Oral Daily    dilTIAZem  120 mg Oral Daily    [Held by provider] ramipril  5 mg Oral Daily    aspirin  81 mg Oral Daily    atorvastatin  80 mg Oral Nightly     PRN Meds: sodium chloride, glycerin moisturizing mouth spray, hydrALAZINE, dextrose, glucagon (rDNA), dextrose, glucose, racepinephrine HCl, acetaminophen, albuterol, acetaminophen, ondansetron **OR** ondansetron, polyethylene glycol, perflutren lipid microspheres    Labs:     Recent Labs     05/08/22  0624 05/09/22  0815 05/10/22  0605   WBC 17.2* 17.8* 15.6*   HGB 8.4* 8.6* 7.8*   HCT 26.8* 27.9* 26.0*    351 306       Recent Labs     05/08/22  0624 05/09/22  0815 05/10/22  4353  137 142   K 4.3 4.5 4.2    104 105   CO2 24 24 24   BUN 20 23 22   CREATININE 1.0 1.0 1.0   CALCIUM 7.9* 7.8* 8.5*       Recent Labs     05/08/22  0624 05/09/22  0815 05/10/22  0605   PROT 4.9* 5.1* 6.0*   ALKPHOS 67 69 62   ALT 28 27 22   AST 29 26 22   BILITOT 0.5 0.4 0.5       No results for input(s): INR in the last 72 hours. No results for input(s): Jadene Moh in the last 72 hours. Chronic labs:    Lab Results   Component Value Date    CHOL 140 04/23/2022    TRIG 87 04/23/2022    HDL 48 04/23/2022    LDLCALC 75 04/23/2022    INR 1.3 04/27/2022    LABA1C 5.6 04/23/2022       Radiology: REVIEWED DAILY    Assessment:  Acute respiratory failure - improving  Posterior glottic/interarytenoid scar band versus mucosal crusting  Right MCA stroke- severe aphasia, severe dysarthria, and left arm and leg drift, and rightward gaze, and left-sided hemianopia  Encephalopathy  Bilateral DVT   CKD III  Epistaxis - resolved  Acute on chronic anemia   Dysphagia s/p PEG tube placement  Acute delirium   UTI s/p 5 days of Ceftri  Leukocytosis  CKD, unknown baseline  CAD s/p PCI in 2017  HTN  HLD  Hx of BLE DVT + PE in 11/2021 anticoagulated on Eliquis    Plan:  Nephrology and pulmonology following   Neurology signed off with recs to resume anticoagulation if and when possible as well as Zio patch at discharge  [address] resumed  Will make outpatient referral to hematology/oncology for hypercoagulable work-up given history of DVT/PE and failure of Eliquis     Discharge Plan: [facility] once precert obtained.     +++++++++++++++++++++++++++++++++++++++++++++++++  Carlos Mad, APRN  [employer] - [address], [facility]  +++++++++++++++++++++++++++++++++++++++++++++++++  NOTE: This report was transcribed using voice recognition software. Every effort was made to ensure accuracy; however, inadvertent computerized transcription errors may be present.

## 2022-05-10 NOTE — PROGRESS NOTES
[facility]  Department of Pulmonary, Critical Care and Sleep Medicine  [address] [facility]  Department of Internal Medicine  Progress Note    SUBJECTIVE:    Patient seen and examined. Currently without any acute issues. OBJECTIVE:  Vitals:    05/09/22 2313 05/10/22 0415 05/10/22 0735 05/10/22 1455   BP: 134/63  132/70 136/84   Pulse: 88 81 87 92   Resp: 16 18 18 16   Temp: 98 °F (36.7 °C)  97.9 °F (36.6 °C) 98.2 °F (36.8 °C)   TempSrc: Temporal  Temporal Temporal   SpO2: 97%  96% 95%   Weight:       Height:         Constitutional: Alert,     EENT: EOMI SRINIVAS. Mucous membranes dry no icterus. Voice remains hoarse   Neck: No thyromegaly. Trachea was midline. Respiratory: Symmetrical.  Breath sounds were clear. Cardiovascular: Regular, No murmur. No rubs. Pulses:  Equal bilaterally. Abdomen: Soft without organomegaly. No rebound, rigidity. No guarding. Lymphatic: No lymphadenopathy. Musculoskeletal: Without weakness or gross deficits  Extremities: +  lower extremity edema. Reflexes appear adequate. Skin:  Warm and dry. No skin rashes. Neurological/Psychiatric: Continued left-sided weakness however this is slowly improving      DATA:    Monitor Strips:  Reviewed & discusses with technical team. No changes noted. RADIOLOGY:   Chest x-ray consistent with pulmonary edema/vascular congestion.       CBC with Differential:    Lab Results   Component Value Date    WBC 15.6 05/10/2022    RBC 2.77 05/10/2022    HGB 7.8 05/10/2022    HCT 26.0 05/10/2022     05/10/2022    MCV 93.9 05/10/2022    MCH 28.2 05/10/2022    MCHC 30.0 05/10/2022    RDW 15.3 05/10/2022     CMP:    Lab Results   Component Value Date     05/10/2022    K 4.2 05/10/2022     05/10/2022    CO2 24 05/10/2022    BUN 22 05/10/2022    CREATININE 1.0 05/10/2022    GFRAA >60 05/10/2022    LABGLOM 54 05/10/2022    GLUCOSE 145 05/10/2022    PROT 6.0 05/10/2022 LABALBU 3.5 05/10/2022    CALCIUM 8.5 05/10/2022    BILITOT 0.5 05/10/2022    ALKPHOS 62 05/10/2022    AST 22 05/10/2022    ALT 22 05/10/2022       CLINICAL ASSESMENT:  1. Acute hypoxemic respiratory failure-improving  2. Mucous plugging  3. Concern for posterior glottic/interarytenoid scar band versus mucosal crusting. 4. Bilateral DVT on lower extremity ultrasound  5. Right MCA stroke  6. E. coli UTI  7. Acute on chronic anemia secondary to epistaxis    PLAN: If needed the case was discussed with the care team  1. Currently on room air  2. Recommend aggressive airway hygiene measures with incentive spirometry and flutter valve. 3% saline nebs  3. PT OT  4. Repeat MBS failed now with PEG, TF advanced  5. SNF placement  6. Thickened diet as per dietary recommendations  7.  Resume ASA and Eliquis, Resume ACE      Manuel Remedies, DO

## 2022-05-11 ENCOUNTER — APPOINTMENT (OUTPATIENT)
Dept: GENERAL RADIOLOGY | Age: 77
DRG: 064 | End: 2022-05-11
Attending: FAMILY MEDICINE
Payer: MEDICARE

## 2022-05-11 VITALS
RESPIRATION RATE: 18 BRPM | DIASTOLIC BLOOD PRESSURE: 58 MMHG | SYSTOLIC BLOOD PRESSURE: 158 MMHG | TEMPERATURE: 98.8 F | BODY MASS INDEX: 37.81 KG/M2 | OXYGEN SATURATION: 94 % | HEART RATE: 82 BPM | HEIGHT: 60 IN | WEIGHT: 192.6 LBS

## 2022-05-11 LAB
ALBUMIN SERPL-MCNC: 3.4 G/DL (ref 3.5–5.2)
ALP BLD-CCNC: 64 U/L (ref 35–104)
ALT SERPL-CCNC: 21 U/L (ref 0–32)
ANION GAP SERPL CALCULATED.3IONS-SCNC: 11 MMOL/L (ref 7–16)
AST SERPL-CCNC: 22 U/L (ref 0–31)
BILIRUB SERPL-MCNC: 0.7 MG/DL (ref 0–1.2)
BUN BLDV-MCNC: 18 MG/DL (ref 6–23)
CALCIUM SERPL-MCNC: 8.7 MG/DL (ref 8.6–10.2)
CHLORIDE BLD-SCNC: 109 MMOL/L (ref 98–107)
CO2: 22 MMOL/L (ref 22–29)
CREAT SERPL-MCNC: 1 MG/DL (ref 0.5–1)
GFR AFRICAN AMERICAN: >60
GFR NON-AFRICAN AMERICAN: 54 ML/MIN/1.73
GLUCOSE BLD-MCNC: 163 MG/DL (ref 74–99)
HCT VFR BLD CALC: 25 % (ref 34–48)
HCT VFR BLD CALC: 25.1 % (ref 34–48)
HCT VFR BLD CALC: 25.2 % (ref 34–48)
HEMOGLOBIN: 7.7 G/DL (ref 11.5–15.5)
HEMOGLOBIN: 7.9 G/DL (ref 11.5–15.5)
HEMOGLOBIN: 7.9 G/DL (ref 11.5–15.5)
MCH RBC QN AUTO: 28.2 PG (ref 26–35)
MCHC RBC AUTO-ENTMCNC: 30.8 % (ref 32–34.5)
MCV RBC AUTO: 91.6 FL (ref 80–99.9)
METER GLUCOSE: 122 MG/DL (ref 74–99)
METER GLUCOSE: 132 MG/DL (ref 74–99)
METER GLUCOSE: 140 MG/DL (ref 74–99)
PDW BLD-RTO: 15.2 FL (ref 11.5–15)
PLATELET # BLD: 317 E9/L (ref 130–450)
PMV BLD AUTO: 9.8 FL (ref 7–12)
POTASSIUM REFLEX MAGNESIUM: 4.1 MMOL/L (ref 3.5–5)
RBC # BLD: 2.73 E12/L (ref 3.5–5.5)
SARS-COV-2, NAAT: NOT DETECTED
SODIUM BLD-SCNC: 142 MMOL/L (ref 132–146)
TOTAL PROTEIN: 5.7 G/DL (ref 6.4–8.3)
WBC # BLD: 14.2 E9/L (ref 4.5–11.5)

## 2022-05-11 PROCEDURE — 51798 US URINE CAPACITY MEASURE: CPT

## 2022-05-11 PROCEDURE — 99233 SBSQ HOSP IP/OBS HIGH 50: CPT | Performed by: INTERNAL MEDICINE

## 2022-05-11 PROCEDURE — 94640 AIRWAY INHALATION TREATMENT: CPT

## 2022-05-11 PROCEDURE — 85018 HEMOGLOBIN: CPT

## 2022-05-11 PROCEDURE — 93242 EXT ECG>48HR<7D RECORDING: CPT

## 2022-05-11 PROCEDURE — C9113 INJ PANTOPRAZOLE SODIUM, VIA: HCPCS | Performed by: INTERNAL MEDICINE

## 2022-05-11 PROCEDURE — 6360000002 HC RX W HCPCS: Performed by: INTERNAL MEDICINE

## 2022-05-11 PROCEDURE — 36415 COLL VENOUS BLD VENIPUNCTURE: CPT

## 2022-05-11 PROCEDURE — 94660 CPAP INITIATION&MGMT: CPT

## 2022-05-11 PROCEDURE — 51702 INSERT TEMP BLADDER CATH: CPT

## 2022-05-11 PROCEDURE — 85027 COMPLETE CBC AUTOMATED: CPT

## 2022-05-11 PROCEDURE — 97530 THERAPEUTIC ACTIVITIES: CPT

## 2022-05-11 PROCEDURE — 6370000000 HC RX 637 (ALT 250 FOR IP): Performed by: STUDENT IN AN ORGANIZED HEALTH CARE EDUCATION/TRAINING PROGRAM

## 2022-05-11 PROCEDURE — 71045 X-RAY EXAM CHEST 1 VIEW: CPT

## 2022-05-11 PROCEDURE — 2580000003 HC RX 258: Performed by: INTERNAL MEDICINE

## 2022-05-11 PROCEDURE — 82962 GLUCOSE BLOOD TEST: CPT

## 2022-05-11 PROCEDURE — A4216 STERILE WATER/SALINE, 10 ML: HCPCS | Performed by: INTERNAL MEDICINE

## 2022-05-11 PROCEDURE — 85014 HEMATOCRIT: CPT

## 2022-05-11 PROCEDURE — 6370000000 HC RX 637 (ALT 250 FOR IP): Performed by: INTERNAL MEDICINE

## 2022-05-11 PROCEDURE — 97535 SELF CARE MNGMENT TRAINING: CPT

## 2022-05-11 PROCEDURE — 87635 SARS-COV-2 COVID-19 AMP PRB: CPT

## 2022-05-11 PROCEDURE — 80053 COMPREHEN METABOLIC PANEL: CPT

## 2022-05-11 RX ADMIN — GUAIFENESIN 200 MG: 200 SOLUTION ORAL at 08:29

## 2022-05-11 RX ADMIN — CHLORHEXIDINE GLUCONATE 15 ML: 1.2 RINSE ORAL at 08:29

## 2022-05-11 RX ADMIN — SODIUM CHLORIDE, PRESERVATIVE FREE 40 MG: 5 INJECTION INTRAVENOUS at 08:29

## 2022-05-11 RX ADMIN — SALINE NASAL SPRAY 2 SPRAY: 1.5 SOLUTION NASAL at 08:37

## 2022-05-11 RX ADMIN — LEVALBUTEROL 0.63 MG: 0.63 SOLUTION RESPIRATORY (INHALATION) at 11:02

## 2022-05-11 RX ADMIN — Medication 5000 UNITS: at 08:30

## 2022-05-11 RX ADMIN — LEVALBUTEROL 0.63 MG: 0.63 SOLUTION RESPIRATORY (INHALATION) at 03:12

## 2022-05-11 RX ADMIN — DILTIAZEM HYDROCHLORIDE 120 MG: 120 CAPSULE, COATED, EXTENDED RELEASE ORAL at 08:30

## 2022-05-11 RX ADMIN — ASPIRIN 81 MG: 81 TABLET, COATED ORAL at 08:30

## 2022-05-11 RX ADMIN — SALINE NASAL SPRAY 2 SPRAY: 1.5 SOLUTION NASAL at 14:06

## 2022-05-11 RX ADMIN — APIXABAN 5 MG: 5 TABLET, FILM COATED ORAL at 08:30

## 2022-05-11 RX ADMIN — SODIUM CHLORIDE SOLN NEBU 3% 4 ML: 3 NEBU SOLN at 11:03

## 2022-05-11 ASSESSMENT — PAIN SCALES - GENERAL
PAINLEVEL_OUTOF10: 0
PAINLEVEL_OUTOF10: 0

## 2022-05-11 ASSESSMENT — PAIN SCALES - WONG BAKER: WONGBAKER_NUMERICALRESPONSE: 4

## 2022-05-11 ASSESSMENT — PAIN DESCRIPTION - LOCATION: LOCATION: BACK

## 2022-05-11 NOTE — PROGRESS NOTES
[place]  Department of Pulmonary, Critical Care and Sleep Medicine  [address] [facility]  Department of Internal Medicine  Progress Note    SUBJECTIVE:    Seen and examined. She reports that she is going to rehab today. She currently denies any shortness of breath, cough, wheezing. She reports that her cough has almost totally resolved. OBJECTIVE:  Vitals:    05/10/22 2329 05/11/22 0351 05/11/22 0735 05/11/22 1535   BP: (!) 149/56 (!) 160/71 (!) 158/70 (!) 158/58   Pulse: 84 92 83 82   Resp: 18 20 18 18   Temp: 99 °F (37.2 °C) 100.3 °F (37.9 °C) 99 °F (37.2 °C) 98.8 °F (37.1 °C)   TempSrc: Temporal Temporal Temporal Temporal   SpO2: 92% 93% 96% 94%   Weight:       Height:         Constitutional: Alert,     EENT: EOMI SRINIVAS. Mucous membranes dry no icterus. Voice remains hoarse   Neck: No thyromegaly. Trachea was midline. Respiratory: Symmetrical.  Breath sounds were clear. Cardiovascular: Regular, No murmur. No rubs. Pulses:  Equal bilaterally. Abdomen: Soft without organomegaly. No rebound, rigidity. No guarding. Lymphatic: No lymphadenopathy. Musculoskeletal: Without weakness or gross deficits  Extremities: +  lower extremity edema. Reflexes appear adequate. Skin:  Warm and dry. No skin rashes. Neurological/Psychiatric: Continued left-sided weakness however this is slowly improving      DATA:    Monitor Strips:  Reviewed & discusses with technical team. No changes noted. RADIOLOGY:  Chest x-ray reviewed with almost complete clearing of pulmonary congestion.       CBC with Differential:    Lab Results   Component Value Date    WBC 14.2 05/11/2022    RBC 2.73 05/11/2022    HGB 7.9 05/11/2022    HCT 25.2 05/11/2022     05/11/2022    MCV 91.6 05/11/2022    MCH 28.2 05/11/2022    MCHC 30.8 05/11/2022    RDW 15.2 05/11/2022     CMP:    Lab Results   Component Value Date     05/11/2022    K 4.1 05/11/2022     05/11/2022    CO2 22 05/11/2022    BUN 18 05/11/2022    CREATININE 1.0 05/11/2022    GFRAA >60 05/11/2022    LABGLOM 54 05/11/2022    GLUCOSE 163 05/11/2022    PROT 5.7 05/11/2022    LABALBU 3.4 05/11/2022    CALCIUM 8.7 05/11/2022    BILITOT 0.7 05/11/2022    ALKPHOS 64 05/11/2022    AST 22 05/11/2022    ALT 21 05/11/2022       CLINICAL ASSESMENT:  1. Acute hypoxemic respiratory failure- resolved  2. Mucous plugging-resolved  3. Concern for posterior glottic/interarytenoid scar band versus mucosal crusting. 4. Bilateral DVT on lower extremity ultrasound  5. Right MCA stroke  6. E. coli UTI  7. Acute on chronic anemia secondary to epistaxis    PLAN: If needed the case was discussed with the care team  1. Currently on room air  2. Recommend aggressive airway hygiene measures with incentive spirometry and flutter valve. 3% saline nebs  3. PT OT  4. SNF placement  5. Thickened diet as per dietary recommendations  6.  Resume ASA and Eliquis, Resume ACE  7.  Okay to discharge from pulmonary standpoint    Graham Garibay DO

## 2022-05-11 NOTE — PROGRESS NOTES
Nutrition Note    Bolus TF regimen recs provided per request from RN. Pt. Intakes remain poor/limited since admit. Regimen meets 100% of estimated calorie needs and 87% estimated protein needs. Recommend utilizing continuous EN / supplemental bolus regimen with diet until pt is able to obtain adequate po intake. Supplemental Bolus TF regimen: Standard Formula w/ fiber (Jevity 1.5) 240ml 4x daily will provide: 960ml TV, 1440kcal, 61g pro 730ml free water.             Electronically signed by Vandana Sauceda, [address] on 5/11/22 at 3:13 PM EDT    Contact: [phone] Spoke with patient and she will get the Chest x-ray done that Dr Oni Quevedo put in due to the insurance company wants her to have it done first

## 2022-05-11 NOTE — PROGRESS NOTES
Hospitalist Progress Note      Synopsis: Patient admitted as a transfer from [facility] for strokelike symptoms. Patient presented to [facility] with sudden onset of left-sided weakness and aphasia that occurred while in the middle of a conversation with her daughter. In ED she was noted to have severe aphasia, severe dysarthria, and left arm and leg drift, and rightward gaze, and left-sided hemianopia. MRI of the brain right MCA stroke likely embolic in nature. Neurology is following. Carotid ultrasound with no significant stenosis. Awaiting echo to assess for paradoxical embolus. If unrevealing plan is for Zio patch at discharge. She is currently being treated with therapeutic Lovenox with plans to switch to Coumadin when she is able to take p.o. she began obtunded and stridorous requiring transfer to ICU and intubation. A large mucous plug was extracted from her airway. Patient began having bleeding from the nose and mouth. ENT was consulted. Nasal and oral packing remain in place. Anticoagulation on hold. Nephrology following for CURT with oliguria. Nasal packing was removed and replaced with absorbable sinofoam. She was successfully extubated. PEG tube was placed for dysphagia. Stable for discharge but awaiting pre-CERT to [facility]. Hospital day 19     Subjective:  Stable overnight. No issues reported  Patient seen and examined. No new complaints. Anxious for discharge. Daughter at bedside. Records reviewed. Temp (24hrs), Av.1 °F (37.3 °C), Min:98.2 °F (36.8 °C), Max:100.3 °F (37.9 °C)    DIET: ADULT TUBE FEEDING; Orogastric; Standard with Fiber; Continuous; 10; Yes; 15; Q 4 hours; 35; 30; Q 4 hours; Protein; 1 Proteinex Daily  ADULT DIET; Dysphagia - Pureed; 4 carb choices (60 gm/meal);  Moderately Thick (Honey)  CODE: Full Code    Intake/Output Summary (Last 24 hours) at 2022 0000  Last data filed at 2022 2953  Gross per 24 hour   Intake 451 ml Output 0 ml   Net 451 ml       Review of Systems:    KURTIS given mental status    Objective:    BP (!) 158/70   Pulse 83   Temp 99 °F (37.2 °C) (Temporal)   Resp 18   Ht 5' (1.524 m)   Wt 192 lb 9.6 oz (87.4 kg)   SpO2 96%   BMI 37.61 kg/m²     General appearance: Obese elderly female in no apparent distress. Appears stated age and cooperative. HEENT: Conjunctivae/corneas clear. Mucous membranes moist.   Respiratory: CTA. No respiratory difficulty. Cardiovascular:  RRR. S1, S2. IV/VI systolic murmur  PV: Pulses palpable. No edema. Abdomen: Soft, non-tender, non-distended. +BS  Musculoskeletal: No obvious deformities. Skin: Normal skin color. No rashes or lesions. Good turgor. Neurologic: LUE flaccid. Generalized weakness otherwise.      Medications:  REVIEWED DAILY    Infusion Medications    dextrose       Scheduled Medications    guaiFENesin  200 mg Oral BID    levalbuterol  0.63 mg Nebulization Q8H    pantoprazole (PROTONIX) 40 mg injection  40 mg IntraVENous BID    chlorhexidine  15 mL Mouth/Throat BID    sodium chloride (Inhalant)  4 mL Nebulization BID    insulin lispro  0-12 Units SubCUTAneous TID WC    insulin lispro  0-6 Units SubCUTAneous Nightly    sodium chloride  2 spray Each Nostril TID    apixaban  5 mg Oral BID    vitamin D  5,000 Units Oral Daily    dilTIAZem  120 mg Oral Daily    [Held by provider] ramipril  5 mg Oral Daily    aspirin  81 mg Oral Daily    atorvastatin  80 mg Oral Nightly     PRN Meds: calcium carbonate, dicyclomine, sodium chloride, glycerin moisturizing mouth spray, hydrALAZINE, dextrose, glucagon (rDNA), dextrose, glucose, racepinephrine HCl, acetaminophen, albuterol, acetaminophen, ondansetron **OR** ondansetron, polyethylene glycol, perflutren lipid microspheres    Labs:     Recent Labs     05/09/22  0815 05/09/22  0815 05/10/22  0605 05/10/22  0605 05/10/22  1939 05/11/22  0326 05/11/22  0432   WBC 17.8*  --  15.6*  --   --   --  14.2*   HGB 8.6* < > 7.8*   < > 7.7* 7.9* 7.7*   HCT 27.9*   < > 26.0*   < > 25.2* 25.1* 25.0*     --  306  --   --   --  317    < > = values in this interval not displayed. Recent Labs     05/09/22  0815 05/10/22  0605 05/11/22 0432    142 142   K 4.5 4.2 4.1    105 109*   CO2 24 24 22   BUN 23 22 18   CREATININE 1.0 1.0 1.0   CALCIUM 7.8* 8.5* 8.7       Recent Labs     05/09/22  0815 05/10/22  0605 05/11/22 0432   PROT 5.1* 6.0* 5.7*   ALKPHOS 69 62 64   ALT 27 22 21   AST 26 22 22   BILITOT 0.4 0.5 0.7       No results for input(s): INR in the last 72 hours. No results for input(s): John Beery in the last 72 hours.     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  Right MCA stroke- severe aphasia, severe dysarthria, and left arm and leg drift, and rightward gaze, and left-sided hemianopia  Encephalopathy  Bilateral DVT   CKD III  Epistaxis - resolved  Acute on chronic anemia   Dysphagia s/p PEG tube placement  Acute delirium   UTI s/p 5 days of Ceftri  Leukocytosis  CKD, unknown baseline  CAD s/p PCI in 2017  HTN  HLD  Hx of BLE DVT + PE in 11/2021 anticoagulated on Eliquis    Plan:  Nephrology and pulmonology following   Neurology signed off with recs for Zio patch at discharge  Will make outpatient referral to hematology/oncology for hypercoagulable work-up given history of DVT/PE and failure of Eliquis   Encouraged compliance with bipap     Discharge Plan: [place] once precert obtained.     +++++++++++++++++++++++++++++++++++++++++++++++++  PATRICIA Galvez  [employer] - [address], [place]  +++++++++++++++++++++++++++++++++++++++++++++++++  NOTE: This report was transcribed using voice recognition software. Every effort was made to ensure accuracy; however, inadvertent computerized transcription errors may be present.

## 2022-05-11 NOTE — DISCHARGE SUMMARY
Hospitalist Discharge Summary    Patient ID: Wayne Colby   Patient : 1945  Patient's PCP: Eleonora Bansal MD    Admit Date: 2022   Admitting Physician: Sarah Jerome DO    Discharge Date:  2022   Discharge Physician: Lavelle Goodpasture , APRN - NP   Discharge Condition: Stable  Discharge Disposition: [facility] course in brief:  (Please refer to daily progress notes for a comprehensive review of the hospitalization by requesting medical records)    atient admitted as a transfer from [facility] for strokelike symptoms.     Patient presented to [facility] with sudden onset of left-sided weakness and aphasia that occurred while in the middle of a conversation with her daughter. In ED she was noted to have severe aphasia, severe dysarthria, and left arm and leg drift, and rightward gaze, and left-sided hemianopia. MRI of the brain right MCA stroke likely embolic in nature. Neurology is following. Carotid ultrasound with no significant stenosis. Awaiting echo to assess for paradoxical embolus. If unrevealing plan is for Zio patch at discharge. She is currently being treated with therapeutic Lovenox with plans to switch to Coumadin when she is able to take p.o. she began obtunded and stridorous requiring transfer to ICU and intubation. A large mucous plug was extracted from her airway. Patient began having bleeding from the nose and mouth. ENT was consulted. Nasal and oral packing remain in place. Anticoagulation on hold. Nephrology following for CURT with oliguria. Nasal packing was removed and replaced with absorbable sinofoam. She was successfully extubated. PEG tube was placed for dysphagia. She is having issues with urinary retention. Pink cathter was inserted. Stable for discharge to [facility].     Consults:   IP CONSULT TO NEUROLOGY  IP CONSULT TO DIETITIAN  IP CONSULT TO OTOLARYNGOLOGY  IP CONSULT TO NEPHROLOGY  IP CONSULT TO PULMONOLOGY  IP CONSULT TO GENERAL SURGERY  IP CONSULT TO DIETITIAN    Discharge Diagnoses:  Acute respiratory failure - improving  Noncompliance with BiPAP  Posterior glottic/interarytenoid scar band versus mucosal crusting  Right MCA stroke- severe aphasia, severe dysarthria, and left arm and leg drift, and rightward gaze, and left-sided hemianopia  Urinary retention s/p catheter insertion  Encephalopathy  Bilateral DVT   CKD III  Epistaxis - resolved  Acute on chronic anemia   Dysphagia s/p PEG tube placement  Acute delirium   UTI s/p 5 days of Ceftri  Leukocytosis  CKD, unknown baseline  CAD s/p PCI in 2017  HTN  HLD  Hx of BLE DVT + PE in 11/2021 anticoagulated on Eliquis    Discharge Instructions / Follow up: Follow-up with PCP within 1 week of discharge. Follow-up with consultants as indicated by them. Compliance with medications as prescribed on discharge. Zio patch to be followed by neurology  Follow up with hematology/oncology as per discharge instructions for hypercoagulable work up  Follow up with urology for urinary retention   Check H&H in 3-5 days. Recommend OP colonoscopy   BiPAP at     The patient's condition is stable. At this time the patient is without objective evidence of an acute process requiring continuing hospitalization or inpatient management. They are stable for discharge with outpatient follow-up. I have spoken with the patient and discussed the results of the current hospitalization, in addition to providing specific details for the plan of care and counseling regarding the diagnosis and prognosis. The plan has been discussed in detail and they are aware of the specific conditions for emergent return, as well as the importance of follow-up. Their questions are answered at this time and they are agreeable with the plan for discharge to [facility].     Continued appropriate risk factor modification of blood pressure, diabetes and serum lipids will remain essential to reducing risk of future atherosclerotic development    Activity: activity as tolerated    Significant labs:  CBC:   Recent Labs     05/09/22  0815 05/09/22  0815 05/10/22  0605 05/10/22  1939 05/11/22  0326 05/11/22 0432 05/11/22  1209   WBC 17.8*  --  15.6*  --   --  14.2*  --    RBC 3.04*  --  2.77*  --   --  2.73*  --    HGB 8.6*   < > 7.8*   < > 7.9* 7.7* 7.9*   HCT 27.9*   < > 26.0*   < > 25.1* 25.0* 25.2*   MCV 91.8  --  93.9  --   --  91.6  --    RDW 15.2*  --  15.3*  --   --  15.2*  --      --  306  --   --  317  --     < > = values in this interval not displayed. BMP:   Recent Labs     05/09/22  0815 05/10/22  0605 05/11/22 0432    142 142   K 4.5 4.2 4.1    105 109*   CO2 24 24 22   BUN 23 22 18   CREATININE 1.0 1.0 1.0     LFT:  Recent Labs     05/09/22  0815 05/10/22  0605 05/11/22 0432   PROT 5.1* 6.0* 5.7*   ALKPHOS 69 62 64   ALT 27 22 21   AST 26 22 22   BILITOT 0.4 0.5 0.7     PT/INR: No results for input(s): INR, APTT in the last 72 hours. BNP: No results for input(s): BNP in the last 72 hours.   Hgb A1C:   Lab Results   Component Value Date    LABA1C 5.6 04/23/2022     Folate and B12: No results found for: Saul Eis, No results found for: FOLATE  Thyroid Studies: No results found for: TSH, M6OCDND, O8UHFMM, THYROIDAB    Urinalysis:    Lab Results   Component Value Date    NITRU Negative 05/09/2022    WBCUA 1-3 05/09/2022    BACTERIA MANY 05/09/2022    RBCUA 0-1 05/09/2022    BLOODU MODERATE 05/09/2022    SPECGRAV 1.010 05/09/2022    GLUCOSEU Negative 05/09/2022       Imaging:  Echo Complete    Result Date: 4/27/2022  Transthoracic Echocardiography Report (TTE)  Demographics   Patient Name        Nicky Durham Gender                Female   Medical Record      [id number]      Room Number           2631  Number   Account #           [de-identified]     Procedure Date        04/27/2022   Corporate ID                      Ordering Physician    Dereck Kelly   Accession Number    [id number] Referring Physician   Date of Birth       1945    Sonographer           Lesly Fuentes Santana Juanjo   Age                 68 year(s)    Interpreting          Mallorie Noel MD                                    Physician                                     Any Other  Procedure Type of Study   TTE procedure  Procedure Date Date: 04/27/2022 Start: 11:24 AM Study Location: Portable Technical Quality: Adequate visualization Indications:CVA. Patient Status: Routine Contrast Medium: Bubble Study. Height: 60 inches Weight: 176 pounds BSA: 1.77 m^2 BMI: 34.37 kg/m^2 BP: 138/72 mmHg  Findings   Left Ventricle  Left ventricle size is normal.  Normal left ventricular wall thickness. Normal left ventricular systolic function. Ejection fraction is visually estimated at > 60%. There is doppler evidence of stage I diastolic dysfunction. Right Ventricle  Normal right ventricular size and function (TAPSE 2.8 cm). Left Atrium  Dilated left atrium by volume index. No evidence of interatrial shunting on bubble study. Right Atrium  Normal sized right atrium. Mitral Valve  Physiologic and/or trace mitral regurgitation. No evidence of hemodynamically significant mitral stenosis. Tricuspid Valve  Physiologic and/or trace tricuspid regurgitation. Aortic Valve  No evidence of hemodynamically significant aortic regurgitation. Mild aortic stenosis. Pulmonic Valve  The pulmonic valve was not well visualized. Pericardial Effusion  No evidence of a hemodynamically significant pericardial effusion. Aorta  Aortic root dimension within normal limits. Conclusions   Summary  Normal left ventricular systolic function. Ejection fraction is visually estimated at > 60%. Normal right ventricular size and function (TAPSE 2.8 cm). There is doppler evidence of stage I diastolic dysfunction. No evidence of interatrial shunting on bubble study. Mild aortic stenosis.    Signature ----------------------------------------------------------------  Electronically signed by Nabil Spears MD(Interpreting  physician) on 04/27/2022 05:15 PM  ----------------------------------------------------------------  M-Mode/2D Measurements & Calculations   LV Diastolic      LV Systolic Dimension: 2.1 cm     LA Dimension: 4.3 cm  Dimension: 4.1 cm LV Volume Diastolic: 81.7 ml  LV EH:19.9 %      LV Volume Systolic: 89.6 ml  LV PW Diastolic:  LV EDV/LV EDV Index: 74.1 ml/42  1 cm              ml/m^2LV ESV/LV ESV Index: 25.9   RV Diastolic  LV PW Systolic:   QJ/4FB/ m^2                       Dimension: 2.5 cm  1.6 cm            EF Calculated: 80.3 %  Septum Diastolic: LV Mass Index: 69 l/min*m^2       Ascending Aorta: 2.6  0.9 cm            LV Length: 7.2 cm                 cm  Septum Systolic:                                    LA volume/Index: 63.4  1.5 cm            LVOT: 1.9 cm                      ml /35.8ml/m^2                                                      RA Area: 8.8 cm^2  LV Mass: 122.97 g                                                      IVC Expiration: 2.4 cm  Doppler Measurements & Calculations   MV Peak E-Wave:    AV Peak Velocity: 2.43 m/s      LVOT Peak Velocity:  0.9 m/s            AV Peak Gradient: 23.58 mmHg    1.36 m/s  MV Peak A-Wave:    AV Mean Velocity: 1.81 m/s      LVOT Mean Velocity:  1.36 m/s           AV Mean Gradient: 14.3 mmHg     1.05 m/s  MV E/A Ratio: 0.66 AV VTI: 45.9 cm                 LVOT Peak Gradient: 7.4  MV Peak Gradient:  AV Area (Continuity):1.91 cm^2  mmHgLVOT Mean Gradient:  11.1 mmHg                                          4.7 mmHg  MV Mean Gradient:  LVOT VTI: 31 cm                 Estimated RAP:15 mmHg  5.3 mmHg           IVRT: 106.1 msec  MV Mean Velocity:  1.11 m/s           Pulm. Vein A Reversal  MV Deceleration    Duration:115.3 msec  Time: 114.7 msec   Pulm. Vein D Velocity:0.49      PV Peak Velocity: 1.38  MV P1/2t: 45.5     m/sPulm.  Vein A Reversal m/s  msec               Velocity:0.36 m/s               PV Peak Gradient: 7.56  MVA by PHT:4.84    Pulm. Vein S Velocity: 0.63 m/s mmHg  cm^2                                               PV Mean Velocity: 0.91  MV Area                                            m/s  (continuity): 2.3                                  PV Mean Gradient: 3.8  cm^2                                               mmHg  MV E' Septal  Velocity: 0.07 m/s  MV E' Lateral  Velocity: 8 m/s  [email or web address] [id number]    CT HEAD WO CONTRAST    Result Date: 4/26/2022  EXAMINATION: CT OF THE HEAD WITHOUT CONTRAST  4/26/2022 10:26 am TECHNIQUE: CT of the head was performed without the administration of intravenous contrast. Dose modulation, iterative reconstruction, and/or weight based adjustment of the mA/kV was utilized to reduce the radiation dose to as low as reasonably achievable. COMPARISON OUTSIDE CT SCAN OF THE BRAIN OF A APRIL 22ND, MRI OF THE BRAIN OF APRIL 23. HISTORY: ORDERING SYSTEM PROVIDED HISTORY: stroke- worsening exam TECHNOLOGIST PROVIDED HISTORY: Reason for exam:->stroke- worsening exam Has a \"code stroke\" or \"stroke alert\" been called? ->No What reading provider will be dictating this exam?->CRC FINDINGS: Peripheral CSF space ventricular system particular over the cerebral convexities more than expected for the age group. Several hypodensities seen in the deep periventricular white matter both cerebral hemispheres in some areas of the basal ganglia region which relates with chronic small vessel insults.  Multiple areas of a acute infarct in progression now on late acute/early subacute phase within vascular distribution of the right middle cerebral artery scattered over cerebral convexities including opercular region of frontal parietal lobes, subcortical areas seen frontal parietal region and in parietal temporal occipital demonstrated on the recent MRI study diffusion images are difficult to be specifically pinpoint with the plain CT scan examination. The previous CT outside images are difficult to be direct compared due the degree of obliquity of the images on the outside study. Most likely there is a late acute/early subacute cortical insult in progression in the watershed area of the right cerebral hemisphere in temporoparietal occipital confluence region, cortical opercular region of the right cerebral hemisphere and in the frontal lobe. There is no midline shift. There is no acute intracranial hemorrhagic event. 1.  Late acute/early subacute multiple scattered infarcts in the vascular distribution of the right middle cerebral artery as above commented. 2.  No indication for hemorrhagic component. 3.  No indication for new sizable area of acute recent insult in progression to the brain parenchyma. 4.  There is no midline shift. RECOMMENDATIONS: Unavailable     XR CHEST PORTABLE    Result Date: 5/3/2022  EXAMINATION: ONE XRAY VIEW OF THE CHEST 5/3/2022 7:32 am COMPARISON: 05/02/2022 HISTORY: ORDERING SYSTEM PROVIDED HISTORY: respiratory failure TECHNOLOGIST PROVIDED HISTORY: Reason for exam:->respiratory failure What reading provider will be dictating this exam?->CRC FINDINGS: The cardiac silhouette is at upper limits of normal in size. Hazy bilateral airspace disease is noted favored to represent mild CHF. There is chronic elevation of the right hemidiaphragm. There is no pneumothorax. Gross pleural effusions are not appreciated. 1. Cardiomegaly. 2. Hazy bilateral airspace disease favored to represent mild pulmonary edema/vascular congestion. XR CHEST PORTABLE    Result Date: 5/2/2022  EXAMINATION: ONE XRAY VIEW OF THE CHEST 5/2/2022 6:36 am COMPARISON: 1st May 2022.  HISTORY: ORDERING SYSTEM PROVIDED HISTORY: respiratory failure TECHNOLOGIST PROVIDED HISTORY: Reason for exam:->respiratory failure What reading provider will be dictating this exam?->CRC FINDINGS: Support lines have been removed. There is bilateral atelectasis and or infiltrate with slightly improved aeration on the right and stable appearance on the left. No new abnormal findings. Removal of support lines. Somewhat improving aeration on the right. XR CHEST PORTABLE    Result Date: 5/1/2022  EXAMINATION: ONE XRAY VIEW OF THE CHEST 5/1/2022 7:30 am COMPARISON: 04/30/2022 and 04/29/2022 and 04/26/2022. HISTORY: ORDERING SYSTEM PROVIDED HISTORY: respiratory failure TECHNOLOGIST PROVIDED HISTORY: Reason for exam:->respiratory failure What reading provider will be dictating this exam?->CRC FINDINGS: The chest x-ray is rotated which obscures the mediastinum. I cannot exclude a right perihilar infiltrate. There is patchy airspace disease seen within the right lower lobe. Vague patchy airspace disease is seen within the left lower lobe. There is no pleural effusion. 1. The chest x-ray is rotated which limits evaluation of the mediastinum. Dense consolidation within the right perihilar region is suspected 2. Patchy right lower lobe airspace disease 3. Vague patchy left lower lobe airspace disease. XR CHEST PORTABLE    Result Date: 4/30/2022  EXAMINATION: ONE XRAY VIEW OF THE CHEST 4/30/2022 7:31 am COMPARISON: 04/28/2022 and 04/29/2022 HISTORY: ORDERING SYSTEM PROVIDED HISTORY: respiratory failure TECHNOLOGIST PROVIDED HISTORY: Reason for exam:->respiratory failure What reading provider will be dictating this exam?->CRC FINDINGS: The cardiac silhouette is within normal limits. There is an NG tube in the stomach. The endotracheal tube tip is located 3 cm proximal to the pascual. The chest x-ray is rotated. There is slight improved aeration of the right and left lungs when compared to prior study. Minimal bibasilar atelectasis is noted. 1. Partial interval clearing of the bibasilar airspace disease 2.  There is mild residual bibasilar airspace disease. XR CHEST PORTABLE    Result Date: 4/29/2022  EXAMINATION: ONE XRAY VIEW OF THE CHEST 4/29/2022 8:01 am COMPARISON: 04/28/2022 HISTORY: ORDERING SYSTEM PROVIDED HISTORY: respiratory failure TECHNOLOGIST PROVIDED HISTORY: Reason for exam:->respiratory failure What reading provider will be dictating this exam?->CRC FINDINGS: The endotracheal tube and NG tube are unchanged in position. There is suboptimal inflation of the lungs. Patchy bilateral infrahilar and lower lobe airspace disease is noted. There is no pneumothorax. 1. Patchy bilateral infrahilar and lower lobe airspace disease 2. The lung volumes are diminished. 3. There is no pneumothorax. XR CHEST PORTABLE    Result Date: 4/28/2022  EXAMINATION: ONE XRAY VIEW OF THE CHEST 4/28/2022 7:51 am COMPARISON: 04/27/2022 HISTORY: ORDERING SYSTEM PROVIDED HISTORY: respiratory failure TECHNOLOGIST PROVIDED HISTORY: Reason for exam:->respiratory failure What reading provider will be dictating this exam?->CRC FINDINGS: The endotracheal tube and NG tube are unchanged in position. The chest x-ray is rotated. There is consolidation seen within the right perihilar region which is favored to represent an infiltrate as well as superimposed vascular structures due to the rotation. There is a trace right pleural effusion. There is a small left pleural effusion. *Right perihilar airspace disease. Please note the chest x-ray is rotated and the hilar vessels are superimposed. The airspace disease within the right hilum may be exaggerated. *Trace right pleural effusion. *Left pleural effusion.  RECOMMENDATION: 1.    XR CHEST PORTABLE    Result Date: 4/27/2022  EXAMINATION: ONE XRAY VIEW OF THE CHEST 4/27/2022 6:39 am COMPARISON: 26 April 2022 HISTORY: ORDERING SYSTEM PROVIDED HISTORY: respiratory failure TECHNOLOGIST PROVIDED HISTORY: Reason for exam:->respiratory failure What reading provider will be dictating this exam?->CRC FINDINGS: Optimal position of support lines. There is a better inspiration with improved aeration at the left base. There is residual atelectasis and or infiltrate present there. No new abnormal findings. Improved aeration at the left base associated with a greater degree of inspiration. XR CHEST PORTABLE    Result Date: 4/26/2022  EXAMINATION: ONE XRAY VIEW OF THE CHEST 4/26/2022 3:34 pm COMPARISON: 1:44 p.m. HISTORY: ORDERING SYSTEM PROVIDED HISTORY: ETT placement TECHNOLOGIST PROVIDED HISTORY: Reason for exam:->ETT placement What reading provider will be dictating this exam?->CRC FINDINGS: The lungs are without acute focal process. There is no effusion or pneumothorax. The cardiomediastinal silhouette is without acute process. The osseous structures are without acute process. ET tube tip 2.5 cm from the pascual. ET tube tip 2.4 cm from the pascual. XR CHEST PORTABLE    Result Date: 4/26/2022  EXAMINATION: ONE XRAY VIEW OF THE CHEST 4/26/2022 1:10 pm COMPARISON: None. HISTORY: ORDERING SYSTEM PROVIDED HISTORY: s/p intubation TECHNOLOGIST PROVIDED HISTORY: Reason for exam:->s/p intubation FINDINGS: The lungs are without acute focal process. There is no effusion or pneumothorax. The cardiomediastinal silhouette is without acute process. The osseous structures are without acute process. ET tube tip in the right mainstem bronchus and should be retracted at least 4.0 cm. No acute process. ET tube tip within the right mainstem bronchus and should be retracted at least 4.0 cm. Findings given to the core team to be relayed to the license caregiver on 04/26/2022 at 2:09 p.m. Jossue Gipson XR CHEST PORTABLE    Result Date: 4/25/2022  EXAMINATION: ONE XRAY VIEW OF THE CHEST 4/25/2022 9:10 am COMPARISON: None.  HISTORY: ORDERING SYSTEM PROVIDED HISTORY: leukocytosis, suspected aspiration TECHNOLOGIST PROVIDED HISTORY: Reason for exam:->leukocytosis, suspected aspiration What reading provider will be dictating this exam?->CRC FINDINGS: Heart size is normal.  There are scattered interstitial scars. There is some central peribronchial thickening. There are no focal infiltrates or effusions. Findings suggest interstitial scarring and chronic bronchitis. No significant acute infiltrates are noted     VL DUP CAROTID BILATERAL    Result Date: 2022  Patient MRN:  [id number] : 1945 Age: 68 years Gender: Female Order Date:  2022 1:45 PM EXAM: VL DUP CAROTID BILATERAL NUMBER OF IMAGES:  48 INDICATION:  stroke stroke What reading provider will be dictating this exam?->[facility] COMPARISON: None FINDINGS: Velocities are within normal limits ICA\CCA ratios are  within normal limits The right vertebral artery doppler images   demonstrate antegrade flow. The left vertebral artery doppler images  demonstrate antegrade flow. Grey scale images demonstrate mild plaque identified in the right and left carotid arteries. Atherosclerotic disease. No hemodynamically significant stenosis is identified Estimated stenosis by NASCET criteria in the proximal right carotid artery is between 0% and 49%. Estimated stenosis by NASCET criteria in the proximal left carotid artery is between 0% and 49%. XR ABDOMEN FOR NG/OG/NE TUBE PLACEMENT    Result Date: 2022  EXAMINATION: ONE SUPINE XRAY VIEW(S) OF THE ABDOMEN 2022 8:36 pm COMPARISON: None. HISTORY: ORDERING SYSTEM PROVIDED HISTORY: Confirmation of course of NG/OG/NE tube and location of tip of tube TECHNOLOGIST PROVIDED HISTORY: Reason for exam:->Confirmation of course of NG/OG/NE tube and location of tip of tube Portable? ->Yes What reading provider will be dictating this exam?->CRC FINDINGS: Esophageal route catheter is into the central abdomen which is consistent with gastric location considering the rightward rotation being prominent. Chest evaluation was done earlier apparent no dilated bowels visualized. Catheter is in the stomach.      MRI brain without contrast    Result Date: 4/23/2022  EXAMINATION: MRI OF THE BRAIN WITHOUT CONTRAST  4/23/2022 7:41 am TECHNIQUE: Multiplanar multisequence MRI of the brain was performed without the administration of intravenous contrast. COMPARISON: None. HISTORY: ORDERING SYSTEM PROVIDED HISTORY: Strokelike symptoms TECHNOLOGIST PROVIDED HISTORY: Reason for exam:->Strokelike symptoms What reading provider will be dictating this exam?->CRC FINDINGS: INTRACRANIAL STRUCTURES/VENTRICLES: Scattered small foci of restricted diffusion essentially throughout the right MCA territory, including the basal ganglia. There is FLAIR hyperintensity of these foci. Chronic microvascular ischemic changes, fairly advanced. FLAIR vascular hyperintensity in the right MCA territory. No mass effect. No intracranial hemorrhage. No hydrocephalus. ORBITS: The visualized portion of the orbits demonstrate no acute abnormality. SINUSES: The visualized paranasal sinuses and mastoid air cells demonstrate no acute abnormality. BONES/SOFT TISSUES: The bone marrow signal intensity appears normal. The soft tissues demonstrate no acute abnormality. Multiple small recent infarcts throughout the right MCA territory. There is evidence of slow vascular flow in the right MCA territory. Consider CTA of the head/neck for further evaluation. Advanced chronic microvascular ischemic changes. No mass effect. No hemorrhage. No hydrocephalus. FL MODIFIED BARIUM SWALLOW W VIDEO    Result Date: 5/6/2022  EXAMINATION: MODIFIED BARIUM SWALLOW WAS PERFORMED IN CONJUNCTION WITH SPEECH PATHOLOGY SERVICES TECHNIQUE: Fluoroscopic evaluation of the swallowing mechanism was performed using cineradiography with multiple consistency of barium product in conjunction with speech pathology services. FLUOROSCOPY DOSE AND TYPE OR TIME AND EXPOSURES: Fluoroscopy time is 1.8 minute Dose: 18 mGy Total 8 fluoroscopic series of the hypopharynx.  COMPARISON: None HISTORY: ORDERING SYSTEM PROVIDED HISTORY: Reevaluation before PEG TECHNOLOGIST PROVIDED HISTORY: Reason for exam:->Reevaluation before PEG What reading provider will be dictating this exam?->CRC FINDINGS: Oral phase of swallowing was grossly within normal limits. Penetration seen with nectar and honey consistency liquids. There was aspiration also seen with nectar consistency liquids. Penetration seen with nectar and honey consistency liquids. There was aspiration also seen with nectar consistency liquids. Please see separate speech pathology report for full discussion of findings and recommendations. Fluoroscopy modified barium swallow with video    Result Date: 5/3/2022  EXAMINATION: MODIFIED BARIUM SWALLOW WAS PERFORMED IN CONJUNCTION WITH SPEECH PATHOLOGY SERVICES TECHNIQUE: Fluoroscopic evaluation of the swallowing mechanism was performed using cineradiography with multiple consistency of barium product in conjunction with speech pathology services. FLUOROSCOPY DOSE AND TYPE OR TIME AND EXPOSURES: Fluoroscopy time is 0.3 minute Dose: 3 mGy Total of 8 fluoroscopic series of hypopharynx COMPARISON: None HISTORY: ORDERING SYSTEM PROVIDED HISTORY: failed swallow eval TECHNOLOGIST PROVIDED HISTORY: Reason for exam:->failed swallow eval What reading provider will be dictating this exam?->[facility] FINDINGS: Oral phase of swallowing was delayed. Penetration and aspiration seen with nectar consistency liquids. Penetration and aspiration seen with nectar consistency liquids. Please see separate speech pathology report for full discussion of findings and recommendations.      US DUP LOWER EXTREMITIES BILATERAL VENOUS    Result Date: 2022  Patient MRN:  [id number] : 1945 Age: 68 years Gender: Female Order Date:  2022 6:04 PM EXAM: US DUP LOWER EXTREMITIES BILATERAL VENOUS NUMBER OF IMAGES:  52 INDICATION:  dvt dvt What reading provider will be dictating this exam?->[facility] Within the visualized vessels, there is no evidence for deep venous thrombosis There is good compressibility, there is good augmentation, there is good color flow. Within the visualized vessels there is no evidence for deep venous thrombosis     US DUP LOWER EXTREMITIES BILATERAL VENOUS    Result Date: 2022  Patient MRN:  [id number] : 1945 Age: 68 years Gender: Female Order Date:  2022 5:19 PM EXAM: US DUP LOWER EXTREMITIES BILATERAL VENOUS NUMBER OF IMAGES:  52 INDICATION:  r/o dvt r/o dvt What reading provider will be dictating this exam?->[facility] COMPARISON: None There is evidence for deep venous thrombosis is nonocclusive in the popliteal vein and the tibioperoneal trunk on the right There is otherwise good compressibility, there is good augmentation, there is good color flow.      There is evidence for deep venous thrombosis ALERT:  THIS IS AN ABNORMAL REPORT       Discharge Medications:      Medication List      START taking these medications    albuterol sulfate  (90 Base) MCG/ACT inhaler  Commonly known as: Ventolin HFA  Inhale 2 puffs into the lungs 4 times daily as needed for Wheezing     aspirin 81 MG EC tablet  Take 1 tablet by mouth daily        CONTINUE taking these medications    atorvastatin 80 MG tablet  Commonly known as: LIPITOR     DIALYVITE VITAMIN D 5000 PO     dilTIAZem 120 MG extended release capsule  Commonly known as: CARDIZEM CD     Eliquis 5 MG Tabs tablet  Generic drug: apixaban     omeprazole 20 MG delayed release capsule  Commonly known as: PRILOSEC        STOP taking these medications    ramipril 5 MG capsule  Commonly known as: ALTACE           Where to Get Your Medications      Information about where to get these medications is not yet available    Ask your nurse or doctor about these medications  · albuterol sulfate  (90 Base) MCG/ACT inhaler  · aspirin 81 MG EC tablet         Time Spent on discharge is more than 45 minutes in the examination, evaluation, counseling and review of medications and discharge plan.    +++++++++++++++++++++++++++++++++++++++++++++++++  PATRICIA Hardy - VARUN  [facility] - [address] [place] [address]  +++++++++++++++++++++++++++++++++++++++++++++++++  NOTE: This report was transcribed using voice recognition software. Every effort was made to ensure accuracy; however, inadvertent computerized transcription errors may be present.

## 2022-05-11 NOTE — PROGRESS NOTES
Physical Therapy  Physical Therapy Daily Treatment Note      Name: Elvi Kline  : 1945  MRN: [id number]      Date of Service: 2022    Re-Evaluating PT:  Armandoharryjermaine Michelle PT, DPHERMILA  [id number]     Room #:  9455/7193-O  Diagnosis:  Stroke-like symptoms [R29.90]  Stroke of unknown etiology ([address] [I63.9]  Acute CVA (cerebrovascular accident) ([facility] [I63.9]  PMHx/PSHx:  CAD s/p PCI, DVT, HTN, HLD, PE   Procedure/Surgery:  Intubated , Extubated   Precautions:  Falls, L hemiparesis (flaccid LUE), L inattention  Equipment Needs:  TBD    Reason for re-evaluation:  Change in medical status requiring transfer to intensive care with intubation. Pt extubated   Date of re-evaluation:  22    SUBJECTIVE:    Pt lives alone in a 1 story home with 1 ESTRELLA 1 rail. Pt ambulated with single point cane PTA. OBJECTIVE:   Re-Evaluation  Date: 22 Treatment  22 Short Term/ Long Term   Goals   AM-PAC 6 Clicks     Was pt agreeable to Re-Eval/treatment? Yes  Yes     Does pt have pain? None reported  None reported     Bed Mobility  Rolling: Max A  Supine to sit: Max A x2  Sit to supine: Max A x2  Scooting: Max A to EOB Rolling: Max A  Supine to sit: Max A  Sit to supine: Max A  Scooting: Max A to EOB Rolling: Mod A  Supine to sit: Mod A  Sit to supine: Mod A  Scooting: Mod A   Transfers Sit to stand: Max A x2  Stand to sit: Max A x2  Stand pivot: NT Sit to stand: Max A   Stand to sit: Max A  Stand pivot: NT Sit to stand: Mod A  Stand to sit: Mod A  Stand pivot: Mod A with AAD   Ambulation    NT 3 side steps with no AD Mod A x2 >5 feet with AAD Max A   Stair negotiation: ascended and descended  NT NT NA   ROM BUE:  Per OT eval  BLE:  WFL BLE WFL    Strength BUE:  Per OT eval  RLE:  Grossly 4/5  LLE:  Grossly 3-/5 RLE grossly 4/5  LLE grossly 3+/5    Balance Sitting EOB:  Mod A  Static Standing: Max A x2 Sitting EOB:  Max A periods of  Min A  Dynamic Standing:   Mod A x2 Sitting EOB:  SBA  Dynamic Standing: Mod A     Pt is A & O x 3  Sensation:  Pt denies numbness and tingling to extremities  Edema:  Unremarkable    Therapeutic Exercises:  None performed this visit     Patient education  Pt educated on safety during functional mobility, sitting balance and posture, standing balance and posture, positioning in bed     Patient response to education:   Pt verbalized understanding Pt demonstrated skill Pt requires further education in this area   Yes  Yes  Reinforce      RE ASSESSMENT:  Conditions Requiring Skilled Therapeutic Intervention:  [x]Decreased strength     []Decreased ROM  [x]Decreased functional mobility  [x]Decreased balance   [x]Decreased endurance   [x]Decreased posture  [x]Decreased sensation  [x]Decreased coordination   []Decreased vision  [x]Decreased safety awareness   []Increased pain     Comments:  Pt received supine and agreeable to PT treatment with OT collab. Pt cleared for participation by RN prior to session. Pt motivated to participate in treatment this date. Pt able to transfer to EOB with physical assist to manage LLE and trunk. Pt able to sit at EOB with fluctuating support - pt able to correct balance with cuing for upright posture and forward weight shift. Pt able to stand from bed with 2 person support and take 3 side steps to EOB; pt with ataxic movements of LLE when stepping. Pt able to stand ~2 min with mod A x2 before sitting back to EOB. Pt assisted back to bed at end of session and positioned skillfully to protect skin. Session time limited as pt needed for CXR. Pt left in bed at end of session with all needs met and call light in reach.      Treatment:  Patient practiced and was instructed in the following treatment:     Bed mobility training - pt given verbal and tactile cues to facilitate proper sequencing and safety during rolling and supine<>sit as well as provided with physical assistance to complete task     Sitting EOB for >10 minutes for upright tolerance, postural awareness and BLE ROM   STS training and side stepping at EOB to progress gait tolerance - pt educated on proper hand and foot placement, safety and sequencing, and use of no AD to safely complete sit<>stand and side stepping along EOB with hands on assistance to complete task safely    Skilled positioning - Pt placed in the chair position with pillows utilized to facilitate upright posture, joint and skin integrity, and interaction with environment. PHYSICAL THERAPY PLAN OF CARE:  Pt is making progress towards established goals. Continue PT POC.      Specific instructions for next treatment:  Progress EOB sitting tolerance, STS and transfer training, gait training     Time in  1405  Time out  1430    Total Treatment Time  25 minutes     CPT codes:  [] Low Complexity PT evaluation 44660  [] Moderate Complexity PT evaluation 57763  [] High Complexity PT evaluation 71258  [] PT Re-evaluation 78726  [] Gait training 06404 -- minutes  [] Manual therapy [id number] -- minutes  [x] Therapeutic activities 53932 25 minutes  [] Therapeutic exercises 41101 - minutes  [] Neuromuscular reeducation 74765 -- minutes     Purvi Nixon PT, DPT  [id number]

## 2022-05-11 NOTE — PROGRESS NOTES
Applied Zio monitor for 14 days. # [id number]. Instructed patient to avoid showering in the first 24 hours. Press the button on the monitor when you feel a symptom and write it in your diary. Do not submerge in water. No lotion or power near the patch. Please return the monitor in the box provided. Patient verbalized understanding. This RN to  register the device.

## 2022-05-11 NOTE — CARE COORDINATION
Discharge order noted. Call placed to [facility] [place] - preert has not been obtained yet. Que Zaragoza Pt/ot to update today. Will need negative covid test day of discharge. Per prior sw note-passar completed and faxed to facility. Ambulance form and envelope in soft chart. Cm/sw to follow. Electronically signed by Larry Torres RN on 5/11/2022 at 11:08 AM      Message from Wander Scott received needs negative covid. [facility] transport set up for 4 pm today. Charge rn and bedside rn updated as well as patient and daughter Itzel Lopez in room and facility laision. Electronically signed by Larry Torres RN on 5/11/2022 at 12:05 PM    Notified by nurse that patient needs basilio and urology consult. [facility] transport cancelled for today. . Facility liaison ntfd and precert good thru tomorrow. Electronically signed by Larry Torres RN on 5/11/2022 at 2:45 PM    Facility liaison ntfd that pt can discharge. [facility] set up for 9- 9:30 pm. Bedside rn ntfd. and faciltiy liaison ntfd. pt and daughter also ntfd. Electronically signed by Larry Torres RN on 5/11/2022 at 3:57 PM

## 2022-05-11 NOTE — PROGRESS NOTES
ZIO Patch - Cardiac Event Monitor Instructions      You are being prescribed a Zio by i[employer] heart monitor to wear over the next 14 days or as specified by your provider. Your Zio came with a prepaid [employer]PS postage to return the monitor at the end of the wear. Make sure to keep the box and/or prepaid envelope that came with your Zio monitor and mail the device via USPS promptly at the end of wear. FOR ASSISTANCE WITH YOU Ryan Matute CALL CUSTOMER CARE  at [phone]    Patient Instructions:  - Avoid showering for the first 24 hours  - For Zio AT (MCT) Monitor - Keep the black gateway nearby   - Press the button on the center of your [place] Comings when you feel symptoms   - Log the symptom in your log book OR download the free [employer] amy to log your symptoms on your phone  - Wear the Marco [place] Comings for 14 days or as specified you your provider  - Removal instructions are included with your monitor    [employer] [place] [address]  at [phone]  - Skin reaction  - Device removed accidentally   - Flashing orange light       Insurance Information:  Your insurance company may send an Explanation of Benefits (EOB). An EOB from your insurance carrier is NOT a bill. You will be responsible for usual out-of-pocket cost associated with deductibles and co-insurances determined by your instance provider. If there is a balance due, you will receive a statement from i[employer] monitor a cash pay option is available and financial assistance programs are available for those who qualify. For insurance coverage, financial assistance, or out-of-pocket estimates, call Zio at [phone]    Prompt Return of the Zio Monitor:  The device and kit components must be returned immediately upon completion of wear using the pre-paid . Delays in return may result in delayed final test results.     FOR ASSISTANCE WITH YOUR Ryan Matute CALL CUSTOMER CARE  at [phone]

## 2022-05-11 NOTE — PROGRESS NOTES
Occupational Therapy  OT BEDSIDE TREATMENT NOTE   [address] [facility] [address] [address]  [address] [place] [address]       Date:2022  Patient Name: Megahn Pearson  MRN: [id number]  : 1945  Room: [address] [place] [address]     Per OT Eval:    Re-evaluating OT: Halle Watkins OTD, OTR/L, [id number]     Evaluating OT: Abbiejay George OTR/L #[id number]      Re-evaluation indicated d/t pt transfer to ICU for respiratory distress on . Referring Provider: Sophie Mane DO  Specific Provider Orders/Date: OT eval and treat 22     Diagnosis: Stroke-like symptoms [R29.90]   Pt admitted to hospital with L sided weakness, aphasia, dysarthria      Pertinent Medical History:  has a past medical history of CAD (coronary artery disease), DVT, bilateral lower limbs ([address] HTN (hypertension), Hyperlipidemia, and Pulmonary embolism ([address]      Precautions:  Fall Risk, L cash,, L inattention, dysarthria, bed alarm, L lateral lean  stress incontinence   peg tube    Assessment of current deficits    [x] Functional mobility          [x]ADLs           [x] Strength                  [x]Cognition    [x] Functional transfers        [x] IADLs         [x] Safety Awareness   [x]Endurance    [x] Fine Coordination                        [x] Balance      [x] Vision/perception   [x]Sensation      [x]Gross Motor Coordination            [x] ROM           [] Delirium                   [x] Motor Control      OT PLAN OF CARE   OT POC based on physician orders, patient diagnosis and results of clinical assessment     Frequency/Duration 1-5 days/wk for 2 weeks PRN   Specific OT Treatment Interventions to include:   * Instruction/training on adapted ADL techniques and AE recommendations to increase functional independence within precautions       * Training on energy conservation strategies, correct breathing pattern and techniques to improve independence/tolerance for self-care routine  * Functional transfer/mobility training/DME recommendations for increased independence, safety, and fall prevention  * Patient/Family education to increase follow through with safety techniques and functional independence  * Recommendation of environmental modifications for increased safety with functional transfers/mobility and ADLs  * Cognitive retraining/development of therapeutic activities to improve problem solving, judgement, memory, and attention for increased safety/participation in ADL/IADL tasks  * Sensory re-education to improve body/limb awareness, maintain/improve skin integrity, and improve hand/UE motor function  * Visual-perceptual training to improve environmental scanning, visual attention/focus, and oculomotor skills for increased safety/independence with functional transfers/mobility and ADLs  * Splinting/positioning for increased function, prevention of contractures, and improve skin integrity  * Therapeutic exercise to improve motor endurance, ROM, and functional strength for ADLs/functional transfers  * Therapeutic activities to facilitate/challenge dynamic balance, stand tolerance for increased safety and independence with ADLs  * Therapeutic activities to facilitate gross/fine motor skills for increased independence with ADLs  * Neuro-muscular re-education: facilitation of righting/equilibrium reactions, midline orientation, scapular stability/mobility, normalization of muscle tone, and facilitation of volitional active controled movement  * Positioning to improve skin integrity, interaction with environment and functional independence  * Manual techniques for edema management        Modified Geneva Scale (MRS)  Score     Description  0             No symptoms  1             No significant disability despite symptoms  2             Slight disability; able to look after own affairs  3             Moderate disability; able to ambulate without assist/ requires assist with ADLs  4             Moderate/Severe Assist    Bed Mobility  Supine to sit: Maximal Assist x2  Sit to supine: Maximal Assist x2 Supine to sit: Maximal Assist   Sit to supine: Maximal Assist x2    Educated pt on technique to increase independence. Max A x 2- scooting    Max A   Supine < > sit  Supine to sit: Moderate Assist   Sit to supine: Moderate Assist    Functional Transfers Maximal Assist x2     Sit to stands Unable to stand with Max A of 1 attempt x 2  Max A  Sit < > stand  L lateral lean Moderate Assist    Functional Mobility Maximal Assist x2     1 Side step to [facility] with HHA NT  Pt unable this date. Mod A x 2  Side stepping along EOB, assist with weight shifting with pt able to take 3 side steps, ataxic movements noted with L LE  Moderate Assist    Balance Sitting: max - min A  (L lateral lean; mod/max cuing; able to correct with min A at times)     Standing: max A x2 Sitting:  Static: Max A initially improving to Min A with core strengthening exercises EOB  Dynamic: Dep  Standing: N/T     Sitting: Min/CGA  Continued improvement  Standing: Max/Mod A x 2  L lateral lean    Sitting:  Static: SBA  Dynamic: Min A  Standing: Mod A   Activity Tolerance F- Fair-   Fair  95% on RA  F+   Visual/  Perceptual R gaze  L inattention     Continue to assess    R gaze preference  L inattention Continuing to improve  L inattention                         Hand Dominance right    Strength ROM Additional Info:    RUE   3-/5 wfl good  and wfl FMC/dexterity noted during ADL tasks      LUE  Grossly 0/5     continues to improve, no tone noted, AROM noted in distal planes, AAROM in proximal planes, limited by chronic large spur in shoulder     absent  and absent  FMC/dexterity noted during ADL tasks             Comments: Upon arrival pt was in bed & agreeable for therapy.  Pt educated on techniques to increase independence and safety during ADL's and bed mobility, functional transfers & mobility with focus on mid-line & upright posture with Good results this date. Completed core strengthening EOB to increase sitting balance for increased independence with ADL's. At end of session pt was returned to bed, HOB upright, L UE supported, all lines and tubes intact, call light within reach. Overall Fair+ understanding from pt this date. Pt has made Fair+ progress towards set goals. Continue with current plan of care; POC updated on 5/11 d/t patient progress. Treatment Time In: 2:05         Treatment Time Out: 2:30         Treatment Charges: Mins Units   Ther Ex  06526     Manual Therapy 72813     Thera Activities 48275 15 1   ADL/Home Mgt 91278 10 1   Neuro Re-ed 58553     Group Therapy      Orthotic manage/training  65260     Non-Billable Time     Total Timed Treatment 25 2       RADHA Banda/CON [id number]    Scarlett Guadarrama [address] [facility] RAMSES Rehman/L, [id number]

## 2022-05-12 LAB
ALBUMIN SERPL-MCNC: 3.2 G/DL (ref 3.5–5.2)
ALP BLD-CCNC: 67 U/L (ref 35–104)
ALT SERPL-CCNC: 18 U/L (ref 0–32)
ANION GAP SERPL CALCULATED.3IONS-SCNC: 12 MMOL/L (ref 7–16)
AST SERPL-CCNC: 22 U/L (ref 0–31)
BILIRUB SERPL-MCNC: 0.7 MG/DL (ref 0–1.2)
BUN BLDV-MCNC: 15 MG/DL (ref 6–23)
CALCIUM SERPL-MCNC: 8.9 MG/DL (ref 8.6–10.2)
CHLORIDE BLD-SCNC: 106 MMOL/L (ref 98–107)
CHOLESTEROL, TOTAL: 121 MG/DL (ref 0–199)
CO2: 23 MMOL/L (ref 22–29)
CREAT SERPL-MCNC: 0.9 MG/DL (ref 0.5–1)
GFR AFRICAN AMERICAN: >60
GFR NON-AFRICAN AMERICAN: >60 ML/MIN/1.73
GLUCOSE BLD-MCNC: 103 MG/DL (ref 74–99)
HCT VFR BLD CALC: 25.8 % (ref 34–48)
HDLC SERPL-MCNC: 29 MG/DL
HEMOGLOBIN: 7.8 G/DL (ref 11.5–15.5)
LDL CHOLESTEROL CALCULATED: 73 MG/DL (ref 0–99)
MCH RBC QN AUTO: 28.2 PG (ref 26–35)
MCHC RBC AUTO-ENTMCNC: 30.2 % (ref 32–34.5)
MCV RBC AUTO: 93.1 FL (ref 80–99.9)
PDW BLD-RTO: 15.2 FL (ref 11.5–15)
PLATELET # BLD: 316 E9/L (ref 130–450)
PMV BLD AUTO: 10 FL (ref 7–12)
POTASSIUM SERPL-SCNC: 4.1 MMOL/L (ref 3.5–5)
RBC # BLD: 2.77 E12/L (ref 3.5–5.5)
SODIUM BLD-SCNC: 141 MMOL/L (ref 132–146)
TOTAL PROTEIN: 5.8 G/DL (ref 6.4–8.3)
TRIGL SERPL-MCNC: 94 MG/DL (ref 0–149)
VITAMIN D 25-HYDROXY: 32 NG/ML (ref 30–100)
VLDLC SERPL CALC-MCNC: 19 MG/DL
WBC # BLD: 12.5 E9/L (ref 4.5–11.5)

## 2022-05-20 LAB
ALBUMIN SERPL-MCNC: 2.7 G/DL (ref 3.5–5.2)
ALP BLD-CCNC: 61 U/L (ref 35–104)
ALT SERPL-CCNC: 15 U/L (ref 0–32)
ANION GAP SERPL CALCULATED.3IONS-SCNC: 8 MMOL/L (ref 7–16)
AST SERPL-CCNC: 13 U/L (ref 0–31)
BILIRUB SERPL-MCNC: <0.2 MG/DL (ref 0–1.2)
BUN BLDV-MCNC: 24 MG/DL (ref 6–23)
CALCIUM SERPL-MCNC: 8.2 MG/DL (ref 8.6–10.2)
CHLORIDE BLD-SCNC: 109 MMOL/L (ref 98–107)
CO2: 24 MMOL/L (ref 22–29)
CREAT SERPL-MCNC: 0.8 MG/DL (ref 0.5–1)
GFR AFRICAN AMERICAN: >60
GFR NON-AFRICAN AMERICAN: >60 ML/MIN/1.73
GLUCOSE BLD-MCNC: 183 MG/DL (ref 74–99)
HCT VFR BLD CALC: 20.8 % (ref 34–48)
HCT VFR BLD CALC: 24.7 % (ref 34–48)
HEMOGLOBIN: 6.5 G/DL (ref 11.5–15.5)
HEMOGLOBIN: 7.5 G/DL (ref 11.5–15.5)
MCH RBC QN AUTO: 28.4 PG (ref 26–35)
MCHC RBC AUTO-ENTMCNC: 31.3 % (ref 32–34.5)
MCV RBC AUTO: 90.8 FL (ref 80–99.9)
PDW BLD-RTO: 16.2 FL (ref 11.5–15)
PLATELET # BLD: 256 E9/L (ref 130–450)
PMV BLD AUTO: 9.9 FL (ref 7–12)
POTASSIUM SERPL-SCNC: 3.4 MMOL/L (ref 3.5–5)
RBC # BLD: 2.29 E12/L (ref 3.5–5.5)
SODIUM BLD-SCNC: 141 MMOL/L (ref 132–146)
TOTAL PROTEIN: 4.9 G/DL (ref 6.4–8.3)
WBC # BLD: 10.3 E9/L (ref 4.5–11.5)

## 2022-05-26 ENCOUNTER — APPOINTMENT (OUTPATIENT)
Dept: GENERAL RADIOLOGY | Age: 77
End: 2022-05-26
Payer: MEDICARE

## 2022-05-26 ENCOUNTER — HOSPITAL ENCOUNTER (EMERGENCY)
Age: 77
Discharge: HOME | End: 2022-05-26
Attending: STUDENT IN AN ORGANIZED HEALTH CARE EDUCATION/TRAINING PROGRAM
Payer: MEDICARE

## 2022-05-26 ENCOUNTER — APPOINTMENT (OUTPATIENT)
Dept: CT IMAGING | Age: 77
End: 2022-05-26
Payer: MEDICARE

## 2022-05-26 VITALS
DIASTOLIC BLOOD PRESSURE: 76 MMHG | SYSTOLIC BLOOD PRESSURE: 144 MMHG | WEIGHT: 170 LBS | HEIGHT: 61 IN | TEMPERATURE: 98.1 F | BODY MASS INDEX: 32.1 KG/M2 | RESPIRATION RATE: 16 BRPM | OXYGEN SATURATION: 94 % | HEART RATE: 88 BPM

## 2022-05-26 DIAGNOSIS — R13.10 DYSPHAGIA, UNSPECIFIED TYPE: Primary | ICD-10-CM

## 2022-05-26 LAB
ALBUMIN SERPL-MCNC: 4 G/DL (ref 3.5–5.2)
ALP BLD-CCNC: 86 U/L (ref 35–104)
ALT SERPL-CCNC: 19 U/L (ref 0–32)
ANION GAP SERPL CALCULATED.3IONS-SCNC: 12 MMOL/L (ref 7–16)
ANISOCYTOSIS: ABNORMAL
AST SERPL-CCNC: 18 U/L (ref 0–31)
BASOPHILS ABSOLUTE: 0 E9/L (ref 0–0.2)
BASOPHILS RELATIVE PERCENT: 0.2 % (ref 0–2)
BILIRUB SERPL-MCNC: 0.5 MG/DL (ref 0–1.2)
BUN BLDV-MCNC: 30 MG/DL (ref 6–23)
CALCIUM SERPL-MCNC: 9.2 MG/DL (ref 8.6–10.2)
CHLORIDE BLD-SCNC: 105 MMOL/L (ref 98–107)
CO2: 24 MMOL/L (ref 22–29)
CREAT SERPL-MCNC: 0.9 MG/DL (ref 0.5–1)
EOSINOPHILS ABSOLUTE: 0.16 E9/L (ref 0.05–0.5)
EOSINOPHILS RELATIVE PERCENT: 0.9 % (ref 0–6)
GFR AFRICAN AMERICAN: >60
GFR NON-AFRICAN AMERICAN: >60 ML/MIN/1.73
GLUCOSE BLD-MCNC: 123 MG/DL (ref 74–99)
HCT VFR BLD CALC: 28.3 % (ref 34–48)
HEMOGLOBIN: 8.7 G/DL (ref 11.5–15.5)
LYMPHOCYTES ABSOLUTE: 1.06 E9/L (ref 1.5–4)
LYMPHOCYTES RELATIVE PERCENT: 6.1 % (ref 20–42)
MCH RBC QN AUTO: 28.4 PG (ref 26–35)
MCHC RBC AUTO-ENTMCNC: 30.7 % (ref 32–34.5)
MCV RBC AUTO: 92.5 FL (ref 80–99.9)
MONOCYTES ABSOLUTE: 1.59 E9/L (ref 0.1–0.95)
MONOCYTES RELATIVE PERCENT: 8.7 % (ref 2–12)
NEUTROPHILS ABSOLUTE: 14.87 E9/L (ref 1.8–7.3)
NEUTROPHILS RELATIVE PERCENT: 84.3 % (ref 43–80)
NUCLEATED RED BLOOD CELLS: 0.9 /100 WBC
OVALOCYTES: ABNORMAL
PDW BLD-RTO: 17.1 FL (ref 11.5–15)
PLATELET # BLD: 647 E9/L (ref 130–450)
PMV BLD AUTO: 9 FL (ref 7–12)
POIKILOCYTES: ABNORMAL
POLYCHROMASIA: ABNORMAL
POTASSIUM REFLEX MAGNESIUM: 3.9 MMOL/L (ref 3.5–5)
PRO-BNP: 539 PG/ML (ref 0–450)
RBC # BLD: 3.06 E12/L (ref 3.5–5.5)
SCHISTOCYTES: ABNORMAL
SODIUM BLD-SCNC: 141 MMOL/L (ref 132–146)
STREP GRP A PCR: NEGATIVE
TOTAL PROTEIN: 6.8 G/DL (ref 6.4–8.3)
TROPONIN, HIGH SENSITIVITY: 51 NG/L (ref 0–9)
TROPONIN, HIGH SENSITIVITY: 66 NG/L (ref 0–9)
WBC # BLD: 17.7 E9/L (ref 4.5–11.5)

## 2022-05-26 PROCEDURE — 93005 ELECTROCARDIOGRAM TRACING: CPT | Performed by: STUDENT IN AN ORGANIZED HEALTH CARE EDUCATION/TRAINING PROGRAM

## 2022-05-26 PROCEDURE — 96374 THER/PROPH/DIAG INJ IV PUSH: CPT

## 2022-05-26 PROCEDURE — 6360000002 HC RX W HCPCS: Performed by: STUDENT IN AN ORGANIZED HEALTH CARE EDUCATION/TRAINING PROGRAM

## 2022-05-26 PROCEDURE — 87880 STREP A ASSAY W/OPTIC: CPT

## 2022-05-26 PROCEDURE — 80053 COMPREHEN METABOLIC PANEL: CPT

## 2022-05-26 PROCEDURE — 99285 EMERGENCY DEPT VISIT HI MDM: CPT

## 2022-05-26 PROCEDURE — 70491 CT SOFT TISSUE NECK W/DYE: CPT

## 2022-05-26 PROCEDURE — 84484 ASSAY OF TROPONIN QUANT: CPT

## 2022-05-26 PROCEDURE — 85025 COMPLETE CBC W/AUTO DIFF WBC: CPT

## 2022-05-26 PROCEDURE — 2580000003 HC RX 258: Performed by: RADIOLOGY

## 2022-05-26 PROCEDURE — 6360000004 HC RX CONTRAST MEDICATION: Performed by: RADIOLOGY

## 2022-05-26 PROCEDURE — 70450 CT HEAD/BRAIN W/O DYE: CPT

## 2022-05-26 PROCEDURE — 71045 X-RAY EXAM CHEST 1 VIEW: CPT

## 2022-05-26 PROCEDURE — 96375 TX/PRO/DX INJ NEW DRUG ADDON: CPT

## 2022-05-26 PROCEDURE — 83880 ASSAY OF NATRIURETIC PEPTIDE: CPT

## 2022-05-26 RX ORDER — ASPIRIN 81 MG/1
81 TABLET ORAL DAILY
COMMUNITY

## 2022-05-26 RX ORDER — ALBUTEROL SULFATE 90 UG/1
2 AEROSOL, METERED RESPIRATORY (INHALATION) EVERY 6 HOURS PRN
COMMUNITY

## 2022-05-26 RX ORDER — ARFORMOTEROL TARTRATE 15 UG/2ML
1 SOLUTION RESPIRATORY (INHALATION) 2 TIMES DAILY
COMMUNITY

## 2022-05-26 RX ORDER — BUDESONIDE 0.5 MG/2ML
1 INHALANT ORAL 2 TIMES DAILY
COMMUNITY

## 2022-05-26 RX ORDER — SODIUM CHLORIDE 0.9 % (FLUSH) 0.9 %
10 SYRINGE (ML) INJECTION PRN
Status: DISCONTINUED | OUTPATIENT
Start: 2022-05-26 | End: 2022-05-26 | Stop reason: HOSPADM

## 2022-05-26 RX ORDER — ACETAMINOPHEN 325 MG/1
650 TABLET ORAL EVERY 4 HOURS PRN
COMMUNITY

## 2022-05-26 RX ORDER — FLUTICASONE PROPIONATE 50 MCG
1 SPRAY, SUSPENSION (ML) NASAL 2 TIMES DAILY PRN
COMMUNITY

## 2022-05-26 RX ORDER — LORAZEPAM 0.5 MG/1
0.5 TABLET ORAL NIGHTLY PRN
COMMUNITY

## 2022-05-26 RX ORDER — DEXAMETHASONE SODIUM PHOSPHATE 10 MG/ML
8 INJECTION, SOLUTION INTRAMUSCULAR; INTRAVENOUS ONCE
Status: COMPLETED | OUTPATIENT
Start: 2022-05-26 | End: 2022-05-26

## 2022-05-26 RX ADMIN — IOPAMIDOL 90 ML: 755 INJECTION, SOLUTION INTRAVENOUS at 13:36

## 2022-05-26 RX ADMIN — DEXAMETHASONE SODIUM PHOSPHATE 8 MG: 10 INJECTION, SOLUTION INTRAMUSCULAR; INTRAVENOUS at 11:08

## 2022-05-26 RX ADMIN — Medication 10 ML: at 13:36

## 2022-05-26 ASSESSMENT — ENCOUNTER SYMPTOMS
VOICE CHANGE: 0
CHOKING: 0
GASTROINTESTINAL NEGATIVE: 1
COLOR CHANGE: 0
RHINORRHEA: 0
WHEEZING: 0
SORE THROAT: 0
COUGH: 0
SINUS PRESSURE: 0
SINUS PAIN: 0
STRIDOR: 0
TROUBLE SWALLOWING: 1

## 2022-05-26 ASSESSMENT — PAIN DESCRIPTION - LOCATION: LOCATION: THROAT

## 2022-05-26 ASSESSMENT — PAIN SCALES - GENERAL: PAINLEVEL_OUTOF10: 6

## 2022-05-26 ASSESSMENT — PAIN DESCRIPTION - DESCRIPTORS: DESCRIPTORS: SORE

## 2022-05-26 ASSESSMENT — PAIN - FUNCTIONAL ASSESSMENT
PAIN_FUNCTIONAL_ASSESSMENT: 0-10
PAIN_FUNCTIONAL_ASSESSMENT: NONE - DENIES PAIN
PAIN_FUNCTIONAL_ASSESSMENT: NONE - DENIES PAIN

## 2022-05-26 NOTE — CONSULTS
OTOLARYNGOLOGY  CONSULT NOTE  5/26/2022    Physician Consulted: Dr. Renay Farias  Reason for Consult: Dysphagia  Referring Physician: Dr. Tam Birch    ELÍAS Santiago is a 68 y.o. female who ENT was consulted for evaluation of dysphagia. Patient from [facility]. Known history of dysphagia status post recent CVA requiring PEG tube placement. Recently underwent EGD by Dr. Riri Yip on 5/6. She complains of odynophagia with trouble swallowing solids. She does report eating macaroni and cheese yesterday without any issues. Of note patient was previously evaluated for epistaxis as well as stridor requiring intubation during recent hospital admission . She was found to have crusted mucous obstructing her kyler glottidis. She denies difficulty breathing at this time. Review of Systems   Constitutional: Negative for activity change, fatigue and fever. HENT: Positive for trouble swallowing. Negative for congestion, ear discharge, ear pain, hearing loss, nosebleeds, postnasal drip, rhinorrhea, sinus pressure, sinus pain, sore throat, tinnitus and voice change. Respiratory: Negative for cough, choking, wheezing and stridor. Cardiovascular: Negative for chest pain. Gastrointestinal: Negative. Genitourinary: Negative. Skin: Negative for color change and rash. Neurological: Negative for speech difficulty, light-headedness, numbness and headaches. Hematological: Negative for adenopathy. Psychiatric/Behavioral: Negative for behavioral problems.        Past Medical History:   Diagnosis Date    CAD (coronary artery disease)     s/p PCI in 2017    DVT, bilateral lower limbs ([address] 11/2021    on eliquis    HTN (hypertension)     Hyperlipidemia     Pulmonary embolism ([address] 11/2021    on eliquis       Past Surgical History:   Procedure Laterality Date    CHOLECYSTECTOMY  2011    GASTROSTOMY TUBE PLACEMENT N/A 5/6/2022    EGD PEG TUBE PLACEMENT performed by Tomas Westbrook MD at [facility] ENDOSCOPY    HYSTERECTOMY  02/09/2011       Medications Prior to Admission:    Prior to Admission medications    Medication Sig Start Date End Date Taking? Authorizing Provider   albuterol sulfate HFA (VENTOLIN HFA) 108 (90 Base) MCG/ACT inhaler Inhale 2 puffs into the lungs every 6 hours as needed for Wheezing   Yes Historical Provider, MD   Arformoterol Tartrate (BROVANA) 15 MCG/2ML NEBU Take 1 ampule by nebulization 2 times daily   Yes Historical Provider, MD   aspirin 81 MG EC tablet Take 81 mg by mouth daily   Yes Historical Provider, MD   LORazepam (ATIVAN) 0.5 MG tablet Take 0.5 mg by mouth nightly as needed for Anxiety. Yes Historical Provider, MD   budesonide (PULMICORT) 0.5 MG/2ML nebulizer suspension Take 1 ampule by nebulization 2 times daily   Yes Historical Provider, MD   fluticasone (FLONASE) 50 MCG/ACT nasal spray 1 spray by Nasal route 2 times daily as needed for Rhinitis   Yes Historical Provider, MD   magnesium hydroxide (MILK OF MAGNESIA) 400 MG/5ML suspension Take 30 mLs by mouth daily as needed for Constipation   Yes Historical Provider, MD   acetaminophen (TYLENOL) 325 mg tablet Take 650 mg by mouth every 4 hours as needed for Pain or Fever   Yes Historical Provider, MD   Cholecalciferol (VITAMIN D3) 125 MCG (5000 UT) TABS Take 5,000 Units by mouth daily   Yes Historical Provider, MD   dilTIAZem (CARDIZEM CD) 120 MG extended release capsule Take 120 mg by mouth daily    Historical Provider, MD   atorvastatin (LIPITOR) 80 MG tablet Take 80 mg by mouth daily     Historical Provider, MD   apixaban (ELIQUIS) 5 MG TABS tablet Take 5 mg by mouth 2 times daily    Historical Provider, MD       No Known Allergies    History reviewed. No pertinent family history.     Social History     Tobacco Use    Smoking status: Never Smoker    Smokeless tobacco: Never Used   Vaping Use    Vaping Use: Never used   Substance Use Topics    Alcohol use: Not Currently    Drug use: Never           PHYSICAL EXAM:    Vitals:    05/26/22 1457   BP: (!) 144/76   Pulse: 91   Resp: 16   Temp:    SpO2: 94%       General Appearance:  Laying in bed, awake, alert, no apparent distress  Head/face:  NC/AT  Eyes: PERRL, EOMI  ENT: Bilateral external ears WNL, Nares patent, Septum midline,   Neck:Supple, no adenopathy  Lungs:  Non-labored, good respiratory effort, no stridor  Heart:  RR  Neuro: Facial nerve symmetric and intact. House Brackmann 1/6, bilaterally. Nasopharyngoscopy  Procedure note- after pt verbally consented, was sprayed nasally with 1:1 neosynephrine and xylocaine. Scope was passed and found nasal cavity with no lesion. nasopharynx clear, tonsil wnl without asymmetry, tongue mobile and no masses, vocal cords mobile bilaterally with full ab and ad duction. Hypopharynx clear, open and no masses. Diffuse mild hyperemia of epiglottis, post cricoid region consistent with chronic reflux. No glottic scarband seen. LABS:  CBC  Recent Labs     05/26/22  1058   WBC 17.7*   HGB 8.7*   HCT 28.3*   *       RADIOLOGY  CT HEAD WO CONTRAST    Result Date: 5/26/2022  EXAMINATION: CT OF THE NECK SOFT TISSUE WITH CONTRAST; CT OF THE HEAD WITHOUT CONTRAST 5/26/2022 TECHNIQUE: CT of the neck was performed with the administration of intravenous contrast. Multiplanar reformatted images are provided for review. Automated exposure control, iterative reconstruction, and/or weight based adjustment of the mA/kV was utilized to reduce the radiation dose to as low as reasonably achievable.; CT of the head was performed without the administration of intravenous contrast. Automated exposure control, iterative reconstruction, and/or weight based adjustment of the mA/kV was utilized to reduce the radiation dose to as low as reasonably achievable.  COMPARISON: Head CT 04/26/2022, MRI brain 04/23/2022 HISTORY: ORDERING SYSTEM PROVIDED HISTORY: swelling FINDINGS: BRAIN/VENTRICLES:  No evidence of an acute infarct or other acute parenchymal process. No evidence of acute intracranial hemorrhage. There is no evidence of an intracranial mass or extraaxial fluid collection. No significant mass effect or midline shift. There is mild generalized volume loss. Ventricular enlargement concordant with the degree of parenchymal volume loss. Patchy and confluent foci of low attenuation are present within supratentorial white matter which is a nonspecific finding but likely represents moderate to severe chronic microvascular ischemia. Chronic appearing right internal and external watershed cerebral infarcts. ORBITS: The visualized portion of the orbits demonstrate no acute abnormality. SINUSES:  The visualized paranasal sinuses and mastoid air cells demonstrate no acute abnormality. SOFT TISSUES/SKULL: No acute abnormality of the visualized skull or soft tissues. PHARYNX/LARYNX: Nasopharynx appears normal.  There is effacement of the left vallecula with mild edematous changes along the left oropharyngeal wall. Parapharyngeal tissue planes are preserved. Oral cavity and floor of mouth appear normal within constraints of artifact from dental amalgam. spaces appear normal.Hypopharynx, larynx, and imaged infraglottic trachea appear normal.Imaged upper esophagus is unremarkable. Small amount of secretions within the posterior subglottic trachea. SALIVARY GLANDS/THYROID:The parotid and submandibular glands appear unremarkable. The thyroid gland appears unremarkable. LYMPH NODES: No cervical or supraclavicular lymphadenopathy is seen. SOFT TISSUES: No appreciable soft tissue swelling. No mass within carotid spaces. Patent extracranial carotid systems and internal jugular veins bilaterally. BRAIN/ORBITS/SINUSES: No abnormal intracranial enhancement within the imaged brain. See above regarding orbits and sinuses. LUNG APICES/SUPERIOR MEDIASTINUM:Imaged lung apices are clear of focal consolidation or mass.  BONES:  No aggressive appearing lytic or blastic bony lesion. No significant spondylotic changes in the visualized spine. 1. No acute intracranial abnormality. 2. Mild edematous changes along the left oropharyngeal wall with effacement of the left vallecula. Direct visual inspection is recommended to exclude underlying lesion. 3. No cervical lymphadenopathy. CT SOFT TISSUE NECK W CONTRAST    Result Date: 5/26/2022  EXAMINATION: CT OF THE NECK SOFT TISSUE WITH CONTRAST; CT OF THE HEAD WITHOUT CONTRAST 5/26/2022 TECHNIQUE: CT of the neck was performed with the administration of intravenous contrast. Multiplanar reformatted images are provided for review. Automated exposure control, iterative reconstruction, and/or weight based adjustment of the mA/kV was utilized to reduce the radiation dose to as low as reasonably achievable.; CT of the head was performed without the administration of intravenous contrast. Automated exposure control, iterative reconstruction, and/or weight based adjustment of the mA/kV was utilized to reduce the radiation dose to as low as reasonably achievable. COMPARISON: Head CT 04/26/2022, MRI brain 04/23/2022 HISTORY: ORDERING SYSTEM PROVIDED HISTORY: swelling FINDINGS: BRAIN/VENTRICLES:  No evidence of an acute infarct or other acute parenchymal process. No evidence of acute intracranial hemorrhage. There is no evidence of an intracranial mass or extraaxial fluid collection. No significant mass effect or midline shift. There is mild generalized volume loss. Ventricular enlargement concordant with the degree of parenchymal volume loss. Patchy and confluent foci of low attenuation are present within supratentorial white matter which is a nonspecific finding but likely represents moderate to severe chronic microvascular ischemia. Chronic appearing right internal and external watershed cerebral infarcts. ORBITS: The visualized portion of the orbits demonstrate no acute abnormality.  SINUSES:  The visualized paranasal sinuses and mastoid air cells demonstrate no acute abnormality. SOFT TISSUES/SKULL: No acute abnormality of the visualized skull or soft tissues. PHARYNX/LARYNX: Nasopharynx appears normal.  There is effacement of the left vallecula with mild edematous changes along the left oropharyngeal wall. Parapharyngeal tissue planes are preserved. Oral cavity and floor of mouth appear normal within constraints of artifact from dental amalgam. spaces appear normal.Hypopharynx, larynx, and imaged infraglottic trachea appear normal.Imaged upper esophagus is unremarkable. Small amount of secretions within the posterior subglottic trachea. SALIVARY GLANDS/THYROID:The parotid and submandibular glands appear unremarkable. The thyroid gland appears unremarkable. LYMPH NODES: No cervical or supraclavicular lymphadenopathy is seen. SOFT TISSUES: No appreciable soft tissue swelling. No mass within carotid spaces. Patent extracranial carotid systems and internal jugular veins bilaterally. BRAIN/ORBITS/SINUSES: No abnormal intracranial enhancement within the imaged brain. See above regarding orbits and sinuses. LUNG APICES/SUPERIOR MEDIASTINUM:Imaged lung apices are clear of focal consolidation or mass. BONES:  No aggressive appearing lytic or blastic bony lesion. No significant spondylotic changes in the visualized spine. 1. No acute intracranial abnormality. 2. Mild edematous changes along the left oropharyngeal wall with effacement of the left vallecula. Direct visual inspection is recommended to exclude underlying lesion. 3. No cervical lymphadenopathy. XR CHEST PORTABLE    Result Date: 5/26/2022  EXAMINATION: ONE XRAY VIEW OF THE CHEST 5/26/2022 11:43 am COMPARISON: 05/11/2022 HISTORY: ORDERING SYSTEM PROVIDED HISTORY: shortness of breath TECHNOLOGIST PROVIDED HISTORY: Reason for exam:->shortness of breath What reading provider will be dictating this exam?->CRC FINDINGS: The cardiac silhouette is within normals.  Suboptimal inspiration was obtained for the chest x-ray. The right lung is clear. There is a small opacity within the left costophrenic angle which could represent atelectasis. This is unlikely to represent a pleural effusion. 1. Small opacity within the left costophrenic angle which could represent atelectasis. A pleural effusion is less likely but not excluded. 2. The right lung is clear         ASSESSMENT:  68 y.o. female with known history of dysphagia requiring PEG tube s/p CVA. PLAN:  · Patient seen and examined, CT imaging reviewed, NPL performed at bedside with findings above. · Patient currently being seen by speech therapy at [facility]. Recommend repeat speech evaluation outpatient. · Patient seen by GI, EGD performed by Dr Breezy Hull on 5/6 with findings of paraesophageal hernia. · Recommend daily PPI. · No acute ENT intervention. · Patient my follow up outpatient with Dr. Andres Cervantes as needed. Patient seen and examined by resident. Will discuss with attending.      eDz Bullard DO,  Resident Physician, PGY-1  Department of Otolaryngology, [facility])       Electronically signed by Dez Bullard DO on 5/26/22 at 3:14 PM EDT

## 2022-05-26 NOTE — PROGRESS NOTES
Patient's IV from the right ac infiltrated during CT exam. IV was removed and a warm compress and elevation was applied. RNDonna , notified of infiltrate and given instructions to keep the patient's arm elevated and a warm compress on area. IV had blood return and flushed properly prior to exam.A new Iv was placed in CT.

## 2022-05-27 LAB
ALBUMIN SERPL-MCNC: 2.9 G/DL (ref 3.5–5.2)
ALP BLD-CCNC: 68 U/L (ref 35–104)
ALT SERPL-CCNC: 14 U/L (ref 0–32)
ANION GAP SERPL CALCULATED.3IONS-SCNC: 11 MMOL/L (ref 7–16)
AST SERPL-CCNC: 15 U/L (ref 0–31)
BILIRUB SERPL-MCNC: 0.3 MG/DL (ref 0–1.2)
BUN BLDV-MCNC: 29 MG/DL (ref 6–23)
CALCIUM SERPL-MCNC: 8.5 MG/DL (ref 8.6–10.2)
CHLORIDE BLD-SCNC: 106 MMOL/L (ref 98–107)
CO2: 24 MMOL/L (ref 22–29)
CREAT SERPL-MCNC: 0.8 MG/DL (ref 0.5–1)
EKG ATRIAL RATE: 91 BPM
EKG P AXIS: 6 DEGREES
EKG P-R INTERVAL: 158 MS
EKG Q-T INTERVAL: 370 MS
EKG QRS DURATION: 72 MS
EKG QTC CALCULATION (BAZETT): 455 MS
EKG R AXIS: -15 DEGREES
EKG T AXIS: 11 DEGREES
EKG VENTRICULAR RATE: 91 BPM
GFR AFRICAN AMERICAN: >60
GFR NON-AFRICAN AMERICAN: >60 ML/MIN/1.73
GLUCOSE BLD-MCNC: 118 MG/DL (ref 74–99)
HCT VFR BLD CALC: 23.2 % (ref 34–48)
HEMOGLOBIN: 7 G/DL (ref 11.5–15.5)
MCH RBC QN AUTO: 28.1 PG (ref 26–35)
MCHC RBC AUTO-ENTMCNC: 30.2 % (ref 32–34.5)
MCV RBC AUTO: 93.2 FL (ref 80–99.9)
PDW BLD-RTO: 17 FL (ref 11.5–15)
PLATELET # BLD: 501 E9/L (ref 130–450)
PMV BLD AUTO: 9.3 FL (ref 7–12)
POTASSIUM SERPL-SCNC: 3.9 MMOL/L (ref 3.5–5)
RBC # BLD: 2.49 E12/L (ref 3.5–5.5)
SODIUM BLD-SCNC: 141 MMOL/L (ref 132–146)
TOTAL PROTEIN: 5.3 G/DL (ref 6.4–8.3)
WBC # BLD: 15 E9/L (ref 4.5–11.5)

## 2022-05-28 ASSESSMENT — ENCOUNTER SYMPTOMS
CHEST TIGHTNESS: 0
DIARRHEA: 0
NAUSEA: 0
SORE THROAT: 1
PHOTOPHOBIA: 0
TROUBLE SWALLOWING: 1
SHORTNESS OF BREATH: 0
COUGH: 0
ABDOMINAL PAIN: 0
VOMITING: 0
ABDOMINAL DISTENTION: 0

## 2022-05-28 NOTE — ED PROVIDER NOTES
Melvin Stewart is a 68year old female with PMH of CVA and dysphasia who presented to ED with concern for difficulty swallowing and sensation of oral swelling. Patient had EGD almost one week ago at [facility], patient has had increasing difficulty swallowing and now feels swelling sensation in her throat. Patient also just had her diet increased at nursing home. Patient is evaluated by speech therapy there. Patient daughter stated that patient is now eating grilled cheese and had a hamburger. Patient has had symptoms present for 2 days and are worsening. Patient denies chest pain, shortness of breath, nausea, vomiting, headache, dizziness, lightheadedness, syncope. Nothing make symptoms better or worse symptoms are moderate in severity and constant. The history is provided by the patient and medical records. Review of Systems   Constitutional: Negative for chills, diaphoresis, fatigue and fever. HENT: Positive for sore throat and trouble swallowing. Eyes: Negative for photophobia and visual disturbance. Respiratory: Negative for cough, chest tightness and shortness of breath. Cardiovascular: Negative for chest pain, palpitations and leg swelling. Gastrointestinal: Negative for abdominal distention, abdominal pain, diarrhea, nausea and vomiting. Genitourinary: Negative for dysuria. Musculoskeletal: Negative for neck pain and neck stiffness. Skin: Negative for pallor and rash. Neurological: Negative for headaches. Psychiatric/Behavioral: Negative for confusion. Physical Exam  Vitals and nursing note reviewed. Constitutional:       General: She is not in acute distress. Appearance: Normal appearance. She is not ill-appearing. HENT:      Head: Normocephalic and atraumatic. Mouth/Throat:      Comments: Patent visualized oropharynx  +secretions present  Eyes:      General: No scleral icterus.      Conjunctiva/sclera: Conjunctivae normal.      Pupils: Pupils are equal, round, and reactive to light. Cardiovascular:      Rate and Rhythm: Normal rate and regular rhythm. Pulmonary:      Effort: Pulmonary effort is normal.      Breath sounds: Normal breath sounds. Abdominal:      General: Bowel sounds are normal. There is no distension. Palpations: Abdomen is soft. Tenderness: There is no abdominal tenderness. There is no guarding or rebound. Musculoskeletal:      Cervical back: Normal range of motion and neck supple. No rigidity. No muscular tenderness. Right lower leg: No edema. Left lower leg: No edema. Skin:     General: Skin is warm and dry. Capillary Refill: Capillary refill takes less than 2 seconds. Coloration: Skin is not pale. Findings: No erythema or rash. Neurological:      Mental Status: She is alert and oriented to person, place, and time. Mental status is at baseline. Psychiatric:         Mood and Affect: Mood normal.          Procedures     MDM  Number of Diagnoses or Management Options  Dysphagia, unspecified type  Diagnosis management comments: Myrtha Leventhal is a 68year old female who presented to ED with concern for oral swelling. CT scan was significant for possible edema, patient was given decadron ENT did bedside evaluation and did not see any edema on direct visualization with fiberoptic. Patient was well-appearing at time of reevaluation. Patient is tolerating p.o. fluids and does have a PEG tube in place if needed. The remainder of lab work and imaging was unremarkable. Discussed with daughter and patient that possible patient is not tolerating any diet advised them to follow-up with PCP and return if symptoms worsen patient and daughter are agreeable to plan. Patient's daughter is comfortable with discharge back to facility. EKG: This EKG is signed and interpreted by me.     Rate: 91  Rhythm: Sinus  Interpretation: non-specific EKG, LVH, no st elevation  Comparison: stable as compared to patient's most recent EKG           --------------------------------------------- PAST HISTORY ---------------------------------------------  Past Medical History:  has a past medical history of CAD (coronary artery disease), DVT, bilateral lower limbs [address] [address] HTN (hypertension), Hyperlipidemia, and Pulmonary embolism ([facility] [address] Past Surgical History:  has a past surgical history that includes Hysterectomy (02/09/2011); Cholecystectomy (2011); and Gastrostomy tube placement (N/A, 5/6/2022). Social History:  reports that she has never smoked. She has never used smokeless tobacco. She reports previous alcohol use. She reports that she does not use drugs. Family History: family history is not on file. The patients home medications have been reviewed. Allergies: Patient has no known allergies.     -------------------------------------------------- RESULTS -------------------------------------------------  Labs:  Results for orders placed or performed during the hospital encounter of 05/26/22   Strep Screen Group A Throat    Specimen: Throat   Result Value Ref Range    Strep Grp A PCR Negative Negative   CBC with Auto Differential   Result Value Ref Range    WBC 17.7 (H) 4.5 - 11.5 E9/L    RBC 3.06 (L) 3.50 - 5.50 E12/L    Hemoglobin 8.7 (L) 11.5 - 15.5 g/dL    Hematocrit 28.3 (L) 34.0 - 48.0 %    MCV 92.5 80.0 - 99.9 fL    MCH 28.4 26.0 - 35.0 pg    MCHC 30.7 (L) 32.0 - 34.5 %    RDW 17.1 (H) 11.5 - 15.0 fL    Platelets 892 (H) 842 - 450 E9/L    MPV 9.0 7.0 - 12.0 fL    Neutrophils % 84.3 (H) 43.0 - 80.0 %    Lymphocytes % 6.1 (L) 20.0 - 42.0 %    Monocytes % 8.7 2.0 - 12.0 %    Eosinophils % 0.9 0.0 - 6.0 %    Basophils % 0.2 0.0 - 2.0 %    Neutrophils Absolute 14.87 (H) 1.80 - 7.30 E9/L    Lymphocytes Absolute 1.06 (L) 1.50 - 4.00 E9/L    Monocytes Absolute 1.59 (H) 0.10 - 0.95 E9/L    Eosinophils Absolute 0.16 0.05 - 0.50 E9/L    Basophils Absolute 0.00 0.00 - 0.20 E9/L    nRBC 0.9 /100 WBC    Anisocytosis 1+     Polychromasia 2+     Poikilocytes 1+     Schistocytes 1+     Ovalocytes 1+    Comprehensive Metabolic Panel w/ Reflex to MG   Result Value Ref Range    Sodium 141 132 - 146 mmol/L    Potassium reflex Magnesium 3.9 3.5 - 5.0 mmol/L    Chloride 105 98 - 107 mmol/L    CO2 24 22 - 29 mmol/L    Anion Gap 12 7 - 16 mmol/L    Glucose 123 (H) 74 - 99 mg/dL    BUN 30 (H) 6 - 23 mg/dL    CREATININE 0.9 0.5 - 1.0 mg/dL    GFR Non-African American >60 >=60 mL/min/1.73    GFR African American >60     Calcium 9.2 8.6 - 10.2 mg/dL    Total Protein 6.8 6.4 - 8.3 g/dL    Albumin 4.0 3.5 - 5.2 g/dL    Total Bilirubin 0.5 0.0 - 1.2 mg/dL    Alkaline Phosphatase 86 35 - 104 U/L    ALT 19 0 - 32 U/L    AST 18 0 - 31 U/L   Troponin   Result Value Ref Range    Troponin, High Sensitivity 66 (H) 0 - 9 ng/L   Brain Natriuretic Peptide   Result Value Ref Range    Pro- (H) 0 - 450 pg/mL   Troponin   Result Value Ref Range    Troponin, High Sensitivity 51 (H) 0 - 9 ng/L   EKG 12 Lead   Result Value Ref Range    Ventricular Rate 91 BPM    Atrial Rate 91 BPM    P-R Interval 158 ms    QRS Duration 72 ms    Q-T Interval 370 ms    QTc Calculation (Bazett) 455 ms    P Axis 6 degrees    R Axis -15 degrees    T Axis 11 degrees       Radiology:  CT HEAD WO CONTRAST   Final Result   1. No acute intracranial abnormality. 2. Mild edematous changes along the left oropharyngeal wall with effacement   of the left vallecula. Direct visual inspection is recommended to exclude   underlying lesion. 3. No cervical lymphadenopathy. CT SOFT TISSUE NECK W CONTRAST   Final Result   1. No acute intracranial abnormality. 2. Mild edematous changes along the left oropharyngeal wall with effacement   of the left vallecula. Direct visual inspection is recommended to exclude   underlying lesion. 3. No cervical lymphadenopathy. XR CHEST PORTABLE   Final Result   1.  Small opacity within the left costophrenic angle which could represent atelectasis. A pleural effusion is less likely but not excluded. 2. The right lung is clear             ------------------------- NURSING NOTES AND VITALS REVIEWED ---------------------------  Date / Time Roomed:  5/26/2022 10:34 AM  ED Bed Assignment:  [id number]    The nursing notes within the ED encounter and vital signs as below have been reviewed. BP (!) 144/76   Pulse 88   Temp 98.1 °F (36.7 °C) (Oral)   Resp 16   Ht 5' 1\" (1.549 m)   Wt 170 lb (77.1 kg)   SpO2 94%   BMI 32.12 kg/m²   Oxygen Saturation Interpretation: Normal      ------------------------------------------ PROGRESS NOTES ------------------------------------------  7:42 PM EDT  I have spoken with the patient and kelliere and discussed todays results, in addition to providing specific details for the plan of care and counseling regarding the diagnosis and prognosis. Their questions are answered at this time and they are agreeable with the plan. I discussed at length with them reasons for immediate return here for re evaluation. They will followup with their primary care physician by calling their office tomorrow. --------------------------------- ADDITIONAL PROVIDER NOTES ---------------------------------  At this time the patient is without objective evidence of an acute process requiring hospitalization or inpatient management. They have remained hemodynamically stable throughout their entire ED visit and are stable for discharge with outpatient follow-up. The plan has been discussed in detail and they are aware of the specific conditions for emergent return, as well as the importance of follow-up. Discharge Medication List as of 5/26/2022  5:07 PM          Diagnosis:  1. Dysphagia, unspecified type        Disposition:  Patient's disposition: Discharge to nursing home  Patient's condition is stable.           Hao Ramírez MD  05/28/22 6101

## 2022-05-31 LAB
ALBUMIN SERPL-MCNC: 2.7 G/DL (ref 3.5–5.2)
ALP BLD-CCNC: 66 U/L (ref 35–104)
ALT SERPL-CCNC: 15 U/L (ref 0–32)
ANION GAP SERPL CALCULATED.3IONS-SCNC: 11 MMOL/L (ref 7–16)
AST SERPL-CCNC: 19 U/L (ref 0–31)
BILIRUB SERPL-MCNC: 0.3 MG/DL (ref 0–1.2)
BUN BLDV-MCNC: 29 MG/DL (ref 6–23)
CALCIUM SERPL-MCNC: 8.5 MG/DL (ref 8.6–10.2)
CHLORIDE BLD-SCNC: 109 MMOL/L (ref 98–107)
CO2: 23 MMOL/L (ref 22–29)
CREAT SERPL-MCNC: 0.9 MG/DL (ref 0.5–1)
GFR AFRICAN AMERICAN: >60
GFR NON-AFRICAN AMERICAN: >60 ML/MIN/1.73
GLUCOSE BLD-MCNC: 122 MG/DL (ref 74–99)
HCT VFR BLD CALC: 23.3 % (ref 34–48)
HCT VFR BLD CALC: 27 % (ref 34–48)
HEMOGLOBIN: 6.9 G/DL (ref 11.5–15.5)
HEMOGLOBIN: 8.1 G/DL (ref 11.5–15.5)
MCH RBC QN AUTO: 28.2 PG (ref 26–35)
MCHC RBC AUTO-ENTMCNC: 29.6 % (ref 32–34.5)
MCV RBC AUTO: 95.1 FL (ref 80–99.9)
PDW BLD-RTO: 17.4 FL (ref 11.5–15)
PLATELET # BLD: 430 E9/L (ref 130–450)
PMV BLD AUTO: 9.4 FL (ref 7–12)
POTASSIUM SERPL-SCNC: 3.4 MMOL/L (ref 3.5–5)
RBC # BLD: 2.45 E12/L (ref 3.5–5.5)
SODIUM BLD-SCNC: 143 MMOL/L (ref 132–146)
TOTAL PROTEIN: 5 G/DL (ref 6.4–8.3)
WBC # BLD: 12.8 E9/L (ref 4.5–11.5)

## 2022-06-01 LAB
BACTERIA: ABNORMAL /HPF
BILIRUBIN URINE: NEGATIVE
BLOOD, URINE: NORMAL
CLARITY: CLEAR
COLOR: YELLOW
GLUCOSE URINE: NEGATIVE MG/DL
KETONES, URINE: NEGATIVE MG/DL
LEUKOCYTE ESTERASE, URINE: NEGATIVE
NITRITE, URINE: NEGATIVE
PH UA: 5.5 (ref 5–9)
PROTEIN UA: NEGATIVE MG/DL
RBC UA: ABNORMAL /HPF (ref 0–2)
SPECIFIC GRAVITY UA: 1.02 (ref 1–1.03)
UROBILINOGEN, URINE: 0.2 E.U./DL
WBC UA: ABNORMAL /HPF (ref 0–5)

## 2022-06-02 DIAGNOSIS — G45.9 TIA (TRANSIENT ISCHEMIC ATTACK): ICD-10-CM

## 2022-06-03 ENCOUNTER — HOSPITAL ENCOUNTER (EMERGENCY)
Age: 77
Discharge: HOME | End: 2022-06-04
Attending: STUDENT IN AN ORGANIZED HEALTH CARE EDUCATION/TRAINING PROGRAM
Payer: MEDICARE

## 2022-06-03 DIAGNOSIS — D64.9 ANEMIA, UNSPECIFIED TYPE: Primary | ICD-10-CM

## 2022-06-03 LAB
ANION GAP SERPL CALCULATED.3IONS-SCNC: 10 MMOL/L (ref 7–16)
BUN BLDV-MCNC: 22 MG/DL (ref 6–23)
CALCIUM SERPL-MCNC: 8.1 MG/DL (ref 8.6–10.2)
CHLORIDE BLD-SCNC: 109 MMOL/L (ref 98–107)
CO2: 23 MMOL/L (ref 22–29)
CREAT SERPL-MCNC: 0.8 MG/DL (ref 0.5–1)
GFR AFRICAN AMERICAN: >60
GFR NON-AFRICAN AMERICAN: >60 ML/MIN/1.73
GLUCOSE BLD-MCNC: 78 MG/DL (ref 74–99)
HCT VFR BLD CALC: 20.7 % (ref 34–48)
HCT VFR BLD CALC: 23.3 % (ref 34–48)
HEMOGLOBIN: 6.1 G/DL (ref 11.5–15.5)
HEMOGLOBIN: 6.8 G/DL (ref 11.5–15.5)
MCH RBC QN AUTO: 28 PG (ref 26–35)
MCHC RBC AUTO-ENTMCNC: 29.5 % (ref 32–34.5)
MCV RBC AUTO: 95 FL (ref 80–99.9)
PDW BLD-RTO: 17.8 FL (ref 11.5–15)
PLATELET # BLD: 347 E9/L (ref 130–450)
PMV BLD AUTO: 9.4 FL (ref 7–12)
POTASSIUM SERPL-SCNC: 3.2 MMOL/L (ref 3.5–5)
RBC # BLD: 2.18 E12/L (ref 3.5–5.5)
SODIUM BLD-SCNC: 142 MMOL/L (ref 132–146)
WBC # BLD: 11.1 E9/L (ref 4.5–11.5)

## 2022-06-03 PROCEDURE — 99285 EMERGENCY DEPT VISIT HI MDM: CPT

## 2022-06-03 ASSESSMENT — PAIN - FUNCTIONAL ASSESSMENT: PAIN_FUNCTIONAL_ASSESSMENT: NONE - DENIES PAIN

## 2022-06-04 VITALS
RESPIRATION RATE: 18 BRPM | TEMPERATURE: 98.7 F | DIASTOLIC BLOOD PRESSURE: 85 MMHG | WEIGHT: 170 LBS | SYSTOLIC BLOOD PRESSURE: 144 MMHG | HEIGHT: 61 IN | BODY MASS INDEX: 32.1 KG/M2 | HEART RATE: 79 BPM | OXYGEN SATURATION: 97 %

## 2022-06-04 LAB
ABO/RH: NORMAL
ANION GAP SERPL CALCULATED.3IONS-SCNC: 14 MMOL/L (ref 7–16)
ANTIBODY SCREEN: NORMAL
BASOPHILS ABSOLUTE: 0.03 E9/L (ref 0–0.2)
BASOPHILS RELATIVE PERCENT: 0.2 % (ref 0–2)
BLOOD BANK DISPENSE STATUS: NORMAL
BLOOD BANK DISPENSE STATUS: NORMAL
BLOOD BANK PRODUCT CODE: NORMAL
BLOOD BANK PRODUCT CODE: NORMAL
BPU ID: NORMAL
BPU ID: NORMAL
BUN BLDV-MCNC: 22 MG/DL (ref 6–23)
CALCIUM SERPL-MCNC: 8.1 MG/DL (ref 8.6–10.2)
CHLORIDE BLD-SCNC: 111 MMOL/L (ref 98–107)
CO2: 20 MMOL/L (ref 22–29)
CREAT SERPL-MCNC: 0.9 MG/DL (ref 0.5–1)
DESCRIPTION BLOOD BANK: NORMAL
DESCRIPTION BLOOD BANK: NORMAL
EOSINOPHILS ABSOLUTE: 0.22 E9/L (ref 0.05–0.5)
EOSINOPHILS RELATIVE PERCENT: 1.6 % (ref 0–6)
GFR AFRICAN AMERICAN: >60
GFR NON-AFRICAN AMERICAN: >60 ML/MIN/1.73
GLUCOSE BLD-MCNC: 117 MG/DL (ref 74–99)
HCT VFR BLD CALC: 20.2 % (ref 34–48)
HEMOGLOBIN: 6.1 G/DL (ref 11.5–15.5)
IMMATURE GRANULOCYTES #: 0.11 E9/L
IMMATURE GRANULOCYTES %: 0.8 % (ref 0–5)
LYMPHOCYTES ABSOLUTE: 1.96 E9/L (ref 1.5–4)
LYMPHOCYTES RELATIVE PERCENT: 14.3 % (ref 20–42)
MAGNESIUM: 1.5 MG/DL (ref 1.6–2.6)
MCH RBC QN AUTO: 27.6 PG (ref 26–35)
MCHC RBC AUTO-ENTMCNC: 30.2 % (ref 32–34.5)
MCV RBC AUTO: 91.4 FL (ref 80–99.9)
MONOCYTES ABSOLUTE: 1.31 E9/L (ref 0.1–0.95)
MONOCYTES RELATIVE PERCENT: 9.6 % (ref 2–12)
NEUTROPHILS ABSOLUTE: 10.04 E9/L (ref 1.8–7.3)
NEUTROPHILS RELATIVE PERCENT: 73.5 % (ref 43–80)
ORGANISM: ABNORMAL
ORGANISM: ABNORMAL
PDW BLD-RTO: 17.8 FL (ref 11.5–15)
PLATELET # BLD: 338 E9/L (ref 130–450)
PMV BLD AUTO: 8.7 FL (ref 7–12)
POTASSIUM REFLEX MAGNESIUM: 3.4 MMOL/L (ref 3.5–5)
RBC # BLD: 2.21 E12/L (ref 3.5–5.5)
SODIUM BLD-SCNC: 145 MMOL/L (ref 132–146)
URINE CULTURE, ROUTINE: ABNORMAL
URINE CULTURE, ROUTINE: ABNORMAL
WBC # BLD: 13.7 E9/L (ref 4.5–11.5)

## 2022-06-04 PROCEDURE — 86850 RBC ANTIBODY SCREEN: CPT

## 2022-06-04 PROCEDURE — 86900 BLOOD TYPING SEROLOGIC ABO: CPT

## 2022-06-04 PROCEDURE — 36430 TRANSFUSION BLD/BLD COMPNT: CPT

## 2022-06-04 PROCEDURE — 85025 COMPLETE CBC W/AUTO DIFF WBC: CPT

## 2022-06-04 PROCEDURE — P9016 RBC LEUKOCYTES REDUCED: HCPCS

## 2022-06-04 PROCEDURE — 86901 BLOOD TYPING SEROLOGIC RH(D): CPT

## 2022-06-04 PROCEDURE — 83735 ASSAY OF MAGNESIUM: CPT

## 2022-06-04 PROCEDURE — 86923 COMPATIBILITY TEST ELECTRIC: CPT

## 2022-06-04 PROCEDURE — 80048 BASIC METABOLIC PNL TOTAL CA: CPT

## 2022-06-04 PROCEDURE — 36415 COLL VENOUS BLD VENIPUNCTURE: CPT

## 2022-06-04 RX ORDER — SODIUM CHLORIDE 9 MG/ML
INJECTION, SOLUTION INTRAVENOUS PRN
Status: DISCONTINUED | OUTPATIENT
Start: 2022-06-04 | End: 2022-06-04 | Stop reason: HOSPADM

## 2022-06-04 NOTE — ED NOTES
Spoke with [facility] for ETA for . States patient will be picked up after crew drops off patient at [facility].       Bishop Mart RN  06/04/22 [id number]

## 2022-06-04 NOTE — ED NOTES
Nurse to nurse called to [facility] at [facility]  06/04/22 8039

## 2022-06-04 NOTE — ED NOTES
Attempt made to call nurse to nurse to [facility] [phone], no answer at this time. Will attempt again.       Jose Estrada RN  06/04/22 0116

## 2022-06-04 NOTE — ED NOTES
Nurse at bedside for first 15 minutes of second unit, no suspected reaction.  Patient states no needs at this time     Kristie Chavez, [address] [facility]  06/04/22 3158

## 2022-06-04 NOTE — ED PROVIDER NOTES
ED  Provider Note  Admit Date/RoomTime: 6/3/2022 11:55 PM  ED Room: [facility]      History of Present Illness:  6/4/22, Time: 12:33 AM EDT  Chief Complaint   Patient presents with    Abnormal Lab     Pt from [facility] sent for Hgb of 6.8         Xavier Hook is a 68 y.o. female presenting to the ED for low hemoglobin. Patient states that she has had low hemoglobin before but never transfused. However her documentation states has had longstanding chronic anemia. She states that she was told last week that her hemoglobin counts were low but they did not transfuse her at [facility]. She was sent today for hemoglobin reportedly of 6.8. Patient is not symptomatic, denying any generalized weakness/fatigue, no shortness of breath, no weakness or numbness, no chest pain no dysuria or hematuria, no diarrhea or constipation. She is resting comfortably, answering questions appropriately, very pleasant. Exacerbated by none, relieved by none. Review of Systems:   A complete review of systems was performed and pertinent positives and negatives are stated within HPI, all other systems reviewed and are negative.        --------------------------------------------- PATIENT HISTORY--------------------------------------------  Past Medical History:  has a past medical history of CAD (coronary artery disease), DVT, bilateral lower limbs ([facility] [address] HTN (hypertension), Hyperlipidemia, and Pulmonary embolism ([facility] [address] Past Surgical History:  has a past surgical history that includes Hysterectomy (02/09/2011); Cholecystectomy (2011); and Gastrostomy tube placement (N/A, 5/6/2022). Family History: family history is not on file. . Unless otherwise noted, family history is non contributory    Social History:  reports that she has never smoked. She has never used smokeless tobacco. She reports previous alcohol use. She reports that she does not use drugs. The patients home medications have been reviewed.     Allergies: Patient has no known allergies. I have reviewed the past medical history, past surgical history, social history, and family history    ---------------------------------------------------PHYSICAL EXAM--------------------------------------    Constitutional/General: Alert and oriented x3  Head: Normocephalic and atraumatic  Eyes: PERRL, EOMI, sclera non icteric  ENT: Oropharynx clear, handling secretions, no trismus  Neck: Supple, full ROM, no stridor, no meningismus  Respiratory: Lungs clear to auscultation bilaterally  Cardiovascular: RRR, no R/G/M, 2+ peripheral pulses  Chest: No chest wall tenderness, equal chest rise  Gastrointestinal: Soft nontender nondistended  Musculoskeletal: Extremities warm and well perfused, moving all extremities  Skin: skin warm and dry. No rashes. Neurologic: No focal deficits, strength and sensation grossly intact   Psychiatric: Normal Affect, behavior normal           -------------------------------------------------- RESULTS -------------------------------------------------  I have personally reviewed all laboratory and imaging results for this patient. Results are listed below.      LABS: (Lab results interpreted by me)  Results for orders placed or performed during the hospital encounter of 06/03/22   CBC with Auto Differential   Result Value Ref Range    WBC 13.7 (H) 4.5 - 11.5 E9/L    RBC 2.21 (L) 3.50 - 5.50 E12/L    Hemoglobin 6.1 (LL) 11.5 - 15.5 g/dL    Hematocrit 20.2 (L) 34.0 - 48.0 %    MCV 91.4 80.0 - 99.9 fL    MCH 27.6 26.0 - 35.0 pg    MCHC 30.2 (L) 32.0 - 34.5 %    RDW 17.8 (H) 11.5 - 15.0 fL    Platelets 089 006 - 253 E9/L    MPV 8.7 7.0 - 12.0 fL    Neutrophils % 73.5 43.0 - 80.0 %    Immature Granulocytes % 0.8 0.0 - 5.0 %    Lymphocytes % 14.3 (L) 20.0 - 42.0 %    Monocytes % 9.6 2.0 - 12.0 %    Eosinophils % 1.6 0.0 - 6.0 %    Basophils % 0.2 0.0 - 2.0 %    Neutrophils Absolute 10.04 (H) 1.80 - 7.30 E9/L    Immature Granulocytes # 0.11 E9/L    Lymphocytes Absolute 1. 96 1.50 - 4.00 E9/L    Monocytes Absolute 1.31 (H) 0.10 - 0.95 E9/L    Eosinophils Absolute 0.22 0.05 - 0.50 E9/L    Basophils Absolute 0.03 0.00 - 0.20 P5/L   Basic Metabolic Panel w/ Reflex to MG   Result Value Ref Range    Sodium 145 132 - 146 mmol/L    Potassium reflex Magnesium 3.4 (L) 3.5 - 5.0 mmol/L    Chloride 111 (H) 98 - 107 mmol/L    CO2 20 (L) 22 - 29 mmol/L    Anion Gap 14 7 - 16 mmol/L    Glucose 117 (H) 74 - 99 mg/dL    BUN 22 6 - 23 mg/dL    CREATININE 0.9 0.5 - 1.0 mg/dL    GFR Non-African American >60 >=60 mL/min/1.73    GFR African American >60     Calcium 8.1 (L) 8.6 - 10.2 mg/dL   Magnesium   Result Value Ref Range    Magnesium 1.5 (L) 1.6 - 2.6 mg/dL   TYPE AND SCREEN   Result Value Ref Range    ABO/Rh AB POS     Antibody Screen NEG    PREPARE RBC (CROSSMATCH), 2 Units   Result Value Ref Range    Product Code Blood Bank [id number]     Description Blood Bank Red Blood Cells, Leuko-reduced     Unit Number [id number]     Dispense Status Blood Bank transfused     Product Code Blood Bank [id number]     Description Blood Bank Red Blood Cells, Leuko-reduced     Unit Number [id number]     Dispense Status Blood Bank transfused    ,       RADIOLOGY:  Imaging interpreted by Radiologist unless otherwise specified  No orders to display     ------------------------- NURSING NOTES AND VITALS REVIEWED ---------------------------  The nursing notes within the ED encounter and vital signs as below have been reviewed by myself  BP (!) 144/85   Pulse 79   Temp 98.7 °F (37.1 °C)   Resp 18   Ht 5' 1\" (1.549 m)   Wt 170 lb (77.1 kg)   SpO2 97%   BMI 32.12 kg/m²      The patients available past medical records and past encounters were reviewed. ------------------------------ ED COURSE/MEDICAL DECISION MAKING----------------------  Medications - No data to display    I, Dr. Barber Lynn, am the primary provider of record    Medical Decision Making:   Anemia on outpatient labs, asymptomatic.  Will re-check Hb here and transfuse if <7.0     Consent obtained at bedside for transfusion of two units. Patient is not tachycardic, tachypneic, SOB, weak/fatigued, dizzy/lightheaded. Can be safely discharge home to NH. ED Counseling: This emergency provider has spoken with the patient and any family present to discuss clinical status, results, plan of care, diagnosis and prognosis as able to be determined at this time. Any questions were answered and patient and/or family/POA are agreeable with the plan.       --------------------------------- IMPRESSION AND DISPOSITION ---------------------------------    IMPRESSION  1. Anemia, unspecified type        DISPOSITION  Disposition: Discharge to nursing home  Patient condition is good      This report was transcribed using voice recognition software. Every effort was made to ensure accuracy; however, transcription errors may be present.          Sumi Paz MD  06/05/22 1902

## 2022-06-04 NOTE — ED NOTES
Nurse at bedside for the first 15min of blood transfusion, no reaction suspected. Patient has no needs at this time, resting comfortably.  Rate continues at 200ml/hr       All Cervantes RN  06/04/22 0631

## 2022-06-04 NOTE — ED NOTES
[facility] called for transport back to [facility].  ETA 3-4 hours     [facility]  06/04/22 [id number]

## 2022-06-05 ENCOUNTER — APPOINTMENT (OUTPATIENT)
Dept: CT IMAGING | Age: 77
End: 2022-06-05
Payer: MEDICARE

## 2022-06-05 ENCOUNTER — HOSPITAL ENCOUNTER (EMERGENCY)
Age: 77
Discharge: NURSING, REHAB OR LONG-TERM CARE | End: 2022-06-05
Attending: EMERGENCY MEDICINE
Payer: MEDICARE

## 2022-06-05 VITALS
RESPIRATION RATE: 16 BRPM | SYSTOLIC BLOOD PRESSURE: 162 MMHG | HEART RATE: 88 BPM | OXYGEN SATURATION: 97 % | DIASTOLIC BLOOD PRESSURE: 82 MMHG | TEMPERATURE: 98.2 F
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
## 2022-06-05 PROCEDURE — 70450 CT HEAD/BRAIN W/O DYE: CPT

## 2022-06-05 PROCEDURE — 99284 EMERGENCY DEPT VISIT MOD MDM: CPT

## 2022-06-05 ASSESSMENT — ENCOUNTER SYMPTOMS
EYE PAIN: 0
WHEEZING: 0
EYE DISCHARGE: 0
SORE THROAT: 0
SHORTNESS OF BREATH: 0
VOMITING: 0
EYE REDNESS: 0
BACK PAIN: 0
SINUS PRESSURE: 0
DIARRHEA: 0
COUGH: 0
ABDOMINAL DISTENTION: 0
NAUSEA: 0

## 2022-06-05 ASSESSMENT — PAIN - FUNCTIONAL ASSESSMENT: PAIN_FUNCTIONAL_ASSESSMENT: NONE - DENIES PAIN

## 2022-06-05 NOTE — ED PROVIDER NOTES
This patient from a local nursing facility. Patient states she was getting out of bed this morning when her left leg became tangled in her Pink catheter causing her to fall to the floor. She denies any injury from the fall. No complaints of pain. She does have a bandage to her left wrist from a previously occurring skin tear. She is on chronic anticoagulation. The history is provided by the patient. Fall  The accident occurred less than 1 hour ago. The fall occurred from a bed. She fell from a height of 1 to 2 ft. She landed on a hard floor. The patient is experiencing no pain. She was ambulatory at the scene. There was no entrapment after the fall. There was no drug use involved in the accident. There was no alcohol use involved in the accident. Pertinent negatives include no fever, no nausea, no vomiting and no headaches. She has tried nothing for the symptoms. Review of Systems   Constitutional: Negative for chills and fever. HENT: Negative for sinus pressure and sore throat. Eyes: Negative for pain, discharge and redness. Respiratory: Negative for cough, shortness of breath and wheezing. Cardiovascular: Negative for chest pain. Gastrointestinal: Negative for abdominal distention, diarrhea, nausea and vomiting. Genitourinary: Negative for dysuria and frequency. Musculoskeletal: Negative for arthralgias and back pain. Skin: Negative for rash and wound. Neurological: Negative for weakness and headaches. Hematological: Negative for adenopathy. All other systems reviewed and are negative. Physical Exam  Vitals and nursing note reviewed. Constitutional:       Appearance: She is well-developed. HENT:      Head: Normocephalic and atraumatic. Comments:   Or sign of head trauma. Eyes:      Pupils: Pupils are equal, round, and reactive to light. Cardiovascular:      Rate and Rhythm: Normal rate and regular rhythm. Heart sounds: Normal heart sounds.  No murmur heard. Pulmonary:      Effort: Pulmonary effort is normal. No respiratory distress. Breath sounds: Normal breath sounds. No wheezing or rales. Abdominal:      General: Bowel sounds are normal.      Palpations: Abdomen is soft. Tenderness: There is no abdominal tenderness. There is no guarding or rebound. Musculoskeletal:      Cervical back: Normal range of motion and neck supple. Skin:     General: Skin is warm and dry. Comments: Skin tear to left wrist already in dressing. Multiple small bruises about the patient's upper extremities in various stages of healing. Neurological:      Mental Status: She is alert and oriented to person, place, and time. Cranial Nerves: No cranial nerve deficit. Coordination: Coordination normal.          Procedures     MDM              --------------------------------------------- PAST HISTORY ---------------------------------------------  Past Medical History:  has a past medical history of CAD (coronary artery disease), DVT, bilateral lower limbs ([address] HTN (hypertension), Hyperlipidemia, and Pulmonary embolism ([facility] [address] Past Surgical History:  has a past surgical history that includes Hysterectomy (02/09/2011); Cholecystectomy (2011); and Gastrostomy tube placement (N/A, 5/6/2022). Social History:  reports that she has never smoked. She has never used smokeless tobacco. She reports previous alcohol use. She reports that she does not use drugs. Family History: family history is not on file. The patients home medications have been reviewed. Allergies: Patient has no known allergies. -------------------------------------------------- RESULTS -------------------------------------------------  Labs:  No results found for this visit on 06/05/22.     Radiology:  CT HEAD WO CONTRAST   Final Result   Atrophy and chronic changes seen within the brain with no acute intracranial   abnormality.             ------------------------- NURSING NOTES AND VITALS REVIEWED ---------------------------  Date / Time Roomed:  6/5/2022  8:33 AM  ED Bed Assignment:  22/22    The nursing notes within the ED encounter and vital signs as below have been reviewed. BP (!) 162/82   Pulse 88   Temp 98.2 °F (36.8 °C)   Resp 16   SpO2 97%   Oxygen Saturation Interpretation: Normal      ------------------------------------------ PROGRESS NOTES ------------------------------------------  9:06 AM EDT  I have spoken with the patient and discussed todays results, in addition to providing specific details for the plan of care and counseling regarding the diagnosis and prognosis. Their questions are answered at this time and they are agreeable with the plan. I discussed at length with them reasons for immediate return here for re evaluation. They will followup with their primary care physician by calling their office tomorrow. --------------------------------- ADDITIONAL PROVIDER NOTES ---------------------------------  At this time the patient is without objective evidence of an acute process requiring hospitalization or inpatient management. They have remained hemodynamically stable throughout their entire ED visit and are stable for discharge with outpatient follow-up. The plan has been discussed in detail and they are aware of the specific conditions for emergent return, as well as the importance of follow-up. New Prescriptions    No medications on file       Diagnosis:  1. Fall, initial encounter        Disposition:  Patient's disposition: Discharge to nursing home  Patient's condition is stable.          Pito Jiang, [place]  06/05/22 0150

## 2022-06-05 NOTE — ED NOTES
Daughter at bedside updated on approx. ETA of 2-3 hours with [facility]. Breakfast tray ordered for patient.       Mayte Silva RN  06/05/22 8152

## 2022-06-05 NOTE — ED NOTES
Nurse to nurse report called to [facility].       Chioma Jenkins RN  06/05/22 7972

## 2022-06-07 LAB
ANION GAP SERPL CALCULATED.3IONS-SCNC: 9 MMOL/L (ref 7–16)
BUN BLDV-MCNC: 13 MG/DL (ref 6–23)
CALCIUM SERPL-MCNC: 7.9 MG/DL (ref 8.6–10.2)
CHLORIDE BLD-SCNC: 112 MMOL/L (ref 98–107)
CO2: 22 MMOL/L (ref 22–29)
CREAT SERPL-MCNC: 0.8 MG/DL (ref 0.5–1)
GFR AFRICAN AMERICAN: >60
GFR NON-AFRICAN AMERICAN: >60 ML/MIN/1.73
GLUCOSE BLD-MCNC: 99 MG/DL (ref 74–99)
POTASSIUM SERPL-SCNC: 3.1 MMOL/L (ref 3.5–5)
SODIUM BLD-SCNC: 143 MMOL/L (ref 132–146)

## 2022-06-10 LAB
ANION GAP SERPL CALCULATED.3IONS-SCNC: 13 MMOL/L (ref 7–16)
BUN BLDV-MCNC: 11 MG/DL (ref 6–23)
CALCIUM SERPL-MCNC: 8.5 MG/DL (ref 8.6–10.2)
CHLORIDE BLD-SCNC: 113 MMOL/L (ref 98–107)
CO2: 18 MMOL/L (ref 22–29)
CREAT SERPL-MCNC: 0.7 MG/DL (ref 0.5–1)
GFR AFRICAN AMERICAN: >60
GFR NON-AFRICAN AMERICAN: >60 ML/MIN/1.73
GLUCOSE BLD-MCNC: 83 MG/DL (ref 74–99)
POTASSIUM SERPL-SCNC: 3.9 MMOL/L (ref 3.5–5)
SODIUM BLD-SCNC: 144 MMOL/L (ref 132–146)

## 2022-06-14 LAB
ANION GAP SERPL CALCULATED.3IONS-SCNC: 12 MMOL/L (ref 7–16)
BUN BLDV-MCNC: 14 MG/DL (ref 6–23)
CALCIUM SERPL-MCNC: 8.7 MG/DL (ref 8.6–10.2)
CHLORIDE BLD-SCNC: 107 MMOL/L (ref 98–107)
CO2: 20 MMOL/L (ref 22–29)
CREAT SERPL-MCNC: 0.9 MG/DL (ref 0.5–1)
GFR AFRICAN AMERICAN: >60
GFR NON-AFRICAN AMERICAN: >60 ML/MIN/1.73
GLUCOSE BLD-MCNC: 95 MG/DL (ref 74–99)
POTASSIUM SERPL-SCNC: 3.7 MMOL/L (ref 3.5–5)
SODIUM BLD-SCNC: 139 MMOL/L (ref 132–146)

## 2022-06-17 LAB
ANION GAP SERPL CALCULATED.3IONS-SCNC: 9 MMOL/L (ref 7–16)
BUN BLDV-MCNC: 13 MG/DL (ref 6–23)
CALCIUM SERPL-MCNC: 8.6 MG/DL (ref 8.6–10.2)
CHLORIDE BLD-SCNC: 110 MMOL/L (ref 98–107)
CO2: 25 MMOL/L (ref 22–29)
CREAT SERPL-MCNC: 0.8 MG/DL (ref 0.5–1)
GFR AFRICAN AMERICAN: >60
GFR NON-AFRICAN AMERICAN: >60 ML/MIN/1.73
GLUCOSE BLD-MCNC: 75 MG/DL (ref 74–99)
HCT VFR BLD CALC: 31.7 % (ref 34–48)
HEMOGLOBIN: 9.5 G/DL (ref 11.5–15.5)
MCH RBC QN AUTO: 27.9 PG (ref 26–35)
MCHC RBC AUTO-ENTMCNC: 30 % (ref 32–34.5)
MCV RBC AUTO: 93 FL (ref 80–99.9)
PDW BLD-RTO: 17 FL (ref 11.5–15)
PLATELET # BLD: 492 E9/L (ref 130–450)
PMV BLD AUTO: 9.1 FL (ref 7–12)
POTASSIUM SERPL-SCNC: 4.3 MMOL/L (ref 3.5–5)
RBC # BLD: 3.41 E12/L (ref 3.5–5.5)
SODIUM BLD-SCNC: 144 MMOL/L (ref 132–146)
WBC # BLD: 9.4 E9/L (ref 4.5–11.5)

## 2022-06-23 LAB
ANION GAP SERPL CALCULATED.3IONS-SCNC: 12 MMOL/L (ref 7–16)
BUN BLDV-MCNC: 24 MG/DL (ref 6–23)
CALCIUM SERPL-MCNC: 8.3 MG/DL (ref 8.6–10.2)
CHLORIDE BLD-SCNC: 107 MMOL/L (ref 98–107)
CO2: 22 MMOL/L (ref 22–29)
CREAT SERPL-MCNC: 0.9 MG/DL (ref 0.5–1)
GFR AFRICAN AMERICAN: >60
GFR NON-AFRICAN AMERICAN: >60 ML/MIN/1.73
GLUCOSE BLD-MCNC: 120 MG/DL (ref 74–99)
HCT VFR BLD CALC: 26.4 % (ref 34–48)
HEMOGLOBIN: 8 G/DL (ref 11.5–15.5)
MCH RBC QN AUTO: 27.9 PG (ref 26–35)
MCHC RBC AUTO-ENTMCNC: 30.3 % (ref 32–34.5)
MCV RBC AUTO: 92 FL (ref 80–99.9)
PDW BLD-RTO: 16.5 FL (ref 11.5–15)
PLATELET # BLD: 456 E9/L (ref 130–450)
PMV BLD AUTO: 9.3 FL (ref 7–12)
POTASSIUM SERPL-SCNC: 4.7 MMOL/L (ref 3.5–5)
RBC # BLD: 2.87 E12/L (ref 3.5–5.5)
SODIUM BLD-SCNC: 141 MMOL/L (ref 132–146)
WBC # BLD: 11.2 E9/L (ref 4.5–11.5)

## 2022-06-27 ENCOUNTER — HOSPITAL ENCOUNTER (EMERGENCY)
Age: 77
Discharge: HOME | End: 2022-06-27
Attending: STUDENT IN AN ORGANIZED HEALTH CARE EDUCATION/TRAINING PROGRAM
Payer: MEDICARE

## 2022-06-27 VITALS
SYSTOLIC BLOOD PRESSURE: 140 MMHG | DIASTOLIC BLOOD PRESSURE: 82 MMHG | BODY MASS INDEX: 30.96 KG/M2 | HEART RATE: 79 BPM | HEIGHT: 61 IN | OXYGEN SATURATION: 97 % | TEMPERATURE: 98 F | WEIGHT: 164 LBS | RESPIRATION RATE: 16 BRPM

## 2022-06-27 DIAGNOSIS — R04.0 EPISTAXIS: Primary | ICD-10-CM

## 2022-06-27 PROCEDURE — 99283 EMERGENCY DEPT VISIT LOW MDM: CPT

## 2022-06-27 RX ORDER — OXYMETAZOLINE HYDROCHLORIDE 0.05 G/100ML
SPRAY NASAL
Status: DISCONTINUED
Start: 2022-06-27 | End: 2022-06-27 | Stop reason: HOSPADM

## 2022-06-27 ASSESSMENT — ENCOUNTER SYMPTOMS
VOMITING: 0
SHORTNESS OF BREATH: 0
DIARRHEA: 0
SINUS PRESSURE: 0
CHEST TIGHTNESS: 0
CONSTIPATION: 0
COUGH: 0
SINUS PAIN: 0
SORE THROAT: 0
ABDOMINAL PAIN: 0
COLOR CHANGE: 0
ABDOMINAL DISTENTION: 0
NAUSEA: 0
BACK PAIN: 0

## 2022-06-27 ASSESSMENT — PAIN - FUNCTIONAL ASSESSMENT: PAIN_FUNCTIONAL_ASSESSMENT: NONE - DENIES PAIN

## 2022-06-27 NOTE — ED NOTES
Pt discharged home at this time. Awaiting daughter for ride back to rehab facility. Pt denies questions for this nurse.       Shania Ayala RN  06/27/22 0633

## 2022-06-27 NOTE — ED TRIAGE NOTES
Pt to ED via EMS from \"[facility]\" for nosebleed since approx 7am. 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 No deficits noted upon arrival. No active bleeding noted upon ED arrival. Pt denies pain, denies dizziness.

## 2022-06-27 NOTE — ED PROVIDER NOTES
The history is provided by the patient and medical records. 49-year-old female presents emerged part complaint of epistaxis. States his right sided was bleeding today approximately couple hours. Denies any trauma or injury to the area. She is on Eliquis. Otherwise denies any other acute complaints denies any fever chills cough congestion runny nose. The patient presents with episatxis that has been going on for 1 -3 hrs. These symptoms are moderate in severity. Symptoms are made better by nothing. Symptoms are made worse by nothng. Associated symptoms include none. Review of Systems   Constitutional: Negative for chills, fatigue and fever. HENT: Positive for nosebleeds. Negative for congestion, sinus pressure, sinus pain and sore throat. Respiratory: Negative for cough, chest tightness and shortness of breath. Cardiovascular: Negative for chest pain and leg swelling. Gastrointestinal: Negative for abdominal distention, abdominal pain, constipation, diarrhea, nausea and vomiting. Endocrine: Negative for polyuria. Genitourinary: Negative for difficulty urinating, dysuria, frequency, hematuria, urgency, vaginal bleeding and vaginal discharge. Musculoskeletal: Negative for arthralgias and back pain. Skin: Negative for color change and pallor. Neurological: Negative for dizziness and weakness. Physical Exam  Vitals and nursing note reviewed. Constitutional:       Appearance: Normal appearance. She is normal weight. HENT:      Head: Normocephalic and atraumatic. Comments: No active bleeding from the right naris at this time. Eyes:      Conjunctiva/sclera: Conjunctivae normal.   Cardiovascular:      Rate and Rhythm: Normal rate and regular rhythm. Pulses: Normal pulses. Heart sounds: Normal heart sounds. No murmur heard. No gallop. Pulmonary:      Effort: Pulmonary effort is normal. No respiratory distress. Breath sounds: Normal breath sounds.  No wheezing or rales. Abdominal:      General: Abdomen is flat. Bowel sounds are normal. There is no distension. Palpations: Abdomen is soft. Tenderness: There is no abdominal tenderness. There is no guarding. Skin:     General: Skin is warm and dry. Capillary Refill: Capillary refill takes less than 2 seconds. Neurological:      General: No focal deficit present. Mental Status: She is alert and oriented to person, place, and time. Procedures     MDM       40-year-old female presents emerged part complaint of nosebleed. She is on Eliquis. The couple was taken out of her right nare's and patient had no active bleeding. Did have a small blood clot and there she was unable to pull out. Did suction that out with suction cannula. Patient had no active bleeding following. She was observed with no active bleeding. Safe to be discharged home follow-up with her family doctor. Patient safe for discharge from the emergency department. Did advise on return precautions to the emergency department. Did also advise follow-up with her primary care physician. Patient agreeable with the plan moving forward.                 --------------------------------------------- PAST HISTORY ---------------------------------------------  Past Medical History:  has a past medical history of CAD (coronary artery disease), DVT, bilateral lower limbs ([address] HTN (hypertension), Hyperlipidemia, and Pulmonary embolism ([facility] [address] Past Surgical History:  has a past surgical history that includes Hysterectomy (02/09/2011); Cholecystectomy (2011); and Gastrostomy tube placement (N/A, 5/6/2022). Social History:  reports that she has never smoked. She has never used smokeless tobacco. She reports previous alcohol use. She reports that she does not use drugs. Family History: family history is not on file. The patients home medications have been reviewed.     Allergies: Patient has no known allergies. -------------------------------------------------- RESULTS -------------------------------------------------  Labs:  No results found for this visit on 06/27/22. Radiology:  No orders to display       ------------------------- NURSING NOTES AND VITALS REVIEWED ---------------------------  Date / Time Roomed:  6/27/2022  4:04 PM  ED Bed Assignment:  18/18    The nursing notes within the ED encounter and vital signs as below have been reviewed. BP (!) 162/70   Pulse 85   Temp 97.9 °F (36.6 °C) (Oral)   Resp 16   Ht 5' 1\" (1.549 m)   Wt 164 lb (74.4 kg)   SpO2 97%   BMI 30.99 kg/m²   Oxygen Saturation Interpretation: Normal      ------------------------------------------ PROGRESS NOTES ------------------------------------------  4:19 PM EDT  I have spoken with the patient and discussed todays results, in addition to providing specific details for the plan of care and counseling regarding the diagnosis and prognosis. Their questions are answered at this time and they are agreeable with the plan. I discussed at length with them reasons for immediate return here for re evaluation. They will followup with their primary care physician by calling their office on Monday.      --------------------------------- ADDITIONAL PROVIDER NOTES ---------------------------------  At this time the patient is without objective evidence of an acute process requiring hospitalization or inpatient management. They have remained hemodynamically stable throughout their entire ED visit and are stable for discharge with outpatient follow-up. The plan has been discussed in detail and they are aware of the specific conditions for emergent return, as well as the importance of follow-up. New Prescriptions    No medications on file       Diagnosis:  1. Epistaxis        Disposition:  Patient's disposition: Discharge to home  Patient's condition is stable.        Danielito Garrett MD  Resident  06/27/22 4341

## 2022-06-28 LAB
HCT VFR BLD CALC: 30.3 % (ref 34–48)
HEMOGLOBIN: 9.4 G/DL (ref 11.5–15.5)
MCH RBC QN AUTO: 28.7 PG (ref 26–35)
MCHC RBC AUTO-ENTMCNC: 31 % (ref 32–34.5)
MCV RBC AUTO: 92.4 FL (ref 80–99.9)
PDW BLD-RTO: 16.8 FL (ref 11.5–15)
PLATELET # BLD: 493 E9/L (ref 130–450)
PMV BLD AUTO: 9.3 FL (ref 7–12)
RBC # BLD: 3.28 E12/L (ref 3.5–5.5)
WBC # BLD: 15.5 E9/L (ref 4.5–11.5)

## 2022-06-29 LAB
BACTERIA: ABNORMAL /HPF
BILIRUBIN URINE: NEGATIVE
BLOOD, URINE: NEGATIVE
CLARITY: CLEAR
COLOR: YELLOW
GLUCOSE URINE: NEGATIVE MG/DL
KETONES, URINE: NEGATIVE MG/DL
LEUKOCYTE ESTERASE, URINE: NEGATIVE
NITRITE, URINE: NEGATIVE
PH UA: 5.5 (ref 5–9)
PROTEIN UA: NEGATIVE MG/DL
RBC UA: ABNORMAL /HPF (ref 0–2)
SPECIFIC GRAVITY UA: 1.02 (ref 1–1.03)
UROBILINOGEN, URINE: 0.2 E.U./DL
WBC UA: ABNORMAL /HPF (ref 0–5)

## 2022-07-01 LAB — URINE CULTURE, ROUTINE: NORMAL

## 2022-07-05 LAB
HCT VFR BLD CALC: 29.8 % (ref 34–48)
HEMOGLOBIN: 8.9 G/DL (ref 11.5–15.5)
MCH RBC QN AUTO: 28.2 PG (ref 26–35)
MCHC RBC AUTO-ENTMCNC: 29.9 % (ref 32–34.5)
MCV RBC AUTO: 94.3 FL (ref 80–99.9)
PDW BLD-RTO: 17.3 FL (ref 11.5–15)
PLATELET # BLD: 422 E9/L (ref 130–450)
PMV BLD AUTO: 10.1 FL (ref 7–12)
RBC # BLD: 3.16 E12/L (ref 3.5–5.5)
WBC # BLD: 12.9 E9/L (ref 4.5–11.5)

## 2022-07-08 LAB
HCT VFR BLD CALC: 26.7 % (ref 34–48)
HEMOGLOBIN: 8 G/DL (ref 11.5–15.5)
MCH RBC QN AUTO: 28.2 PG (ref 26–35)
MCHC RBC AUTO-ENTMCNC: 30 % (ref 32–34.5)
MCV RBC AUTO: 94 FL (ref 80–99.9)
PDW BLD-RTO: 17.1 FL (ref 11.5–15)
PLATELET # BLD: 378 E9/L (ref 130–450)
PMV BLD AUTO: 10 FL (ref 7–12)
RBC # BLD: 2.84 E12/L (ref 3.5–5.5)
WBC # BLD: 8.9 E9/L (ref 4.5–11.5)

## 2022-10-28 NOTE — PROGRESS NOTES
Department of Internal Medicine  Nephrology Progress Note    Events reviewed. SUBJECTIVE:  We are following Ms. Wayne Colby for CURT. Patient complaining of shortness of breath. Daughter at the bedside at the time of my examination. PHYSICAL EXAM:      Vitals:    VITALS:  BP (!) 158/70   Pulse 83   Temp 99 °F (37.2 °C) (Temporal)   Resp 18   Ht 5' (1.524 m)   Wt 192 lb 9.6 oz (87.4 kg)   SpO2 96%   BMI 37.61 kg/m²   24HR INTAKE/OUTPUT:      Intake/Output Summary (Last 24 hours) at 5/11/2022 1237  Last data filed at 5/11/2022 0351  Gross per 24 hour   Intake 451 ml   Output 0 ml   Net 451 ml       Constitutional: Alert, speech delayed, no acute distress  HEENT: PERRLA, normocephalic, atraumtic  Respiratory:  Bilateral wheezing  Cardiovascular/Edema:  RRR, S1/S2  Gastrointestinal:  abd rounded, non tender, BS hypoactive.  PEG tube present  Neurologic: Alert and oriented, no focal deficits noted  Skin:  Warm,dry, ecchymosis to BUE  Other:  Pink catheter draining minimal yellow urine    Scheduled Meds:   guaiFENesin  200 mg Oral BID    levalbuterol  0.63 mg Nebulization Q8H    pantoprazole (PROTONIX) 40 mg injection  40 mg IntraVENous BID    chlorhexidine  15 mL Mouth/Throat BID    sodium chloride (Inhalant)  4 mL Nebulization BID    insulin lispro  0-12 Units SubCUTAneous TID WC    insulin lispro  0-6 Units SubCUTAneous Nightly    sodium chloride  2 spray Each Nostril TID    apixaban  5 mg Oral BID    vitamin D  5,000 Units Oral Daily    dilTIAZem  120 mg Oral Daily    [Held by provider] ramipril  5 mg Oral Daily    aspirin  81 mg Oral Daily    atorvastatin  80 mg Oral Nightly     Continuous Infusions:   dextrose       PRN Meds:.calcium carbonate, dicyclomine, sodium chloride, glycerin moisturizing mouth spray, hydrALAZINE, dextrose, glucagon (rDNA), dextrose, glucose, racepinephrine HCl, acetaminophen, albuterol, acetaminophen, ondansetron **OR** ondansetron, polyethylene glycol, perflutren lipid Urine culture multi abdirizak  Patient already received 3 dose of IV ceftriaxone  Dow removed yesterday microspheres    DATA:    CBC:   Lab Results   Component Value Date    WBC 14.2 05/11/2022    RBC 2.73 05/11/2022    HGB 7.7 05/11/2022    HCT 25.0 05/11/2022    MCV 91.6 05/11/2022    MCH 28.2 05/11/2022    MCHC 30.8 05/11/2022    RDW 15.2 05/11/2022     05/11/2022    MPV 9.8 05/11/2022     CMP:    Lab Results   Component Value Date     05/11/2022    K 4.1 05/11/2022     05/11/2022    CO2 22 05/11/2022    BUN 18 05/11/2022    CREATININE 1.0 05/11/2022    GFRAA >60 05/11/2022    LABGLOM 54 05/11/2022    GLUCOSE 163 05/11/2022    PROT 5.7 05/11/2022    LABALBU 3.4 05/11/2022    CALCIUM 8.7 05/11/2022    BILITOT 0.7 05/11/2022    ALKPHOS 64 05/11/2022    AST 22 05/11/2022    ALT 21 05/11/2022     Magnesium:    Lab Results   Component Value Date    MG 1.8 05/07/2022     Phosphorus:  No results found for: PHOS  Radiology Review:      Chest x-ray May 3, 2022   1. Cardiomegaly. 2. Hazy bilateral airspace disease favored to represent mild pulmonary   edema/vascular congestion. BRIEF SUMMARY OF INITIAL CONSULT:  Briefly, Ms. Jaycob Brown is a 68year old female with a PMH of HTN, CAD with PCI 2017, BLE DVT 11/2021, HLD, GERD who was admitted on April 22, 2022 after she was transferred from [facility] with stroke like symptoms. Patient presented to [facility] with complaint of strokelike symptoms and sudden onset left-sided weakness with aphasia, with MRI indicating right MCA without hemorrhagic conversion. Patient was originally admitted to the medical floor at [facility] and became progressively obtunded with stridorous respiratory failure, was intubated and transferred to MICU 4/26/2022. Patient developed severe epistaxis requiring packing per ENT. Labs on admission were significant for sodium of 141, potassium 3.7, chloride 111, bicarbonate 18, BUN 21, creatinine 1.2, and hemoglobin of 7.1.   We are consulted for worsening CURT and decreased urine output. Notably patient's daughter states that patient has been recently evaluated by nephrology in [place] and underwent blood work and renal ultrasound however did not receive the results yet. Ramipril, diltiazem, atorvastatin, apixaban, and omeprazole are medications patient was taking prior to admission. Problems resolved:  Hypocalcemia, 2/2 vitamin D deficiency. On cholecalciferol, ionized calcium 1.16  Vitamin D deficiency, on cholecalciferol  Hypernatremia, secondary to decreased free water intake patient is NPO. Increase D5 water to 80 cc/h  Alkalemia, pH 7.509, PCO2 28, HCO3 21.8, respiratory alkalosis  Acute epistaxis, nasal packing in place  Acute hypoxic respiratory failure 2/2 mucus plug, intubated on 4/26, extubated on 5/1, S/p bronchoscopy  CURT stage III based on urine output,  volume responsive pre-renal CURT from poor oral intake and volume loss with severe anemia in the setting of ACEi administration. Oliguric. FENa 0.3%. Resolved, renal function improved beyond baseline, 0.9 mg/dL. IMPRESSION/RECOMMENDATIONS:      CKD Stage 2 with proteinuria, Renal US done at Dr. Mohit Smallwood office in [place], [place] shows increased cortical echogenicity consistent with medical renal disease. Renal function stable. HTN, on cardizem and hydralazine, ramipril (on hold)  ------------------------------------------------------------------------    CVA, MRI demonstrates infarcts in right MCA without hemorrhagic conversion. Neurology following, concern for already embolic source. Echo ordered for PFO evaluation to r/o paradoxical embolus. Borderline intracranial atherosclerosis. Historical DVT and current right LE+, patiently chronically anticoagulated on Eliquis at home, was being treated with Lovenox for transition to [address] [place] [address] Road,   Anemia, acute drop in hemoglobin with previous work-up at [facility]. Hemoglobin stable  HLD, on statin   GERD, on omeprazole at home  UTI, 2/2 E. coli s/p ceftriaxone. Reflux? On pantoprazole bid  Diet, TF 35cc/hr with  cc Q4hrs, nightly and oral intake during the day    Plan:    CXR  Bladder scan  Continue Free water flushes to 30cc every 4 hours nightly  Continue to monitor renal function  Continue to monitor H&H, transfuse to keep <7  Continue to monitor blood pressure  For Zio patch on discharge  Discharge planning    Case and plan discussed with Dr. Linda Browning  Electronically signed by PATRICIA Briones CNP on 5/11/2022 at 12:37 PM  Patient seen and examined and discussed with RN  Agree with above as annotated  Ryan Reyna MD FACP

## 2023-04-27 NOTE — PROCEDURES
Date: 4/26/2022  Time: 1230  Patient identity confirmed:  Yes  Indications: Acute respiratory failure  Sedation Standing Order Used: Yes  Preoxygenation: BVM with 100% O2  Laryngoscope size and type Glidescope  Airway introducer used: Yes  Evac: No  Tube size:a 7.5 cuffed  Number of attempts:1   Cords visualized:  [x] Clearly  [] Poorly  Breath sounds present bilaterally: Yes   ETCO2 26 mmhg  ETT to lip: 24  Tube secured with: Tube shah   Chest x-ray ordered: Yes  Difficulties encountered: Vocal cords initially obstructed with large mucus plug  Difficult Airway: No    Performed by: Ayesha Daily DO High Dose Vitamin A Counseling: Side effects reviewed, pt to contact office should one occur.

## 2024-05-01 ENCOUNTER — OUTSIDE SERVICES (OUTPATIENT)
Dept: PRIMARY CARE CLINIC | Age: 79
End: 2024-05-01

## 2024-05-01 DIAGNOSIS — G40.209 LOCALIZATION-RELATED (FOCAL) (PARTIAL) SYMPTOMATIC EPILEPSY AND EPILEPTIC SYNDROMES WITH COMPLEX PARTIAL SEIZURES, NOT INTRACTABLE, WITHOUT STATUS EPILEPTICUS (HCC): ICD-10-CM

## 2024-05-01 DIAGNOSIS — I10 PRIMARY HYPERTENSION: ICD-10-CM

## 2024-05-01 DIAGNOSIS — N39.0 URINARY TRACT INFECTION WITHOUT HEMATURIA, SITE UNSPECIFIED: ICD-10-CM

## 2024-05-01 DIAGNOSIS — R78.81 BACTEREMIA DUE TO KLEBSIELLA PNEUMONIAE: Primary | ICD-10-CM

## 2024-05-01 DIAGNOSIS — Z86.73 HX OF COMPLETED STROKE: ICD-10-CM

## 2024-05-01 DIAGNOSIS — E11.9 TYPE 2 DIABETES MELLITUS WITHOUT COMPLICATION, WITHOUT LONG-TERM CURRENT USE OF INSULIN (HCC): ICD-10-CM

## 2024-05-01 DIAGNOSIS — M06.9 RHEUMATOID ARTHRITIS, INVOLVING UNSPECIFIED SITE, UNSPECIFIED WHETHER RHEUMATOID FACTOR PRESENT (HCC): ICD-10-CM

## 2024-05-01 DIAGNOSIS — B96.1 BACTEREMIA DUE TO KLEBSIELLA PNEUMONIAE: Primary | ICD-10-CM

## 2024-05-03 ASSESSMENT — ENCOUNTER SYMPTOMS
COUGH: 0
GASTROINTESTINAL NEGATIVE: 1
EYE ITCHING: 0
VOICE CHANGE: 0
SINUS PRESSURE: 0
SORE THROAT: 0
EYE DISCHARGE: 0
TROUBLE SWALLOWING: 0
SINUS PAIN: 0
RHINORRHEA: 0
WHEEZING: 0
SHORTNESS OF BREATH: 0
EYE REDNESS: 0

## 2024-05-03 ASSESSMENT — VISUAL ACUITY: OU: 1

## 2024-05-03 NOTE — PROGRESS NOTES
site, unspecified whether rheumatoid factor present (HCC)  7. Hx of completed stroke    Hospital notes reviewed   Chart and medications reviewed   Pt/ot eval and treat   Uti secondary to sepsis on cipro bid   Seizures on keppra bid   Hx of stroke in 2022 left side hemiplegia   RA taking prednisone   Dm 2 blood sugars stable continue medications   Htn blood pressure stable   Check labs   Continue current treatment   Advanced Care Planning: Discussed the patient’s choices for care and treatment in case of a health event that adversely affects decision-making abilities. Also discussed the patient’s long-term treatment options. Reviewed with the patient the appropriate state-specific advance directive documents. Reviewed the process of designating a competent adult as an Agent (or -in-fact) that could take make health care decisions for the patient if incompetent. Patient was asked to complete the declaration forms, if they have not already, either acknowledge the forms by a public notary or an eligible witness and provide a signed copy to the practice office. Per patient she is a full code     Time spent (minutes): 17         Deanna AL MA  am transcribing for Poonam Guzman MD, personally performed the services described in this documentation as scribed by Deanna Rivera and it is both accurate and complete

## 2024-05-08 ENCOUNTER — OUTSIDE SERVICES (OUTPATIENT)
Dept: PRIMARY CARE CLINIC | Age: 79
End: 2024-05-08
Payer: MEDICARE

## 2024-05-08 DIAGNOSIS — I10 PRIMARY HYPERTENSION: ICD-10-CM

## 2024-05-08 DIAGNOSIS — N39.0 URINARY TRACT INFECTION WITHOUT HEMATURIA, SITE UNSPECIFIED: ICD-10-CM

## 2024-05-08 DIAGNOSIS — R78.81 BACTEREMIA DUE TO KLEBSIELLA PNEUMONIAE: Primary | ICD-10-CM

## 2024-05-08 DIAGNOSIS — G40.209 LOCALIZATION-RELATED (FOCAL) (PARTIAL) SYMPTOMATIC EPILEPSY AND EPILEPTIC SYNDROMES WITH COMPLEX PARTIAL SEIZURES, NOT INTRACTABLE, WITHOUT STATUS EPILEPTICUS (HCC): ICD-10-CM

## 2024-05-08 DIAGNOSIS — Z86.73 HX OF COMPLETED STROKE: ICD-10-CM

## 2024-05-08 DIAGNOSIS — B96.1 BACTEREMIA DUE TO KLEBSIELLA PNEUMONIAE: Primary | ICD-10-CM

## 2024-05-08 DIAGNOSIS — M06.9 RHEUMATOID ARTHRITIS, INVOLVING UNSPECIFIED SITE, UNSPECIFIED WHETHER RHEUMATOID FACTOR PRESENT (HCC): ICD-10-CM

## 2024-05-08 DIAGNOSIS — E11.9 TYPE 2 DIABETES MELLITUS WITHOUT COMPLICATION, WITHOUT LONG-TERM CURRENT USE OF INSULIN (HCC): ICD-10-CM

## 2024-05-08 PROCEDURE — 99309 SBSQ NF CARE MODERATE MDM 30: CPT | Performed by: INTERNAL MEDICINE

## 2024-05-21 ASSESSMENT — ENCOUNTER SYMPTOMS
VOICE CHANGE: 0
WHEEZING: 0
SHORTNESS OF BREATH: 0
EYE REDNESS: 0
COUGH: 0
RHINORRHEA: 0
SORE THROAT: 0
TROUBLE SWALLOWING: 0
EYE DISCHARGE: 0
SINUS PRESSURE: 0
SINUS PAIN: 0
EYE ITCHING: 0
GASTROINTESTINAL NEGATIVE: 1

## 2024-05-21 ASSESSMENT — VISUAL ACUITY: OU: 1

## 2024-05-21 NOTE — PROGRESS NOTES
Visit Date: 5/8/24  Samia Maria  1945  female 78 y.o.      Subjective:    CC: Patient presents with uti and weakness. Patient presents for follow up of dm, htn, seizures, and RA.    HPI:  Urinary Tract Infection  This is a new (admitted to the hospital for sepsis secondary to uti on cipro bid) problem. The current episode started 1 to 4 weeks ago. The problem has been gradually improving since onset.   Diabetes  She presents for her follow-up diabetic visit. She has type 2 diabetes mellitus. Her disease course has been stable. Hypoglycemia symptoms include seizures. Pertinent negatives for hypoglycemia include no confusion or nervousness/anxiousness. There are no diabetic associated symptoms. There are no hypoglycemic complications. Diabetic complications include a CVA. Risk factors for coronary artery disease include diabetes mellitus, obesity and hypertension. Current diabetic treatments: taking medications, no medication side effects. She is compliant with treatment most of the time. Her weight is fluctuating minimally. She is following a generally healthy diet. Her home blood glucose trend is fluctuating minimally.   Hypertension  This is a chronic problem. The current episode started more than 1 year ago. The problem is unchanged. The problem is controlled. Pertinent negatives include no shortness of breath. There are no associated agents to hypertension. Risk factors for coronary artery disease include diabetes mellitus and obesity. Treatments tried: taking medications, no medication side effects. The current treatment provides mild improvement. There are no compliance problems.  Hypertensive end-organ damage includes CVA.   Seizures  This is a chronic problem. The current episode started more than 1 year ago. The problem occurs intermittently. The problem has been waxing and waning. Pertinent negatives include no congestion, coughing, myalgias or sore throat. Treatments tried: taking medications, no

## (undated) DEVICE — DEFENDO AIR WATER SUCTION AND BIOPSY VALVE KIT FOR  OLYMPUS: Brand: DEFENDO AIR/WATER/SUCTION AND BIOPSY VALVE

## (undated) DEVICE — KIT PEG 20FR STD PUL EN ACCS DEV ENDOVIVE

## (undated) DEVICE — BITEBLOCK 54FR W/ DENT RIM BLOX

## (undated) DEVICE — SPONGE GZ W4XL4IN RAYON POLY FILL CVR W/ NONWOVEN FAB

## (undated) DEVICE — BINDER ABD UNISX 4 PNL PREM 6INX6INX12IN L XL 4

## (undated) DEVICE — Z DISCONTINUED NO SUB IDED TUBING ETCO2 AD L6.5FT NSL ORAL CVD PRNG NONFLARED TIP OVR